# Patient Record
Sex: FEMALE | Race: WHITE | NOT HISPANIC OR LATINO | Employment: FULL TIME | URBAN - METROPOLITAN AREA
[De-identification: names, ages, dates, MRNs, and addresses within clinical notes are randomized per-mention and may not be internally consistent; named-entity substitution may affect disease eponyms.]

---

## 2017-02-25 ENCOUNTER — PATIENT MESSAGE (OUTPATIENT)
Dept: INTERNAL MEDICINE | Facility: CLINIC | Age: 37
End: 2017-02-25

## 2017-02-25 ENCOUNTER — PATIENT MESSAGE (OUTPATIENT)
Dept: OBSTETRICS AND GYNECOLOGY | Facility: CLINIC | Age: 37
End: 2017-02-25

## 2017-02-27 ENCOUNTER — PATIENT MESSAGE (OUTPATIENT)
Dept: OBSTETRICS AND GYNECOLOGY | Facility: CLINIC | Age: 37
End: 2017-02-27

## 2018-06-01 ENCOUNTER — OFFICE VISIT (OUTPATIENT)
Dept: FAMILY MEDICINE | Facility: CLINIC | Age: 38
End: 2018-06-01
Payer: COMMERCIAL

## 2018-06-01 VITALS
BODY MASS INDEX: 34.61 KG/M2 | OXYGEN SATURATION: 98 % | HEART RATE: 74 BPM | DIASTOLIC BLOOD PRESSURE: 72 MMHG | HEIGHT: 63 IN | SYSTOLIC BLOOD PRESSURE: 108 MMHG | WEIGHT: 195.31 LBS

## 2018-06-01 DIAGNOSIS — R07.9 CHEST PAIN, UNSPECIFIED TYPE: ICD-10-CM

## 2018-06-01 DIAGNOSIS — Z00.00 ROUTINE PHYSICAL EXAMINATION: Primary | ICD-10-CM

## 2018-06-01 DIAGNOSIS — F41.8 DEPRESSION WITH ANXIETY: ICD-10-CM

## 2018-06-01 PROCEDURE — 99999 PR PBB SHADOW E&M-EST. PATIENT-LVL III: CPT | Mod: PBBFAC,,, | Performed by: INTERNAL MEDICINE

## 2018-06-01 PROCEDURE — 99385 PREV VISIT NEW AGE 18-39: CPT | Mod: S$GLB,,, | Performed by: INTERNAL MEDICINE

## 2018-06-01 RX ORDER — CITALOPRAM 20 MG/1
20 TABLET, FILM COATED ORAL DAILY
Qty: 30 TABLET | Refills: 11 | Status: SHIPPED | OUTPATIENT
Start: 2018-06-01 | End: 2019-06-01

## 2018-06-01 RX ORDER — DIAZEPAM 2 MG/1
2 TABLET ORAL DAILY PRN
Qty: 30 TABLET | Refills: 1 | Status: SHIPPED | OUTPATIENT
Start: 2018-06-01 | End: 2019-10-10

## 2018-06-01 NOTE — PROGRESS NOTES
"Subjective:       Patient ID: Eugenie Melendrez is a 37 y.o. female.    Chief Complaint: Annual Exam    Here for routine health maintenance.  New patient to me.   Complains of 7/10 left center chest pain that radiates to her left arm intermittent for the past 1 week.  She was admitted to [facility].  Her cardiac enzymes and CT of her chest were normal.  She had a stress test but I can not see and she does not know the result.  She was DC and told to f/u w her PCP.  It occurs at rest and with exertion since 5 days ago (normall ok with this level of exertion).  It is associated with fatigue - she had to stop her [employer] training when it happened.  It is worse when she lays flat but goes away later in the night.  No palpitations but she felt like her "head was swimming."  She was DC after stress test. She has walked around with no issues since.  No cp today.   Mood good except with her menstrual period, but per pt her fiance states her moods have been "outrageous.:"  She does not feel stressed, but others have stated she is.  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           Review of Systems   Constitutional: Negative for appetite change and fever.   HENT: Negative for nosebleeds and trouble swallowing.    Eyes: Negative for discharge and visual disturbance.   Respiratory: Positive for chest tightness. Negative for choking and shortness of breath.    Cardiovascular: Negative for chest pain and palpitations.   Gastrointestinal: Negative for abdominal pain, nausea and vomiting.   Musculoskeletal: Negative for arthralgias and joint swelling.   Skin: Negative for rash and wound.   Neurological: Negative for dizziness and syncope.   Psychiatric/Behavioral: Negative for confusion and dysphoric mood.       Objective:      Vitals:    06/01/18 1226   BP: 108/72   Pulse: 74     Physical Exam   Constitutional: She " appears well-nourished.   Eyes: Conjunctivae and EOM are normal.   Neck: Trachea normal and normal range of motion. No thyromegaly present.   Cardiovascular: Normal heart sounds.    Edema negative   Pulmonary/Chest: Effort normal and breath sounds normal.   Abdominal: Soft. There is no hepatomegaly.   Musculoskeletal:   ROM normal bilateral  Strength normal bilateral   Neurological: She has normal reflexes. No cranial nerve deficit.   Skin: Skin is warm, dry and intact.   Psychiatric: She has a normal mood and affect.   Alert and Oriented    Vitals reviewed.        Assessment:       1. Routine physical examination    2. Chest pain, unspecified type    3. Depression with anxiety        Plan:       Routine physical examination    Chest pain, unspecified type  -     diazePAM (VALIUM) 2 MG tablet; Take 1 tablet (2 mg total) by mouth daily as needed for Anxiety.  Dispense: 30 tablet; Refill: 1  -     citalopram (CELEXA) 20 MG tablet; Take 1 tablet (20 mg total) by mouth once daily.  Dispense: 30 tablet; Refill: 11    Depression with anxiety  -     diazePAM (VALIUM) 2 MG tablet; Take 1 tablet (2 mg total) by mouth daily as needed for Anxiety.  Dispense: 30 tablet; Refill: 1  -     citalopram (CELEXA) 20 MG tablet; Take 1 tablet (20 mg total) by mouth once daily.  Dispense: 30 tablet; Refill: 11            Medication List with Changes/Refills   Discontinued Medications    CITALOPRAM (CELEXA) 20 MG TABLET    Take 1 tablet (20 mg total) by mouth once daily.    DICLOFENAC SODIUM 1 % GEL    Apply 2 g topically 4 (four) times daily.     Wellness reviewed  Likely stress induced.  Continue current management    Counseled on regular exercise, maintenance of a healthy weight, balanced diet rich in fruits/vegetables and lean protein, and avoidance of unhealthy habits like smoking and excessive alcohol intake.   Also, counseled on importance of being compliant with medication, health appointments, diet and exercise.     Follow-up in  "about 1 year (around 6/1/2019). call Monday if stress test wnl and no exertional cp/ valium improved = clear for     "This note will not be shared with the patient."  "

## 2018-06-04 ENCOUNTER — PATIENT MESSAGE (OUTPATIENT)
Dept: FAMILY MEDICINE | Facility: CLINIC | Age: 38
End: 2018-06-04

## 2018-07-24 ENCOUNTER — OFFICE VISIT (OUTPATIENT)
Dept: OBSTETRICS AND GYNECOLOGY | Facility: CLINIC | Age: 38
End: 2018-07-24
Payer: COMMERCIAL

## 2018-07-24 VITALS
RESPIRATION RATE: 15 BRPM | DIASTOLIC BLOOD PRESSURE: 78 MMHG | SYSTOLIC BLOOD PRESSURE: 126 MMHG | WEIGHT: 191.38 LBS | BODY MASS INDEX: 33.91 KG/M2 | HEIGHT: 63 IN

## 2018-07-24 DIAGNOSIS — D50.0 IRON DEFICIENCY ANEMIA DUE TO CHRONIC BLOOD LOSS: ICD-10-CM

## 2018-07-24 DIAGNOSIS — N94.6 DYSMENORRHEA: ICD-10-CM

## 2018-07-24 DIAGNOSIS — Z12.4 ENCOUNTER FOR PAP SMEAR OF CERVIX WITH HPV DNA COTESTING: Primary | ICD-10-CM

## 2018-07-24 DIAGNOSIS — Z86.718 PERSONAL HISTORY OF DVT (DEEP VEIN THROMBOSIS): ICD-10-CM

## 2018-07-24 DIAGNOSIS — N92.0 MENORRHAGIA WITH REGULAR CYCLE: ICD-10-CM

## 2018-07-24 PROCEDURE — 99999 PR PBB SHADOW E&M-EST. PATIENT-LVL III: CPT | Mod: PBBFAC,,, | Performed by: OBSTETRICS & GYNECOLOGY

## 2018-07-24 PROCEDURE — 99395 PREV VISIT EST AGE 18-39: CPT | Mod: S$GLB,,, | Performed by: OBSTETRICS & GYNECOLOGY

## 2018-07-24 PROCEDURE — 88175 CYTOPATH C/V AUTO FLUID REDO: CPT

## 2018-07-24 PROCEDURE — 87624 HPV HI-RISK TYP POOLED RSLT: CPT

## 2018-07-24 NOTE — PROGRESS NOTES
Chief Complaint   Patient presents with    Well Woman    Dysmenorrhea     with cycle       History of Present Illness: Eugenie Melendrez is a 37 y.o. female that presents today 2018 for well gyn visit. She reports painful heavy first day of the cycle. She reports changing tampon and pad together and changing every 1 hour.       Past Medical History:   Diagnosis Date    Clotting disorder     DVT (deep venous thrombosis) 3/07    was felt to be due to ? Factor V Leiden    Hyperthyroidism 2013    Iron deficiency anemia due to chronic blood loss 2015       Past Surgical History:   Procedure Laterality Date    2 R knee sx; 1 L knee      BRONCHOSCOPY       SECTION  7/30/15    KNEE ARTHROSCOPY      KNEE SURGERY      x3    TUBAL LIGATION  7/30/15       Current Outpatient Prescriptions   Medication Sig Dispense Refill    citalopram (CELEXA) 20 MG tablet Take 1 tablet (20 mg total) by mouth once daily. 30 tablet 11    diazePAM (VALIUM) 2 MG tablet Take 1 tablet (2 mg total) by mouth daily as needed for Anxiety. 30 tablet 1     No current facility-administered medications for this visit.        Review of patient's allergies indicates:   Allergen Reactions    Augmentin [amoxicillin-pot clavulanate] Swelling       Family History   Problem Relation Age of Onset    Breast cancer Maternal Grandmother     Anuerysm Father     Cancer Father         skin    Deep vein thrombosis Father     Hypertension Father     Fibromyalgia Mother     Depression Mother     Anuerysm Paternal Grandfather         AAA    Bipolar disorder Sister     Heart disease Brother     Anuerysm Paternal Aunt         AAA    Diabetes Paternal Grandmother     Ovarian cancer Neg Hx        Social History     Social History    Marital status:      Spouse name: N/A    Number of children: 2    Years of education: N/A     Social History Main Topics    Smoking status: Current Every Day Smoker     Packs/day: 3.00      "Years: 10.00    Smokeless tobacco: Never Used    Alcohol use 1.2 oz/week     2 Glasses of wine per week    Drug use: No    Sexual activity: Yes     Partners: Male     Birth control/ protection: None     Other Topics Concern    None     Social History Narrative    None       OB History    Para Term  AB Living   3 3 2 1   3   SAB TAB Ectopic Multiple Live Births           3      # Outcome Date GA Lbr [place]/2nd Weight Sex Delivery Anes PTL Lv   3 Term 07/30/15 39w0d  3.728 kg (8 lb 3.5 oz) F CS-LTranv EPI N JANNET   2 Term 12/15/12 37w0d  3.204 kg (7 lb 1 oz) F Vag-Spont EPI N JANNET      Birth Comments: System Generated. Please review and update pregnancy details.   1  10/06/08 25w0d  0.822 kg (1 lb 13 oz) F Vag-Spont None Y JANNET      Birth Comments: delivered at 25 IUP, PPROM on a friday with labor on monday.          Review of Symptoms:  GENERAL: Denies weight gain or weight loss. Feeling well overall.   SKIN: Denies rash or lesions.   HEAD: Denies head injury or headache.   NODES: Denies enlarged lymph nodes.   CHEST: Denies chest pain or shortness of breath.   CARDIOVASCULAR: Denies palpitations or left sided chest pain.   ABDOMEN: No abdominal pain, constipation, diarrhea, nausea, vomiting or rectal bleeding.   URINARY: No frequency, dysuria, hematuria, or burning on urination.  HEMATOLOGIC: No easy bruisability or excessive bleeding.   MUSCULOSKELETAL: Denies joint pain or swelling.     /78   Resp 15   Ht 5' 3" (1.6 m)   Wt 86.8 kg (191 lb 5.8 oz)   LMP 2018 (Approximate)   Physical Exam:  APPEARANCE: Well nourished, well developed, in no acute distress.  SKIN: Normal skin turgor, no lesions.  NECK: Neck symmetric without masses   RESPIRATORY: Normal respiratory effort with no retractions or use of accessory muscles  CARDIOVASCULAR: Peripheral vascular system with no swelling no varicosities and palpation of pulses normal  LYMPHATIC: No enlargements of the lymph nodes noted in " the neck, axillae, or groin  ABDOMEN: Soft. No tenderness or masses. No hepatosplenomegaly. No hernias.  BREASTS: Symmetrical, no skin changes or visible lesions. No palpable masses, nipple discharge or adenopathy bilaterally.  PELVIC: Normal external female genitalia without lesions. Normal hair distribution. Adequate perineal body, normal urethral meatus. Urethra with no masses.  Bladder nontender. Vagina moist and well rugated without lesions or discharge. Cervix pink and without lesions. No significant cystocele or rectocele. Bimanual exam showed uterus normal size, shape, position, mobile and nontender. Adnexa without masses or tenderness. Urethra and bladder normal.   EXTREMITIES: No clubbing cyanosis or edema.    ASSESSMENT/PLAN:  Encounter for Pap smear of cervix with HPV DNA cotesting  -     Liquid-based pap smear, screening  -     HPV High Risk Genotypes, PCR    Personal history of DVT (deep vein thrombosis)    Menorrhagia with regular cycle          Patient was counseled today on Pap guidelines, recommendation for pelvic exams, mammograms every other year after the age of 40 and annually after the age of 50, Colonoscopy after the age of 50, Dexa Bone Scan and calcium and vitamin D supplementation in menopause and to see her PCP for other health maintenance.   FOLLOW-UP:prn

## 2018-07-30 LAB
HPV HR 12 DNA CVX QL NAA+PROBE: NEGATIVE
HPV16 AG SPEC QL: NEGATIVE
HPV18 DNA SPEC QL NAA+PROBE: NEGATIVE

## 2018-07-31 ENCOUNTER — LAB VISIT (OUTPATIENT)
Dept: LAB | Facility: HOSPITAL | Age: 38
End: 2018-07-31
Attending: OBSTETRICS & GYNECOLOGY
Payer: COMMERCIAL

## 2018-07-31 ENCOUNTER — PATIENT MESSAGE (OUTPATIENT)
Dept: OBSTETRICS AND GYNECOLOGY | Facility: CLINIC | Age: 38
End: 2018-07-31

## 2018-07-31 DIAGNOSIS — N92.0 MENORRHAGIA WITH REGULAR CYCLE: ICD-10-CM

## 2018-07-31 LAB
BASOPHILS # BLD AUTO: 0.05 K/UL
BASOPHILS NFR BLD: 0.9 %
DIFFERENTIAL METHOD: ABNORMAL
EOSINOPHIL # BLD AUTO: 0.1 K/UL
EOSINOPHIL NFR BLD: 1.4 %
ERYTHROCYTE [DISTWIDTH] IN BLOOD BY AUTOMATED COUNT: 12.9 %
HCT VFR BLD AUTO: 43.6 %
HGB BLD-MCNC: 13.7 G/DL
IMM GRANULOCYTES # BLD AUTO: 0.01 K/UL
IMM GRANULOCYTES NFR BLD AUTO: 0.2 %
LYMPHOCYTES # BLD AUTO: 1.4 K/UL
LYMPHOCYTES NFR BLD: 25.5 %
MCH RBC QN AUTO: 29 PG
MCHC RBC AUTO-ENTMCNC: 31.4 G/DL
MCV RBC AUTO: 92 FL
MONOCYTES # BLD AUTO: 0.4 K/UL
MONOCYTES NFR BLD: 7.7 %
NEUTROPHILS # BLD AUTO: 3.6 K/UL
NEUTROPHILS NFR BLD: 64.3 %
NRBC BLD-RTO: 0 /100 WBC
PLATELET # BLD AUTO: 290 K/UL
PMV BLD AUTO: 10 FL
RBC # BLD AUTO: 4.73 M/UL
TSH SERPL DL<=0.005 MIU/L-ACNC: 0.71 UIU/ML
WBC # BLD AUTO: 5.56 K/UL

## 2018-07-31 PROCEDURE — 85025 COMPLETE CBC W/AUTO DIFF WBC: CPT

## 2018-07-31 PROCEDURE — 84443 ASSAY THYROID STIM HORMONE: CPT

## 2018-07-31 PROCEDURE — 36415 COLL VENOUS BLD VENIPUNCTURE: CPT | Mod: PO

## 2018-08-08 ENCOUNTER — TELEPHONE (OUTPATIENT)
Dept: OBSTETRICS AND GYNECOLOGY | Facility: CLINIC | Age: 38
End: 2018-08-08

## 2018-08-08 NOTE — TELEPHONE ENCOUNTER
Spoke with patient she states per Dr Pino that she will hold off on scheduling ablation until after her pelvic US on 8/13/18.

## 2018-08-13 ENCOUNTER — HOSPITAL ENCOUNTER (OUTPATIENT)
Dept: RADIOLOGY | Facility: HOSPITAL | Age: 38
Discharge: HOME | End: 2018-08-13
Attending: OBSTETRICS & GYNECOLOGY
Payer: COMMERCIAL

## 2018-08-13 DIAGNOSIS — N92.0 MENORRHAGIA WITH REGULAR CYCLE: ICD-10-CM

## 2018-08-13 PROCEDURE — 76830 TRANSVAGINAL US NON-OB: CPT | Mod: 26,,, | Performed by: RADIOLOGY

## 2018-08-13 PROCEDURE — 76856 US EXAM PELVIC COMPLETE: CPT | Mod: TC,PN

## 2018-08-13 PROCEDURE — 76856 US EXAM PELVIC COMPLETE: CPT | Mod: 26,,, | Performed by: RADIOLOGY

## 2018-08-14 ENCOUNTER — PATIENT MESSAGE (OUTPATIENT)
Dept: OBSTETRICS AND GYNECOLOGY | Facility: CLINIC | Age: 38
End: 2018-08-14

## 2018-09-08 ENCOUNTER — PATIENT MESSAGE (OUTPATIENT)
Dept: OBSTETRICS AND GYNECOLOGY | Facility: CLINIC | Age: 38
End: 2018-09-08

## 2018-11-07 ENCOUNTER — PATIENT MESSAGE (OUTPATIENT)
Dept: FAMILY MEDICINE | Facility: CLINIC | Age: 38
End: 2018-11-07

## 2018-11-09 ENCOUNTER — HOSPITAL ENCOUNTER (OUTPATIENT)
Dept: RADIOLOGY | Facility: HOSPITAL | Age: 38
Discharge: HOME | End: 2018-11-09
Attending: NURSE PRACTITIONER
Payer: COMMERCIAL

## 2018-11-09 ENCOUNTER — OFFICE VISIT (OUTPATIENT)
Dept: FAMILY MEDICINE | Facility: CLINIC | Age: 38
End: 2018-11-09
Payer: COMMERCIAL

## 2018-11-09 VITALS
SYSTOLIC BLOOD PRESSURE: 120 MMHG | HEART RATE: 79 BPM | HEIGHT: 63 IN | DIASTOLIC BLOOD PRESSURE: 72 MMHG | TEMPERATURE: 98 F | BODY MASS INDEX: 34.1 KG/M2 | OXYGEN SATURATION: 98 % | WEIGHT: 192.44 LBS

## 2018-11-09 DIAGNOSIS — M79.89 PAIN AND SWELLING OF LOWER EXTREMITY, UNSPECIFIED LATERALITY: ICD-10-CM

## 2018-11-09 DIAGNOSIS — Z86.718 HISTORY OF DVT (DEEP VEIN THROMBOSIS): ICD-10-CM

## 2018-11-09 DIAGNOSIS — M79.604 PAIN IN BOTH LOWER EXTREMITIES: Primary | ICD-10-CM

## 2018-11-09 DIAGNOSIS — M79.606 PAIN AND SWELLING OF LOWER EXTREMITY, UNSPECIFIED LATERALITY: ICD-10-CM

## 2018-11-09 DIAGNOSIS — M79.605 PAIN IN BOTH LOWER EXTREMITIES: Primary | ICD-10-CM

## 2018-11-09 PROCEDURE — 96372 THER/PROPH/DIAG INJ SC/IM: CPT | Mod: S$GLB,,, | Performed by: NURSE PRACTITIONER

## 2018-11-09 PROCEDURE — 99214 OFFICE O/P EST MOD 30 MIN: CPT | Mod: 25,S$GLB,, | Performed by: NURSE PRACTITIONER

## 2018-11-09 PROCEDURE — 3008F BODY MASS INDEX DOCD: CPT | Mod: CPTII,S$GLB,, | Performed by: NURSE PRACTITIONER

## 2018-11-09 PROCEDURE — 93970 EXTREMITY STUDY: CPT | Mod: TC,PO

## 2018-11-09 PROCEDURE — 99999 PR PBB SHADOW E&M-EST. PATIENT-LVL III: CPT | Mod: PBBFAC,,, | Performed by: NURSE PRACTITIONER

## 2018-11-09 PROCEDURE — 93970 EXTREMITY STUDY: CPT | Mod: 26,,, | Performed by: RADIOLOGY

## 2018-11-09 RX ORDER — KETOROLAC TROMETHAMINE 30 MG/ML
30 INJECTION, SOLUTION INTRAMUSCULAR; INTRAVENOUS
Status: COMPLETED | OUTPATIENT
Start: 2018-11-09 | End: 2018-11-09

## 2018-11-09 RX ORDER — PREDNISONE 10 MG/1
TABLET ORAL
Qty: 20 TABLET | Refills: 0 | Status: SHIPPED | OUTPATIENT
Start: 2018-11-09 | End: 2018-12-11

## 2018-11-09 RX ORDER — TIZANIDINE 4 MG/1
4 TABLET ORAL NIGHTLY PRN
Qty: 10 TABLET | Refills: 0 | Status: SHIPPED | OUTPATIENT
Start: 2018-11-09 | End: 2018-11-19

## 2018-11-09 RX ADMIN — KETOROLAC TROMETHAMINE 30 MG: 30 INJECTION, SOLUTION INTRAMUSCULAR; INTRAVENOUS at 01:11

## 2018-11-09 NOTE — PROGRESS NOTES
Subjective:       Patient ID: Eugenie Melendrez is a 38 y.o. female.    Chief Complaint: Leg Pain and Tetnus Shot Needed    Ms. Melendrez is a new patient to me. She presents today for leg pain     HPI   Gradually worsening over the last 1-2 months. Calves, bilaterally, occurs at night while laying in bed resting. Personal history of DVT--symptoms not similar to previous episode. Denies injury/trauma. Has tried otc NSAIDs without relief. Without pain on exertion. Drinks plenty of water. Denies otc supplements  Vitals:    11/09/18 1321   BP: 120/72   Pulse: 79   Temp: 98.2 °F (36.8 °C)     Review of Systems   Constitutional: Negative for diaphoresis and fever.   HENT: Negative for facial swelling and trouble swallowing.    Eyes: Negative for discharge and redness.   Respiratory: Negative for cough and shortness of breath.    Cardiovascular: Negative for chest pain and palpitations.   Gastrointestinal: Negative for abdominal pain and diarrhea.   Genitourinary: Negative for difficulty urinating.   Musculoskeletal: Positive for myalgias. Negative for gait problem.   Skin: Negative for rash and wound.   Neurological: Negative for facial asymmetry and speech difficulty.   Psychiatric/Behavioral: Negative for confusion. The patient is not nervous/anxious.        Past Medical History:   Diagnosis Date    Clotting disorder     DVT (deep venous thrombosis) 3/07    was felt to be due to ? Factor V Leiden    Hyperthyroidism 7/8/2013    Iron deficiency anemia due to chronic blood loss 12/1/2015     Objective:      Physical Exam   Constitutional: She is oriented to person, place, and time. She does not have a sickly appearance. No distress.   HENT:   Head: Normocephalic.   Right Ear: Hearing normal.   Left Ear: Hearing normal.   Nose: Nose normal.   Eyes: Conjunctivae and lids are normal.   Neck: No JVD present. No tracheal deviation present.   Cardiovascular: Normal rate.   dony nonpitting LE edema; without hyperpigmentation     Pulmonary/Chest: Effort normal.   Musculoskeletal: She exhibits no deformity.        Right lower leg: She exhibits tenderness.        Left lower leg: She exhibits tenderness.   Neurological: She is alert and oriented to person, place, and time.   Skin: She is not diaphoretic. No pallor.   Psychiatric: She has a normal mood and affect. Her speech is normal and behavior is normal. Judgment and thought content normal. Cognition and memory are normal.   Nursing note and vitals reviewed.      Assessment:       1. Pain in both lower extremities    2. History of DVT (deep vein thrombosis)    3. Pain and swelling of lower extremity, unspecified laterality        Plan:       Pain in both lower extremities  -     Comprehensive metabolic panel; Future; Expected date: 11/09/2018  -     Sedimentation rate; Future; Expected date: 11/09/2018  -     C-reactive protein; Future; Expected date: 11/09/2018  -     CK; Future; Expected date: 11/09/2018  -     ketorolac injection 30 mg  -     predniSONE (DELTASONE) 10 MG tablet; Take 4 tabs daily for 2 days then 3 tabs daily for 2 days then 2 tabs daily for 2 days then 1 tab daily for 2 days then stop  Dispense: 20 tablet; Refill: 0  -     tiZANidine (ZANAFLEX) 4 MG tablet; Take 1 tablet (4 mg total) by mouth nightly as needed (muscle spasms/pain).  Dispense: 10 tablet; Refill: 0    History of DVT (deep vein thrombosis)  -     US Lower Extremity Veins Bilateral; Future; Expected date: 11/09/2018    Pain and swelling of lower extremity, unspecified laterality  -      Lower Extremity Veins Bilateral; Future; Expected date: 11/09/2018          Follow-up for further evaluation if s/s worsen, fail to improve, or new symptoms arise.       Medication List           Accurate as of 11/9/18  1:54 PM. If you have any questions, ask your nurse or doctor.               START taking these medications    predniSONE 10 MG tablet  Commonly known as:  DELTASONE  Take 4 tabs daily for 2 days then 3 tabs  daily for 2 days then 2 tabs daily for 2 days then 1 tab daily for 2 days then stop  Started by:  Arlene Gomez NP     tiZANidine 4 MG tablet  Commonly known as:  ZANAFLEX  Take 1 tablet (4 mg total) by mouth nightly as needed (muscle spasms/pain).  Started by:  Arlene Gomez NP        CONTINUE taking these medications    citalopram 20 MG tablet  Commonly known as:  CELEXA  Take 1 tablet (20 mg total) by mouth once daily.     diazePAM 2 MG tablet  Commonly known as:  VALIUM  Take 1 tablet (2 mg total) by mouth daily as needed for Anxiety.           Where to Get Your Medications      These medications were sent to [facility]/pharmacy #[id number] - HILDA Millard  [address] [facility] [address] [address]    Phone:  [phone]   · predniSONE 10 MG tablet  · tiZANidine 4 MG tablet

## 2018-11-10 ENCOUNTER — PATIENT MESSAGE (OUTPATIENT)
Dept: FAMILY MEDICINE | Facility: CLINIC | Age: 38
End: 2018-11-10

## 2018-11-12 ENCOUNTER — PATIENT MESSAGE (OUTPATIENT)
Dept: FAMILY MEDICINE | Facility: CLINIC | Age: 38
End: 2018-11-12

## 2018-11-12 DIAGNOSIS — M79.89 PAIN AND SWELLING OF LOWER EXTREMITY, UNSPECIFIED LATERALITY: Primary | ICD-10-CM

## 2018-11-12 DIAGNOSIS — M79.606 PAIN AND SWELLING OF LOWER EXTREMITY, UNSPECIFIED LATERALITY: Primary | ICD-10-CM

## 2018-11-12 DIAGNOSIS — Z13.220 SCREENING, LIPID: ICD-10-CM

## 2018-11-20 ENCOUNTER — PATIENT MESSAGE (OUTPATIENT)
Dept: FAMILY MEDICINE | Facility: CLINIC | Age: 38
End: 2018-11-20

## 2018-11-21 ENCOUNTER — HOSPITAL ENCOUNTER (OUTPATIENT)
Dept: RADIOLOGY | Facility: HOSPITAL | Age: 38
Discharge: HOME | End: 2018-11-21
Attending: NURSE PRACTITIONER
Payer: COMMERCIAL

## 2018-11-21 ENCOUNTER — PATIENT MESSAGE (OUTPATIENT)
Dept: FAMILY MEDICINE | Facility: CLINIC | Age: 38
End: 2018-11-21

## 2018-11-21 DIAGNOSIS — M79.89 PAIN AND SWELLING OF LOWER EXTREMITY, UNSPECIFIED LATERALITY: ICD-10-CM

## 2018-11-21 DIAGNOSIS — M79.606 PAIN AND SWELLING OF LOWER EXTREMITY, UNSPECIFIED LATERALITY: ICD-10-CM

## 2018-11-21 PROCEDURE — 93922 UPR/L XTREMITY ART 2 LEVELS: CPT | Mod: 26,,, | Performed by: RADIOLOGY

## 2018-11-21 PROCEDURE — 93922 UPR/L XTREMITY ART 2 LEVELS: CPT | Mod: TC,PO

## 2018-11-21 PROCEDURE — 93925 LOWER EXTREMITY STUDY: CPT | Mod: 26,,, | Performed by: RADIOLOGY

## 2018-11-24 ENCOUNTER — LAB VISIT (OUTPATIENT)
Dept: LAB | Facility: HOSPITAL | Age: 38
End: 2018-11-24
Attending: INTERNAL MEDICINE
Payer: COMMERCIAL

## 2018-11-24 DIAGNOSIS — M79.606 PAIN AND SWELLING OF LOWER EXTREMITY, UNSPECIFIED LATERALITY: ICD-10-CM

## 2018-11-24 DIAGNOSIS — Z13.220 SCREENING, LIPID: ICD-10-CM

## 2018-11-24 DIAGNOSIS — M79.89 PAIN AND SWELLING OF LOWER EXTREMITY, UNSPECIFIED LATERALITY: ICD-10-CM

## 2018-11-24 LAB
CHOLEST SERPL-MCNC: 144 MG/DL
CHOLEST/HDLC SERPL: 3.5 {RATIO}
HDLC SERPL-MCNC: 41 MG/DL
HDLC SERPL: 28.5 %
LDLC SERPL CALC-MCNC: 87.2 MG/DL
NONHDLC SERPL-MCNC: 103 MG/DL
TRIGL SERPL-MCNC: 79 MG/DL

## 2018-11-24 PROCEDURE — 36415 COLL VENOUS BLD VENIPUNCTURE: CPT | Mod: PO

## 2018-11-24 PROCEDURE — 80061 LIPID PANEL: CPT

## 2018-11-26 ENCOUNTER — LAB VISIT (OUTPATIENT)
Dept: LAB | Facility: HOSPITAL | Age: 38
End: 2018-11-26
Attending: NURSE PRACTITIONER
Payer: COMMERCIAL

## 2018-11-26 DIAGNOSIS — M79.89 LEG SWELLING: ICD-10-CM

## 2018-11-26 DIAGNOSIS — M79.89 LEG SWELLING: Primary | ICD-10-CM

## 2018-11-26 LAB — BNP SERPL-MCNC: 21 PG/ML

## 2018-11-26 PROCEDURE — 36415 COLL VENOUS BLD VENIPUNCTURE: CPT | Mod: PO

## 2018-11-26 PROCEDURE — 83880 ASSAY OF NATRIURETIC PEPTIDE: CPT

## 2018-11-27 DIAGNOSIS — M79.89 LEG SWELLING: Primary | ICD-10-CM

## 2018-12-11 ENCOUNTER — OFFICE VISIT (OUTPATIENT)
Dept: PODIATRY | Facility: CLINIC | Age: 38
End: 2018-12-11
Payer: COMMERCIAL

## 2018-12-11 VITALS
HEART RATE: 83 BPM | DIASTOLIC BLOOD PRESSURE: 75 MMHG | TEMPERATURE: 98 F | WEIGHT: 192 LBS | OXYGEN SATURATION: 97 % | HEIGHT: 63 IN | SYSTOLIC BLOOD PRESSURE: 108 MMHG | BODY MASS INDEX: 34.02 KG/M2

## 2018-12-11 DIAGNOSIS — R60.0 LOWER EXTREMITY EDEMA: Primary | ICD-10-CM

## 2018-12-11 PROCEDURE — 99203 OFFICE O/P NEW LOW 30 MIN: CPT | Mod: S$GLB,,, | Performed by: PODIATRIST

## 2018-12-11 PROCEDURE — 3008F BODY MASS INDEX DOCD: CPT | Mod: CPTII,S$GLB,, | Performed by: PODIATRIST

## 2018-12-11 PROCEDURE — 99999 PR PBB SHADOW E&M-EST. PATIENT-LVL III: CPT | Mod: PBBFAC,,, | Performed by: PODIATRIST

## 2018-12-11 NOTE — LETTER
December 20, 2018      Arlene Gomez, MEGAN  [address] [facility] [address] [place] [address]           [facility]  [address] [facility] [address]  Phone: [phone]  Fax: [phone]          Patient: Eugenie Melendrez   MR Number: [id number]   YOB: 1980   Date of Visit: 12/11/2018       Dear Arlene Gomez:    Thank you for referring Eugenie Melendrez to me for evaluation. Attached you will find relevant portions of my assessment and plan of care.    If you have questions, please do not hesitate to call me. I look forward to following Eugenie Melendrez along with you.    Sincerely,    Swapna Hernandez DPM    Enclosure  CC:  No Recipients    If you would like to receive this communication electronically, please contact [email or web address] or [phone] to request more information on [facility] access.    For providers and/or their staff who would like to refer a patient to [facility], please contact us through our one-stop-shop provider referral line, [facility] , at [phone].    If you feel you have received this communication in error or would no longer like to receive these types of communications, please e-mail [email or web address]

## 2019-05-28 ENCOUNTER — PATIENT MESSAGE (OUTPATIENT)
Dept: FAMILY MEDICINE | Facility: CLINIC | Age: 39
End: 2019-05-28

## 2019-05-28 ENCOUNTER — OFFICE VISIT (OUTPATIENT)
Dept: FAMILY MEDICINE | Facility: CLINIC | Age: 39
End: 2019-05-28
Payer: COMMERCIAL

## 2019-05-28 VITALS
OXYGEN SATURATION: 97 % | SYSTOLIC BLOOD PRESSURE: 114 MMHG | BODY MASS INDEX: 34.53 KG/M2 | HEIGHT: 63 IN | DIASTOLIC BLOOD PRESSURE: 62 MMHG | RESPIRATION RATE: 14 BRPM | HEART RATE: 55 BPM | WEIGHT: 194.88 LBS | TEMPERATURE: 99 F

## 2019-05-28 DIAGNOSIS — N39.0 URINARY TRACT INFECTION WITH HEMATURIA, SITE UNSPECIFIED: Primary | ICD-10-CM

## 2019-05-28 DIAGNOSIS — J02.9 PHARYNGITIS, UNSPECIFIED ETIOLOGY: ICD-10-CM

## 2019-05-28 DIAGNOSIS — R31.9 URINARY TRACT INFECTION WITH HEMATURIA, SITE UNSPECIFIED: Primary | ICD-10-CM

## 2019-05-28 DIAGNOSIS — L29.9 PRURITUS: ICD-10-CM

## 2019-05-28 LAB
BILIRUB SERPL-MCNC: NORMAL MG/DL
BLOOD URINE, POC: NORMAL
COLOR, POC UA: NORMAL
CTP QC/QA: YES
GLUCOSE UR QL STRIP: NORMAL
KETONES UR QL STRIP: NEGATIVE
LEUKOCYTE ESTERASE URINE, POC: NORMAL
NITRITE, POC UA: POSITIVE
PH, POC UA: 5
PROTEIN, POC: NORMAL
S PYO RRNA THROAT QL PROBE: NEGATIVE
SPECIFIC GRAVITY, POC UA: 1.02
UROBILINOGEN, POC UA: NORMAL

## 2019-05-28 PROCEDURE — 87880 STREP A ASSAY W/OPTIC: CPT | Mod: QW,S$GLB,, | Performed by: NURSE PRACTITIONER

## 2019-05-28 PROCEDURE — 81002 POCT URINE DIPSTICK WITHOUT MICROSCOPE: ICD-10-PCS | Mod: S$GLB,,, | Performed by: NURSE PRACTITIONER

## 2019-05-28 PROCEDURE — 99214 OFFICE O/P EST MOD 30 MIN: CPT | Mod: S$GLB,,, | Performed by: NURSE PRACTITIONER

## 2019-05-28 PROCEDURE — 87186 SC STD MICRODIL/AGAR DIL: CPT

## 2019-05-28 PROCEDURE — 99214 PR OFFICE/OUTPT VISIT, EST, LEVL IV, 30-39 MIN: ICD-10-PCS | Mod: S$GLB,,, | Performed by: NURSE PRACTITIONER

## 2019-05-28 PROCEDURE — 3008F BODY MASS INDEX DOCD: CPT | Mod: CPTII,S$GLB,, | Performed by: NURSE PRACTITIONER

## 2019-05-28 PROCEDURE — 99999 PR PBB SHADOW E&M-EST. PATIENT-LVL IV: ICD-10-PCS | Mod: PBBFAC,,, | Performed by: NURSE PRACTITIONER

## 2019-05-28 PROCEDURE — 81002 URINALYSIS NONAUTO W/O SCOPE: CPT | Mod: S$GLB,,, | Performed by: NURSE PRACTITIONER

## 2019-05-28 PROCEDURE — 87880 POCT RAPID STREP A: ICD-10-PCS | Mod: QW,S$GLB,, | Performed by: NURSE PRACTITIONER

## 2019-05-28 PROCEDURE — 99999 PR PBB SHADOW E&M-EST. PATIENT-LVL IV: CPT | Mod: PBBFAC,,, | Performed by: NURSE PRACTITIONER

## 2019-05-28 PROCEDURE — 87088 URINE BACTERIA CULTURE: CPT

## 2019-05-28 PROCEDURE — 87070 CULTURE OTHR SPECIMN AEROBIC: CPT

## 2019-05-28 PROCEDURE — 87086 URINE CULTURE/COLONY COUNT: CPT

## 2019-05-28 PROCEDURE — 3008F PR BODY MASS INDEX (BMI) DOCUMENTED: ICD-10-PCS | Mod: CPTII,S$GLB,, | Performed by: NURSE PRACTITIONER

## 2019-05-28 PROCEDURE — 87077 CULTURE AEROBIC IDENTIFY: CPT

## 2019-05-28 RX ORDER — DOXYCYCLINE HYCLATE 100 MG
100 TABLET ORAL EVERY 12 HOURS
Qty: 14 TABLET | Refills: 0 | Status: SHIPPED | OUTPATIENT
Start: 2019-05-28 | End: 2019-05-30 | Stop reason: ALTCHOICE

## 2019-05-28 NOTE — PROGRESS NOTES
Subjective:       Patient ID: Eugenie Melendrez is a 38 y.o. female.    Chief Complaint: Sore Throat (hurts to swallow,); Itching (all over); and possible UTI    Ms. Melendrez is a known patient to me. She presents today for multiple concerns    Sore Throat    This is a new problem. The current episode started yesterday. The problem has been unchanged. There has been no fever. Pertinent negatives include no abdominal pain, coughing, diarrhea, shortness of breath, trouble swallowing or vomiting. She has tried nothing for the symptoms.   Itching   This is a new problem. The current episode started yesterday. The problem occurs constantly. The problem has been gradually worsening. Associated symptoms include a sore throat. Pertinent negatives include no abdominal pain, chest pain, coughing, diaphoresis, fever, rash or vomiting. She has tried nothing for the symptoms.   Urinary Tract Infection    This is a new problem. The current episode started in the past 7 days. The problem occurs every urination. The problem has been gradually worsening. Pertinent negatives include no vomiting or rash. Associated symptoms comments: Foul smelling urine. She has tried nothing for the symptoms.     Denies food allergies  Denies contact with plants or known allergens   Vitals:    05/28/19 0747   BP: 114/62   Pulse: (!) 55   Resp: 14   Temp: 98.7 °F (37.1 °C)     Review of Systems   Constitutional: Negative for diaphoresis and fever.   HENT: Positive for sore throat. Negative for facial swelling and trouble swallowing.    Eyes: Negative for discharge and redness.   Respiratory: Negative for cough, shortness of breath and wheezing.    Cardiovascular: Negative for chest pain and palpitations.   Gastrointestinal: Negative for abdominal pain, diarrhea and vomiting.   Genitourinary: Negative for difficulty urinating.        Foul smelling urine   Musculoskeletal: Negative for gait problem.   Skin: Positive for itching. Negative for pallor and  rash.   Neurological: Negative for facial asymmetry and speech difficulty.   Psychiatric/Behavioral: Negative for confusion. The patient is not nervous/anxious.        Past Medical History:   Diagnosis Date    Clotting disorder     DVT (deep venous thrombosis) 3/07    was felt to be due to ? Factor V Leiden    Hyperthyroidism 7/8/2013    Iron deficiency anemia due to chronic blood loss 12/1/2015     Objective:      Physical Exam   Constitutional: She is oriented to person, place, and time. She does not have a sickly appearance. No distress.   HENT:   Head: Normocephalic.   Right Ear: Hearing, tympanic membrane, external ear and ear canal normal.   Left Ear: Hearing, tympanic membrane, external ear and ear canal normal.   Nose: Nose normal.   Mouth/Throat: Uvula is midline, oropharynx is clear and moist and mucous membranes are normal.   Eyes: Conjunctivae and lids are normal.   Neck: No JVD present. No tracheal deviation present.   Cardiovascular: Normal rate.   Pulmonary/Chest: Effort normal. She has no wheezes.   Abdominal: Normal appearance. She exhibits no distension. There is no tenderness.   Musculoskeletal: She exhibits no deformity.   Neurological: She is alert and oriented to person, place, and time.   Skin: No rash noted. She is not diaphoretic. No pallor.   Psychiatric: She has a normal mood and affect. Her speech is normal and behavior is normal. Judgment and thought content normal. Cognition and memory are normal.   Nursing note and vitals reviewed.      Assessment:       1. Urinary tract infection with hematuria, site unspecified    2. Pharyngitis, unspecified etiology    3. Pruritus        Plan:       Urinary tract infection with hematuria, site unspecified  -     POCT URINE DIPSTICK WITHOUT MICROSCOPE  -     doxycycline (VIBRA-TABS) 100 MG tablet; Take 1 tablet (100 mg total) by mouth every 12 (twelve) hours. for 7 days  Dispense: 14 tablet; Refill: 0  -     Urine culture; Future; Expected date:  05/28/2019    Pharyngitis, unspecified etiology  -     POCT Rapid Strep A  -     Throat culture    Pruritus   Educated on otc medications, supportive care    Educated on medication safety, supportive care, return precautions    Follow up for further evaluation if s/s worsen, fail to improve, or new symptoms arise.    Medication List with Changes/Refills   New Medications    DOXYCYCLINE (VIBRA-TABS) 100 MG TABLET    Take 1 tablet (100 mg total) by mouth every 12 (twelve) hours. for 7 days   Current Medications    CITALOPRAM (CELEXA) 20 MG TABLET    Take 1 tablet (20 mg total) by mouth once daily.    DIAZEPAM (VALIUM) 2 MG TABLET    Take 1 tablet (2 mg total) by mouth daily as needed for Anxiety.

## 2019-05-28 NOTE — LETTER
May 28, 2019      [facility]  [address] [facility] [address] [place] [address]  Phone: [phone]  Fax: [phone]       Patient: Eugenie Melendrez   YOB: 1980  Date of Visit: 05/28/2019    To Whom It May Concern:    SHAJI Melendrez  was at [facility] on 05/28/2019. She may return to work on 5/31/19 with no restrictions. If you have any questions or concerns, or if I can be of further assistance, please do not hesitate to contact me.    Sincerely,        Arlene Gomez NP

## 2019-05-28 NOTE — PATIENT INSTRUCTIONS
"Take benadryl at night (can make you drowsy)  Take daytime antihistamine (Claritin, zyrtec, or allegra)  Take Pepcid as directed     OK to get generic medications         Bladder Infection, Female (Adult)    Urine is normally doesn't have any bacteria in it. But bacteria can get into the urinary tract from the skin around the rectum. Or they can travel in the blood from elsewhere in the body. Once they are in your urinary tract, they can cause infection in the urethra (urethritis), the bladder (cystitis), or the kidneys (pyelonephritis).  The most common place for an infection is in the bladder. This is called a bladder infection. This is one of the most common infections in women. Most bladder infections are easily treated. They are not serious unless the infection spreads to the kidney.  The phrases "bladder infection," "UTI," and "cystitis" are often used to describe the same thing. But they are not always the same. Cystitis is an inflammation of the bladder. The most common cause of cystitis is an infection.  Symptoms  The infection causes inflammation in the urethra and bladder. This causes many of the symptoms. The most common symptoms of a bladder infection are:  · Pain or burning when urinating  · Having to urinate more often than usual  · Urgent need to urinate  · Only a small amount of urine comes out  · Blood in urine  · Abdominal discomfort. This is usually in the lower abdomen above the pubic bone.  · Cloudy urine  · Strong- or bad-smelling urine  · Unable to urinate (urinary retention)  · Unable to hold urine in (urinary incontinence)  · Fever  · Loss of appetite  · Confusion (in older adults)  Causes  Bladder infections are not contagious. You can't get one from someone else, from a toilet seat, or from sharing a bath.  The most common cause of bladder infections is bacteria from the bowels. The bacteria get onto the skin around the opening of the urethra. From there, they can get into the urine and " travel up to the bladder, causing inflammation and infection. This usually happens because of:  · Wiping improperly after urinating. Always wipe from front to back.  · Bowel incontinence  · Pregnancy  · Procedures such as having a catheter inserted  · Older age  · Not emptying your bladder. This can allow bacteria a chance to grow in your urine.  · Dehydration  · Constipation  · Sex  · Use of a diaphragm for birth control   Treatment  Bladder infections are diagnosed by a urine test. They are treated with antibiotics and usually clear up quickly without complications. Treatment helps prevent a more serious kidney infection.  Medicines  Medicines can help in the treatment of a bladder infection:  · Take antibiotics until they are used up, even if you feel better. It is important to finish them to make sure the infection has cleared.  · You can use acetaminophen or ibuprofen for pain, fever, or discomfort, unless another medicine was prescribed. If you have chronic liver or kidney disease, talk with your healthcare provider before using these medicines. Also talk with your provider if you've ever had a stomach ulcer or gastrointestinal bleeding, or are taking blood-thinner medicines.  · If you are given phenazopydridine to reduce burning with urination, it will cause your urine to become a bright orange color. This can stain clothing.  Care and prevention  These self-care steps can help prevent future infections:  · Drink plenty of fluids to prevent dehydration and flush out your bladder. Do this unless you must restrict fluids for other health reasons, or your doctor told you not to.  · Proper cleaning after going to the bathroom is important. Wipe from front to back after using the toilet to prevent the spread of bacteria.  · Urinate more often. Don't try to hold urine in for a long time.  · Wear loose-fitting clothes and cotton underwear. Avoid tight-fitting pants.  · Improve your diet and prevent constipation. Eat  more fresh fruit and vegetables, and fiber, and less junk and fatty foods.  · Avoid sex until your symptoms are gone.  · Avoid caffeine, alcohol, and spicy foods. These can irritate your bladder.  · Urinate right after intercourse to flush out your bladder.  · If you use birth control pills and have frequent bladder infections, discuss it with your doctor.  Follow-up care  Call your healthcare provider if all symptoms are not gone after 3 days of treatment. This is especially important if you have repeat infections.  If a culture was done, you will be told if your treatment needs to be changed. If directed, you can call to find out the results.  If X-rays were done, you will be told if the results will affect your treatment.  Call 911  Call 911 if any of the following occur:  · Trouble breathing  · Hard to wake up or confusion  · Fainting or loss of consciousness  · Rapid heart rate  When to seek medical advice  Call your healthcare provider right away if any of these occur:  · Fever of 100.4ºF (38.0ºC) or higher, or as directed by your healthcare provider  · Symptoms are not better by the third day of treatment  · Back or belly (abdominal) pain that gets worse  · Repeated vomiting, or unable to keep medicine down  · Weakness or dizziness  · Vaginal discharge  · Pain, redness, or swelling in the outer vaginal area (labia)  Date Last Reviewed: 10/1/2016  © 7851-2208 [employer]. [address] [place] [address], [place] [address]. All rights reserved. This information is not intended as a substitute for professional medical care. Always follow your healthcare professional's instructions.

## 2019-05-30 ENCOUNTER — PATIENT MESSAGE (OUTPATIENT)
Dept: FAMILY MEDICINE | Facility: CLINIC | Age: 39
End: 2019-05-30

## 2019-05-30 DIAGNOSIS — R31.9 URINARY TRACT INFECTION WITH HEMATURIA, SITE UNSPECIFIED: Primary | ICD-10-CM

## 2019-05-30 DIAGNOSIS — N39.0 URINARY TRACT INFECTION WITH HEMATURIA, SITE UNSPECIFIED: Primary | ICD-10-CM

## 2019-05-30 LAB — BACTERIA THROAT CULT: NORMAL

## 2019-05-30 RX ORDER — SULFAMETHOXAZOLE AND TRIMETHOPRIM 800; 160 MG/1; MG/1
1 TABLET ORAL 2 TIMES DAILY
Qty: 14 TABLET | Refills: 0 | Status: SHIPPED | OUTPATIENT
Start: 2019-05-30 | End: 2019-05-31 | Stop reason: ALTCHOICE

## 2019-05-30 NOTE — LETTER
May 30, 2019      [facility]  [address] [facility] [address] [place] [address]  Phone: [phone]  Fax: [phone]       Patient: Eugenie Melendrez   YOB: 1980  Date of Visit: 05/30/2019    To Whom It May Concern:    SHAJI Melendrez  was at [facility] on 5/28/19. She may return to work on 6/1/19 with no restrictions. If you have any questions or concerns, or if I can be of further assistance, please do not hesitate to contact me.    Sincerely,    Arlene Gomez NP

## 2019-05-31 ENCOUNTER — PATIENT MESSAGE (OUTPATIENT)
Dept: FAMILY MEDICINE | Facility: CLINIC | Age: 39
End: 2019-05-31

## 2019-05-31 DIAGNOSIS — N39.0 URINARY TRACT INFECTION WITH HEMATURIA, SITE UNSPECIFIED: Primary | ICD-10-CM

## 2019-05-31 DIAGNOSIS — R31.9 URINARY TRACT INFECTION WITH HEMATURIA, SITE UNSPECIFIED: Primary | ICD-10-CM

## 2019-05-31 LAB — BACTERIA UR CULT: NORMAL

## 2019-05-31 RX ORDER — NITROFURANTOIN (MACROCRYSTALS) 100 MG/1
100 CAPSULE ORAL EVERY 6 HOURS
Qty: 20 CAPSULE | Refills: 0 | Status: SHIPPED | OUTPATIENT
Start: 2019-05-31 | End: 2019-06-05

## 2019-05-31 NOTE — TELEPHONE ENCOUNTER
Patient is having a reaction to the antibiotics. The antibiotics, itching, nausea. She wants a call from [facility] or advise on what to do.Please advise?

## 2019-05-31 NOTE — TELEPHONE ENCOUNTER
Nitrofurantoin sent to pharmacy    Unable to use cipro or levaquin due to potential interaction with celexa  PCN allergy

## 2019-07-01 ENCOUNTER — HOSPITAL ENCOUNTER (OUTPATIENT)
Dept: RADIOLOGY | Facility: HOSPITAL | Age: 39
Discharge: HOME | End: 2019-07-01
Attending: NURSE PRACTITIONER
Payer: COMMERCIAL

## 2019-07-01 ENCOUNTER — OFFICE VISIT (OUTPATIENT)
Dept: FAMILY MEDICINE | Facility: CLINIC | Age: 39
End: 2019-07-01
Payer: COMMERCIAL

## 2019-07-01 VITALS
WEIGHT: 190.25 LBS | DIASTOLIC BLOOD PRESSURE: 70 MMHG | BODY MASS INDEX: 33.71 KG/M2 | HEIGHT: 63 IN | SYSTOLIC BLOOD PRESSURE: 112 MMHG | HEART RATE: 88 BPM | OXYGEN SATURATION: 98 % | TEMPERATURE: 98 F

## 2019-07-01 DIAGNOSIS — R39.9 UTI SYMPTOMS: ICD-10-CM

## 2019-07-01 DIAGNOSIS — R10.9 FLANK PAIN: ICD-10-CM

## 2019-07-01 DIAGNOSIS — R31.9 HEMATURIA, UNSPECIFIED TYPE: ICD-10-CM

## 2019-07-01 DIAGNOSIS — R31.9 HEMATURIA, UNSPECIFIED TYPE: Primary | ICD-10-CM

## 2019-07-01 LAB
BILIRUB SERPL-MCNC: ABNORMAL MG/DL
BLOOD URINE, POC: ABNORMAL
COLOR, POC UA: ABNORMAL
GLUCOSE UR QL STRIP: NORMAL
KETONES UR QL STRIP: ABNORMAL
LEUKOCYTE ESTERASE URINE, POC: ABNORMAL
NITRITE, POC UA: ABNORMAL
PH, POC UA: 5
PROTEIN, POC: ABNORMAL
SPECIFIC GRAVITY, POC UA: 1.01
UROBILINOGEN, POC UA: NORMAL

## 2019-07-01 PROCEDURE — 81002 POCT URINE DIPSTICK WITHOUT MICROSCOPE: ICD-10-PCS | Mod: S$GLB,,, | Performed by: NURSE PRACTITIONER

## 2019-07-01 PROCEDURE — 74176 CT ABD & PELVIS W/O CONTRAST: CPT | Mod: TC,PO

## 2019-07-01 PROCEDURE — 99214 OFFICE O/P EST MOD 30 MIN: CPT | Mod: 25,S$GLB,, | Performed by: NURSE PRACTITIONER

## 2019-07-01 PROCEDURE — 99999 PR PBB SHADOW E&M-EST. PATIENT-LVL III: ICD-10-PCS | Mod: PBBFAC,,, | Performed by: NURSE PRACTITIONER

## 2019-07-01 PROCEDURE — 81002 URINALYSIS NONAUTO W/O SCOPE: CPT | Mod: S$GLB,,, | Performed by: NURSE PRACTITIONER

## 2019-07-01 PROCEDURE — 74176 CT RENAL STONE STUDY ABD PELVIS WO: ICD-10-PCS | Mod: 26,,, | Performed by: RADIOLOGY

## 2019-07-01 PROCEDURE — 74176 CT ABD & PELVIS W/O CONTRAST: CPT | Mod: 26,,, | Performed by: RADIOLOGY

## 2019-07-01 PROCEDURE — 99214 PR OFFICE/OUTPT VISIT, EST, LEVL IV, 30-39 MIN: ICD-10-PCS | Mod: 25,S$GLB,, | Performed by: NURSE PRACTITIONER

## 2019-07-01 PROCEDURE — 3008F BODY MASS INDEX DOCD: CPT | Mod: CPTII,S$GLB,, | Performed by: NURSE PRACTITIONER

## 2019-07-01 PROCEDURE — 3008F PR BODY MASS INDEX (BMI) DOCUMENTED: ICD-10-PCS | Mod: CPTII,S$GLB,, | Performed by: NURSE PRACTITIONER

## 2019-07-01 PROCEDURE — 99999 PR PBB SHADOW E&M-EST. PATIENT-LVL III: CPT | Mod: PBBFAC,,, | Performed by: NURSE PRACTITIONER

## 2019-07-01 RX ORDER — CITALOPRAM 20 MG/1
20 TABLET, FILM COATED ORAL
COMMUNITY
End: 2019-10-10

## 2019-07-01 NOTE — LETTER
July 1, 2019      [facility]  [address]  [place] [address]  Phone: [phone]  Fax: [phone]       Patient: Eugenie Melendrez   YOB: 1980  Date of Visit: 07/01/2019    To Whom It May Concern:    SHAJI Melendrez  was at [facility] on 07/01/2019. Please excuse her from work from Saturday 6/29/19 to 7/7/19. She may return to work 7/8/19 with no  restrictions. If you have any questions or concerns, or if I can be of further assistance, please do not hesitate to contact me.    Sincerely,        Arlene Gomez NP

## 2019-07-01 NOTE — PROGRESS NOTES
Subjective:       Patient ID: Eugenie Melendrez is a 38 y.o. female.    Chief Complaint: Urinary Tract Infection    Ms. Melendrez is a known patient to me. She presents today for possible UTI    HPI   Seen at [facility] ED on 6/29/19--diagnosed with UTI, DCd home with Rx Keflex, zofran and naprosyn. Was told to FU with PCP 2-3 days. She reports keflex was too expensive, started taking old Rx. Urine culture from ED visit with no growth. She reports left flank pain and abdominal pain.  LMP 6/10/19  Vitals:    07/01/19 1453   BP: 112/70   Pulse: 88   Temp: 98.3 °F (36.8 °C)     Review of Systems   Constitutional: Negative for diaphoresis and fever.   HENT: Negative for facial swelling and trouble swallowing.    Eyes: Negative for discharge and redness.   Respiratory: Negative for cough and shortness of breath.    Cardiovascular: Negative for chest pain and palpitations.   Gastrointestinal: Positive for abdominal pain. Negative for diarrhea, nausea and vomiting.   Genitourinary: Positive for flank pain. Negative for difficulty urinating.   Musculoskeletal: Negative for gait problem.   Skin: Negative for pallor and rash.   Neurological: Negative for facial asymmetry and speech difficulty.   Psychiatric/Behavioral: Negative for confusion. The patient is not nervous/anxious.        Past Medical History:   Diagnosis Date    Clotting disorder     DVT (deep venous thrombosis) 3/07    was felt to be due to ? Factor V Leiden    Hyperthyroidism 7/8/2013    Iron deficiency anemia due to chronic blood loss 12/1/2015     Objective:      Physical Exam   Constitutional: She is oriented to person, place, and time. She does not have a sickly appearance. No distress.   HENT:   Head: Normocephalic.   Right Ear: Hearing normal.   Left Ear: Hearing normal.   Nose: Nose normal.   Eyes: Conjunctivae and lids are normal.   Neck: No JVD present. No tracheal deviation present.   Cardiovascular: Normal rate and normal heart sounds.    Pulmonary/Chest: Effort normal and breath sounds normal.   Abdominal: Normal appearance. She exhibits no distension. There is tenderness. There is CVA tenderness.   Musculoskeletal: She exhibits no deformity.   Neurological: She is alert and oriented to person, place, and time.   Skin: She is not diaphoretic. No pallor.   Psychiatric: She has a normal mood and affect. Her speech is normal and behavior is normal. Judgment and thought content normal. Cognition and memory are normal.   Nursing note and vitals reviewed.      Assessment:       1. Hematuria, unspecified type    2. UTI symptoms    3. Flank pain        Plan:       Hematuria, unspecified type  -     CT Renal Stone Study ABD Pelvis WO; Future; Expected date: 07/01/2019  -     Cancel: PREGNANCY TEST, URINE RAPID; Future; Expected date: 07/01/2019  -     PREGNANCY TEST, URINE RAPID    UTI symptoms  -     POCT URINE DIPSTICK WITHOUT MICROSCOPE    Flank pain  -     CT Renal Stone Study ABD Pelvis WO; Future; Expected date: 07/01/2019  -     Cancel: PREGNANCY TEST, URINE RAPID; Future; Expected date: 07/01/2019  -     PREGNANCY TEST, URINE RAPID    dip +hematuria, negative UTI  educated on safety, return precautions  Follow up for further evaluation if s/s worsen, fail to improve, or new symptoms arise.    Medication List with Changes/Refills   Current Medications    CITALOPRAM (CELEXA) 20 MG TABLET    Take 1 tablet (20 mg total) by mouth once daily.    CITALOPRAM (CELEXA) 20 MG TABLET    Take 20 mg by mouth.    DIAZEPAM (VALIUM) 2 MG TABLET    Take 1 tablet (2 mg total) by mouth daily as needed for Anxiety.    NAPROXEN (NAPROSYN) 500 MG TABLET    Take 1 tablet (500 mg total) by mouth 2 (two) times daily with meals.    ONDANSETRON (ZOFRAN-ODT) 4 MG TBDL    Take 1 tablet (4 mg total) by mouth every 6 (six) hours as needed.   Discontinued Medications    CEPHALEXIN (KEFLEX) 500 MG CAPSULE    Take 1 capsule (500 mg total) by mouth every 12 (twelve) hours. for 7 days

## 2019-07-02 DIAGNOSIS — R93.41 ABNORMAL CT SCAN, BLADDER: ICD-10-CM

## 2019-07-02 DIAGNOSIS — R31.9 HEMATURIA, UNSPECIFIED TYPE: Primary | ICD-10-CM

## 2019-07-22 ENCOUNTER — OFFICE VISIT (OUTPATIENT)
Dept: UROLOGY | Facility: CLINIC | Age: 39
End: 2019-07-22
Payer: COMMERCIAL

## 2019-07-22 VITALS
SYSTOLIC BLOOD PRESSURE: 130 MMHG | WEIGHT: 192.25 LBS | DIASTOLIC BLOOD PRESSURE: 81 MMHG | HEART RATE: 109 BPM | BODY MASS INDEX: 34.06 KG/M2 | HEIGHT: 63 IN

## 2019-07-22 DIAGNOSIS — R31.9 HEMATURIA, UNSPECIFIED TYPE: Primary | ICD-10-CM

## 2019-07-22 DIAGNOSIS — R30.0 DYSURIA: ICD-10-CM

## 2019-07-22 DIAGNOSIS — R39.15 URINARY URGENCY: ICD-10-CM

## 2019-07-22 LAB
BILIRUB SERPL-MCNC: ABNORMAL MG/DL
BLOOD URINE, POC: ABNORMAL
COLOR, POC UA: ABNORMAL
GLUCOSE UR QL STRIP: ABNORMAL
KETONES UR QL STRIP: ABNORMAL
LEUKOCYTE ESTERASE URINE, POC: ABNORMAL
NITRITE, POC UA: ABNORMAL
PH, POC UA: 5
PROTEIN, POC: ABNORMAL
SPECIFIC GRAVITY, POC UA: 1020
UROBILINOGEN, POC UA: ABNORMAL

## 2019-07-22 PROCEDURE — 3008F BODY MASS INDEX DOCD: CPT | Mod: CPTII,S$GLB,, | Performed by: UROLOGY

## 2019-07-22 PROCEDURE — 81002 URINALYSIS NONAUTO W/O SCOPE: CPT | Mod: S$GLB,,, | Performed by: UROLOGY

## 2019-07-22 PROCEDURE — 3008F PR BODY MASS INDEX (BMI) DOCUMENTED: ICD-10-PCS | Mod: CPTII,S$GLB,, | Performed by: UROLOGY

## 2019-07-22 PROCEDURE — 99204 OFFICE O/P NEW MOD 45 MIN: CPT | Mod: 25,S$GLB,, | Performed by: UROLOGY

## 2019-07-22 PROCEDURE — 99999 PR PBB SHADOW E&M-EST. PATIENT-LVL III: ICD-10-PCS | Mod: PBBFAC,,, | Performed by: UROLOGY

## 2019-07-22 PROCEDURE — 99204 PR OFFICE/OUTPT VISIT, NEW, LEVL IV, 45-59 MIN: ICD-10-PCS | Mod: 25,S$GLB,, | Performed by: UROLOGY

## 2019-07-22 PROCEDURE — 81002 POCT URINE DIPSTICK WITHOUT MICROSCOPE: ICD-10-PCS | Mod: S$GLB,,, | Performed by: UROLOGY

## 2019-07-22 PROCEDURE — 99999 PR PBB SHADOW E&M-EST. PATIENT-LVL III: CPT | Mod: PBBFAC,,, | Performed by: UROLOGY

## 2019-07-22 NOTE — PROGRESS NOTES
Subjective:       Patient ID: Eugenie Melendrez is a 38 y.o. female.    Chief Complaint: Hematuria (consult)    HPI     38-year-old has had microscopic hematuria on several occasions.  She says she has pain in urethra after voiding but no pain.  While voiding  She has lower abdominal pain and lower back pain when wearing her duty belt.  She works in law enforcement.  She also complains of urinary urgency on occasion.  She denies gross hematuria but has had blood on the toilet tissue after voiding on occasion.  She has hope no history of kidney stone.  She is a current every day smoker but she has been trying to quit and has decreased her smoking to 5 cigarettes per day.  She had a CT scan obtained and I reviewed the images with her.  Her kidneys appear normal there are no stones or obstruction.  There is a small lesion noted on the bladder perhaps a diverticulum with a small stone inside.    Urine dipstick shows negative for nitrites, leukocytes, glucose, protein, positive for 1+ blood    Past Medical History:   Diagnosis Date    Clotting disorder     DVT (deep venous thrombosis) 3/07    was felt to be due to ? Factor V Leiden    Hyperthyroidism 2013    Iron deficiency anemia due to chronic blood loss 2015     Past Surgical History:   Procedure Laterality Date    2 R knee sx; 1 L knee      BRONCHOSCOPY       SECTION  7/30/15    ENCERCLAGE N/A 3/3/2015    Performed by Black Segovia III, MD at [facility] L[facility]D    KNEE ARTHROSCOPY      KNEE SURGERY      x3    TUBAL LIGATION  7/30/15       Current Outpatient Medications:     citalopram (CELEXA) 20 MG tablet, Take 20 mg by mouth., Disp: , Rfl:     diazePAM (VALIUM) 2 MG tablet, Take 1 tablet (2 mg total) by mouth daily as needed for Anxiety., Disp: 30 tablet, Rfl: 1    naproxen (NAPROSYN) 500 MG tablet, Take 1 tablet (500 mg total) by mouth 2 (two) times daily with meals., Disp: 20 tablet, Rfl: 0    ondansetron (ZOFRAN-ODT) 4 MG TbDL, Take 1  tablet (4 mg total) by mouth every 6 (six) hours as needed., Disp: 12 tablet, Rfl: 0    Review of Systems   Constitutional: Negative for fever.   Eyes: Negative for visual disturbance.   Respiratory: Negative for shortness of breath.    Cardiovascular: Negative for chest pain.   Gastrointestinal: Positive for abdominal pain. Negative for nausea.   Genitourinary: Positive for dysuria and urgency. Negative for flank pain and hematuria.   Musculoskeletal: Negative for gait problem.   Skin: Negative for rash.   Neurological: Negative for seizures.   Psychiatric/Behavioral: Negative for confusion.       Objective:      Physical Exam   Constitutional: She is oriented to person, place, and time. She appears well-developed and well-nourished.   HENT:   Head: Normocephalic.   Eyes: Conjunctivae and EOM are normal.   Neck: Normal range of motion.   Cardiovascular: Normal rate.   Pulmonary/Chest: Effort normal.   Abdominal: Soft. She exhibits no distension and no mass. There is no tenderness.   Genitourinary:   Genitourinary Comments: Bladder non-tender and nondistended  No CVA tenderness   Musculoskeletal: She exhibits no edema.   Neurological: She is alert and oriented to person, place, and time.   Skin: Skin is warm and dry. No rash noted. No erythema.   Psychiatric: She has a normal mood and affect. Her behavior is normal.   Vitals reviewed.      Assessment:       1. Microhematuria    2. Dysuria    3. Urinary urgency        Plan:       Microhematuria  -     POCT URINE DIPSTICK WITHOUT MICROSCOPE  -     Cystoscopy; Future    Dysuria    Urinary urgency      follow-up for cystoscopy

## 2019-07-22 NOTE — LETTER
July 22, 2019      Arlene Gomez, MEGAN  [address] [place] [address]           [facility]  [address] [place] [address]  Phone: [phone]  Fax: [phone]          Patient: Eugenie Melendrez   MR Number: [id number]   YOB: 1980   Date of Visit: 7/22/2019       Dear Arlene Gomez:    Thank you for referring Eugenie Melendrez to me for evaluation. Attached you will find relevant portions of my assessment and plan of care.    If you have questions, please do not hesitate to call me. I look forward to following Eugenie Melendrez along with you.    Sincerely,    ERMA Blood MD    Enclosure  CC:  No Recipients    If you would like to receive this communication electronically, please contact [email or web address] or [phone] to request more information on [employer] access.    For providers and/or their staff who would like to refer a patient to [facility], please contact us through our one-stop-shop provider referral line, [facility], at [phone].    If you feel you have received this communication in error or would no longer like to receive these types of communications, please e-mail [email or web address]

## 2019-08-01 ENCOUNTER — PROCEDURE VISIT (OUTPATIENT)
Dept: UROLOGY | Facility: CLINIC | Age: 39
End: 2019-08-01
Payer: COMMERCIAL

## 2019-08-01 VITALS
BODY MASS INDEX: 33.71 KG/M2 | SYSTOLIC BLOOD PRESSURE: 101 MMHG | HEART RATE: 59 BPM | WEIGHT: 190.25 LBS | DIASTOLIC BLOOD PRESSURE: 63 MMHG | HEIGHT: 63 IN

## 2019-08-01 DIAGNOSIS — R31.29 MICROSCOPIC HEMATURIA: Primary | ICD-10-CM

## 2019-08-01 DIAGNOSIS — R31.9 HEMATURIA, UNSPECIFIED TYPE: ICD-10-CM

## 2019-08-01 LAB
BILIRUB SERPL-MCNC: ABNORMAL MG/DL
BLOOD URINE, POC: ABNORMAL
COLOR, POC UA: ABNORMAL
GLUCOSE UR QL STRIP: ABNORMAL
KETONES UR QL STRIP: ABNORMAL
LEUKOCYTE ESTERASE URINE, POC: ABNORMAL
NITRITE, POC UA: ABNORMAL
PH, POC UA: 7
PROTEIN, POC: ABNORMAL
SPECIFIC GRAVITY, POC UA: 1020
UROBILINOGEN, POC UA: ABNORMAL

## 2019-08-01 PROCEDURE — 52000 CYSTOSCOPY: ICD-10-PCS | Mod: S$GLB,,, | Performed by: UROLOGY

## 2019-08-01 PROCEDURE — 81002 POCT URINE DIPSTICK WITHOUT MICROSCOPE: ICD-10-PCS | Mod: S$GLB,,, | Performed by: UROLOGY

## 2019-08-01 PROCEDURE — 88112 CYTOPATH CELL ENHANCE TECH: CPT | Performed by: PATHOLOGY

## 2019-08-01 PROCEDURE — 81002 URINALYSIS NONAUTO W/O SCOPE: CPT | Mod: S$GLB,,, | Performed by: UROLOGY

## 2019-08-01 PROCEDURE — 88112 CYTOPATH CELL ENHANCE TECH: CPT | Mod: 26,,, | Performed by: PATHOLOGY

## 2019-08-01 PROCEDURE — 88112 CYTOLOGY SPECIMEN-URINE: ICD-10-PCS | Mod: 26,,, | Performed by: PATHOLOGY

## 2019-08-01 PROCEDURE — 52000 CYSTOURETHROSCOPY: CPT | Mod: S$GLB,,, | Performed by: UROLOGY

## 2019-08-01 NOTE — PROCEDURES
"Cystoscopy  Date/Time: 8/1/2019 11:06 AM  Performed by: ERMA Blood MD  Authorized by: ERMA Blood MD     Consent Done?:  Yes (Written)  Time out: Immediately prior to procedure a "time out" was called to verify the correct patient, procedure, equipment, support staff and site/side marked as required.    Indications: hematuria    Position:  Other  Patient sedated?: No    Preparation: Patient was prepped and draped in usual sterile fashion      Scope type:  Flexible cystoscope    Patient tolerance:  Patient tolerated the procedure well with no immediate complications    Blood Loss:  None    The patients clinic history and physical were reviewed and there are no changes.    The patient was placed in the frog-leg position.  The genitals were prepped and draped in a sterile fashion.   Using a flexible scope, a careful cystoscopic exam was then performed.  The entire bladder mucosa was systematically visualized.  The mucosa appeared normal.  There were no lesions, masses foreign bodies or stones.   Each ureteral orifices were visualized and both had clear efflux of urine.  Barbotage was performed and washings were collected for cytological evaluation.  The cystoscope was then removed and I examined the entire length of the urethra.  The urethra appeared normal.  She tolerated the procedure well.  There were no complications.    Impression:  Normal cystoscopy.      "

## 2019-08-18 ENCOUNTER — PATIENT MESSAGE (OUTPATIENT)
Dept: OBSTETRICS AND GYNECOLOGY | Facility: CLINIC | Age: 39
End: 2019-08-18

## 2019-08-21 ENCOUNTER — OFFICE VISIT (OUTPATIENT)
Dept: OBSTETRICS AND GYNECOLOGY | Facility: CLINIC | Age: 39
End: 2019-08-21
Payer: COMMERCIAL

## 2019-08-21 VITALS
RESPIRATION RATE: 18 BRPM | DIASTOLIC BLOOD PRESSURE: 68 MMHG | HEIGHT: 62 IN | BODY MASS INDEX: 34.89 KG/M2 | WEIGHT: 189.63 LBS | SYSTOLIC BLOOD PRESSURE: 102 MMHG

## 2019-08-21 DIAGNOSIS — R10.2 PELVIC PAIN IN FEMALE: Primary | ICD-10-CM

## 2019-08-21 PROCEDURE — 87086 URINE CULTURE/COLONY COUNT: CPT

## 2019-08-21 PROCEDURE — 99213 OFFICE O/P EST LOW 20 MIN: CPT | Mod: S$GLB,,, | Performed by: OBSTETRICS & GYNECOLOGY

## 2019-08-21 PROCEDURE — 3008F PR BODY MASS INDEX (BMI) DOCUMENTED: ICD-10-PCS | Mod: CPTII,S$GLB,, | Performed by: OBSTETRICS & GYNECOLOGY

## 2019-08-21 PROCEDURE — 87491 CHLMYD TRACH DNA AMP PROBE: CPT

## 2019-08-21 PROCEDURE — 99999 PR PBB SHADOW E&M-EST. PATIENT-LVL III: CPT | Mod: PBBFAC,,, | Performed by: OBSTETRICS & GYNECOLOGY

## 2019-08-21 PROCEDURE — 3008F BODY MASS INDEX DOCD: CPT | Mod: CPTII,S$GLB,, | Performed by: OBSTETRICS & GYNECOLOGY

## 2019-08-21 PROCEDURE — 99213 PR OFFICE/OUTPT VISIT, EST, LEVL III, 20-29 MIN: ICD-10-PCS | Mod: S$GLB,,, | Performed by: OBSTETRICS & GYNECOLOGY

## 2019-08-21 PROCEDURE — 99999 PR PBB SHADOW E&M-EST. PATIENT-LVL III: ICD-10-PCS | Mod: PBBFAC,,, | Performed by: OBSTETRICS & GYNECOLOGY

## 2019-08-21 RX ORDER — PROMETHAZINE HYDROCHLORIDE 12.5 MG/1
12.5 TABLET ORAL
COMMUNITY
Start: 2019-08-10 | End: 2019-10-10

## 2019-08-21 RX ORDER — BUTALBITAL, ACETAMINOPHEN AND CAFFEINE 50; 325; 40 MG/1; MG/1; MG/1
1 TABLET ORAL
COMMUNITY
Start: 2019-08-10 | End: 2019-10-14 | Stop reason: ALTCHOICE

## 2019-08-21 NOTE — PROGRESS NOTES
Subjective:       Patient ID: Eugenie Melendrez is a 39 y.o. female.    Chief Complaint:  Pelvic Pain (LLQ)      History of Present Illness  HPI  39 y.o.  with complaint of LLQ pain for past 4 days. Patient reports cystoscopy on 19 and then normal menstrual flow on 19. Patient reports regular monthly menses and has had surgical sterilization.    GYN & OB History  Patient's last menstrual period was 2019.   Date of Last Pap: 2018    OB History    Para Term  AB Living   3 3 2 1   3   SAB TAB Ectopic Multiple Live Births           3      # Outcome Date GA Lbr [place]/2nd Weight Sex Delivery Anes PTL Lv   3 Term 07/30/15 39w0d  3.728 kg (8 lb 3.5 oz) F CS-LTranv EPI N JANNET   2 Term 12/15/12 37w0d  3.204 kg (7 lb 1 oz) F Vag-Spont EPI N JANNET      Birth Comments: System Generated. Please review and update pregnancy details.   1  10/06/08 25w0d  0.822 kg (1 lb 13 oz) F Vag-Spont None Y JANNET      Birth Comments: delivered at 25 IUP, PPROM on a friday with labor on monday.       Review of Systems  Review of Systems   Constitutional: Negative for activity change, appetite change, chills, diaphoresis, fatigue, fever and unexpected weight change.   HENT: Negative for nasal congestion, mouth sores and tinnitus.    Eyes: Negative for discharge and visual disturbance.   Respiratory: Negative for cough, shortness of breath and wheezing.    Cardiovascular: Negative for chest pain, palpitations and leg swelling.   Gastrointestinal: Negative for abdominal pain, bloating, blood in stool, constipation, diarrhea, nausea, vomiting, reflux and fecal incontinence.   Endocrine: Positive for hyperthyroidism. Negative for diabetes, hair loss, hot flashes and hypothyroidism.   Genitourinary: Positive for pelvic pain. Negative for bladder incontinence, decreased libido, dysmenorrhea, dyspareunia, dysuria, flank pain, frequency, genital sores, hematuria, hot flashes, menorrhagia, menstrual problem,  urgency, vaginal bleeding, vaginal discharge, vaginal pain, urinary incontinence, postcoital bleeding, postmenopausal bleeding, vaginal dryness and vaginal odor.   Musculoskeletal: Negative for arthralgias, back pain, joint swelling, leg pain and myalgias.   Integumentary:  Negative for rash, acne, hair changes, mole/lesion, breast mass, nipple discharge, breast skin changes and breast tenderness.   Neurological: Negative for vertigo, seizures, syncope, numbness and headaches.   Hematological: Negative for adenopathy. Does not bruise/bleed easily.   Psychiatric/Behavioral: Negative for depression and sleep disturbance. The patient is not nervous/anxious.    Breast: Negative for asymmetry, breast self exam, lump, mass, mastodynia, nipple discharge, skin changes and tenderness          Objective:    Physical Exam:   Constitutional: She is oriented to person, place, and time. She appears well-developed and well-nourished. No distress.    HENT:   Head: Normocephalic.   Nose: No epistaxis.    Eyes: Pupils are equal, round, and reactive to light.    Neck: Normal range of motion.    Cardiovascular: Normal rate.     Pulmonary/Chest: Effort normal.        Abdominal: Soft. She exhibits no distension and no mass. There is no tenderness. There is no rebound. No hernia.     Genitourinary: Vagina normal and uterus normal.   Genitourinary Comments: Cultures done of cervix. No cervical or uterine tenderness. No palpable adnexal masses. Patient more tender on left than right.           Musculoskeletal: Normal range of motion and moves all extremeties.       Neurological: She is alert and oriented to person, place, and time.    Skin: Skin is warm and dry. She is not diaphoretic.    Psychiatric: She has a normal mood and affect. Her behavior is normal. Judgment and thought content normal.          Assessment:        1. Pelvic pain in female    2.     Probable ovulatory pain            Plan:      Urine C&S

## 2019-08-22 LAB
C TRACH DNA SPEC QL NAA+PROBE: NOT DETECTED
N GONORRHOEA DNA SPEC QL NAA+PROBE: NOT DETECTED

## 2019-08-23 LAB — BACTERIA UR CULT: NORMAL

## 2019-10-07 NOTE — PROGRESS NOTES
"Subjective:       Patient ID: Eugenie Melendrez is a 39 y.o. female.    Chief Complaint: Hospital Follow Up    HPI   History of Present Illness:   Ms. Melendrez is a 40yo F with PMHx of DVT, iron deficiency, anxiety, tobacco abuse who presented to the [facility] on 9/30/19 with c/o facial numbness, slurred speech, and vision changes. She had experienced slurred speech, R side facial and tongue numbness, blurry vision and seeing spots, headache, dizziness, and lightheadedness while working. Stroke workup was negative (see below). Diagnosed with complicated migraine     Hospital Course and Treatment:     Complicated migraine  -Still complaining of a mild headache but is improved with Fioricet. Her headache was difficult to control and required IV Depacon, Zonegran.  -Neurology followed; no evidence of papilledema on exam.   -Also received a one time dose of Toradol and Dilaudid.   -Acute stroke ruled out with a negative MRI, MRA of the brain and CT of the brain.   -Carotid US showed no evidence of hemodynamically significant stenosis  -Echo w/bubble negative; EF 60-65%. Normal diastolic function.      Prescribed zonegran and depakote daily by Dr. Irby     Tobacco abuse  She has not smoked since admission and is determined to never smoke again    She has not seen neurology outpatient. She reports still getting headaches-Still feels "hazy". No reoccurrence of symptoms that brought her to ED  Vitals:    10/10/19 1020   BP: 116/70   Pulse: 98     Review of Systems   Constitutional: Negative for diaphoresis and fever.   HENT: Negative for facial swelling and trouble swallowing.    Eyes: Negative for discharge, redness and visual disturbance.   Respiratory: Negative for cough and shortness of breath.    Cardiovascular: Negative for chest pain and palpitations.   Gastrointestinal: Negative for abdominal pain and diarrhea.   Genitourinary: Negative for difficulty urinating.   Musculoskeletal: Negative for gait problem. "   Skin: Negative for pallor and rash.   Neurological: Positive for headaches. Negative for dizziness, tremors, syncope, facial asymmetry, speech difficulty, weakness and numbness.   Psychiatric/Behavioral: Negative for confusion. The patient is not nervous/anxious.        Past Medical History:   Diagnosis Date    Clotting disorder     DVT (deep venous thrombosis) 3/07    was felt to be due to ? Factor V Leiden    High risk pregnancy due to history of  labor 2015    Hyperthyroidism 2013    Iron deficiency anemia due to chronic blood loss 2015    Short cervix affecting pregnancy 3/3/2015     Objective:      Physical Exam   Constitutional: She is oriented to person, place, and time. She does not have a sickly appearance. No distress.   HENT:   Head: Normocephalic.   Right Ear: Hearing normal.   Left Ear: Hearing normal.   Nose: Nose normal.   Eyes: Pupils are equal, round, and reactive to light. Conjunctivae, EOM and lids are normal.   Neck: No JVD present. No tracheal deviation present.   Cardiovascular: Normal rate and normal heart sounds.   Pulmonary/Chest: Effort normal and breath sounds normal.   Abdominal: She exhibits no distension.   Musculoskeletal: She exhibits no deformity.   Neurological: She is alert and oriented to person, place, and time. She displays no atrophy and no tremor. She exhibits normal muscle tone. Coordination and gait normal.   Skin: She is not diaphoretic. No pallor.   Psychiatric: She has a normal mood and affect. Her speech is normal and behavior is normal. Judgment and thought content normal. Cognition and memory are normal.   Nursing note and vitals reviewed.      Assessment:       1. Hospital discharge follow-up    2. Complicated migraine    3. Smoker    4. Need for diphtheria-tetanus-pertussis (Tdap) vaccine        Plan:       Hospital discharge follow-up  -     Ambulatory referral to Neurology    Complicated migraine  -     Ambulatory referral to  Neurology    Smoking history     Need for diphtheria-tetanus-pertussis (Tdap) vaccine  -     (In Office Administered) Tdap Vaccine    Other orders  -     Influenza - Quadrivalent (PF)    Appreciate neuro recs regarding medicaiton      FU routinely with PCP, me as needed      Medication List with Changes/Refills   Current Medications    BUTALBITAL-ACETAMINOPHEN-CAFFEINE -40 MG (FIORICET, ESGIC) -40 MG PER TABLET    Take 1 tablet by mouth.    DIVALPROEX (DEPAKOTE) 500 MG TBEC    Take 500 mg by mouth.    ZONISAMIDE (ZONEGRAN) 100 MG CAP    Take 200 mg by mouth.   Discontinued Medications    CITALOPRAM (CELEXA) 20 MG TABLET    Take 20 mg by mouth.    DIAZEPAM (VALIUM) 2 MG TABLET    Take 1 tablet (2 mg total) by mouth daily as needed for Anxiety.    NAPROXEN (NAPROSYN) 500 MG TABLET    Take 1 tablet (500 mg total) by mouth 2 (two) times daily with meals.    ONDANSETRON (ZOFRAN-ODT) 4 MG TBDL    Take 1 tablet (4 mg total) by mouth every 6 (six) hours as needed.    PROMETHAZINE (PHENERGAN) 12.5 MG TAB    Take 12.5 mg by mouth as needed.

## 2019-10-10 ENCOUNTER — OFFICE VISIT (OUTPATIENT)
Dept: FAMILY MEDICINE | Facility: CLINIC | Age: 39
End: 2019-10-10
Payer: COMMERCIAL

## 2019-10-10 ENCOUNTER — TELEPHONE (OUTPATIENT)
Dept: NEUROLOGY | Facility: CLINIC | Age: 39
End: 2019-10-10

## 2019-10-10 ENCOUNTER — PATIENT OUTREACH (OUTPATIENT)
Dept: ADMINISTRATIVE | Facility: OTHER | Age: 39
End: 2019-10-10

## 2019-10-10 VITALS
OXYGEN SATURATION: 97 % | SYSTOLIC BLOOD PRESSURE: 116 MMHG | BODY MASS INDEX: 35.78 KG/M2 | WEIGHT: 194.44 LBS | DIASTOLIC BLOOD PRESSURE: 70 MMHG | HEIGHT: 62 IN | HEART RATE: 98 BPM

## 2019-10-10 DIAGNOSIS — Z87.891 SMOKING HX: ICD-10-CM

## 2019-10-10 DIAGNOSIS — Z23 NEED FOR DIPHTHERIA-TETANUS-PERTUSSIS (TDAP) VACCINE: ICD-10-CM

## 2019-10-10 DIAGNOSIS — G43.109 COMPLICATED MIGRAINE: ICD-10-CM

## 2019-10-10 DIAGNOSIS — Z09 HOSPITAL DISCHARGE FOLLOW-UP: Primary | ICD-10-CM

## 2019-10-10 PROCEDURE — 99999 PR PBB SHADOW E&M-EST. PATIENT-LVL III: ICD-10-PCS | Mod: PBBFAC,,, | Performed by: NURSE PRACTITIONER

## 2019-10-10 PROCEDURE — 3008F BODY MASS INDEX DOCD: CPT | Mod: CPTII,S$GLB,, | Performed by: NURSE PRACTITIONER

## 2019-10-10 PROCEDURE — 90472 TDAP VACCINE GREATER THAN OR EQUAL TO 7YO IM: ICD-10-PCS | Mod: S$GLB,,, | Performed by: NURSE PRACTITIONER

## 2019-10-10 PROCEDURE — 90471 FLU VACCINE (QUAD) GREATER THAN OR EQUAL TO 3YO PRESERVATIVE FREE IM: ICD-10-PCS | Mod: S$GLB,,, | Performed by: NURSE PRACTITIONER

## 2019-10-10 PROCEDURE — 99214 OFFICE O/P EST MOD 30 MIN: CPT | Mod: 25,S$GLB,, | Performed by: NURSE PRACTITIONER

## 2019-10-10 PROCEDURE — 99214 PR OFFICE/OUTPT VISIT, EST, LEVL IV, 30-39 MIN: ICD-10-PCS | Mod: 25,S$GLB,, | Performed by: NURSE PRACTITIONER

## 2019-10-10 PROCEDURE — 3008F PR BODY MASS INDEX (BMI) DOCUMENTED: ICD-10-PCS | Mod: CPTII,S$GLB,, | Performed by: NURSE PRACTITIONER

## 2019-10-10 PROCEDURE — 90686 FLU VACCINE (QUAD) GREATER THAN OR EQUAL TO 3YO PRESERVATIVE FREE IM: ICD-10-PCS | Mod: S$GLB,,, | Performed by: NURSE PRACTITIONER

## 2019-10-10 PROCEDURE — 90715 TDAP VACCINE 7 YRS/> IM: CPT | Mod: S$GLB,,, | Performed by: NURSE PRACTITIONER

## 2019-10-10 PROCEDURE — 99999 PR PBB SHADOW E&M-EST. PATIENT-LVL III: CPT | Mod: PBBFAC,,, | Performed by: NURSE PRACTITIONER

## 2019-10-10 PROCEDURE — 90686 IIV4 VACC NO PRSV 0.5 ML IM: CPT | Mod: S$GLB,,, | Performed by: NURSE PRACTITIONER

## 2019-10-10 PROCEDURE — 90715 TDAP VACCINE GREATER THAN OR EQUAL TO 7YO IM: ICD-10-PCS | Mod: S$GLB,,, | Performed by: NURSE PRACTITIONER

## 2019-10-10 PROCEDURE — 90471 IMMUNIZATION ADMIN: CPT | Mod: S$GLB,,, | Performed by: NURSE PRACTITIONER

## 2019-10-10 PROCEDURE — 90472 IMMUNIZATION ADMIN EACH ADD: CPT | Mod: S$GLB,,, | Performed by: NURSE PRACTITIONER

## 2019-10-10 RX ORDER — DIVALPROEX SODIUM 500 MG/1
500 TABLET, DELAYED RELEASE ORAL
COMMUNITY
Start: 2019-10-03 | End: 2019-10-14 | Stop reason: ALTCHOICE

## 2019-10-10 RX ORDER — ZONISAMIDE 100 MG/1
200 CAPSULE ORAL
COMMUNITY
Start: 2019-10-03 | End: 2019-10-31 | Stop reason: SDUPTHER

## 2019-10-10 NOTE — TELEPHONE ENCOUNTER
----- Message from Abelrado Roberto MA sent at 10/10/2019 11:05 AM CDT -----  Pt needs to be scheduled for a neurology appt. Pt is being referred for Z09 (ICD-10-CM) - Hospital discharge follow-up G43.109 (ICD-10-CM) - Complicated migraine. Please call the pt at [phone].

## 2019-10-10 NOTE — TELEPHONE ENCOUNTER
Called patient and offered my first available with Dr. Armendariz. Patient stated that she told the Doctor in the hospital that she would see any doctor even if she had to go to the [facility] to be seen sooner. Gave her [facility] number. Appointment not scheduled.

## 2019-10-14 ENCOUNTER — OFFICE VISIT (OUTPATIENT)
Dept: NEUROLOGY | Facility: CLINIC | Age: 39
End: 2019-10-14
Payer: COMMERCIAL

## 2019-10-14 VITALS
HEART RATE: 80 BPM | BODY MASS INDEX: 35.86 KG/M2 | DIASTOLIC BLOOD PRESSURE: 65 MMHG | HEIGHT: 62 IN | SYSTOLIC BLOOD PRESSURE: 97 MMHG | WEIGHT: 194.88 LBS

## 2019-10-14 DIAGNOSIS — E05.90 HYPERTHYROIDISM: ICD-10-CM

## 2019-10-14 DIAGNOSIS — G43.109 BASILAR MIGRAINE: Primary | ICD-10-CM

## 2019-10-14 DIAGNOSIS — D50.0 IRON DEFICIENCY ANEMIA DUE TO CHRONIC BLOOD LOSS: ICD-10-CM

## 2019-10-14 PROCEDURE — 99204 PR OFFICE/OUTPT VISIT, NEW, LEVL IV, 45-59 MIN: ICD-10-PCS | Mod: S$GLB,,, | Performed by: PHYSICIAN ASSISTANT

## 2019-10-14 PROCEDURE — 3008F PR BODY MASS INDEX (BMI) DOCUMENTED: ICD-10-PCS | Mod: CPTII,S$GLB,, | Performed by: PHYSICIAN ASSISTANT

## 2019-10-14 PROCEDURE — 99204 OFFICE O/P NEW MOD 45 MIN: CPT | Mod: S$GLB,,, | Performed by: PHYSICIAN ASSISTANT

## 2019-10-14 PROCEDURE — 99999 PR PBB SHADOW E&M-EST. PATIENT-LVL III: CPT | Mod: PBBFAC,,, | Performed by: PHYSICIAN ASSISTANT

## 2019-10-14 PROCEDURE — 99999 PR PBB SHADOW E&M-EST. PATIENT-LVL III: ICD-10-PCS | Mod: PBBFAC,,, | Performed by: PHYSICIAN ASSISTANT

## 2019-10-14 PROCEDURE — 3008F BODY MASS INDEX DOCD: CPT | Mod: CPTII,S$GLB,, | Performed by: PHYSICIAN ASSISTANT

## 2019-10-14 RX ORDER — DICLOFENAC SODIUM 50 MG/1
50 TABLET, DELAYED RELEASE ORAL 2 TIMES DAILY PRN
Qty: 20 TABLET | Refills: 3 | Status: SHIPPED | OUTPATIENT
Start: 2019-10-14 | End: 2019-10-22 | Stop reason: SDUPTHER

## 2019-10-14 NOTE — LETTER
October 14, 2019      Arlene Gomez, NP  [address] [facility] [address] [place] [address]           [facility]  [address]  [place] [address]  Phone: [phone]  Fax: [phone]          Patient: Eugenie Melendrez   MR Number: [id number]   YOB: 1980   Date of Visit: 10/14/2019       Dear Arlene Gomez:    Thank you for referring Eugenie Melendrez to me for evaluation. Attached you will find relevant portions of my assessment and plan of care.    If you have questions, please do not hesitate to call me. I look forward to following Eugenie Melendrez along with you.    Sincerely,    Mandi Huff PA-C    Enclosure  CC:  No Recipients    If you would like to receive this communication electronically, please contact [email or web address] or [phone] to request more information on [facility] [employer] access.    For providers and/or their staff who would like to refer a patient to [facility], please contact us through our one-stop-shop provider referral line, [facility], at [phone].    If you feel you have received this communication in error or would no longer like to receive these types of communications, please e-mail [email or web address]

## 2019-10-14 NOTE — PROGRESS NOTES
"New Patient     SUBJECTIVE:  Patient ID: Eugenie Melendrez   MRN: [id number]  Referred By: Arlene Gomez  Chief Complaint: Migraine      History of Present Illness:   39 y.o. female with a PMHx of DVT, anemia, hyperthyroidism, tubal ligation, migraine, who presents to clinic with  for evaluation of headaches.   PMHx negative for TBI, Meningitis, Aneurysms, Smoking, Kidney Stones, asthma, GI bleed, osteoporosis, CAD/MI, CVA/TIA, DM, infection, fever, cancer, pregnancy   Family Hx negative for brain tumors. Positive for mother and sisters with migraines, dad and paternal aunt with aneurysm.    Patient was admitted on 9/30/19 at [facility] for stroke like symptoms (slurred speech, R side facial and tongue numbness, blurry vision and seeing spots, headache, dizziness, and lightheadedness). MRI brain, MRA brain, CT brain, Carotid US, and Echo were all negative. Stroke w/u negative. Was diagnosed with migraines and started on fioricet, depakote, and zonegram. She feels she has been "zoned out" since starting these and her HA's have not improved.     Headache History:  Onset - teens  Previous Hx of HA - that were mild and intermittent  Location/Radiation - right retro-orbital, frontal, temporal, occipital, neck, shoulder, arm  Quality - throbbing, stabbing, numbing, tingling  Duration - 30 minutes to 3 days  Intensity (range) - 1-10/10  Time to Peak - 30 minutes  Frequency - 16/30 ha days per month, 11/30 are debilitating  Triggers - none identified  Aggravating Factors - light, noise  Alleviating Factors - laying down, dark room, quiet room  Recent Changes - got  last Saturday and has been very stressed about that  Prodrome/Aura - silver and black spots  HA today? - 5/10  Status Migrainosus history - no   Time of day of most headaches- anytime   Sleep - snores, gets 4-5 hours per night, has trouble staying asleep, doesn't wake feeling refreshed, resolution of headache with sleep    Associated " "symptoms with the headache: photophobia, phonophobia, paresthesia, numbness, nausea, worsened by exertion, lightheaded, visual blurriness, dysarthria, confusion    Treatments Tried   fioricet  depakote  zonegram  ibuprofen    Social History  Alcohol - denies  Smoke - 1/2 ppd since teens  Recreational Drug Use- denies    Current Medications:    butalbital-acetaminophen-caffeine -40 mg (FIORICET, ESGIC) -40 mg per tablet, Take 1 tablet by mouth., Disp: , Rfl:     divalproex (DEPAKOTE) 500 MG TbEC, Take 500 mg by mouth., Disp: , Rfl:     zonisamide (ZONEGRAN) 100 MG Cap, Take 200 mg by mouth., Disp: , Rfl:     Review of Systems - as per HPI, otherwise a balanced 10 systems review is negative.    OBJECTIVE:  Vitals:  BP 97/65   Pulse 80   Ht 5' 2" (1.575 m)   Wt 88.4 kg (194 lb 14.2 oz)   LMP 09/28/2019 (Approximate)   BMI 35.65 kg/m²     Physical Exam   Constitutional: she appears well-developed and well-nourished. she is well groomed. NAD  HENT:    Head: Normocephalic and atraumatic, oral and nasal mucosa intact.  Frontalis was NTTP, temporalis was NTTP   Eyes: Conjunctivae and EOM are normal. Pupils are equal, round, and reactive to light   Neck: Neck supple. Right Occiput and Right trapezius TTP. + right FATIMAH and ANNA Tinnel. Traps tight bilaterally  Musculoskeletal: Normal range of motion. No joint stiffness. No vertebral point tenderness.  Skin: Skin is warm and dry.  Psychiatric: Normal mood and affect.     Neuro exam:    Mental status:  The patient is alert and oriented to person, place and time.  Language is intact and fluent  Remote and recent memory are intact  Normal attention and concentration  Mood is stable    Cranial Nerves:  Pupils are equal and reactive to light.    Extraocular movements are intact and without nystagmus.    Visual fields are full to confrontation testing.   Facial movement is symmetric.  Facial sensation is intact.    Hearing is intact to finger rub   Uvula in " midline. Tongue in midline without fasciculation.   FROM of neck in all (6) directions without pain  Shoulder shrug symmetrical.    Coordination:     Finger to nose - normal and symmetric bilaterally   Heel to shin test - normal and symmetric bilaterally     Motor:  Normal muscle bulk and symmetry. No fasciculations were noted.   Tremor not apparent   Pronator drift not apparent.    strength was strong and symmetric  Finger extension strength was strong and symmetric  RUE:appropriate against gravity and medium force as tested 5/5  LUE: appropriate against gravity and medium force as tested 5/5  RLE:appropriate against gravity and medium force as tested 5/5              LLE: appropriate against gravity and medium force as tested 5/5    Reflexes:  Right Brachioradialis 2+  Left Brachioradialis 2+  Right Biceps 2+  Left Biceps 2+  Right Patellar2+  Left Patellar 2+                          Briones was negative    Sensory:  RUE  intact light touch  LUE intact light touch  RLE intact light touch  LLE intact light touch    Gait:   Romberg - negative  Normal gait  Tandem, Heel, and Toe Walk - able to perform without difficulty    Review of Data:   Notes from ED reviewed   Labs:  Office Visit on 08/21/2019   Component Date Value Ref Range Status    Chlamydia, Amplified DNA 08/21/2019 Not Detected  Not Detected Final    N gonorrhoeae, amplified DNA 08/21/2019 Not Detected  Not Detected Final    Urine Culture, Routine 08/21/2019 No significant growth   Final   Procedure visit on 08/01/2019   Component Date Value Ref Range Status    Color, UA 08/01/2019 y/c   Final    Spec Grav UA 08/01/2019 1,020   Final    pH, UA 08/01/2019 7   Final    WBC, UA 08/01/2019 n   Final    Nitrite, UA 08/01/2019 n   Final    Protein 08/01/2019 n   Final    Glucose, UA 08/01/2019 n   Final    Ketones, UA 08/01/2019 n   Final    Urobilinogen, UA 08/01/2019 n   Final    Bilirubin 08/01/2019 n   Final    Blood, UA 08/01/2019 tr    Final   Office Visit on 07/22/2019   Component Date Value Ref Range Status    Color, UA 07/22/2019 y/c   Final    Spec Grav UA 07/22/2019 1,020   Final    pH, UA 07/22/2019 5   Final    WBC, UA 07/22/2019 n   Final    Nitrite, UA 07/22/2019 n   Final    Protein 07/22/2019 n   Final    Glucose, UA 07/22/2019 n   Final    Ketones, UA 07/22/2019 tr   Final    Urobilinogen, UA 07/22/2019 n   Final    Bilirubin 07/22/2019 n   Final    Blood, UA 07/22/2019 small   Final     Imaging:  Results for orders placed or performed during the hospital encounter of 06/25/13   MRA Brain without contrast    Narrative    Routine  imaging through this 32-year-old females brain was obtained per the MRA protocol.  A survey flair weighted sequence and T2 weighted sequence were also available.    The anterior cerebral arteries, anterior communicating artery, middle cerebral arteries, and posterior cerebral arteries proximally demonstrate no evidence of aneurysm or stenosis.      The left vertebral artery appears dominant in comparison with the right.      The P1 segment on the left appears hypoplastic with the majority of the blood flow coming from the posterior communicating artery on the left.  The posterior communicating artery on the right appears hypoplastic with a majority of the blood flow coming   from the P1 segment.      The A1 segment on the right appears hypoplastic with the majority of the blood flow to the anterior cerebral arteries likely coming from the left A1 segment.    The ventricles and sulci appear age appropriate.  Mild mucous membrane thickening is noted within the maxillary sinuses bilaterally.    Impression     1.  No obvious evidence of aneurysm or stenosis is noted on this MRI of the brain.      2.  Mild mucous membrane thickening is noted within the maxillary sinuses bilaterally.      Electronically signed by: Philippe Hayden MD  Date:     06/25/13  Time:    16:50      Note: I have independently reviewed  any/all imaging/labs/tests and agree with the report (s) as documented.  Any discrepancies will be as noted/demarcated by free text.  YUDI NAVA 10/14/2019    ASSESSMENT:  1. Basilar migraine    2. Hyperthyroidism    3. Iron deficiency anemia due to chronic blood loss          PLAN:  - Discussed symptoms appear to be consistent with basilar migraine, discussed treatment options and patient agreed with the following plan:  - preventative - continue zonisamide, consider increase in dose next visit  - abortive - diclofenac  - nerve block at next visit  - d/c depakote  - d/c fioricet  - hypotension - avoid anti-htn medications, increase oral hydration and sleep  - hyperthyroid - TSH ordered (c/o fatigue)  - risks, benefits, and potential side effects of diclofenac and zonisamide discussed   - alternative treatment options offered   - importance of healthy diet, regular exercise and sleep hygiene in the treatment of headaches    - Start tracking headaches via Migraine Raghu markos on phone   - RTC in 2 weeks for right ANNA and FATIMAH block     Orders Placed This Encounter    Nerve block    TSH    diclofenac (VOLTAREN) 50 MG EC tablet       I have discussed realistic goals of care with patient at length as well as medication options, and need for lifestyle adjustment. I have explained that treatment will take time. We have agreed that the goal will be to reduce frequency/intensity/quantity of HA, not to be completely HA free. I have explained my non narcotic policy regarding headache treatment.    Patient agreeable to work on lifestyle adjustments.    Questions and concerns were sought and answered to the patient's stated verbal satisfaction.  The patient verbalizes understanding and agreement with the above stated treatment plan.     CC: MD Mandi Elkins PA-C  [facility]  [phone]    Dr. Silva was available during today's encounter.

## 2019-10-14 NOTE — PATIENT INSTRUCTIONS
Stop taking the depakote and fioricet.   Continue taking zonisamide daily. Message me with an update in your headaches in 2 weeks. Consider increase in dose at that time.   Nerve block at next visit in 2 weeks.   bp low today, please increase oral hydration and food intake. Please increase your sleeping and use good sleep hygiene (see below).  Hyperthyroid, go to lab to get your TSH drawn.

## 2019-10-17 ENCOUNTER — PATIENT MESSAGE (OUTPATIENT)
Dept: NEUROLOGY | Facility: CLINIC | Age: 39
End: 2019-10-17

## 2019-10-18 ENCOUNTER — LAB VISIT (OUTPATIENT)
Dept: LAB | Facility: HOSPITAL | Age: 39
End: 2019-10-18
Attending: PHYSICIAN ASSISTANT
Payer: COMMERCIAL

## 2019-10-18 DIAGNOSIS — E05.90 HYPERTHYROIDISM: ICD-10-CM

## 2019-10-18 LAB — TSH SERPL DL<=0.005 MIU/L-ACNC: 1.77 UIU/ML (ref 0.4–4)

## 2019-10-18 PROCEDURE — 84443 ASSAY THYROID STIM HORMONE: CPT

## 2019-10-18 PROCEDURE — 36415 COLL VENOUS BLD VENIPUNCTURE: CPT | Mod: PO

## 2019-10-22 ENCOUNTER — PATIENT MESSAGE (OUTPATIENT)
Dept: NEUROLOGY | Facility: CLINIC | Age: 39
End: 2019-10-22

## 2019-10-22 DIAGNOSIS — G43.109 BASILAR MIGRAINE: ICD-10-CM

## 2019-10-22 RX ORDER — DICLOFENAC SODIUM 75 MG/1
75 TABLET, DELAYED RELEASE ORAL 2 TIMES DAILY PRN
Qty: 20 TABLET | Refills: 3 | Status: SHIPPED | OUTPATIENT
Start: 2019-10-22 | End: 2021-01-04

## 2019-10-30 ENCOUNTER — PATIENT MESSAGE (OUTPATIENT)
Dept: NEUROLOGY | Facility: CLINIC | Age: 39
End: 2019-10-30

## 2019-10-31 ENCOUNTER — PATIENT MESSAGE (OUTPATIENT)
Dept: NEUROLOGY | Facility: CLINIC | Age: 39
End: 2019-10-31

## 2019-10-31 RX ORDER — ZONISAMIDE 100 MG/1
300 CAPSULE ORAL DAILY
Qty: 90 CAPSULE | Refills: 5 | Status: SHIPPED | OUTPATIENT
Start: 2019-10-31 | End: 2020-03-04

## 2019-11-07 ENCOUNTER — PATIENT MESSAGE (OUTPATIENT)
Dept: NEUROLOGY | Facility: CLINIC | Age: 39
End: 2019-11-07

## 2019-11-25 ENCOUNTER — PATIENT MESSAGE (OUTPATIENT)
Dept: FAMILY MEDICINE | Facility: CLINIC | Age: 39
End: 2019-11-25

## 2019-11-25 ENCOUNTER — TELEPHONE (OUTPATIENT)
Dept: FAMILY MEDICINE | Facility: CLINIC | Age: 39
End: 2019-11-25

## 2019-11-25 NOTE — TELEPHONE ENCOUNTER
Spoke with pt on phone. States that it is not workman's comp.  It is on the job injury.  Appt rescheduled.

## 2019-11-26 ENCOUNTER — OFFICE VISIT (OUTPATIENT)
Dept: FAMILY MEDICINE | Facility: CLINIC | Age: 39
End: 2019-11-26
Payer: COMMERCIAL

## 2019-11-26 VITALS
OXYGEN SATURATION: 98 % | DIASTOLIC BLOOD PRESSURE: 74 MMHG | WEIGHT: 192 LBS | HEIGHT: 62 IN | BODY MASS INDEX: 35.33 KG/M2 | TEMPERATURE: 98 F | HEART RATE: 75 BPM | SYSTOLIC BLOOD PRESSURE: 126 MMHG

## 2019-11-26 DIAGNOSIS — M54.41 ACUTE RIGHT-SIDED LOW BACK PAIN WITH RIGHT-SIDED SCIATICA: Primary | ICD-10-CM

## 2019-11-26 PROCEDURE — 99999 PR PBB SHADOW E&M-EST. PATIENT-LVL III: ICD-10-PCS | Mod: PBBFAC,,, | Performed by: NURSE PRACTITIONER

## 2019-11-26 PROCEDURE — 99999 PR PBB SHADOW E&M-EST. PATIENT-LVL III: CPT | Mod: PBBFAC,,, | Performed by: NURSE PRACTITIONER

## 2019-11-26 PROCEDURE — 99214 PR OFFICE/OUTPT VISIT, EST, LEVL IV, 30-39 MIN: ICD-10-PCS | Mod: 25,S$GLB,, | Performed by: NURSE PRACTITIONER

## 2019-11-26 PROCEDURE — 96372 THER/PROPH/DIAG INJ SC/IM: CPT | Mod: S$GLB,,, | Performed by: NURSE PRACTITIONER

## 2019-11-26 PROCEDURE — 3008F PR BODY MASS INDEX (BMI) DOCUMENTED: ICD-10-PCS | Mod: CPTII,S$GLB,, | Performed by: NURSE PRACTITIONER

## 2019-11-26 PROCEDURE — 3008F BODY MASS INDEX DOCD: CPT | Mod: CPTII,S$GLB,, | Performed by: NURSE PRACTITIONER

## 2019-11-26 PROCEDURE — 99214 OFFICE O/P EST MOD 30 MIN: CPT | Mod: 25,S$GLB,, | Performed by: NURSE PRACTITIONER

## 2019-11-26 PROCEDURE — 96372 PR INJECTION,THERAP/PROPH/DIAG2ST, IM OR SUBCUT: ICD-10-PCS | Mod: S$GLB,,, | Performed by: NURSE PRACTITIONER

## 2019-11-26 RX ORDER — KETOROLAC TROMETHAMINE 30 MG/ML
30 INJECTION, SOLUTION INTRAMUSCULAR; INTRAVENOUS
Status: COMPLETED | OUTPATIENT
Start: 2019-11-26 | End: 2019-11-26

## 2019-11-26 RX ORDER — PREDNISONE 10 MG/1
TABLET ORAL
Qty: 20 TABLET | Refills: 0 | Status: SHIPPED | OUTPATIENT
Start: 2019-11-26 | End: 2019-12-30 | Stop reason: ALTCHOICE

## 2019-11-26 RX ORDER — TIZANIDINE 4 MG/1
4 TABLET ORAL NIGHTLY PRN
Qty: 12 TABLET | Refills: 0 | Status: SHIPPED | OUTPATIENT
Start: 2019-11-26 | End: 2019-12-06

## 2019-11-26 RX ORDER — METHOCARBAMOL 750 MG/1
TABLET, FILM COATED ORAL
COMMUNITY
Start: 2019-11-21 | End: 2019-11-26 | Stop reason: ALTCHOICE

## 2019-11-26 RX ADMIN — KETOROLAC TROMETHAMINE 30 MG: 30 INJECTION, SOLUTION INTRAMUSCULAR; INTRAVENOUS at 09:11

## 2019-11-26 NOTE — LETTER
November 26, 2019      [facility]  [address] [place] [address]  Phone: [phone]  Fax: [phone]       Patient: Eugenie Melendrez   YOB: 1980  Date of Visit: 11/26/2019    To Whom It May Concern:    SHAJI Melendrez  was at [facility] on 11/26/2019. She may return to work on 12/4/19 with no restrictions. If you have any questions or concerns, or if I can be of further assistance, please do not hesitate to contact me.    Sincerely,        Arlene Gomez NP
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
## 2019-12-02 ENCOUNTER — LAB VISIT (OUTPATIENT)
Dept: LAB | Facility: HOSPITAL | Age: 39
End: 2019-12-02
Attending: NURSE PRACTITIONER
Payer: COMMERCIAL

## 2019-12-02 ENCOUNTER — OFFICE VISIT (OUTPATIENT)
Dept: FAMILY MEDICINE | Facility: CLINIC | Age: 39
End: 2019-12-02
Payer: COMMERCIAL

## 2019-12-02 VITALS
BODY MASS INDEX: 35.24 KG/M2 | WEIGHT: 192.69 LBS | OXYGEN SATURATION: 98 % | HEART RATE: 67 BPM | SYSTOLIC BLOOD PRESSURE: 132 MMHG | TEMPERATURE: 98 F | DIASTOLIC BLOOD PRESSURE: 90 MMHG

## 2019-12-02 DIAGNOSIS — R53.83 FATIGUE, UNSPECIFIED TYPE: ICD-10-CM

## 2019-12-02 DIAGNOSIS — R53.83 FATIGUE, UNSPECIFIED TYPE: Primary | ICD-10-CM

## 2019-12-02 LAB
ALBUMIN SERPL BCP-MCNC: 4.3 G/DL (ref 3.5–5.2)
ALP SERPL-CCNC: 60 U/L (ref 55–135)
ALT SERPL W/O P-5'-P-CCNC: 18 U/L (ref 10–44)
ANION GAP SERPL CALC-SCNC: 11 MMOL/L (ref 8–16)
AST SERPL-CCNC: 16 U/L (ref 10–40)
BASOPHILS # BLD AUTO: 0.08 K/UL (ref 0–0.2)
BASOPHILS NFR BLD: 0.9 % (ref 0–1.9)
BILIRUB SERPL-MCNC: 0.4 MG/DL (ref 0.1–1)
BUN SERPL-MCNC: 19 MG/DL (ref 6–20)
CALCIUM SERPL-MCNC: 9.5 MG/DL (ref 8.7–10.5)
CHLORIDE SERPL-SCNC: 106 MMOL/L (ref 95–110)
CO2 SERPL-SCNC: 24 MMOL/L (ref 23–29)
CREAT SERPL-MCNC: 0.9 MG/DL (ref 0.5–1.4)
DIFFERENTIAL METHOD: ABNORMAL
EOSINOPHIL # BLD AUTO: 0.1 K/UL (ref 0–0.5)
EOSINOPHIL NFR BLD: 0.5 % (ref 0–8)
ERYTHROCYTE [DISTWIDTH] IN BLOOD BY AUTOMATED COUNT: 12.7 % (ref 11.5–14.5)
EST. GFR  (AFRICAN AMERICAN): >60 ML/MIN/1.73 M^2
EST. GFR  (NON AFRICAN AMERICAN): >60 ML/MIN/1.73 M^2
GLUCOSE SERPL-MCNC: 86 MG/DL (ref 70–110)
HCT VFR BLD AUTO: 46.2 % (ref 37–48.5)
HGB BLD-MCNC: 14.7 G/DL (ref 12–16)
IMM GRANULOCYTES # BLD AUTO: 0.03 K/UL (ref 0–0.04)
IMM GRANULOCYTES NFR BLD AUTO: 0.3 % (ref 0–0.5)
LYMPHOCYTES # BLD AUTO: 2.8 K/UL (ref 1–4.8)
LYMPHOCYTES NFR BLD: 30.5 % (ref 18–48)
MCH RBC QN AUTO: 29.5 PG (ref 27–31)
MCHC RBC AUTO-ENTMCNC: 31.8 G/DL (ref 32–36)
MCV RBC AUTO: 93 FL (ref 82–98)
MONOCYTES # BLD AUTO: 0.5 K/UL (ref 0.3–1)
MONOCYTES NFR BLD: 5.9 % (ref 4–15)
NEUTROPHILS # BLD AUTO: 5.7 K/UL (ref 1.8–7.7)
NEUTROPHILS NFR BLD: 61.9 % (ref 38–73)
NRBC BLD-RTO: 0 /100 WBC
PLATELET # BLD AUTO: 289 K/UL (ref 150–350)
PMV BLD AUTO: 10 FL (ref 9.2–12.9)
POTASSIUM SERPL-SCNC: 4.5 MMOL/L (ref 3.5–5.1)
PROT SERPL-MCNC: 7.7 G/DL (ref 6–8.4)
RBC # BLD AUTO: 4.99 M/UL (ref 4–5.4)
SODIUM SERPL-SCNC: 141 MMOL/L (ref 136–145)
WBC # BLD AUTO: 9.14 K/UL (ref 3.9–12.7)

## 2019-12-02 PROCEDURE — 3008F BODY MASS INDEX DOCD: CPT | Mod: CPTII,S$GLB,, | Performed by: NURSE PRACTITIONER

## 2019-12-02 PROCEDURE — 99214 OFFICE O/P EST MOD 30 MIN: CPT | Mod: S$GLB,,, | Performed by: NURSE PRACTITIONER

## 2019-12-02 PROCEDURE — 3008F PR BODY MASS INDEX (BMI) DOCUMENTED: ICD-10-PCS | Mod: CPTII,S$GLB,, | Performed by: NURSE PRACTITIONER

## 2019-12-02 PROCEDURE — 99214 PR OFFICE/OUTPT VISIT, EST, LEVL IV, 30-39 MIN: ICD-10-PCS | Mod: S$GLB,,, | Performed by: NURSE PRACTITIONER

## 2019-12-02 PROCEDURE — 80053 COMPREHEN METABOLIC PANEL: CPT

## 2019-12-02 PROCEDURE — 85025 COMPLETE CBC W/AUTO DIFF WBC: CPT

## 2019-12-02 PROCEDURE — 99999 PR PBB SHADOW E&M-EST. PATIENT-LVL III: ICD-10-PCS | Mod: PBBFAC,,, | Performed by: NURSE PRACTITIONER

## 2019-12-02 PROCEDURE — 99999 PR PBB SHADOW E&M-EST. PATIENT-LVL III: CPT | Mod: PBBFAC,,, | Performed by: NURSE PRACTITIONER

## 2019-12-02 PROCEDURE — 36415 COLL VENOUS BLD VENIPUNCTURE: CPT | Mod: PO
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
## 2019-12-30 ENCOUNTER — OFFICE VISIT (OUTPATIENT)
Dept: FAMILY MEDICINE | Facility: CLINIC | Age: 39
End: 2019-12-30
Payer: COMMERCIAL

## 2019-12-30 VITALS
HEART RATE: 86 BPM | WEIGHT: 190.94 LBS | BODY MASS INDEX: 35.14 KG/M2 | DIASTOLIC BLOOD PRESSURE: 68 MMHG | HEIGHT: 62 IN | OXYGEN SATURATION: 98 % | SYSTOLIC BLOOD PRESSURE: 106 MMHG | TEMPERATURE: 98 F

## 2019-12-30 DIAGNOSIS — H66.92 LEFT OTITIS MEDIA, UNSPECIFIED OTITIS MEDIA TYPE: Primary | ICD-10-CM

## 2019-12-30 DIAGNOSIS — J02.9 PHARYNGITIS, UNSPECIFIED ETIOLOGY: ICD-10-CM

## 2019-12-30 LAB
CTP QC/QA: YES
INFLUENZA A, MOLECULAR: NEGATIVE
INFLUENZA B, MOLECULAR: NEGATIVE
S PYO RRNA THROAT QL PROBE: NEGATIVE
SPECIMEN SOURCE: NORMAL

## 2019-12-30 PROCEDURE — 87880 STREP A ASSAY W/OPTIC: CPT | Mod: QW,S$GLB,, | Performed by: NURSE PRACTITIONER

## 2019-12-30 PROCEDURE — 3008F PR BODY MASS INDEX (BMI) DOCUMENTED: ICD-10-PCS | Mod: CPTII,S$GLB,, | Performed by: NURSE PRACTITIONER

## 2019-12-30 PROCEDURE — 99999 PR PBB SHADOW E&M-EST. PATIENT-LVL III: CPT | Mod: PBBFAC,,, | Performed by: NURSE PRACTITIONER

## 2019-12-30 PROCEDURE — 3008F BODY MASS INDEX DOCD: CPT | Mod: CPTII,S$GLB,, | Performed by: NURSE PRACTITIONER

## 2019-12-30 PROCEDURE — 96372 THER/PROPH/DIAG INJ SC/IM: CPT | Mod: S$GLB,,, | Performed by: NURSE PRACTITIONER

## 2019-12-30 PROCEDURE — 96372 PR INJECTION,THERAP/PROPH/DIAG2ST, IM OR SUBCUT: ICD-10-PCS | Mod: S$GLB,,, | Performed by: NURSE PRACTITIONER

## 2019-12-30 PROCEDURE — 87502 INFLUENZA DNA AMP PROBE: CPT | Mod: PO

## 2019-12-30 PROCEDURE — 99214 PR OFFICE/OUTPT VISIT, EST, LEVL IV, 30-39 MIN: ICD-10-PCS | Mod: 25,S$GLB,, | Performed by: NURSE PRACTITIONER

## 2019-12-30 PROCEDURE — 87880 POCT RAPID STREP A: ICD-10-PCS | Mod: QW,S$GLB,, | Performed by: NURSE PRACTITIONER

## 2019-12-30 PROCEDURE — 99999 PR PBB SHADOW E&M-EST. PATIENT-LVL III: ICD-10-PCS | Mod: PBBFAC,,, | Performed by: NURSE PRACTITIONER

## 2019-12-30 PROCEDURE — 99214 OFFICE O/P EST MOD 30 MIN: CPT | Mod: 25,S$GLB,, | Performed by: NURSE PRACTITIONER

## 2019-12-30 RX ORDER — AZITHROMYCIN 250 MG/1
TABLET, FILM COATED ORAL
Qty: 6 TABLET | Refills: 0 | Status: SHIPPED | OUTPATIENT
Start: 2019-12-30 | End: 2020-01-04

## 2019-12-30 RX ORDER — BETAMETHASONE SODIUM PHOSPHATE AND BETAMETHASONE ACETATE 3; 3 MG/ML; MG/ML
6 INJECTION, SUSPENSION INTRA-ARTICULAR; INTRALESIONAL; INTRAMUSCULAR; SOFT TISSUE
Status: COMPLETED | OUTPATIENT
Start: 2019-12-30 | End: 2019-12-30

## 2019-12-30 RX ADMIN — BETAMETHASONE SODIUM PHOSPHATE AND BETAMETHASONE ACETATE 6 MG: 3; 3 INJECTION, SUSPENSION INTRA-ARTICULAR; INTRALESIONAL; INTRAMUSCULAR; SOFT TISSUE at 02:12

## 2019-12-30 NOTE — PROGRESS NOTES
Subjective:       Patient ID: Eugenie Laguerre is a 39 y.o. female.    Chief Complaint: Cough and Sore Throat    Sore Throat    This is a new problem. Episode onset: 3 days ago. The problem has been unchanged. There has been no fever. Associated symptoms include coughing and neck pain. Pertinent negatives include no diarrhea, shortness of breath or trouble swallowing. She has tried acetaminophen, NSAIDs and cool liquids for the symptoms. The treatment provided no relief.      Vitals:    19 1425   BP: 106/68   Pulse: 86   Temp: 98.3 °F (36.8 °C)     Review of Systems   Constitutional: Negative for chills, diaphoresis and fever.   HENT: Positive for sore throat. Negative for facial swelling and trouble swallowing.    Eyes: Negative for discharge and redness.   Respiratory: Positive for cough. Negative for shortness of breath.    Cardiovascular: Negative for chest pain and palpitations.   Gastrointestinal: Negative for diarrhea.   Genitourinary: Negative for difficulty urinating.   Musculoskeletal: Positive for neck pain. Negative for gait problem and myalgias.   Skin: Negative for pallor and rash.   Neurological: Negative for facial asymmetry and speech difficulty.   Psychiatric/Behavioral: Negative for confusion. The patient is not nervous/anxious.        Past Medical History:   Diagnosis Date    Clotting disorder     DVT (deep venous thrombosis) 3/07    was felt to be due to ? Factor V Leiden    High risk pregnancy due to history of  labor 2015    Hyperthyroidism 2013    Iron deficiency anemia due to chronic blood loss 2015    Short cervix affecting pregnancy 3/3/2015     Objective:      Physical Exam   Constitutional: She is oriented to person, place, and time. She does not have a sickly appearance. No distress.   HENT:   Head: Normocephalic.   Right Ear: Hearing, external ear and ear canal normal. A middle ear effusion is present.   Left Ear: Hearing normal. Tympanic membrane is  erythematous and bulging.   Nose: Nose normal.   Mouth/Throat: Uvula is midline. Posterior oropharyngeal edema and posterior oropharyngeal erythema present. No oropharyngeal exudate.   Eyes: Conjunctivae and lids are normal.   Neck: No JVD present. No tracheal deviation present.   Cardiovascular: Normal rate and normal heart sounds.   Pulmonary/Chest: Effort normal and breath sounds normal.   Abdominal: She exhibits no distension.   Musculoskeletal: She exhibits no deformity.   Neurological: She is alert and oriented to person, place, and time.   Skin: She is not diaphoretic. No pallor.   Psychiatric: She has a normal mood and affect. Her speech is normal and behavior is normal. Judgment and thought content normal. Cognition and memory are normal.   Nursing note and vitals reviewed.      Assessment:       1. Left otitis media, unspecified otitis media type    2. Pharyngitis, unspecified etiology        Plan:       Left otitis media, unspecified otitis media type  -     azithromycin (Z-VARINDER) 250 MG tablet; Take 2 tablets by mouth on day 1; Take 1 tablet by mouth on days 2-5  Dispense: 6 tablet; Refill: 0    Pharyngitis, unspecified etiology  -     POCT Rapid Strep A  -     Influenza A & B by Molecular  -     betamethasone acetate-betamethasone sodium phosphate injection 6 mg    educated on supportive care, return precautions         Follow up for further evaluation if s/s worsen, fail to improve, or new symptoms arise.    Medication List with Changes/Refills   New Medications    AZITHROMYCIN (Z-VARINDER) 250 MG TABLET    Take 2 tablets by mouth on day 1; Take 1 tablet by mouth on days 2-5   Current Medications    DICLOFENAC (VOLTAREN) 75 MG EC TABLET    Take 1 tablet (75 mg total) by mouth 2 (two) times daily as needed (for migraines).    ZONISAMIDE (ZONEGRAN) 100 MG CAP    Take 3 capsules (300 mg total) by mouth once daily.   Discontinued Medications    PREDNISONE (DELTASONE) 10 MG TABLET    Take 4 tabs daily for 2 days,  then 3 tabs daily for 2 days, then 2 tabs daily for 2 days, then 1 tab daily for 2 days, then stop

## 2020-01-06 ENCOUNTER — OFFICE VISIT (OUTPATIENT)
Dept: FAMILY MEDICINE | Facility: CLINIC | Age: 40
End: 2020-01-06
Payer: COMMERCIAL

## 2020-01-06 VITALS
OXYGEN SATURATION: 99 % | HEART RATE: 60 BPM | TEMPERATURE: 98 F | WEIGHT: 191.81 LBS | SYSTOLIC BLOOD PRESSURE: 108 MMHG | HEIGHT: 62 IN | DIASTOLIC BLOOD PRESSURE: 72 MMHG | BODY MASS INDEX: 35.3 KG/M2

## 2020-01-06 DIAGNOSIS — H66.002 ACUTE SUPPURATIVE OTITIS MEDIA OF LEFT EAR WITHOUT SPONTANEOUS RUPTURE OF TYMPANIC MEMBRANE, RECURRENCE NOT SPECIFIED: Primary | ICD-10-CM

## 2020-01-06 PROCEDURE — 99999 PR PBB SHADOW E&M-EST. PATIENT-LVL III: ICD-10-PCS | Mod: PBBFAC,,, | Performed by: NURSE PRACTITIONER

## 2020-01-06 PROCEDURE — 3008F PR BODY MASS INDEX (BMI) DOCUMENTED: ICD-10-PCS | Mod: CPTII,S$GLB,, | Performed by: NURSE PRACTITIONER

## 2020-01-06 PROCEDURE — 99214 PR OFFICE/OUTPT VISIT, EST, LEVL IV, 30-39 MIN: ICD-10-PCS | Mod: 25,S$GLB,, | Performed by: NURSE PRACTITIONER

## 2020-01-06 PROCEDURE — 99999 PR PBB SHADOW E&M-EST. PATIENT-LVL III: CPT | Mod: PBBFAC,,, | Performed by: NURSE PRACTITIONER

## 2020-01-06 PROCEDURE — 96372 PR INJECTION,THERAP/PROPH/DIAG2ST, IM OR SUBCUT: ICD-10-PCS | Mod: S$GLB,,, | Performed by: NURSE PRACTITIONER

## 2020-01-06 PROCEDURE — 3008F BODY MASS INDEX DOCD: CPT | Mod: CPTII,S$GLB,, | Performed by: NURSE PRACTITIONER

## 2020-01-06 PROCEDURE — 99214 OFFICE O/P EST MOD 30 MIN: CPT | Mod: 25,S$GLB,, | Performed by: NURSE PRACTITIONER

## 2020-01-06 PROCEDURE — 96372 THER/PROPH/DIAG INJ SC/IM: CPT | Mod: S$GLB,,, | Performed by: NURSE PRACTITIONER

## 2020-01-06 RX ORDER — DOXYCYCLINE HYCLATE 100 MG
100 TABLET ORAL EVERY 12 HOURS
Qty: 14 TABLET | Refills: 0 | Status: SHIPPED | OUTPATIENT
Start: 2020-01-06 | End: 2020-01-13

## 2020-01-06 RX ORDER — IBUPROFEN 800 MG/1
800 TABLET ORAL 3 TIMES DAILY PRN
Qty: 12 TABLET | Refills: 0 | Status: SHIPPED | OUTPATIENT
Start: 2020-01-06 | End: 2020-08-14

## 2020-01-06 RX ORDER — BETAMETHASONE SODIUM PHOSPHATE AND BETAMETHASONE ACETATE 3; 3 MG/ML; MG/ML
6 INJECTION, SUSPENSION INTRA-ARTICULAR; INTRALESIONAL; INTRAMUSCULAR; SOFT TISSUE
Status: COMPLETED | OUTPATIENT
Start: 2020-01-06 | End: 2020-01-06

## 2020-01-06 RX ADMIN — BETAMETHASONE SODIUM PHOSPHATE AND BETAMETHASONE ACETATE 6 MG: 3; 3 INJECTION, SUSPENSION INTRA-ARTICULAR; INTRALESIONAL; INTRAMUSCULAR; SOFT TISSUE at 08:01

## 2020-01-06 NOTE — PROGRESS NOTES
Subjective:       Patient ID: Eugenie Laguerre is a 39 y.o. female.    Chief Complaint: Otalgia    HPI   Mrs. Laguerre is a known patient to me. Recall I saw her last week diagnosed with AOM left ear--prescribed azithromycin (PCN allergy). She reports symptoms started to improve then significantly worsened, moderate to severe left otalgia. Denies drainage.   Vitals:    20 0757   BP: 108/72   Pulse: 60   Temp: 97.9 °F (36.6 °C)     Review of Systems   Constitutional: Negative for diaphoresis and fever.   HENT: Positive for ear pain. Negative for ear discharge, facial swelling and trouble swallowing.    Eyes: Negative for discharge and redness.   Respiratory: Negative for cough and shortness of breath.    Cardiovascular: Negative for chest pain and palpitations.   Gastrointestinal: Negative for diarrhea.   Genitourinary: Negative for difficulty urinating.   Musculoskeletal: Negative for gait problem.   Skin: Negative for pallor and rash.   Neurological: Negative for facial asymmetry and speech difficulty.   Psychiatric/Behavioral: Negative for confusion. The patient is not nervous/anxious.        Past Medical History:   Diagnosis Date    Clotting disorder     DVT (deep venous thrombosis) 3/07    was felt to be due to ? Factor V Leiden    High risk pregnancy due to history of  labor 2015    Hyperthyroidism 2013    Iron deficiency anemia due to chronic blood loss 2015    Short cervix affecting pregnancy 3/3/2015     Objective:      Physical Exam   Constitutional: She is oriented to person, place, and time. She does not have a sickly appearance. No distress.   HENT:   Head: Normocephalic.   Right Ear: Hearing, tympanic membrane, external ear and ear canal normal.   Left Ear: Hearing and external ear normal. There is swelling and tenderness. No drainage. A middle ear effusion is present.   Nose: Nose normal.   Swelling left ear canal    Eyes: Conjunctivae and lids are normal.   Neck: No JVD  present. No tracheal deviation present.   Cardiovascular: Normal rate.   Pulmonary/Chest: Effort normal.   Musculoskeletal: She exhibits no deformity.   Neurological: She is alert and oriented to person, place, and time.   Skin: She is not diaphoretic. No pallor.   Psychiatric: She has a normal mood and affect. Her speech is normal and behavior is normal. Judgment and thought content normal. Cognition and memory are normal.   Nursing note and vitals reviewed.      Assessment:       1. Acute suppurative otitis media of left ear without spontaneous rupture of tympanic membrane, recurrence not specified        Plan:       Acute suppurative otitis media of left ear without spontaneous rupture of tympanic membrane, recurrence not specified  -     betamethasone acetate-betamethasone sodium phosphate injection 6 mg  -     doxycycline (VIBRA-TABS) 100 MG tablet; Take 1 tablet (100 mg total) by mouth every 12 (twelve) hours. for 7 days  Dispense: 14 tablet; Refill: 0  -     Ambulatory referral to ENT    Other orders  -     ibuprofen (ADVIL,MOTRIN) 800 MG tablet; Take 1 tablet (800 mg total) by mouth 3 (three) times daily as needed for Pain.  Dispense: 12 tablet; Refill: 0      Follow up for further evaluation with ENT if s/s worsen, fail to improve, or new symptoms arise.    Medication List with Changes/Refills   New Medications    DOXYCYCLINE (VIBRA-TABS) 100 MG TABLET    Take 1 tablet (100 mg total) by mouth every 12 (twelve) hours. for 7 days    IBUPROFEN (ADVIL,MOTRIN) 800 MG TABLET    Take 1 tablet (800 mg total) by mouth 3 (three) times daily as needed for Pain.   Current Medications    DICLOFENAC (VOLTAREN) 75 MG EC TABLET    Take 1 tablet (75 mg total) by mouth 2 (two) times daily as needed (for migraines).    ZONISAMIDE (ZONEGRAN) 100 MG CAP    Take 3 capsules (300 mg total) by mouth once daily.

## 2020-01-06 NOTE — LETTER
January 6, 2020      [facility]  [address] [place] [address]  Phone: [phone]  Fax: [phone]       Patient: Eugenie Laguerre   YOB: 1980  Date of Visit: 01/06/2020    To Whom It May Concern:    SHAJI Laguerre  was at [facility] on 01/06/2020. She may return to work on 1/7/20 with no restrictions. If you have any questions or concerns, or if I can be of further assistance, please do not hesitate to contact me.    Sincerely,        Arlene Gomez NP

## 2020-01-06 NOTE — LETTER
January 6, 2020      [facility]  [address] [place] [address]  Phone: [phone]  Fax: [phone]       Patient: Eugenie Laguerre   YOB: 1980  Date of Visit: 01/06/2020    To Whom It May Concern:    SHAJI Laguerre  was at [facility] on 01/06/2020. She may return to work tomorrow 1/7/19 with no restrictions. If you have any questions or concerns, or if I can be of further assistance, please do not hesitate to contact me.    Sincerely,      Arlene Gomez NP

## 2020-01-23 ENCOUNTER — TELEPHONE (OUTPATIENT)
Dept: FAMILY MEDICINE | Facility: CLINIC | Age: 40
End: 2020-01-23

## 2020-01-23 DIAGNOSIS — F41.9 ANXIETY: Primary | ICD-10-CM

## 2020-01-23 RX ORDER — CITALOPRAM 20 MG/1
20 TABLET, FILM COATED ORAL DAILY
Qty: 30 TABLET | Refills: 5 | Status: SHIPPED | OUTPATIENT
Start: 2020-01-23 | End: 2020-05-21

## 2020-01-23 NOTE — TELEPHONE ENCOUNTER
Spoke with patient, requesting to restart celexa. Tolerated in past--stopped because she was feeling good

## 2020-02-17 ENCOUNTER — OFFICE VISIT (OUTPATIENT)
Dept: FAMILY MEDICINE | Facility: CLINIC | Age: 40
End: 2020-02-17
Payer: COMMERCIAL

## 2020-02-17 ENCOUNTER — PATIENT MESSAGE (OUTPATIENT)
Dept: FAMILY MEDICINE | Facility: CLINIC | Age: 40
End: 2020-02-17

## 2020-02-17 VITALS
WEIGHT: 194.69 LBS | BODY MASS INDEX: 35.6 KG/M2 | HEART RATE: 86 BPM | OXYGEN SATURATION: 98 % | DIASTOLIC BLOOD PRESSURE: 84 MMHG | SYSTOLIC BLOOD PRESSURE: 136 MMHG

## 2020-02-17 DIAGNOSIS — M75.82 ROTATOR CUFF TENDONITIS, LEFT: Primary | ICD-10-CM

## 2020-02-17 PROCEDURE — 3008F PR BODY MASS INDEX (BMI) DOCUMENTED: ICD-10-PCS | Mod: CPTII,S$GLB,, | Performed by: PHYSICIAN ASSISTANT

## 2020-02-17 PROCEDURE — 3008F BODY MASS INDEX DOCD: CPT | Mod: CPTII,S$GLB,, | Performed by: PHYSICIAN ASSISTANT

## 2020-02-17 PROCEDURE — 99999 PR PBB SHADOW E&M-EST. PATIENT-LVL III: CPT | Mod: PBBFAC,,, | Performed by: PHYSICIAN ASSISTANT

## 2020-02-17 PROCEDURE — 99214 OFFICE O/P EST MOD 30 MIN: CPT | Mod: S$GLB,,, | Performed by: PHYSICIAN ASSISTANT

## 2020-02-17 PROCEDURE — 99214 PR OFFICE/OUTPT VISIT, EST, LEVL IV, 30-39 MIN: ICD-10-PCS | Mod: S$GLB,,, | Performed by: PHYSICIAN ASSISTANT

## 2020-02-17 PROCEDURE — 99999 PR PBB SHADOW E&M-EST. PATIENT-LVL III: ICD-10-PCS | Mod: PBBFAC,,, | Performed by: PHYSICIAN ASSISTANT

## 2020-02-17 RX ORDER — MELOXICAM 15 MG/1
15 TABLET ORAL DAILY
COMMUNITY
End: 2020-02-24 | Stop reason: SDUPTHER

## 2020-02-17 NOTE — PROGRESS NOTES
Subjective:       Patient ID: Eugenie Laguerre is a 39 y.o. female    Chief Complaint: Pain (left shoulder)    HPI  The patient is new to me, PCP is Dr. Gerardo.  She presents today complaining of left shoulder pain.  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  She does not recall any specific injury but the training is very physically demanding.  Pain is in the left posterior and anterior aspects of the shoulder and radiates down the left arm.  She describes the pain as a burning pain. She denies any weakness, numbness or tingling.    Review of Systems   Constitutional: Negative for activity change, chills and fever.   HENT: Negative for congestion and sore throat.    Eyes: Negative for visual disturbance.   Respiratory: Negative for cough and shortness of breath.    Cardiovascular: Negative for chest pain and palpitations.   Gastrointestinal: Negative for abdominal pain, diarrhea, nausea and vomiting.   Endocrine: Negative for polydipsia and polyuria.   Musculoskeletal: Positive for arthralgias (Left shoulder pain). Negative for myalgias.   Skin: Negative for rash.   Neurological: Negative for headaches.   Psychiatric/Behavioral: The patient is not nervous/anxious.         Objective:   Physical Exam   Constitutional: She is oriented to person, place, and time. She appears well-developed and well-nourished. No distress.   HENT:   Head: Normocephalic and atraumatic.   Eyes: Pupils are equal, round, and reactive to light. EOM are normal.   Neck: Normal range of motion. Neck supple. No thyromegaly present.   No cervical facet tenderness, no cervical paraspinal muscle tenderness or midline tenderness   Cardiovascular: Normal rate, regular rhythm, normal heart sounds and intact distal pulses. Exam reveals no gallop and no friction rub.   No murmur heard.  Pulmonary/Chest: Effort normal and breath sounds normal. No respiratory distress. She has no wheezes. She has no rales. She exhibits no  tenderness.   Abdominal: Soft. Bowel sounds are normal. She exhibits no distension and no mass. There is no tenderness. There is no guarding.   Musculoskeletal: Normal range of motion. She exhibits tenderness (Mild tenderness to palpation over the left anterior and posterior aspect of the shoulder, negative Berman test). She exhibits no edema or deformity.   , biceps and triceps muscle strength 5/5 bilaterally, full left shoulder range of motion the patient reports some pain with full shoulder abduction and flexion   Neurological: She is alert and oriented to person, place, and time.   Skin: Skin is warm and dry. No rash noted. She is not diaphoretic.   Psychiatric: She has a normal mood and affect. Her behavior is normal. Judgment and thought content normal.        Assessment:       1. Rotator cuff tendonitis, left          Plan:       Rotator cuff tendonitis, left  - recommended light stretching  - continue Mobic once daily  - follow-up in 1 week  - patient given a letter with work restrictions for the next week (no repetitive lifting, no lifting over 10 lb, no pushups)

## 2020-02-17 NOTE — LETTER
February 17, 2020      [facility]  [address]  [place] [address]  Phone: [phone]  Fax: [phone]       Patient: Eugenie Laguerre   YOB: 1980  Date of Visit: 02/17/2020    To Whom It May Concern:    SHAJI Laguerre  was at [facility] on 02/17/2020. She may return to work/school on 2/18/2020 with restrictions until she is cleared.  She is scheduled for follow-up visit in 1 week on 02/24/2020.  The restrictions include no repetitive lifting, no lifting over 10 lb.  No pushups.  If you have any questions or concerns, or if I can be of further assistance, please do not hesitate to contact me.    Sincerely,    Danette Parks PA-C

## 2020-02-17 NOTE — PATIENT INSTRUCTIONS
Shoulder Exercises    To start, sit in a chair with your feet flat on the floor. Your weight should be slightly forward so that youre balanced evenly on your buttocks. Relax your shoulders and keep your head level. Avoid arching your back or rounding your shoulders. Using a chair with arms may help you keep your balance.  · Raise your arms, elbows bent, to shoulder height.  · Slowly move your forearms together. Hold for 5 seconds.  · Return to starting position. Repeat 5 times.  Date Last Reviewed: 10/1/2015  © 0704-0654 [employer] [address] [place] [address]. All rights reserved. This information is not intended as a substitute for professional medical care. Always follow your healthcare professional's instructions.        Understanding Rotator Cuff Tendonitis    Tendons are tough tissues that connect muscles to bone. A group of 4 muscles and their tendons form a cuff around the head of the upper arm bone. This is called the rotator cuff. It connects the upper arm to the shoulder blade. It gives the shoulder joint stability and strength.  If tendons are injured or strained, they may become irritated and swollen (inflamed). This is called tendonitis. Rotator cuff tendonitis may cause shoulder pain and loss of function.  What causes rotator cuff tendonitis?  Tendonitis results when the rotator cuff tendons are injured or overworked. The most common cause of injury is repetitive overhead activities. These can be work-related activities such as reaching, pushing, or lifting. Or they can be sports-related activities such as throwing, swimming, or lifting weights.  Symptoms of rotator cuff tendonitis  Pain on the side of the upper arm is the most common symptom. Pain may get worse with overhead movements or when you raise the arm above shoulder level. It may also hurt to lie on the shoulder at night.  Treatment for rotator cuff tendonitis  Treatment may include the following:  · Active  rest. This lets the rotator cuff heal. Active rest means using your arm and shoulder, but avoiding activities that cause pain, such as reaching overhead or sleeping on the shoulder.  · Cold packs. Putting ice packs on the shoulder helps reduce swelling and relieve pain.  · Pain medicines. Prescription or over-the-counter pain medicines can help relieve pain and swelling.  · Arm and shoulder exercises. These help keep the shoulder joint mobile as it heals. They also help improve the strength of muscles around the joint.  Possible complications  It might be tempting to stop using your shoulder completely to avoid pain. But doing so may lead to a condition called frozen shoulder. To help prevent this, following instructions you are given for active rest and for doing exercises to help your shoulder heal.  When to call your healthcare provider  Call your healthcare provider right away if you have any of these:  · Fever of 100.4°F (38°C) or higher, or as directed  · Symptoms that dont get better, or get worse  · New symptoms   Date Last Reviewed: 3/10/2016  © 4309-9112 [employer] [address] [place] [address], [place] [address]. All rights reserved. This information is not intended as a substitute for professional medical care. Always follow your healthcare professional's instructions.

## 2020-02-20 ENCOUNTER — TELEPHONE (OUTPATIENT)
Dept: FAMILY MEDICINE | Facility: CLINIC | Age: 40
End: 2020-02-20

## 2020-02-20 NOTE — TELEPHONE ENCOUNTER
----- Message from Bimal Mota sent at 2/20/2020 10:22 AM CST -----  Contact: Catherine (Stacia Pierson's Office)  Type: Needs Medical Advice    Who Called:  Catherine Escobar Call Back Number: [phone]; [phone]  Additional Information: Caller would like to verify restrictions for patient's worker's comp. Please call to advise. Thanks!

## 2020-02-24 ENCOUNTER — OFFICE VISIT (OUTPATIENT)
Dept: OTOLARYNGOLOGY | Facility: CLINIC | Age: 40
End: 2020-02-24
Payer: COMMERCIAL

## 2020-02-24 ENCOUNTER — OFFICE VISIT (OUTPATIENT)
Dept: FAMILY MEDICINE | Facility: CLINIC | Age: 40
End: 2020-02-24
Payer: COMMERCIAL

## 2020-02-24 VITALS
HEIGHT: 62 IN | WEIGHT: 192.88 LBS | TEMPERATURE: 98 F | OXYGEN SATURATION: 98 % | SYSTOLIC BLOOD PRESSURE: 112 MMHG | HEART RATE: 68 BPM | DIASTOLIC BLOOD PRESSURE: 72 MMHG | HEIGHT: 62 IN | BODY MASS INDEX: 35.49 KG/M2 | WEIGHT: 192.88 LBS | BODY MASS INDEX: 35.49 KG/M2

## 2020-02-24 DIAGNOSIS — M75.82 ROTATOR CUFF TENDONITIS, LEFT: Primary | ICD-10-CM

## 2020-02-24 DIAGNOSIS — H92.03 OTALGIA OF BOTH EARS: Primary | ICD-10-CM

## 2020-02-24 DIAGNOSIS — S03.40XA SPRAIN OF TEMPOROMANDIBULAR JOINT, INITIAL ENCOUNTER: ICD-10-CM

## 2020-02-24 PROCEDURE — 99999 PR PBB SHADOW E&M-EST. PATIENT-LVL III: ICD-10-PCS | Mod: PBBFAC,,, | Performed by: NURSE PRACTITIONER

## 2020-02-24 PROCEDURE — 99999 PR PBB SHADOW E&M-EST. PATIENT-LVL III: CPT | Mod: PBBFAC,,, | Performed by: OTOLARYNGOLOGY

## 2020-02-24 PROCEDURE — 99203 PR OFFICE/OUTPT VISIT, NEW, LEVL III, 30-44 MIN: ICD-10-PCS | Mod: S$GLB,,, | Performed by: OTOLARYNGOLOGY

## 2020-02-24 PROCEDURE — 3008F PR BODY MASS INDEX (BMI) DOCUMENTED: ICD-10-PCS | Mod: CPTII,S$GLB,, | Performed by: OTOLARYNGOLOGY

## 2020-02-24 PROCEDURE — 99999 PR PBB SHADOW E&M-EST. PATIENT-LVL III: CPT | Mod: PBBFAC,,, | Performed by: NURSE PRACTITIONER

## 2020-02-24 PROCEDURE — 99213 OFFICE O/P EST LOW 20 MIN: CPT | Mod: S$GLB,,, | Performed by: NURSE PRACTITIONER

## 2020-02-24 PROCEDURE — 3008F BODY MASS INDEX DOCD: CPT | Mod: CPTII,S$GLB,, | Performed by: NURSE PRACTITIONER

## 2020-02-24 PROCEDURE — 99203 OFFICE O/P NEW LOW 30 MIN: CPT | Mod: S$GLB,,, | Performed by: OTOLARYNGOLOGY

## 2020-02-24 PROCEDURE — 99213 PR OFFICE/OUTPT VISIT, EST, LEVL III, 20-29 MIN: ICD-10-PCS | Mod: S$GLB,,, | Performed by: NURSE PRACTITIONER

## 2020-02-24 PROCEDURE — 99999 PR PBB SHADOW E&M-EST. PATIENT-LVL III: ICD-10-PCS | Mod: PBBFAC,,, | Performed by: OTOLARYNGOLOGY

## 2020-02-24 PROCEDURE — 3008F PR BODY MASS INDEX (BMI) DOCUMENTED: ICD-10-PCS | Mod: CPTII,S$GLB,, | Performed by: NURSE PRACTITIONER

## 2020-02-24 PROCEDURE — 3008F BODY MASS INDEX DOCD: CPT | Mod: CPTII,S$GLB,, | Performed by: OTOLARYNGOLOGY

## 2020-02-24 RX ORDER — MELOXICAM 15 MG/1
15 TABLET ORAL DAILY
Qty: 30 TABLET | Refills: 0 | Status: SHIPPED | OUTPATIENT
Start: 2020-02-24 | End: 2020-08-14

## 2020-02-24 NOTE — LETTER
February 24, 2020      Danny Gerardo MD  [address] [facility] [address] [place] [address]           [facility]  [address] [place] [address]  Phone: [phone]  Fax: [phone]          Patient: Eugenie Laguerre   MR Number: [id number]   YOB: 1980   Date of Visit: 2/24/2020       Dear Dr. Danny Gerardo:    Thank you for referring Eugenie Laguerre to me for evaluation. Attached you will find relevant portions of my assessment and plan of care.    If you have questions, please do not hesitate to call me. I look forward to following Eugenie Laguerre along with you.    Sincerely,    Mohinder Fitzgerald MD    Enclosure  CC:  No Recipients    If you would like to receive this communication electronically, please contact [email or web address] or [phone] to request more information on [facility] Link access.    For providers and/or their staff who would like to refer a patient to [facility], please contact us through our one-stop-shop provider referral line, [facility], at [phone].    If you feel you have received this communication in error or would no longer like to receive these types of communications, please e-mail [email or web address]

## 2020-02-24 NOTE — PATIENT INSTRUCTIONS
TMJ Syndrome / Sprain    This is a condition with chronic or recurrent pain in the joint of the jaw (in front of the ear). Just like all other joints in the body (knees, hips, etc.) the jaw joint can be sprained or chronically injured over time. The jaw joint capsule can wear out just like other joints in the body.       The pain may cause limited motion of the jaw, a locking or catching sensation, clicking, popping or grinding sounds from the joint with movement. It may also lead to headache, earache or neck pain. It is sometimes caused by inflammation in the joint, injury or wear-and-tear of the cartilage in the joint, involuntary grinding of the teeth or poorly fitting dentures. Emotional stress and tension are often a factor. Most cases resolve completely within a few months with proper treatment.    Home Care:  1) Rest the jaw by avoiding crunchy or hard foods to chew. Do not eat hard or sticky candies. Soft foods and liquids are easier on the jaw. Protect your jaw while yawning.  2) Hot packs (small towel soaked in hot water) applied to the jaw may give relief by reducing muscle spasm. You may use a heating pad or a towel soaked in hot water. Some people get relief with cold packs, so try both and see which one works best for you.  3) You may use ibuprofen (Motrin, Advil) to control pain, unless another medicine was prescribed. [ NOTE : If you have chronic liver or kidney disease or ever had a stomach ulcer or GI bleeding, talk with your doctor before using these medicines.]  4) If you suspect emotional stress is related to your condition.  a) Try to identify the sources of stress in your life. It may not be obvious! These may include:  -- Daily hassles of life that pile up (traffic jams, missed appointments, car troubles)  -- Major life changes, both good (new baby, job promotion) and bad (loss of job, loss of loved one)  -- Overload: feeling that you have too many responsibilities and can't take care of  everything at once  -- Helplessness: feeling like your problems are more than you can solve  b) When possible, do something about the source of your stress: avoid hassles, limit the amount of change that is happening in your life at one time and take a break when you feel overloaded.  c) Unfortunately, many stressful situations cannot be avoided. Therefore, it is necessary to learn HOW TO MANAGE STRESS better. There are many proven methods that work and will reduce your anxiety. These include simple things like exercise, good nutrition and adequate rest. Also, there are certain techniques that are helpful: relaxation and breathing exercises, visualization, biofeedback, meditation or simply taking some time-out to clear your mind. For more information about this, consult your doctor or go to a local bookstore and review the many books and tapes available on this subject.

## 2020-02-24 NOTE — LETTER
February 24, 2020      [facility]  [address] [place] [address]  Phone: [phone]  Fax: [phone]       Patient: Eugenie Laguerre   YOB: 1980  Date of Visit: 02/24/2020    To Whom It May Concern:    SHAJI Laguerre  was at [facility] on 02/24/2020. She may return to work with no restrictions. If you have any questions or concerns, or if I can be of further assistance, please do not hesitate to contact me.    Sincerely,      Arlene Gomez NP

## 2020-02-24 NOTE — PROGRESS NOTES
"Subjective:       Patient ID: Eugenie Laguerre is a 39 y.o. female.    Chief Complaint: Otitis Media and Ear Fullness    Eugenie is here for ear issues.   She reports 2 recent ear infections which did not clear with antibiotics.  Or found incidentally when she was being evaluated for throat issues. She feels occ fullness in the ear and feeling that her ear cannot "drain."  Predominantly left sided issues but does have int R otalgia.   She feels her hearing is good overall.        Social History     Tobacco Use   Smoking Status Current Some Day Smoker    Packs/day: 3.00    Years: 10.00    Pack years: 30.00    Last attempt to quit: 2019    Years since quittin.4   Smokeless Tobacco Never Used   Tobacco Comment    Has not smoked at all in the past week 10/10/2019     Social History     Substance and Sexual Activity   Alcohol Use Yes    Alcohol/week: 2.0 standard drinks    Types: 2 Glasses of wine per week          Review of Systems   Constitutional: Negative for activity change and appetite change.   Eyes: Negative for discharge.   Respiratory: Negative for difficulty breathing and wheezing   Cardiovascular: Negative for chest pain.   Gastrointestinal: Negative for abdominal distention and abdominal pain.   Endocrine: Negative for cold intolerance and heat intolerance.   Genitourinary: Negative for dysuria.   Musculoskeletal: Negative for gait problem and joint swelling.   Skin: Negative for color change and pallor.   Neurological: Negative for syncope and weakness.   Psychiatric/Behavioral: Negative for agitation and confusion.     Objective:        Constitutional:   She is oriented to person, place, and time. She appears well-developed and well-nourished. She appears alert. She is active. Normal speech.      Head:  Normocephalic and atraumatic. Head is with TMJ tenderness (bilateral). No scars. Salivary glands normal.  Facial strength is normal.      Ears:    Right Ear: No drainage or swelling. No middle " ear effusion.   Left Ear: No drainage or swelling.  No middle ear effusion.     Nose:  No mucosal edema, rhinorrhea or sinus tenderness. No turbinate hypertrophy.      Mouth/Throat  Oropharynx clear and moist without lesions or asymmetry, normal uvula midline and mirror exam normal. Normal dentition. No uvula swelling, lacerations or trismus. No oropharyngeal exudate. Tonsillar erythema, tonsillar exudate.      Neck:  Full range of motion with neck supple and no adenopathy. Thyroid tenderness is present. No tracheal deviation, no edema, no erythema, normal range of motion, no stridor, no crepitus and no neck rigidity present. No thyroid mass present.     Cardiovascular:   Intact distal pulses and normal pulses.      Pulmonary/Chest:   Effort normal and breath sounds normal. No stridor.     Psychiatric:   Her speech is normal and behavior is normal. Her mood appears not anxious. Her affect is not labile.     Neurological:   She is alert and oriented to person, place, and time. No sensory deficit.     Skin:   No abrasions, lacerations, lesions, or rashes. No abrasion and no bruising noted.         Tests / Results:  none    Assessment:       1. Otalgia of both ears    2. Sprain of temporomandibular joint, initial encounter          Plan:         Reassured ears improved  Suspect TMJ contributing to ear discomfort currently, Discussed conservative measured  FU as needed if issues worsen or fail to improve.

## 2020-02-24 NOTE — PROGRESS NOTES
Subjective:       Patient ID: Eugenie Laguerre is a 39 y.o. female.    Chief Complaint: FOLLOW UP SHOULDER PAIN    HPI   Mrs. Laguerre presents for follow up of left rotator cuff tendonitis. She has been using mobic PRN and doing frequent shoulder stretches/exercises. She reports significant improvement in pain and ROM. Needs note to return to work   Vitals:    20 0843   BP: 112/72   Pulse: 68   Temp: 98.2 °F (36.8 °C)     Review of Systems   Constitutional: Negative for diaphoresis and fever.   HENT: Negative for facial swelling and trouble swallowing.    Eyes: Negative for discharge and redness.   Respiratory: Negative for cough and shortness of breath.    Cardiovascular: Negative for chest pain and palpitations.   Gastrointestinal: Negative for abdominal pain and diarrhea.   Genitourinary: Negative for difficulty urinating.   Musculoskeletal: Negative for gait problem.   Skin: Negative for pallor and rash.   Neurological: Negative for facial asymmetry, speech difficulty and numbness.   Psychiatric/Behavioral: Negative for confusion. The patient is not nervous/anxious.        Past Medical History:   Diagnosis Date    Clotting disorder     DVT (deep venous thrombosis) 3/07    was felt to be due to ? Factor V Leiden    High risk pregnancy due to history of  labor 2015    Hyperthyroidism 2013    Iron deficiency anemia due to chronic blood loss 2015    Short cervix affecting pregnancy 3/3/2015     Objective:      Physical Exam   Constitutional: She is oriented to person, place, and time. No distress.   HENT:   Head: Normocephalic.   Right Ear: Hearing normal.   Left Ear: Hearing normal.   Nose: Nose normal.   Eyes: Conjunctivae and lids are normal.   Neck: No JVD present. No tracheal deviation present.   Cardiovascular: Normal rate and normal heart sounds.   Pulmonary/Chest: Effort normal and breath sounds normal.   Abdominal: She exhibits no distension.   Musculoskeletal: She exhibits no  deformity.        Left shoulder: Normal.   Neurological: She is alert and oriented to person, place, and time.   Skin: She is not diaphoretic. No pallor.   Psychiatric: She has a normal mood and affect. Her speech is normal and behavior is normal. Judgment and thought content normal. Cognition and memory are normal.   Nursing note and vitals reviewed.      Assessment:       1. Rotator cuff tendonitis, left        Plan:       Rotator cuff tendonitis, left  -     meloxicam (MOBIC) 15 MG tablet; Take 1 tablet (15 mg total) by mouth once daily.  Dispense: 30 tablet; Refill: 0    refill mobic--prn. Educated on avoidance of concurrent use of Rx/otcNSAIDs  Continue shoulder exercises   Follow up for further evaluation if s/s worsen, fail to improve, or new symptoms arise.    Medication List with Changes/Refills   Current Medications    CITALOPRAM (CELEXA) 20 MG TABLET    Take 1 tablet (20 mg total) by mouth once daily.    DICLOFENAC (VOLTAREN) 75 MG EC TABLET    Take 1 tablet (75 mg total) by mouth 2 (two) times daily as needed (for migraines).    IBUPROFEN (ADVIL,MOTRIN) 800 MG TABLET    Take 1 tablet (800 mg total) by mouth 3 (three) times daily as needed for Pain.    ZONISAMIDE (ZONEGRAN) 100 MG CAP    Take 3 capsules (300 mg total) by mouth once daily.   Changed and/or Refilled Medications    Modified Medication Previous Medication    MELOXICAM (MOBIC) 15 MG TABLET meloxicam (MOBIC) 15 MG tablet       Take 1 tablet (15 mg total) by mouth once daily.    Take 15 mg by mouth once daily.

## 2020-03-04 RX ORDER — ZONISAMIDE 100 MG/1
CAPSULE ORAL
Qty: 90 CAPSULE | Refills: 5 | Status: SHIPPED | OUTPATIENT
Start: 2020-03-04 | End: 2020-08-14

## 2020-05-21 DIAGNOSIS — F41.9 ANXIETY: ICD-10-CM

## 2020-05-21 RX ORDER — CITALOPRAM 20 MG/1
TABLET, FILM COATED ORAL
Qty: 90 TABLET | Refills: 1 | Status: SHIPPED | OUTPATIENT
Start: 2020-05-21 | End: 2020-08-14

## 2020-08-11 ENCOUNTER — OFFICE VISIT (OUTPATIENT)
Dept: NEUROLOGY | Facility: CLINIC | Age: 40
End: 2020-08-11
Payer: COMMERCIAL

## 2020-08-11 VITALS
WEIGHT: 200.75 LBS | DIASTOLIC BLOOD PRESSURE: 93 MMHG | HEART RATE: 73 BPM | SYSTOLIC BLOOD PRESSURE: 135 MMHG | BODY MASS INDEX: 36.71 KG/M2

## 2020-08-11 DIAGNOSIS — G43.109 MIGRAINE WITH AURA AND WITHOUT STATUS MIGRAINOSUS, NOT INTRACTABLE: ICD-10-CM

## 2020-08-11 DIAGNOSIS — G43.109 BASILAR MIGRAINE: Primary | ICD-10-CM

## 2020-08-11 PROCEDURE — 3008F BODY MASS INDEX DOCD: CPT | Mod: CPTII,S$GLB,, | Performed by: PHYSICIAN ASSISTANT

## 2020-08-11 PROCEDURE — 3008F PR BODY MASS INDEX (BMI) DOCUMENTED: ICD-10-PCS | Mod: CPTII,S$GLB,, | Performed by: PHYSICIAN ASSISTANT

## 2020-08-11 PROCEDURE — 99215 OFFICE O/P EST HI 40 MIN: CPT | Mod: S$GLB,,, | Performed by: PHYSICIAN ASSISTANT

## 2020-08-11 PROCEDURE — 99999 PR PBB SHADOW E&M-EST. PATIENT-LVL III: CPT | Mod: PBBFAC,,, | Performed by: PHYSICIAN ASSISTANT

## 2020-08-11 PROCEDURE — 99215 PR OFFICE/OUTPT VISIT, EST, LEVL V, 40-54 MIN: ICD-10-PCS | Mod: S$GLB,,, | Performed by: PHYSICIAN ASSISTANT

## 2020-08-11 PROCEDURE — 99999 PR PBB SHADOW E&M-EST. PATIENT-LVL III: ICD-10-PCS | Mod: PBBFAC,,, | Performed by: PHYSICIAN ASSISTANT

## 2020-08-11 RX ORDER — TOPIRAMATE 25 MG/1
25 TABLET ORAL NIGHTLY
Qty: 30 TABLET | Refills: 3 | Status: SHIPPED | OUTPATIENT
Start: 2020-08-11 | End: 2020-11-04

## 2020-08-11 NOTE — PROGRESS NOTES
Established Patient   SUBJECTIVE:  Patient ID: Eugenie Laguerre   Chief Complaint: Headache    History of Present Illness:  Eugenie Laguerre is a 40 y.o. female with a PMHx of DVT, anemia, hyperthyroidism, tubal ligation, migraine who presents to clinic alone for follow-up of headaches.       Recommendations made at last Office Visit on 10/14/19:  - Discussed symptoms appear to be consistent with migraines w/ brainstem aura, discussed treatment options and patient agreed with the following plan:  - preventative - continue zonisamide, consider increase in dose next visit  - abortive - diclofenac  - nerve block at next visit  - d/c depakote  - d/c fioricet  - hypotension - avoid anti-htn medications, increase oral hydration and sleep  - hyperthyroid - TSH ordered (c/o fatigue)  - avoid triptans as they are c/i in basilar migraines  - risks, benefits, and potential side effects of diclofenac and zonisamide discussed   - alternative treatment options offered   - importance of healthy diet, regular exercise and sleep hygiene in the treatment of headaches    - Start tracking headaches via Migraine Raghu markos on phone   - RTC in 2 weeks for right ANNA and FATIMAH block     08/11/2020 - Interval History:  Last visit pt presented w/ migraines w/ brainstem aura, we increased zonisamide to 300mg/d and increased diclofenac to 75mg prn.   Pt stopped taking zonegram in March as she felt it was causing drowsiness and was in [employer] which she could not be drowsy during. However, while she was taking zonegram, her HA's were well controlled and were approx 3-4/30 days per month. Since that time, her HA's have worsened culminating in a recent ED visit to [place].   Pt reported she started having pain in her neck and down right arm on the evening of 8/6. She then developed n/t down right face, fingers, and tongue. Sometime later, she developed a HA as well. She went to [facility] where blood work, CXR, EKG, CTH, CT angio, MRI  brain, and Echo was unremarkable. All symptoms resolved.   She has been taking either ibuprofen or diclofenac prn for her HA's which she finds to be effective in aborting her HA's. She reports HA's are occurring 10/30 days per month. Severity 3-8/10. Duration 1-5 hours.   Plan: switch to topamax, continue diclofenac prn, f/u 6 wks      Treatments Tried:  depakote  zonegram  fioricet  diclofenac  ibuprofen    Current Medications:    Current Outpatient Medications:     citalopram (CELEXA) 20 MG tablet, TAKE 1 TABLET BY MOUTH EVERY DAY (Patient not taking: Reported on 8/11/2020), Disp: 90 tablet, Rfl: 1    diclofenac (VOLTAREN) 75 MG EC tablet, Take 1 tablet (75 mg total) by mouth 2 (two) times daily as needed (for migraines)., Disp: 20 tablet, Rfl: 3    ibuprofen (ADVIL,MOTRIN) 800 MG tablet, Take 1 tablet (800 mg total) by mouth 3 (three) times daily as needed for Pain. (Patient not taking: Reported on 8/11/2020), Disp: 12 tablet, Rfl: 0    meloxicam (MOBIC) 15 MG tablet, Take 1 tablet (15 mg total) by mouth once daily. (Patient not taking: Reported on 8/11/2020), Disp: 30 tablet, Rfl: 0    topiramate (TOPAMAX) 25 MG tablet, Take 1 tablet (25 mg total) by mouth every evening., Disp: 30 tablet, Rfl: 3    zonisamide (ZONEGRAN) 100 MG Cap, TAKE 3 CAPSULES EVERY DAY (Patient not taking: Reported on 8/11/2020), Disp: 90 capsule, Rfl: 5  No current facility-administered medications for this visit.     Review of Systems - as per HPI, otherwise a balanced 10 systems review is negative.    OBJECTIVE:  Vitals:  BP (!) 135/93   Pulse 73   Wt 91 kg (200 lb 11.7 oz)   BMI 36.71 kg/m²      Physical Exam:  Constitutional: she appears well-developed and well-nourished. she is well groomed. NAD   HENT:    Head: Normocephalic and atraumatic  Eyes: Conjunctivae and EOM are normal  Musculoskeletal: Normal range of motion. No joint stiffness.   Skin: Skin is warm and dry.  Psychiatric: Mood and affect are normal    Neuro:  Patient is alert and oriented to person, place, and time. Language is intact and fluent. Speech is clear and fluent. Recent and remote memory are intact.  Normal attention and concentration.  Facial movement is symmetric. Moves all 4 extremities against gravity. Gait and station normal.  Cranial Nerves II through XII without focal deficit.     Review of Data:   Notes from ED, neuro reviewed   Labs:  No visits with results within 3 Month(s) from this visit.   Latest known visit with results is:   Office Visit on 12/30/2019   Component Date Value Ref Range Status    Rapid Strep A Screen 12/30/2019 Negative  Negative Final     Acceptable 12/30/2019 Yes   Final    Influenza A, Molecular 12/30/2019 Negative  Negative Final    Influenza B, Molecular 12/30/2019 Negative  Negative Final    Flu A & B Source 12/30/2019 NP   Final     Imaging:  Results for orders placed or performed during the hospital encounter of 06/25/13   MRA Brain without contrast    Narrative    Routine  imaging through this 32-year-old females brain was obtained per the MRA protocol.  A survey flair weighted sequence and T2 weighted sequence were also available.    The anterior cerebral arteries, anterior communicating artery, middle cerebral arteries, and posterior cerebral arteries proximally demonstrate no evidence of aneurysm or stenosis.      The left vertebral artery appears dominant in comparison with the right.      The P1 segment on the left appears hypoplastic with the majority of the blood flow coming from the posterior communicating artery on the left.  The posterior communicating artery on the right appears hypoplastic with a majority of the blood flow coming   from the P1 segment.      The A1 segment on the right appears hypoplastic with the majority of the blood flow to the anterior cerebral arteries likely coming from the left A1 segment.    The ventricles and sulci appear age appropriate.  Mild mucous membrane  thickening is noted within the maxillary sinuses bilaterally.    Impression     1.  No obvious evidence of aneurysm or stenosis is noted on this MRI of the brain.      2.  Mild mucous membrane thickening is noted within the maxillary sinuses bilaterally.      Electronically signed by: Philippe Hayden MD  Date:     06/25/13  Time:    16:50      Note: I have independently reviewed any/all imaging/labs/tests and agree with the report (s) as documented.  Any discrepancies will be as noted/demarcated by free text.  YUDI NAVA 8/11/2020    ASSESSMENT:  1. Basilar migraine    2. Migraine with aura and without status migrainosus, not intractable        PLAN:  - Discussed symptoms appear to be consistent with migraines w/ brainstem aura, discussed treatment options and patient agreed with the following plan:  - preventative - start topamax 25mg/d  - abortive - continue diclofenac  - avoid triptans as they are c/i in basilar migraines  - hypotension - avoid anti-htn medications, increase oral hydration and sleep  - hyperthyroid - TSH wnl, mgmt per pcp  - Continue tracking headaches   - Discussed goals of therapy are to decrease the frequency, intensity, and duration of headaches  - RTC in 6 wks     Orders Placed This Encounter    topiramate (TOPAMAX) 25 MG tablet       Counseling:  I spent 39 minutes face-to-face with the patient with > 50% of the time spent counseling and educating regarding the results of the data, diagnosis, and recommendations stated above, the risks, benefits, and potential side effects of the medications, and the future plan of care.  Questions and concerns were sought and answered to the patient's stated verbal satisfaction.  The patient verbalizes understanding and agreement with the above stated treatment plan.       CC: MD Mandi Elkins PA-C  [facility]   [phone]    Dr. Silva was available during today's encounter.

## 2020-08-14 ENCOUNTER — OFFICE VISIT (OUTPATIENT)
Dept: FAMILY MEDICINE | Facility: CLINIC | Age: 40
End: 2020-08-14
Payer: COMMERCIAL

## 2020-08-14 ENCOUNTER — LAB VISIT (OUTPATIENT)
Dept: LAB | Facility: HOSPITAL | Age: 40
End: 2020-08-14
Attending: NURSE PRACTITIONER
Payer: COMMERCIAL

## 2020-08-14 VITALS
HEIGHT: 62 IN | HEART RATE: 74 BPM | BODY MASS INDEX: 36.71 KG/M2 | OXYGEN SATURATION: 99 % | DIASTOLIC BLOOD PRESSURE: 72 MMHG | TEMPERATURE: 99 F | SYSTOLIC BLOOD PRESSURE: 108 MMHG | WEIGHT: 199.5 LBS

## 2020-08-14 DIAGNOSIS — R73.9 HYPERGLYCEMIA: Primary | ICD-10-CM

## 2020-08-14 DIAGNOSIS — E66.9 OBESITY, UNSPECIFIED CLASSIFICATION, UNSPECIFIED OBESITY TYPE, UNSPECIFIED WHETHER SERIOUS COMORBIDITY PRESENT: ICD-10-CM

## 2020-08-14 DIAGNOSIS — R73.9 HYPERGLYCEMIA: ICD-10-CM

## 2020-08-14 DIAGNOSIS — R53.83 FATIGUE, UNSPECIFIED TYPE: ICD-10-CM

## 2020-08-14 LAB
ESTIMATED AVG GLUCOSE: 103 MG/DL (ref 68–131)
HBA1C MFR BLD HPLC: 5.2 % (ref 4–5.6)

## 2020-08-14 PROCEDURE — 99214 PR OFFICE/OUTPT VISIT, EST, LEVL IV, 30-39 MIN: ICD-10-PCS | Mod: S$GLB,,, | Performed by: NURSE PRACTITIONER

## 2020-08-14 PROCEDURE — 86038 ANTINUCLEAR ANTIBODIES: CPT

## 2020-08-14 PROCEDURE — 99214 OFFICE O/P EST MOD 30 MIN: CPT | Mod: S$GLB,,, | Performed by: NURSE PRACTITIONER

## 2020-08-14 PROCEDURE — 3008F BODY MASS INDEX DOCD: CPT | Mod: CPTII,S$GLB,, | Performed by: NURSE PRACTITIONER

## 2020-08-14 PROCEDURE — 83036 HEMOGLOBIN GLYCOSYLATED A1C: CPT

## 2020-08-14 PROCEDURE — 36415 COLL VENOUS BLD VENIPUNCTURE: CPT | Mod: PO

## 2020-08-14 PROCEDURE — 3008F PR BODY MASS INDEX (BMI) DOCUMENTED: ICD-10-PCS | Mod: CPTII,S$GLB,, | Performed by: NURSE PRACTITIONER

## 2020-08-14 PROCEDURE — 99999 PR PBB SHADOW E&M-EST. PATIENT-LVL III: ICD-10-PCS | Mod: PBBFAC,,, | Performed by: NURSE PRACTITIONER

## 2020-08-14 PROCEDURE — 99999 PR PBB SHADOW E&M-EST. PATIENT-LVL III: CPT | Mod: PBBFAC,,, | Performed by: NURSE PRACTITIONER

## 2020-08-14 NOTE — LETTER
August 14, 2020      [facility]  [address]  [place] [address]  Phone: [phone]  Fax: [phone]       Patient: Eugenie Laguerre   YOB: 1980  Date of Visit: 08/14/2020    To Whom It May Concern:    SHAJI Laguerre  was at [facility] on 08/14/2020. Please excuse her from work 8/13/20 and 8/14/20. She may return on Monday 8/17/20 with no restrictions.  If you have any questions or concerns, or if I can be of further assistance, please do not hesitate to contact me.    Sincerely,        Arlene Gomez NP

## 2020-08-14 NOTE — PROGRESS NOTES
Subjective:       Patient ID: Eugenie Laguerre is a 40 y.o. female.    Chief Complaint: Hospital Follow Up (headaches)    HPI   Patient presents for hospital follow up. Per Epic documentation:     Eugenie Laguerre presented to [facility] on 8/6/20 with complaints of an acute onset of throat pain and bilateral shoulder and arm pain with paresthesias which happened while driving home from dinner about 7pm. She took a shower and symptoms resolved. Symptoms resumed while lying down in bed, and progressed to right-sided facial numbness, right-sided arm numbness. Her  did a FAST exam, which she passed about 10pm.     About 1030pm, she arrived via personal vehicle with her , she denied visual changes, headache, dizziness, chest pain, shortness of breath, abdominal pain. Patient presented to the emergency department approximately 3 hours and 40 minutes from initial onset of symptoms. NIH stroke score 3. Stroke alert continued. Patient not a TPA candidate at this time due to risk outweighs benefit.    In the emergency department pressure 155/94, heart rate 86, respirations 20, sats 98% on room air, temperature 98.3. CBC, BMP unremarkable    Workup including CT brain, echo, MRI, CXR, CTA head and neck were unremarkable. Diagnosed with atypical migraine.    Saw neurology on 8/11 for follow up  Symptoms consistent with migraines w brainstem aura  Started on topamax 25mg  Diclofenac for abortive  Has follow up scheduled 9/21    She reports 30mins-1 hr after eating she gets lightheaded and fatigued  She has cut down to 4 small meals per day, cut down on caffeine and sugar intake--implemented diet adjustments about 1 week ago, has not noticed improvement    Reports extreme fatigue for months. Denies depression or anxiety. Denies fever, night sweats or rashes. Denies inflammatory arthritis. Denies known family history of autoimmune conditions   Vitals:    08/14/20 0846   BP: 108/72   Pulse: 74   Temp: 98.5 °F (36.9 °C)      Review of Systems   Constitutional: Positive for fatigue. Negative for diaphoresis and fever.   HENT: Negative for facial swelling and trouble swallowing.    Eyes: Negative for discharge and redness.   Respiratory: Negative for cough and shortness of breath.    Cardiovascular: Negative for chest pain and palpitations.   Gastrointestinal: Negative for abdominal pain and diarrhea.   Genitourinary: Negative for difficulty urinating.   Musculoskeletal: Negative for gait problem.   Skin: Negative for pallor and rash.   Neurological: Positive for light-headedness and headaches. Negative for facial asymmetry and speech difficulty.   Psychiatric/Behavioral: Negative for confusion. The patient is not nervous/anxious.        Past Medical History:   Diagnosis Date    Clotting disorder     DVT (deep venous thrombosis) 3/07    was felt to be due to ? Factor V Leiden    High risk pregnancy due to history of  labor 2015    Hyperthyroidism 2013    Iron deficiency anemia due to chronic blood loss 2015    Short cervix affecting pregnancy 3/3/2015     Objective:      Physical Exam  Vitals signs and nursing note reviewed.   Constitutional:       General: She is not in acute distress.     Appearance: She is not diaphoretic.   HENT:      Head: Normocephalic.      Right Ear: Hearing normal.      Left Ear: Hearing normal.      Nose: Nose normal.   Eyes:      General: Lids are normal.      Conjunctiva/sclera: Conjunctivae normal.   Neck:      Vascular: No JVD.      Trachea: No tracheal deviation.   Cardiovascular:      Rate and Rhythm: Normal rate.      Heart sounds: Normal heart sounds.      Comments: Nonpitting 1+ LE edema   Pulmonary:      Effort: Pulmonary effort is normal.      Breath sounds: Normal breath sounds.   Abdominal:      Palpations: Abdomen is soft.      Tenderness: There is no abdominal tenderness.   Musculoskeletal:         General: No deformity.   Skin:     Coloration: Skin is not pale.       Findings: No erythema or rash.   Neurological:      Mental Status: She is alert and oriented to person, place, and time.   Psychiatric:         Speech: Speech normal.         Behavior: Behavior normal.         Thought Content: Thought content normal.         Judgment: Judgment normal.         Assessment:       1. Hyperglycemia    2. Fatigue, unspecified type    3. Obesity, unspecified classification, unspecified obesity type, unspecified whether serious comorbidity present        Plan:       Hyperglycemia  -     Hemoglobin A1C; Future; Expected date: 08/14/2020    Fatigue, unspecified type  -     DAMIAN Screen w/Reflex; Future; Expected date: 08/14/2020    Obesity, unspecified classification, unspecified obesity type, unspecified whether serious comorbidity present    FU with neuro next month as scheduled  3 months with PCP  Will notify patient of lab results and further plan of care  Follow up for further evaluation if s/s worsen, fail to improve, or new symptoms arise.    Medication List with Changes/Refills   Current Medications    DICLOFENAC (VOLTAREN) 75 MG EC TABLET    Take 1 tablet (75 mg total) by mouth 2 (two) times daily as needed (for migraines).    TOPIRAMATE (TOPAMAX) 25 MG TABLET    Take 1 tablet (25 mg total) by mouth every evening.   Discontinued Medications    CITALOPRAM (CELEXA) 20 MG TABLET    TAKE 1 TABLET BY MOUTH EVERY DAY    IBUPROFEN (ADVIL,MOTRIN) 800 MG TABLET    Take 1 tablet (800 mg total) by mouth 3 (three) times daily as needed for Pain.    MELOXICAM (MOBIC) 15 MG TABLET    Take 1 tablet (15 mg total) by mouth once daily.    ZONISAMIDE (ZONEGRAN) 100 MG CAP    TAKE 3 CAPSULES EVERY DAY

## 2020-08-18 LAB — ANA SER QL IF: NORMAL

## 2020-09-20 ENCOUNTER — PATIENT OUTREACH (OUTPATIENT)
Dept: ADMINISTRATIVE | Facility: OTHER | Age: 40
End: 2020-09-20

## 2020-09-21 ENCOUNTER — PATIENT MESSAGE (OUTPATIENT)
Dept: NEUROLOGY | Facility: CLINIC | Age: 40
End: 2020-09-21

## 2021-03-15 ENCOUNTER — PATIENT MESSAGE (OUTPATIENT)
Dept: FAMILY MEDICINE | Facility: CLINIC | Age: 41
End: 2021-03-15

## 2021-03-16 ENCOUNTER — HOSPITAL ENCOUNTER (OUTPATIENT)
Dept: RADIOLOGY | Facility: HOSPITAL | Age: 41
Discharge: HOME | End: 2021-03-16
Attending: PHYSICIAN ASSISTANT
Payer: COMMERCIAL

## 2021-03-16 ENCOUNTER — OFFICE VISIT (OUTPATIENT)
Dept: FAMILY MEDICINE | Facility: CLINIC | Age: 41
End: 2021-03-16
Payer: COMMERCIAL

## 2021-03-16 VITALS
BODY MASS INDEX: 39.23 KG/M2 | WEIGHT: 213.19 LBS | DIASTOLIC BLOOD PRESSURE: 80 MMHG | SYSTOLIC BLOOD PRESSURE: 118 MMHG | HEIGHT: 62 IN | TEMPERATURE: 98 F | RESPIRATION RATE: 18 BRPM | HEART RATE: 81 BPM | OXYGEN SATURATION: 98 %

## 2021-03-16 DIAGNOSIS — M25.512 ACUTE PAIN OF LEFT SHOULDER: ICD-10-CM

## 2021-03-16 DIAGNOSIS — M19.012 ARTHROSIS OF LEFT ACROMIOCLAVICULAR JOINT: Primary | ICD-10-CM

## 2021-03-16 DIAGNOSIS — M25.512 STERNOCLAVICULAR JOINT PAIN, LEFT: Primary | ICD-10-CM

## 2021-03-16 DIAGNOSIS — M54.2 NECK PAIN ON LEFT SIDE: ICD-10-CM

## 2021-03-16 DIAGNOSIS — M89.8X1 PAIN OF LEFT CLAVICLE: ICD-10-CM

## 2021-03-16 PROCEDURE — 99214 PR OFFICE/OUTPT VISIT, EST, LEVL IV, 30-39 MIN: ICD-10-PCS | Mod: S$GLB,,, | Performed by: PHYSICIAN ASSISTANT

## 2021-03-16 PROCEDURE — 93005 EKG 12-LEAD: ICD-10-PCS | Mod: S$GLB,,, | Performed by: PHYSICIAN ASSISTANT

## 2021-03-16 PROCEDURE — 1125F AMNT PAIN NOTED PAIN PRSNT: CPT | Mod: S$GLB,,, | Performed by: PHYSICIAN ASSISTANT

## 2021-03-16 PROCEDURE — 73000 XR CLAVICLE LEFT: ICD-10-PCS | Mod: 26,LT,, | Performed by: RADIOLOGY

## 2021-03-16 PROCEDURE — 93010 ELECTROCARDIOGRAM REPORT: CPT | Mod: S$GLB,,, | Performed by: INTERNAL MEDICINE

## 2021-03-16 PROCEDURE — 3008F BODY MASS INDEX DOCD: CPT | Mod: CPTII,S$GLB,, | Performed by: PHYSICIAN ASSISTANT

## 2021-03-16 PROCEDURE — 99214 OFFICE O/P EST MOD 30 MIN: CPT | Mod: S$GLB,,, | Performed by: PHYSICIAN ASSISTANT

## 2021-03-16 PROCEDURE — 3008F PR BODY MASS INDEX (BMI) DOCUMENTED: ICD-10-PCS | Mod: CPTII,S$GLB,, | Performed by: PHYSICIAN ASSISTANT

## 2021-03-16 PROCEDURE — 72040 X-RAY EXAM NECK SPINE 2-3 VW: CPT | Mod: 26,,, | Performed by: RADIOLOGY

## 2021-03-16 PROCEDURE — 99999 PR PBB SHADOW E&M-EST. PATIENT-LVL IV: CPT | Mod: PBBFAC,,, | Performed by: PHYSICIAN ASSISTANT

## 2021-03-16 PROCEDURE — 73000 X-RAY EXAM OF COLLAR BONE: CPT | Mod: TC,FY,PO,LT

## 2021-03-16 PROCEDURE — 73000 X-RAY EXAM OF COLLAR BONE: CPT | Mod: 26,LT,, | Performed by: RADIOLOGY

## 2021-03-16 PROCEDURE — 72040 X-RAY EXAM NECK SPINE 2-3 VW: CPT | Mod: TC,FY,PO

## 2021-03-16 PROCEDURE — 72040 XR CERVICAL SPINE AP LATERAL: ICD-10-PCS | Mod: 26,,, | Performed by: RADIOLOGY

## 2021-03-16 PROCEDURE — 93005 ELECTROCARDIOGRAM TRACING: CPT | Mod: S$GLB,,, | Performed by: PHYSICIAN ASSISTANT

## 2021-03-16 PROCEDURE — 93010 EKG 12-LEAD: ICD-10-PCS | Mod: S$GLB,,, | Performed by: INTERNAL MEDICINE

## 2021-03-16 PROCEDURE — 1125F PR PAIN SEVERITY QUANTIFIED, PAIN PRESENT: ICD-10-PCS | Mod: S$GLB,,, | Performed by: PHYSICIAN ASSISTANT

## 2021-03-16 PROCEDURE — 99999 PR PBB SHADOW E&M-EST. PATIENT-LVL IV: ICD-10-PCS | Mod: PBBFAC,,, | Performed by: PHYSICIAN ASSISTANT

## 2021-03-16 RX ORDER — MELOXICAM 7.5 MG/1
7.5 TABLET ORAL DAILY
Qty: 15 TABLET | Refills: 0 | Status: SHIPPED | OUTPATIENT
Start: 2021-03-16 | End: 2021-03-31

## 2021-03-25 ENCOUNTER — OFFICE VISIT (OUTPATIENT)
Dept: ORTHOPEDICS | Facility: CLINIC | Age: 41
End: 2021-03-25
Payer: COMMERCIAL

## 2021-03-25 VITALS — HEIGHT: 62 IN | BODY MASS INDEX: 39.23 KG/M2 | WEIGHT: 213.19 LBS

## 2021-03-25 DIAGNOSIS — M19.012 ARTHROSIS OF LEFT ACROMIOCLAVICULAR JOINT: ICD-10-CM

## 2021-03-25 PROCEDURE — 99202 OFFICE O/P NEW SF 15 MIN: CPT | Mod: S$GLB,,, | Performed by: ORTHOPAEDIC SURGERY

## 2021-03-25 PROCEDURE — 1125F PR PAIN SEVERITY QUANTIFIED, PAIN PRESENT: ICD-10-PCS | Mod: S$GLB,,, | Performed by: ORTHOPAEDIC SURGERY

## 2021-03-25 PROCEDURE — 99202 PR OFFICE/OUTPT VISIT, NEW, LEVL II, 15-29 MIN: ICD-10-PCS | Mod: S$GLB,,, | Performed by: ORTHOPAEDIC SURGERY

## 2021-03-25 PROCEDURE — 99999 PR PBB SHADOW E&M-EST. PATIENT-LVL III: CPT | Mod: PBBFAC,,, | Performed by: ORTHOPAEDIC SURGERY

## 2021-03-25 PROCEDURE — 3008F BODY MASS INDEX DOCD: CPT | Mod: CPTII,S$GLB,, | Performed by: ORTHOPAEDIC SURGERY

## 2021-03-25 PROCEDURE — 3008F PR BODY MASS INDEX (BMI) DOCUMENTED: ICD-10-PCS | Mod: CPTII,S$GLB,, | Performed by: ORTHOPAEDIC SURGERY

## 2021-03-25 PROCEDURE — 99999 PR PBB SHADOW E&M-EST. PATIENT-LVL III: ICD-10-PCS | Mod: PBBFAC,,, | Performed by: ORTHOPAEDIC SURGERY

## 2021-03-25 PROCEDURE — 1125F AMNT PAIN NOTED PAIN PRSNT: CPT | Mod: S$GLB,,, | Performed by: ORTHOPAEDIC SURGERY

## 2021-03-30 ENCOUNTER — PATIENT MESSAGE (OUTPATIENT)
Dept: FAMILY MEDICINE | Facility: CLINIC | Age: 41
End: 2021-03-30

## 2021-05-06 ENCOUNTER — PATIENT MESSAGE (OUTPATIENT)
Dept: RESEARCH | Facility: HOSPITAL | Age: 41
End: 2021-05-06

## 2021-06-09 ENCOUNTER — PATIENT MESSAGE (OUTPATIENT)
Dept: FAMILY MEDICINE | Facility: CLINIC | Age: 41
End: 2021-06-09

## 2021-06-10 ENCOUNTER — OFFICE VISIT (OUTPATIENT)
Dept: FAMILY MEDICINE | Facility: CLINIC | Age: 41
End: 2021-06-10
Payer: COMMERCIAL

## 2021-06-10 DIAGNOSIS — K29.70 GASTRITIS, PRESENCE OF BLEEDING UNSPECIFIED, UNSPECIFIED CHRONICITY, UNSPECIFIED GASTRITIS TYPE: Primary | ICD-10-CM

## 2021-06-10 PROCEDURE — 99214 OFFICE O/P EST MOD 30 MIN: CPT | Mod: 95,,, | Performed by: PHYSICIAN ASSISTANT

## 2021-06-10 PROCEDURE — 99214 PR OFFICE/OUTPT VISIT, EST, LEVL IV, 30-39 MIN: ICD-10-PCS | Mod: 95,,, | Performed by: PHYSICIAN ASSISTANT

## 2021-06-10 RX ORDER — PANTOPRAZOLE SODIUM 40 MG/1
40 TABLET, DELAYED RELEASE ORAL DAILY
Qty: 30 TABLET | Refills: 1 | Status: SHIPPED | OUTPATIENT
Start: 2021-06-10 | End: 2021-07-02

## 2021-06-18 ENCOUNTER — OFFICE VISIT (OUTPATIENT)
Dept: FAMILY MEDICINE | Facility: CLINIC | Age: 41
End: 2021-06-18
Payer: COMMERCIAL

## 2021-06-18 VITALS
HEART RATE: 78 BPM | HEIGHT: 63 IN | WEIGHT: 216.94 LBS | SYSTOLIC BLOOD PRESSURE: 118 MMHG | BODY MASS INDEX: 38.44 KG/M2 | DIASTOLIC BLOOD PRESSURE: 78 MMHG

## 2021-06-18 DIAGNOSIS — F32.A DEPRESSION, UNSPECIFIED DEPRESSION TYPE: ICD-10-CM

## 2021-06-18 DIAGNOSIS — R14.0 ABDOMINAL BLOATING: Primary | ICD-10-CM

## 2021-06-18 DIAGNOSIS — R10.13 DYSPEPSIA: ICD-10-CM

## 2021-06-18 DIAGNOSIS — F41.9 ANXIETY: ICD-10-CM

## 2021-06-18 PROCEDURE — 99214 PR OFFICE/OUTPT VISIT, EST, LEVL IV, 30-39 MIN: ICD-10-PCS | Mod: S$GLB,,, | Performed by: FAMILY MEDICINE

## 2021-06-18 PROCEDURE — 99999 PR PBB SHADOW E&M-EST. PATIENT-LVL III: CPT | Mod: PBBFAC,,, | Performed by: FAMILY MEDICINE

## 2021-06-18 PROCEDURE — 1126F AMNT PAIN NOTED NONE PRSNT: CPT | Mod: S$GLB,,, | Performed by: FAMILY MEDICINE

## 2021-06-18 PROCEDURE — 1126F PR PAIN SEVERITY QUANTIFIED, NO PAIN PRESENT: ICD-10-PCS | Mod: S$GLB,,, | Performed by: FAMILY MEDICINE

## 2021-06-18 PROCEDURE — 3008F BODY MASS INDEX DOCD: CPT | Mod: CPTII,S$GLB,, | Performed by: FAMILY MEDICINE

## 2021-06-18 PROCEDURE — 3008F PR BODY MASS INDEX (BMI) DOCUMENTED: ICD-10-PCS | Mod: CPTII,S$GLB,, | Performed by: FAMILY MEDICINE

## 2021-06-18 PROCEDURE — 99214 OFFICE O/P EST MOD 30 MIN: CPT | Mod: S$GLB,,, | Performed by: FAMILY MEDICINE

## 2021-06-18 PROCEDURE — 99999 PR PBB SHADOW E&M-EST. PATIENT-LVL III: ICD-10-PCS | Mod: PBBFAC,,, | Performed by: FAMILY MEDICINE

## 2021-06-18 RX ORDER — CITALOPRAM 20 MG/1
20 TABLET, FILM COATED ORAL DAILY
Qty: 30 TABLET | Refills: 11 | Status: SHIPPED | OUTPATIENT
Start: 2021-06-18 | End: 2022-08-01

## 2021-06-29 ENCOUNTER — PATIENT OUTREACH (OUTPATIENT)
Dept: ADMINISTRATIVE | Facility: OTHER | Age: 41
End: 2021-06-29

## 2021-06-30 ENCOUNTER — HOSPITAL ENCOUNTER (OUTPATIENT)
Dept: RADIOLOGY | Facility: HOSPITAL | Age: 41
Discharge: HOME | End: 2021-06-30
Attending: FAMILY MEDICINE
Payer: COMMERCIAL

## 2021-06-30 ENCOUNTER — OFFICE VISIT (OUTPATIENT)
Dept: OBSTETRICS AND GYNECOLOGY | Facility: CLINIC | Age: 41
End: 2021-06-30
Payer: COMMERCIAL

## 2021-06-30 VITALS
SYSTOLIC BLOOD PRESSURE: 108 MMHG | HEIGHT: 63 IN | BODY MASS INDEX: 38.21 KG/M2 | WEIGHT: 215.63 LBS | DIASTOLIC BLOOD PRESSURE: 70 MMHG

## 2021-06-30 DIAGNOSIS — Z01.419 GYNECOLOGIC EXAM NORMAL: Primary | ICD-10-CM

## 2021-06-30 DIAGNOSIS — R14.0 ABDOMINAL BLOATING: ICD-10-CM

## 2021-06-30 DIAGNOSIS — R10.13 DYSPEPSIA: ICD-10-CM

## 2021-06-30 DIAGNOSIS — Z86.718 PERSONAL HISTORY OF DVT (DEEP VEIN THROMBOSIS): ICD-10-CM

## 2021-06-30 PROCEDURE — 76700 US ABDOMEN COMPLETE: ICD-10-PCS | Mod: 26,,, | Performed by: RADIOLOGY

## 2021-06-30 PROCEDURE — 3008F BODY MASS INDEX DOCD: CPT | Mod: CPTII,S$GLB,, | Performed by: OBSTETRICS & GYNECOLOGY

## 2021-06-30 PROCEDURE — 76700 US EXAM ABDOM COMPLETE: CPT | Mod: 26,,, | Performed by: RADIOLOGY

## 2021-06-30 PROCEDURE — 1126F PR PAIN SEVERITY QUANTIFIED, NO PAIN PRESENT: ICD-10-PCS | Mod: S$GLB,,, | Performed by: OBSTETRICS & GYNECOLOGY

## 2021-06-30 PROCEDURE — 1126F AMNT PAIN NOTED NONE PRSNT: CPT | Mod: S$GLB,,, | Performed by: OBSTETRICS & GYNECOLOGY

## 2021-06-30 PROCEDURE — 88175 CYTOPATH C/V AUTO FLUID REDO: CPT | Performed by: OBSTETRICS & GYNECOLOGY

## 2021-06-30 PROCEDURE — 76700 US EXAM ABDOM COMPLETE: CPT | Mod: TC,PO

## 2021-06-30 PROCEDURE — 99395 PR PREVENTIVE VISIT,EST,18-39: ICD-10-PCS | Mod: S$GLB,,, | Performed by: OBSTETRICS & GYNECOLOGY

## 2021-06-30 PROCEDURE — 99999 PR PBB SHADOW E&M-EST. PATIENT-LVL III: ICD-10-PCS | Mod: PBBFAC,,, | Performed by: OBSTETRICS & GYNECOLOGY

## 2021-06-30 PROCEDURE — 3008F PR BODY MASS INDEX (BMI) DOCUMENTED: ICD-10-PCS | Mod: CPTII,S$GLB,, | Performed by: OBSTETRICS & GYNECOLOGY

## 2021-06-30 PROCEDURE — 99395 PREV VISIT EST AGE 18-39: CPT | Mod: S$GLB,,, | Performed by: OBSTETRICS & GYNECOLOGY

## 2021-06-30 PROCEDURE — 99999 PR PBB SHADOW E&M-EST. PATIENT-LVL III: CPT | Mod: PBBFAC,,, | Performed by: OBSTETRICS & GYNECOLOGY

## 2021-06-30 PROCEDURE — 87624 HPV HI-RISK TYP POOLED RSLT: CPT | Performed by: OBSTETRICS & GYNECOLOGY

## 2021-07-02 ENCOUNTER — PATIENT MESSAGE (OUTPATIENT)
Dept: FAMILY MEDICINE | Facility: CLINIC | Age: 41
End: 2021-07-02

## 2021-07-02 RX ORDER — PANTOPRAZOLE SODIUM 40 MG/1
TABLET, DELAYED RELEASE ORAL
Qty: 30 TABLET | Refills: 1 | Status: SHIPPED | OUTPATIENT
Start: 2021-07-02 | End: 2021-08-03

## 2021-07-08 LAB
FINAL PATHOLOGIC DIAGNOSIS: NORMAL
HPV HR 12 DNA SPEC QL NAA+PROBE: NEGATIVE
HPV16 AG SPEC QL: NEGATIVE
HPV18 DNA SPEC QL NAA+PROBE: NEGATIVE
Lab: NORMAL

## 2021-07-09 ENCOUNTER — IMMUNIZATION (OUTPATIENT)
Dept: FAMILY MEDICINE | Facility: CLINIC | Age: 41
End: 2021-07-09
Payer: COMMERCIAL

## 2021-07-09 DIAGNOSIS — Z23 NEED FOR VACCINATION: Primary | ICD-10-CM

## 2021-07-09 PROCEDURE — 0031A COVID-19,VECTOR-NR,RS-AD26,PF,0.5 ML DOSE VACCINE (JANSSEN): CPT | Mod: PBBFAC | Performed by: FAMILY MEDICINE

## 2021-07-18 ENCOUNTER — TELEPHONE (OUTPATIENT)
Dept: FAMILY MEDICINE | Facility: CLINIC | Age: 41
End: 2021-07-18

## 2021-07-18 ENCOUNTER — PATIENT MESSAGE (OUTPATIENT)
Dept: FAMILY MEDICINE | Facility: CLINIC | Age: 41
End: 2021-07-18

## 2021-07-18 DIAGNOSIS — R14.0 ABDOMINAL BLOATING: Primary | ICD-10-CM

## 2021-07-18 DIAGNOSIS — K80.20 GALLSTONES: ICD-10-CM

## 2021-07-19 ENCOUNTER — PATIENT MESSAGE (OUTPATIENT)
Dept: FAMILY MEDICINE | Facility: CLINIC | Age: 41
End: 2021-07-19

## 2021-07-21 ENCOUNTER — PATIENT MESSAGE (OUTPATIENT)
Dept: FAMILY MEDICINE | Facility: CLINIC | Age: 41
End: 2021-07-21

## 2021-07-22 ENCOUNTER — OFFICE VISIT (OUTPATIENT)
Dept: FAMILY MEDICINE | Facility: CLINIC | Age: 41
End: 2021-07-22
Payer: COMMERCIAL

## 2021-07-22 DIAGNOSIS — N30.90 CYSTITIS: Primary | ICD-10-CM

## 2021-07-22 DIAGNOSIS — K80.20 SYMPTOMATIC CHOLELITHIASIS: ICD-10-CM

## 2021-07-22 PROCEDURE — 99212 PR OFFICE/OUTPT VISIT, EST, LEVL II, 10-19 MIN: ICD-10-PCS | Mod: 95,,, | Performed by: PHYSICIAN ASSISTANT

## 2021-07-22 PROCEDURE — 99212 OFFICE O/P EST SF 10 MIN: CPT | Mod: 95,,, | Performed by: PHYSICIAN ASSISTANT

## 2021-07-23 ENCOUNTER — PATIENT MESSAGE (OUTPATIENT)
Dept: FAMILY MEDICINE | Facility: CLINIC | Age: 41
End: 2021-07-23

## 2021-07-29 ENCOUNTER — TELEPHONE (OUTPATIENT)
Dept: SURGERY | Facility: CLINIC | Age: 41
End: 2021-07-29

## 2021-07-29 ENCOUNTER — OFFICE VISIT (OUTPATIENT)
Dept: SURGERY | Facility: CLINIC | Age: 41
End: 2021-07-29
Payer: COMMERCIAL

## 2021-07-29 VITALS
TEMPERATURE: 99 F | HEIGHT: 63 IN | SYSTOLIC BLOOD PRESSURE: 128 MMHG | HEART RATE: 58 BPM | BODY MASS INDEX: 38.32 KG/M2 | DIASTOLIC BLOOD PRESSURE: 76 MMHG | WEIGHT: 216.25 LBS

## 2021-07-29 DIAGNOSIS — K80.20 GALLSTONES: ICD-10-CM

## 2021-07-29 DIAGNOSIS — R14.0 ABDOMINAL BLOATING: Primary | ICD-10-CM

## 2021-07-29 DIAGNOSIS — Z01.818 PRE-OP TESTING: ICD-10-CM

## 2021-07-29 PROCEDURE — 1159F PR MEDICATION LIST DOCUMENTED IN MEDICAL RECORD: ICD-10-PCS | Mod: CPTII,S$GLB,, | Performed by: SURGERY

## 2021-07-29 PROCEDURE — 1126F PR PAIN SEVERITY QUANTIFIED, NO PAIN PRESENT: ICD-10-PCS | Mod: CPTII,S$GLB,, | Performed by: SURGERY

## 2021-07-29 PROCEDURE — 3008F BODY MASS INDEX DOCD: CPT | Mod: CPTII,S$GLB,, | Performed by: SURGERY

## 2021-07-29 PROCEDURE — 3078F PR MOST RECENT DIASTOLIC BLOOD PRESSURE < 80 MM HG: ICD-10-PCS | Mod: CPTII,S$GLB,, | Performed by: SURGERY

## 2021-07-29 PROCEDURE — 99204 PR OFFICE/OUTPT VISIT, NEW, LEVL IV, 45-59 MIN: ICD-10-PCS | Mod: S$GLB,,, | Performed by: SURGERY

## 2021-07-29 PROCEDURE — 3074F SYST BP LT 130 MM HG: CPT | Mod: CPTII,S$GLB,, | Performed by: SURGERY

## 2021-07-29 PROCEDURE — 1126F AMNT PAIN NOTED NONE PRSNT: CPT | Mod: CPTII,S$GLB,, | Performed by: SURGERY

## 2021-07-29 PROCEDURE — 3078F DIAST BP <80 MM HG: CPT | Mod: CPTII,S$GLB,, | Performed by: SURGERY

## 2021-07-29 PROCEDURE — 1159F MED LIST DOCD IN RCRD: CPT | Mod: CPTII,S$GLB,, | Performed by: SURGERY

## 2021-07-29 PROCEDURE — 99204 OFFICE O/P NEW MOD 45 MIN: CPT | Mod: S$GLB,,, | Performed by: SURGERY

## 2021-07-29 PROCEDURE — 3074F PR MOST RECENT SYSTOLIC BLOOD PRESSURE < 130 MM HG: ICD-10-PCS | Mod: CPTII,S$GLB,, | Performed by: SURGERY

## 2021-07-29 PROCEDURE — 99999 PR PBB SHADOW E&M-EST. PATIENT-LVL III: ICD-10-PCS | Mod: PBBFAC,,, | Performed by: SURGERY

## 2021-07-29 PROCEDURE — 3008F PR BODY MASS INDEX (BMI) DOCUMENTED: ICD-10-PCS | Mod: CPTII,S$GLB,, | Performed by: SURGERY

## 2021-07-29 PROCEDURE — 99999 PR PBB SHADOW E&M-EST. PATIENT-LVL III: CPT | Mod: PBBFAC,,, | Performed by: SURGERY

## 2021-07-30 ENCOUNTER — TELEPHONE (OUTPATIENT)
Dept: SURGERY | Facility: CLINIC | Age: 41
End: 2021-07-30

## 2021-07-30 RX ORDER — SODIUM CHLORIDE 9 MG/ML
INJECTION, SOLUTION INTRAVENOUS CONTINUOUS
Status: CANCELLED | OUTPATIENT
Start: 2021-07-30

## 2021-08-01 ENCOUNTER — LAB VISIT (OUTPATIENT)
Dept: FAMILY MEDICINE | Facility: CLINIC | Age: 41
End: 2021-08-01
Payer: COMMERCIAL

## 2021-08-01 DIAGNOSIS — Z01.818 PRE-OP TESTING: ICD-10-CM

## 2021-08-01 PROCEDURE — U0003 INFECTIOUS AGENT DETECTION BY NUCLEIC ACID (DNA OR RNA); SEVERE ACUTE RESPIRATORY SYNDROME CORONAVIRUS 2 (SARS-COV-2) (CORONAVIRUS DISEASE [COVID-19]), AMPLIFIED PROBE TECHNIQUE, MAKING USE OF HIGH THROUGHPUT TECHNOLOGIES AS DESCRIBED BY CMS-2020-01-R: HCPCS | Performed by: SURGERY

## 2021-08-01 PROCEDURE — U0005 INFEC AGEN DETEC AMPLI PROBE: HCPCS | Performed by: SURGERY

## 2021-08-02 LAB
SARS-COV-2 RNA RESP QL NAA+PROBE: NOT DETECTED
SARS-COV-2- CYCLE NUMBER: -1

## 2021-08-03 ENCOUNTER — ANESTHESIA EVENT (OUTPATIENT)
Dept: SURGERY | Facility: HOSPITAL | Age: 41
End: 2021-08-03
Payer: COMMERCIAL

## 2021-08-04 ENCOUNTER — HOSPITAL ENCOUNTER (OUTPATIENT)
Facility: HOSPITAL | Age: 41
Discharge: HOME | End: 2021-08-04
Attending: SURGERY | Admitting: SURGERY
Payer: COMMERCIAL

## 2021-08-04 ENCOUNTER — ANESTHESIA (OUTPATIENT)
Dept: SURGERY | Facility: HOSPITAL | Age: 41
End: 2021-08-04
Payer: COMMERCIAL

## 2021-08-04 DIAGNOSIS — K80.20 GALLSTONES: ICD-10-CM

## 2021-08-04 DIAGNOSIS — R14.0 ABDOMINAL BLOATING: ICD-10-CM

## 2021-08-04 LAB
B-HCG UR QL: NEGATIVE
CTP QC/QA: YES

## 2021-08-04 PROCEDURE — D9220A PRA ANESTHESIA: ICD-10-PCS | Mod: ,,, | Performed by: NURSE ANESTHETIST, CERTIFIED REGISTERED

## 2021-08-04 PROCEDURE — 81025 URINE PREGNANCY TEST: CPT | Mod: PO | Performed by: SURGERY

## 2021-08-04 PROCEDURE — 37000009 HC ANESTHESIA EA ADD 15 MINS: Mod: PO | Performed by: SURGERY

## 2021-08-04 PROCEDURE — 36000708 HC OR TIME LEV III 1ST 15 MIN: Mod: PO | Performed by: SURGERY

## 2021-08-04 PROCEDURE — D9220A PRA ANESTHESIA: Mod: ,,, | Performed by: NURSE ANESTHETIST, CERTIFIED REGISTERED

## 2021-08-04 PROCEDURE — 47562 PR LAP,CHOLECYSTECTOMY: ICD-10-PCS | Mod: ,,, | Performed by: SURGERY

## 2021-08-04 PROCEDURE — 25000003 PHARM REV CODE 250: Mod: PO | Performed by: ANESTHESIOLOGY

## 2021-08-04 PROCEDURE — D9220A PRA ANESTHESIA: ICD-10-PCS | Mod: ,,, | Performed by: ANESTHESIOLOGY

## 2021-08-04 PROCEDURE — 71000033 HC RECOVERY, INTIAL HOUR: Mod: PO | Performed by: SURGERY

## 2021-08-04 PROCEDURE — 88304 TISSUE EXAM BY PATHOLOGIST: CPT | Performed by: STUDENT IN AN ORGANIZED HEALTH CARE EDUCATION/TRAINING PROGRAM

## 2021-08-04 PROCEDURE — 37000008 HC ANESTHESIA 1ST 15 MINUTES: Mod: PO | Performed by: SURGERY

## 2021-08-04 PROCEDURE — 88304 TISSUE EXAM BY PATHOLOGIST: CPT | Mod: 26,,, | Performed by: STUDENT IN AN ORGANIZED HEALTH CARE EDUCATION/TRAINING PROGRAM

## 2021-08-04 PROCEDURE — D9220A PRA ANESTHESIA: Mod: ,,, | Performed by: ANESTHESIOLOGY

## 2021-08-04 PROCEDURE — 47562 LAPAROSCOPIC CHOLECYSTECTOMY: CPT | Mod: ,,, | Performed by: SURGERY

## 2021-08-04 PROCEDURE — 27201423 OPTIME MED/SURG SUP & DEVICES STERILE SUPPLY: Mod: PO | Performed by: SURGERY

## 2021-08-04 PROCEDURE — 25000003 PHARM REV CODE 250: Mod: PO | Performed by: SURGERY

## 2021-08-04 PROCEDURE — 25000003 PHARM REV CODE 250: Mod: PO | Performed by: NURSE ANESTHETIST, CERTIFIED REGISTERED

## 2021-08-04 PROCEDURE — 63600175 PHARM REV CODE 636 W HCPCS: Mod: PO | Performed by: NURSE ANESTHETIST, CERTIFIED REGISTERED

## 2021-08-04 PROCEDURE — 71000015 HC POSTOP RECOV 1ST HR: Mod: PO | Performed by: SURGERY

## 2021-08-04 PROCEDURE — 36000709 HC OR TIME LEV III EA ADD 15 MIN: Mod: PO | Performed by: SURGERY

## 2021-08-04 PROCEDURE — 63600175 PHARM REV CODE 636 W HCPCS: Mod: PO | Performed by: ANESTHESIOLOGY

## 2021-08-04 PROCEDURE — 71000016 HC POSTOP RECOV ADDL HR: Mod: PO | Performed by: SURGERY

## 2021-08-04 PROCEDURE — 88304 PR  SURG PATH,LEVEL III: ICD-10-PCS | Mod: 26,,, | Performed by: STUDENT IN AN ORGANIZED HEALTH CARE EDUCATION/TRAINING PROGRAM

## 2021-08-04 RX ORDER — SODIUM CHLORIDE 9 MG/ML
INJECTION, SOLUTION INTRAVENOUS CONTINUOUS
Status: DISCONTINUED | OUTPATIENT
Start: 2021-08-04 | End: 2021-08-04 | Stop reason: HOSPADM

## 2021-08-04 RX ORDER — ONDANSETRON 2 MG/ML
4 INJECTION INTRAMUSCULAR; INTRAVENOUS EVERY 12 HOURS PRN
Status: DISCONTINUED | OUTPATIENT
Start: 2021-08-04 | End: 2021-08-04 | Stop reason: HOSPADM

## 2021-08-04 RX ORDER — FENTANYL CITRATE 50 UG/ML
25 INJECTION, SOLUTION INTRAMUSCULAR; INTRAVENOUS EVERY 5 MIN PRN
Status: COMPLETED | OUTPATIENT
Start: 2021-08-04 | End: 2021-08-04

## 2021-08-04 RX ORDER — CLINDAMYCIN PHOSPHATE 900 MG/50ML
INJECTION, SOLUTION INTRAVENOUS
Status: DISCONTINUED | OUTPATIENT
Start: 2021-08-04 | End: 2021-08-04

## 2021-08-04 RX ORDER — KETOROLAC TROMETHAMINE 30 MG/ML
INJECTION, SOLUTION INTRAMUSCULAR; INTRAVENOUS
Status: DISCONTINUED | OUTPATIENT
Start: 2021-08-04 | End: 2021-08-04

## 2021-08-04 RX ORDER — DEXAMETHASONE SODIUM PHOSPHATE 4 MG/ML
8 INJECTION, SOLUTION INTRA-ARTICULAR; INTRALESIONAL; INTRAMUSCULAR; INTRAVENOUS; SOFT TISSUE
Status: COMPLETED | OUTPATIENT
Start: 2021-08-04 | End: 2021-08-04

## 2021-08-04 RX ORDER — EPHEDRINE SULFATE 50 MG/ML
INJECTION, SOLUTION INTRAVENOUS
Status: DISCONTINUED | OUTPATIENT
Start: 2021-08-04 | End: 2021-08-04

## 2021-08-04 RX ORDER — HYDROMORPHONE HYDROCHLORIDE 2 MG/ML
0.2 INJECTION, SOLUTION INTRAMUSCULAR; INTRAVENOUS; SUBCUTANEOUS EVERY 5 MIN PRN
Status: DISCONTINUED | OUTPATIENT
Start: 2021-08-04 | End: 2021-08-04 | Stop reason: HOSPADM

## 2021-08-04 RX ORDER — LIDOCAINE HCL/PF 100 MG/5ML
SYRINGE (ML) INTRAVENOUS
Status: DISCONTINUED | OUTPATIENT
Start: 2021-08-04 | End: 2021-08-04

## 2021-08-04 RX ORDER — ACETAMINOPHEN 10 MG/ML
INJECTION, SOLUTION INTRAVENOUS
Status: DISCONTINUED | OUTPATIENT
Start: 2021-08-04 | End: 2021-08-04

## 2021-08-04 RX ORDER — HYDROCODONE BITARTRATE AND ACETAMINOPHEN 5; 325 MG/1; MG/1
1 TABLET ORAL EVERY 6 HOURS PRN
Qty: 20 TABLET | Refills: 0 | Status: SHIPPED | OUTPATIENT
Start: 2021-08-04 | End: 2022-08-01

## 2021-08-04 RX ORDER — HYDROCODONE BITARTRATE AND ACETAMINOPHEN 5; 325 MG/1; MG/1
1 TABLET ORAL EVERY 4 HOURS PRN
Status: DISCONTINUED | OUTPATIENT
Start: 2021-08-04 | End: 2021-08-04 | Stop reason: HOSPADM

## 2021-08-04 RX ORDER — OXYCODONE HYDROCHLORIDE 5 MG/1
5 TABLET ORAL
Status: DISCONTINUED | OUTPATIENT
Start: 2021-08-04 | End: 2021-08-04 | Stop reason: HOSPADM

## 2021-08-04 RX ORDER — SODIUM CHLORIDE, SODIUM LACTATE, POTASSIUM CHLORIDE, CALCIUM CHLORIDE 600; 310; 30; 20 MG/100ML; MG/100ML; MG/100ML; MG/100ML
INJECTION, SOLUTION INTRAVENOUS CONTINUOUS
Status: DISCONTINUED | OUTPATIENT
Start: 2021-08-04 | End: 2021-08-04 | Stop reason: HOSPADM

## 2021-08-04 RX ORDER — MIDAZOLAM HYDROCHLORIDE 1 MG/ML
INJECTION INTRAMUSCULAR; INTRAVENOUS
Status: DISCONTINUED | OUTPATIENT
Start: 2021-08-04 | End: 2021-08-04

## 2021-08-04 RX ORDER — ATROPINE SULFATE 0.4 MG/ML
INJECTION, SOLUTION ENDOTRACHEAL; INTRAMEDULLARY; INTRAMUSCULAR; INTRAVENOUS; SUBCUTANEOUS
Status: DISCONTINUED | OUTPATIENT
Start: 2021-08-04 | End: 2021-08-04

## 2021-08-04 RX ORDER — FENTANYL CITRATE 50 UG/ML
INJECTION, SOLUTION INTRAMUSCULAR; INTRAVENOUS
Status: DISCONTINUED | OUTPATIENT
Start: 2021-08-04 | End: 2021-08-04

## 2021-08-04 RX ORDER — KETAMINE HCL IN 0.9 % NACL 50 MG/5 ML
SYRINGE (ML) INTRAVENOUS
Status: DISCONTINUED | OUTPATIENT
Start: 2021-08-04 | End: 2021-08-04

## 2021-08-04 RX ORDER — ROCURONIUM BROMIDE 10 MG/ML
INJECTION, SOLUTION INTRAVENOUS
Status: DISCONTINUED | OUTPATIENT
Start: 2021-08-04 | End: 2021-08-04

## 2021-08-04 RX ORDER — LIDOCAINE HYDROCHLORIDE 10 MG/ML
1 INJECTION, SOLUTION EPIDURAL; INFILTRATION; INTRACAUDAL; PERINEURAL ONCE
Status: DISCONTINUED | OUTPATIENT
Start: 2021-08-04 | End: 2021-08-04 | Stop reason: HOSPADM

## 2021-08-04 RX ORDER — PROPOFOL 10 MG/ML
VIAL (ML) INTRAVENOUS
Status: DISCONTINUED | OUTPATIENT
Start: 2021-08-04 | End: 2021-08-04

## 2021-08-04 RX ORDER — BUPIVACAINE HYDROCHLORIDE AND EPINEPHRINE 2.5; 5 MG/ML; UG/ML
INJECTION, SOLUTION EPIDURAL; INFILTRATION; INTRACAUDAL; PERINEURAL
Status: DISCONTINUED | OUTPATIENT
Start: 2021-08-04 | End: 2021-08-04 | Stop reason: HOSPADM

## 2021-08-04 RX ORDER — ONDANSETRON 2 MG/ML
INJECTION INTRAMUSCULAR; INTRAVENOUS
Status: DISCONTINUED | OUTPATIENT
Start: 2021-08-04 | End: 2021-08-04

## 2021-08-04 RX ADMIN — CLINDAMYCIN PHOSPHATE 900 MG: 18 INJECTION, SOLUTION INTRAVENOUS at 08:08

## 2021-08-04 RX ADMIN — SODIUM CHLORIDE, SODIUM LACTATE, POTASSIUM CHLORIDE, AND CALCIUM CHLORIDE: .6; .31; .03; .02 INJECTION, SOLUTION INTRAVENOUS at 08:08

## 2021-08-04 RX ADMIN — DEXAMETHASONE SODIUM PHOSPHATE 8 MG: 4 INJECTION, SOLUTION INTRAMUSCULAR; INTRAVENOUS at 08:08

## 2021-08-04 RX ADMIN — ONDANSETRON 4 MG: 2 INJECTION INTRAMUSCULAR; INTRAVENOUS at 09:08

## 2021-08-04 RX ADMIN — LIDOCAINE HYDROCHLORIDE 100 MG: 20 INJECTION, SOLUTION INTRAVENOUS at 08:08

## 2021-08-04 RX ADMIN — OXYCODONE 5 MG: 5 TABLET ORAL at 10:08

## 2021-08-04 RX ADMIN — FENTANYL CITRATE 25 MCG: 50 INJECTION INTRAMUSCULAR; INTRAVENOUS at 09:08

## 2021-08-04 RX ADMIN — Medication 25 MG: at 08:08

## 2021-08-04 RX ADMIN — MIDAZOLAM HYDROCHLORIDE 1 MG: 1 INJECTION, SOLUTION INTRAMUSCULAR; INTRAVENOUS at 08:08

## 2021-08-04 RX ADMIN — ROCURONIUM BROMIDE 35 MG: 10 INJECTION, SOLUTION INTRAVENOUS at 08:08

## 2021-08-04 RX ADMIN — KETOROLAC TROMETHAMINE 30 MG: 30 INJECTION, SOLUTION INTRAMUSCULAR at 09:08

## 2021-08-04 RX ADMIN — ROCURONIUM BROMIDE 5 MG: 10 INJECTION, SOLUTION INTRAVENOUS at 08:08

## 2021-08-04 RX ADMIN — ATROPINE SULFATE 0.4 MG: 0.4 INJECTION, SOLUTION INTRAMUSCULAR; INTRAVENOUS; SUBCUTANEOUS at 08:08

## 2021-08-04 RX ADMIN — FENTANYL CITRATE 100 MCG: 50 INJECTION, SOLUTION INTRAMUSCULAR; INTRAVENOUS at 08:08

## 2021-08-04 RX ADMIN — ACETAMINOPHEN 1000 MG: 10 INJECTION, SOLUTION INTRAVENOUS at 08:08

## 2021-08-04 RX ADMIN — SUGAMMADEX 200 MG: 100 INJECTION, SOLUTION INTRAVENOUS at 09:08

## 2021-08-04 RX ADMIN — FENTANYL CITRATE 25 MCG: 50 INJECTION INTRAMUSCULAR; INTRAVENOUS at 10:08

## 2021-08-04 RX ADMIN — EPHEDRINE SULFATE 10 MG: 50 INJECTION INTRAVENOUS at 08:08

## 2021-08-04 RX ADMIN — PROPOFOL 200 MG: 10 INJECTION, EMULSION INTRAVENOUS at 08:08

## 2021-08-05 VITALS
HEART RATE: 78 BPM | RESPIRATION RATE: 18 BRPM | WEIGHT: 215 LBS | SYSTOLIC BLOOD PRESSURE: 140 MMHG | DIASTOLIC BLOOD PRESSURE: 72 MMHG | TEMPERATURE: 98 F | HEIGHT: 63 IN | BODY MASS INDEX: 38.09 KG/M2 | OXYGEN SATURATION: 98 %

## 2021-08-09 LAB
FINAL PATHOLOGIC DIAGNOSIS: NORMAL
GROSS: NORMAL
Lab: NORMAL
MICROSCOPIC EXAM: NORMAL

## 2021-08-18 ENCOUNTER — TELEPHONE (OUTPATIENT)
Dept: SURGERY | Facility: CLINIC | Age: 41
End: 2021-08-18

## 2021-08-19 ENCOUNTER — OFFICE VISIT (OUTPATIENT)
Dept: SURGERY | Facility: CLINIC | Age: 41
End: 2021-08-19
Payer: COMMERCIAL

## 2021-08-19 VITALS — HEIGHT: 63 IN | BODY MASS INDEX: 37.21 KG/M2 | TEMPERATURE: 99 F | WEIGHT: 210 LBS

## 2021-08-19 DIAGNOSIS — Z09 POSTOP CHECK: Primary | ICD-10-CM

## 2021-08-19 PROCEDURE — 99024 PR POST-OP FOLLOW-UP VISIT: ICD-10-PCS | Mod: S$GLB,,, | Performed by: SURGERY

## 2021-08-19 PROCEDURE — 1126F AMNT PAIN NOTED NONE PRSNT: CPT | Mod: CPTII,S$GLB,, | Performed by: SURGERY

## 2021-08-19 PROCEDURE — 3008F PR BODY MASS INDEX (BMI) DOCUMENTED: ICD-10-PCS | Mod: CPTII,S$GLB,, | Performed by: SURGERY

## 2021-08-19 PROCEDURE — 1159F MED LIST DOCD IN RCRD: CPT | Mod: CPTII,S$GLB,, | Performed by: SURGERY

## 2021-08-19 PROCEDURE — 99999 PR PBB SHADOW E&M-EST. PATIENT-LVL III: ICD-10-PCS | Mod: PBBFAC,,, | Performed by: SURGERY

## 2021-08-19 PROCEDURE — 99999 PR PBB SHADOW E&M-EST. PATIENT-LVL III: CPT | Mod: PBBFAC,,, | Performed by: SURGERY

## 2021-08-19 PROCEDURE — 3008F BODY MASS INDEX DOCD: CPT | Mod: CPTII,S$GLB,, | Performed by: SURGERY

## 2021-08-19 PROCEDURE — 99024 POSTOP FOLLOW-UP VISIT: CPT | Mod: S$GLB,,, | Performed by: SURGERY

## 2021-08-19 PROCEDURE — 1159F PR MEDICATION LIST DOCUMENTED IN MEDICAL RECORD: ICD-10-PCS | Mod: CPTII,S$GLB,, | Performed by: SURGERY

## 2021-08-19 PROCEDURE — 1126F PR PAIN SEVERITY QUANTIFIED, NO PAIN PRESENT: ICD-10-PCS | Mod: CPTII,S$GLB,, | Performed by: SURGERY

## 2021-10-18 ENCOUNTER — PATIENT MESSAGE (OUTPATIENT)
Dept: FAMILY MEDICINE | Facility: CLINIC | Age: 41
End: 2021-10-18

## 2021-10-19 ENCOUNTER — PATIENT MESSAGE (OUTPATIENT)
Dept: FAMILY MEDICINE | Facility: CLINIC | Age: 41
End: 2021-10-19
Payer: COMMERCIAL

## 2021-11-09 ENCOUNTER — PATIENT MESSAGE (OUTPATIENT)
Dept: FAMILY MEDICINE | Facility: CLINIC | Age: 41
End: 2021-11-09
Payer: COMMERCIAL

## 2021-11-10 ENCOUNTER — OFFICE VISIT (OUTPATIENT)
Dept: FAMILY MEDICINE | Facility: CLINIC | Age: 41
End: 2021-11-10
Payer: COMMERCIAL

## 2021-11-10 ENCOUNTER — PATIENT MESSAGE (OUTPATIENT)
Dept: FAMILY MEDICINE | Facility: CLINIC | Age: 41
End: 2021-11-10

## 2021-11-10 VITALS
HEIGHT: 62 IN | WEIGHT: 210 LBS | SYSTOLIC BLOOD PRESSURE: 128 MMHG | HEART RATE: 75 BPM | DIASTOLIC BLOOD PRESSURE: 80 MMHG | BODY MASS INDEX: 38.64 KG/M2 | OXYGEN SATURATION: 97 %

## 2021-11-10 DIAGNOSIS — R10.13 EPIGASTRIC ABDOMINAL PAIN: Primary | ICD-10-CM

## 2021-11-10 DIAGNOSIS — M54.41 ACUTE RIGHT-SIDED LOW BACK PAIN WITH RIGHT-SIDED SCIATICA: ICD-10-CM

## 2021-11-10 DIAGNOSIS — R82.90 ABNORMAL URINALYSIS: ICD-10-CM

## 2021-11-10 LAB
BILIRUB SERPL-MCNC: ABNORMAL MG/DL
BLOOD URINE, POC: ABNORMAL
CLARITY, POC UA: CLEAR
COLOR, POC UA: ABNORMAL
GLUCOSE UR QL STRIP: NORMAL
KETONES UR QL STRIP: ABNORMAL
LEUKOCYTE ESTERASE URINE, POC: ABNORMAL
NITRITE, POC UA: ABNORMAL
PH, POC UA: 5
PROTEIN, POC: ABNORMAL
SPECIFIC GRAVITY, POC UA: 1.01
UROBILINOGEN, POC UA: ABNORMAL

## 2021-11-10 PROCEDURE — 3079F PR MOST RECENT DIASTOLIC BLOOD PRESSURE 80-89 MM HG: ICD-10-PCS | Mod: CPTII,S$GLB,, | Performed by: NURSE PRACTITIONER

## 2021-11-10 PROCEDURE — 1159F MED LIST DOCD IN RCRD: CPT | Mod: CPTII,S$GLB,, | Performed by: NURSE PRACTITIONER

## 2021-11-10 PROCEDURE — 3079F DIAST BP 80-89 MM HG: CPT | Mod: CPTII,S$GLB,, | Performed by: NURSE PRACTITIONER

## 2021-11-10 PROCEDURE — 3008F PR BODY MASS INDEX (BMI) DOCUMENTED: ICD-10-PCS | Mod: CPTII,S$GLB,, | Performed by: NURSE PRACTITIONER

## 2021-11-10 PROCEDURE — 87186 SC STD MICRODIL/AGAR DIL: CPT | Performed by: NURSE PRACTITIONER

## 2021-11-10 PROCEDURE — 1160F PR REVIEW ALL MEDS BY PRESCRIBER/CLIN PHARMACIST DOCUMENTED: ICD-10-PCS | Mod: CPTII,S$GLB,, | Performed by: NURSE PRACTITIONER

## 2021-11-10 PROCEDURE — 81002 URINALYSIS NONAUTO W/O SCOPE: CPT | Mod: S$GLB,,, | Performed by: NURSE PRACTITIONER

## 2021-11-10 PROCEDURE — 1159F PR MEDICATION LIST DOCUMENTED IN MEDICAL RECORD: ICD-10-PCS | Mod: CPTII,S$GLB,, | Performed by: NURSE PRACTITIONER

## 2021-11-10 PROCEDURE — 3074F PR MOST RECENT SYSTOLIC BLOOD PRESSURE < 130 MM HG: ICD-10-PCS | Mod: CPTII,S$GLB,, | Performed by: NURSE PRACTITIONER

## 2021-11-10 PROCEDURE — 87077 CULTURE AEROBIC IDENTIFY: CPT | Performed by: NURSE PRACTITIONER

## 2021-11-10 PROCEDURE — 99999 PR PBB SHADOW E&M-EST. PATIENT-LVL IV: CPT | Mod: PBBFAC,,, | Performed by: NURSE PRACTITIONER

## 2021-11-10 PROCEDURE — 81002 POCT URINE DIPSTICK WITHOUT MICROSCOPE: ICD-10-PCS | Mod: S$GLB,,, | Performed by: NURSE PRACTITIONER

## 2021-11-10 PROCEDURE — 99214 OFFICE O/P EST MOD 30 MIN: CPT | Mod: S$GLB,,, | Performed by: NURSE PRACTITIONER

## 2021-11-10 PROCEDURE — 99214 PR OFFICE/OUTPT VISIT, EST, LEVL IV, 30-39 MIN: ICD-10-PCS | Mod: S$GLB,,, | Performed by: NURSE PRACTITIONER

## 2021-11-10 PROCEDURE — 3074F SYST BP LT 130 MM HG: CPT | Mod: CPTII,S$GLB,, | Performed by: NURSE PRACTITIONER

## 2021-11-10 PROCEDURE — 1160F RVW MEDS BY RX/DR IN RCRD: CPT | Mod: CPTII,S$GLB,, | Performed by: NURSE PRACTITIONER

## 2021-11-10 PROCEDURE — 3008F BODY MASS INDEX DOCD: CPT | Mod: CPTII,S$GLB,, | Performed by: NURSE PRACTITIONER

## 2021-11-10 PROCEDURE — 87086 URINE CULTURE/COLONY COUNT: CPT | Performed by: NURSE PRACTITIONER

## 2021-11-10 PROCEDURE — 99999 PR PBB SHADOW E&M-EST. PATIENT-LVL IV: ICD-10-PCS | Mod: PBBFAC,,, | Performed by: NURSE PRACTITIONER

## 2021-11-10 PROCEDURE — 87088 URINE BACTERIA CULTURE: CPT | Performed by: NURSE PRACTITIONER

## 2021-11-10 RX ORDER — NITROFURANTOIN 25; 75 MG/1; MG/1
100 CAPSULE ORAL 2 TIMES DAILY
Qty: 14 CAPSULE | Refills: 0 | Status: CANCELLED | OUTPATIENT
Start: 2021-11-10 | End: 2021-11-17

## 2021-11-12 ENCOUNTER — PATIENT MESSAGE (OUTPATIENT)
Dept: FAMILY MEDICINE | Facility: CLINIC | Age: 41
End: 2021-11-12
Payer: COMMERCIAL

## 2021-11-12 RX ORDER — NITROFURANTOIN 25; 75 MG/1; MG/1
100 CAPSULE ORAL 2 TIMES DAILY
Qty: 14 CAPSULE | Refills: 0 | Status: SHIPPED | OUTPATIENT
Start: 2021-11-12 | End: 2021-11-15 | Stop reason: ALTCHOICE

## 2021-11-15 LAB — BACTERIA UR CULT: ABNORMAL

## 2021-11-15 RX ORDER — CEPHALEXIN 500 MG/1
500 CAPSULE ORAL EVERY 12 HOURS
Qty: 14 CAPSULE | Refills: 0 | Status: SHIPPED | OUTPATIENT
Start: 2021-11-15 | End: 2021-11-22

## 2021-11-29 ENCOUNTER — PATIENT MESSAGE (OUTPATIENT)
Dept: FAMILY MEDICINE | Facility: CLINIC | Age: 41
End: 2021-11-29
Payer: COMMERCIAL

## 2021-11-29 ENCOUNTER — HOSPITAL ENCOUNTER (OUTPATIENT)
Dept: RADIOLOGY | Facility: HOSPITAL | Age: 41
Discharge: HOME | End: 2021-11-29
Attending: PHYSICIAN ASSISTANT
Payer: COMMERCIAL

## 2021-11-29 ENCOUNTER — OFFICE VISIT (OUTPATIENT)
Dept: FAMILY MEDICINE | Facility: CLINIC | Age: 41
End: 2021-11-29
Payer: COMMERCIAL

## 2021-11-29 VITALS
WEIGHT: 212.94 LBS | BODY MASS INDEX: 39.19 KG/M2 | OXYGEN SATURATION: 97 % | HEART RATE: 94 BPM | DIASTOLIC BLOOD PRESSURE: 72 MMHG | HEIGHT: 62 IN | SYSTOLIC BLOOD PRESSURE: 122 MMHG

## 2021-11-29 DIAGNOSIS — R10.9 FLANK PAIN: ICD-10-CM

## 2021-11-29 DIAGNOSIS — R10.30 LOWER ABDOMINAL PAIN: ICD-10-CM

## 2021-11-29 DIAGNOSIS — N30.01 ACUTE CYSTITIS WITH HEMATURIA: Primary | ICD-10-CM

## 2021-11-29 LAB
BACTERIA #/AREA URNS HPF: ABNORMAL /HPF
BILIRUB UR QL STRIP: NEGATIVE
CLARITY UR: CLEAR
COLOR UR: YELLOW
GLUCOSE UR QL STRIP: NEGATIVE
HGB UR QL STRIP: ABNORMAL
KETONES UR QL STRIP: NEGATIVE
LEUKOCYTE ESTERASE UR QL STRIP: ABNORMAL
MICROSCOPIC COMMENT: ABNORMAL
NITRITE UR QL STRIP: NEGATIVE
PH UR STRIP: 6 [PH] (ref 5–8)
PROT UR QL STRIP: NEGATIVE
RBC #/AREA URNS HPF: 1 /HPF (ref 0–4)
SP GR UR STRIP: 1.02 (ref 1–1.03)
SQUAMOUS #/AREA URNS HPF: 8 /HPF
URN SPEC COLLECT METH UR: ABNORMAL
WBC #/AREA URNS HPF: 5 /HPF (ref 0–5)

## 2021-11-29 PROCEDURE — 99214 OFFICE O/P EST MOD 30 MIN: CPT | Mod: S$GLB,,, | Performed by: PHYSICIAN ASSISTANT

## 2021-11-29 PROCEDURE — 99999 PR PBB SHADOW E&M-EST. PATIENT-LVL III: CPT | Mod: PBBFAC,,, | Performed by: PHYSICIAN ASSISTANT

## 2021-11-29 PROCEDURE — 74176 CT ABD & PELVIS W/O CONTRAST: CPT | Mod: 26,,, | Performed by: RADIOLOGY

## 2021-11-29 PROCEDURE — 99999 PR PBB SHADOW E&M-EST. PATIENT-LVL III: ICD-10-PCS | Mod: PBBFAC,,, | Performed by: PHYSICIAN ASSISTANT

## 2021-11-29 PROCEDURE — 74176 CT RENAL STONE STUDY ABD PELVIS WO: ICD-10-PCS | Mod: 26,,, | Performed by: RADIOLOGY

## 2021-11-29 PROCEDURE — 87086 URINE CULTURE/COLONY COUNT: CPT | Performed by: PHYSICIAN ASSISTANT

## 2021-11-29 PROCEDURE — 81000 URINALYSIS NONAUTO W/SCOPE: CPT | Mod: PO | Performed by: PHYSICIAN ASSISTANT

## 2021-11-29 PROCEDURE — 74176 CT ABD & PELVIS W/O CONTRAST: CPT | Mod: TC,PO

## 2021-11-29 PROCEDURE — 99214 PR OFFICE/OUTPT VISIT, EST, LEVL IV, 30-39 MIN: ICD-10-PCS | Mod: S$GLB,,, | Performed by: PHYSICIAN ASSISTANT

## 2021-11-29 RX ORDER — DOXYCYCLINE 100 MG/1
100 CAPSULE ORAL 2 TIMES DAILY
Qty: 20 CAPSULE | Refills: 0 | Status: SHIPPED | OUTPATIENT
Start: 2021-11-29 | End: 2021-12-09

## 2021-11-30 ENCOUNTER — OFFICE VISIT (OUTPATIENT)
Dept: GASTROENTEROLOGY | Facility: CLINIC | Age: 41
End: 2021-11-30
Payer: COMMERCIAL

## 2021-11-30 VITALS — BODY MASS INDEX: 39.15 KG/M2 | WEIGHT: 212.75 LBS | HEIGHT: 62 IN

## 2021-11-30 DIAGNOSIS — K76.0 FATTY LIVER: ICD-10-CM

## 2021-11-30 DIAGNOSIS — R10.11 RUQ ABDOMINAL PAIN: ICD-10-CM

## 2021-11-30 DIAGNOSIS — Z90.49 S/P CHOLECYSTECTOMY: ICD-10-CM

## 2021-11-30 DIAGNOSIS — R10.13 EPIGASTRIC ABDOMINAL PAIN: Primary | ICD-10-CM

## 2021-11-30 DIAGNOSIS — R11.0 NAUSEA: ICD-10-CM

## 2021-11-30 PROCEDURE — 99999 PR PBB SHADOW E&M-EST. PATIENT-LVL IV: ICD-10-PCS | Mod: PBBFAC,,, | Performed by: NURSE PRACTITIONER

## 2021-11-30 PROCEDURE — 99999 PR PBB SHADOW E&M-EST. PATIENT-LVL IV: CPT | Mod: PBBFAC,,, | Performed by: NURSE PRACTITIONER

## 2021-11-30 PROCEDURE — 99204 PR OFFICE/OUTPT VISIT, NEW, LEVL IV, 45-59 MIN: ICD-10-PCS | Mod: S$GLB,,, | Performed by: NURSE PRACTITIONER

## 2021-11-30 PROCEDURE — 99204 OFFICE O/P NEW MOD 45 MIN: CPT | Mod: S$GLB,,, | Performed by: NURSE PRACTITIONER

## 2021-11-30 RX ORDER — PANTOPRAZOLE SODIUM 40 MG/1
40 TABLET, DELAYED RELEASE ORAL DAILY
Qty: 30 TABLET | Refills: 2 | Status: SHIPPED | OUTPATIENT
Start: 2021-11-30 | End: 2022-08-01

## 2021-12-02 LAB — BACTERIA UR CULT: NO GROWTH

## 2022-01-13 ENCOUNTER — PATIENT MESSAGE (OUTPATIENT)
Dept: ENDOSCOPY | Facility: HOSPITAL | Age: 42
End: 2022-01-13
Payer: COMMERCIAL

## 2022-03-10 ENCOUNTER — TELEPHONE (OUTPATIENT)
Dept: GASTROENTEROLOGY | Facility: CLINIC | Age: 42
End: 2022-03-10
Payer: COMMERCIAL

## 2022-03-10 NOTE — TELEPHONE ENCOUNTER
Spoke with pt to schedule covid test. Pt states she is feeling much better since starting optivio diet & wants to cancel procedure. Pt states if she begins to feel bad again, she will call clinic to schedule a f/u appointment or r/s procedure.     Procedure canceled.

## 2022-05-13 ENCOUNTER — PATIENT MESSAGE (OUTPATIENT)
Dept: OBSTETRICS AND GYNECOLOGY | Facility: CLINIC | Age: 42
End: 2022-05-13
Payer: COMMERCIAL

## 2022-05-13 ENCOUNTER — PATIENT MESSAGE (OUTPATIENT)
Dept: FAMILY MEDICINE | Facility: CLINIC | Age: 42
End: 2022-05-13
Payer: COMMERCIAL

## 2022-05-16 ENCOUNTER — OFFICE VISIT (OUTPATIENT)
Dept: OBSTETRICS AND GYNECOLOGY | Facility: CLINIC | Age: 42
End: 2022-05-16
Payer: COMMERCIAL

## 2022-05-16 ENCOUNTER — PATIENT OUTREACH (OUTPATIENT)
Dept: ADMINISTRATIVE | Facility: OTHER | Age: 42
End: 2022-05-16
Payer: COMMERCIAL

## 2022-05-16 ENCOUNTER — PATIENT MESSAGE (OUTPATIENT)
Dept: ADMINISTRATIVE | Facility: OTHER | Age: 42
End: 2022-05-16
Payer: COMMERCIAL

## 2022-05-16 ENCOUNTER — HOSPITAL ENCOUNTER (OUTPATIENT)
Dept: RADIOLOGY | Facility: HOSPITAL | Age: 42
Discharge: HOME | End: 2022-05-16
Attending: OBSTETRICS & GYNECOLOGY
Payer: COMMERCIAL

## 2022-05-16 VITALS
HEIGHT: 62 IN | SYSTOLIC BLOOD PRESSURE: 132 MMHG | WEIGHT: 206.56 LBS | DIASTOLIC BLOOD PRESSURE: 86 MMHG | BODY MASS INDEX: 38.01 KG/M2

## 2022-05-16 DIAGNOSIS — Z12.31 VISIT FOR SCREENING MAMMOGRAM: ICD-10-CM

## 2022-05-16 DIAGNOSIS — Z12.31 ENCOUNTER FOR SCREENING MAMMOGRAM FOR MALIGNANT NEOPLASM OF BREAST: Primary | ICD-10-CM

## 2022-05-16 DIAGNOSIS — R10.2 PELVIC PAIN: ICD-10-CM

## 2022-05-16 DIAGNOSIS — R10.2 PELVIC PAIN: Primary | ICD-10-CM

## 2022-05-16 DIAGNOSIS — Z86.718 PERSONAL HISTORY OF DVT (DEEP VEIN THROMBOSIS): ICD-10-CM

## 2022-05-16 LAB
BILIRUB SERPL-MCNC: NORMAL MG/DL
BLOOD URINE, POC: 50
CLARITY, POC UA: NORMAL
COLOR, POC UA: NORMAL
GLUCOSE UR QL STRIP: NORMAL
KETONES UR QL STRIP: NORMAL
LEUKOCYTE ESTERASE URINE, POC: NORMAL
NITRITE, POC UA: NORMAL
PH, POC UA: 5
PROTEIN, POC: NORMAL
SPECIFIC GRAVITY, POC UA: NORMAL
UROBILINOGEN, POC UA: NORMAL

## 2022-05-16 PROCEDURE — 1159F MED LIST DOCD IN RCRD: CPT | Mod: CPTII,S$GLB,, | Performed by: OBSTETRICS & GYNECOLOGY

## 2022-05-16 PROCEDURE — 77063 BREAST TOMOSYNTHESIS BI: CPT | Mod: TC,PN

## 2022-05-16 PROCEDURE — 99999 PR PBB SHADOW E&M-EST. PATIENT-LVL III: CPT | Mod: PBBFAC,,, | Performed by: OBSTETRICS & GYNECOLOGY

## 2022-05-16 PROCEDURE — 76830 TRANSVAGINAL US NON-OB: CPT | Mod: TC,PO

## 2022-05-16 PROCEDURE — 77063 BREAST TOMOSYNTHESIS BI: CPT | Mod: 26,,, | Performed by: RADIOLOGY

## 2022-05-16 PROCEDURE — 3079F PR MOST RECENT DIASTOLIC BLOOD PRESSURE 80-89 MM HG: ICD-10-PCS | Mod: CPTII,S$GLB,, | Performed by: OBSTETRICS & GYNECOLOGY

## 2022-05-16 PROCEDURE — 1159F PR MEDICATION LIST DOCUMENTED IN MEDICAL RECORD: ICD-10-PCS | Mod: CPTII,S$GLB,, | Performed by: OBSTETRICS & GYNECOLOGY

## 2022-05-16 PROCEDURE — 3075F SYST BP GE 130 - 139MM HG: CPT | Mod: CPTII,S$GLB,, | Performed by: OBSTETRICS & GYNECOLOGY

## 2022-05-16 PROCEDURE — 77067 MAMMO DIGITAL SCREENING BILAT WITH TOMO: ICD-10-PCS | Mod: 26,,, | Performed by: RADIOLOGY

## 2022-05-16 PROCEDURE — 76830 US PELVIS COMP WITH TRANSVAG NON-OB (XPD): ICD-10-PCS | Mod: 26,,, | Performed by: RADIOLOGY

## 2022-05-16 PROCEDURE — 3008F BODY MASS INDEX DOCD: CPT | Mod: CPTII,S$GLB,, | Performed by: OBSTETRICS & GYNECOLOGY

## 2022-05-16 PROCEDURE — 3079F DIAST BP 80-89 MM HG: CPT | Mod: CPTII,S$GLB,, | Performed by: OBSTETRICS & GYNECOLOGY

## 2022-05-16 PROCEDURE — 81002 POCT URINE DIPSTICK WITHOUT MICROSCOPE: ICD-10-PCS | Mod: S$GLB,,, | Performed by: OBSTETRICS & GYNECOLOGY

## 2022-05-16 PROCEDURE — 81002 URINALYSIS NONAUTO W/O SCOPE: CPT | Mod: S$GLB,,, | Performed by: OBSTETRICS & GYNECOLOGY

## 2022-05-16 PROCEDURE — 99214 PR OFFICE/OUTPT VISIT, EST, LEVL IV, 30-39 MIN: ICD-10-PCS | Mod: S$GLB,,, | Performed by: OBSTETRICS & GYNECOLOGY

## 2022-05-16 PROCEDURE — 77063 MAMMO DIGITAL SCREENING BILAT WITH TOMO: ICD-10-PCS | Mod: 26,,, | Performed by: RADIOLOGY

## 2022-05-16 PROCEDURE — 77067 SCR MAMMO BI INCL CAD: CPT | Mod: TC,PN

## 2022-05-16 PROCEDURE — 76830 TRANSVAGINAL US NON-OB: CPT | Mod: 26,,, | Performed by: RADIOLOGY

## 2022-05-16 PROCEDURE — 77067 SCR MAMMO BI INCL CAD: CPT | Mod: 26,,, | Performed by: RADIOLOGY

## 2022-05-16 PROCEDURE — 87086 URINE CULTURE/COLONY COUNT: CPT | Performed by: OBSTETRICS & GYNECOLOGY

## 2022-05-16 PROCEDURE — 76856 US EXAM PELVIC COMPLETE: CPT | Mod: 26,,, | Performed by: RADIOLOGY

## 2022-05-16 PROCEDURE — 3075F PR MOST RECENT SYSTOLIC BLOOD PRESS GE 130-139MM HG: ICD-10-PCS | Mod: CPTII,S$GLB,, | Performed by: OBSTETRICS & GYNECOLOGY

## 2022-05-16 PROCEDURE — 76856 US PELVIS COMP WITH TRANSVAG NON-OB (XPD): ICD-10-PCS | Mod: 26,,, | Performed by: RADIOLOGY

## 2022-05-16 PROCEDURE — 3008F PR BODY MASS INDEX (BMI) DOCUMENTED: ICD-10-PCS | Mod: CPTII,S$GLB,, | Performed by: OBSTETRICS & GYNECOLOGY

## 2022-05-16 PROCEDURE — 99999 PR PBB SHADOW E&M-EST. PATIENT-LVL III: ICD-10-PCS | Mod: PBBFAC,,, | Performed by: OBSTETRICS & GYNECOLOGY

## 2022-05-16 PROCEDURE — 99214 OFFICE O/P EST MOD 30 MIN: CPT | Mod: S$GLB,,, | Performed by: OBSTETRICS & GYNECOLOGY

## 2022-05-16 RX ORDER — CIPROFLOXACIN 500 MG/1
500 TABLET ORAL EVERY 12 HOURS
Qty: 14 TABLET | Refills: 0 | Status: SHIPPED | OUTPATIENT
Start: 2022-05-16 | End: 2022-05-23

## 2022-05-16 NOTE — PROGRESS NOTES
Chief Complaint   Patient presents with    Pelvic Pain       History of Present Illness: Eugenie Laguerre is a 41 y.o. female that presents today 2022 for   Chief Complaint   Patient presents with    Pelvic Pain     She reports left abdominal pain for 2-3 days.  She reports rest relieves.  She reports no dysuria or hematuria.  She reports standing makes it worse.  She reports no change in bowel movements last one this morning.     Past Medical History:   Diagnosis Date    Clotting disorder     DVT (deep venous thrombosis) 3/07    was felt to be due to ? Factor V Leiden    High risk pregnancy due to history of  labor 2015    Hyperthyroidism 2013    Iron deficiency anemia due to chronic blood loss 2015    Short cervix affecting pregnancy 3/3/2015       Past Surgical History:   Procedure Laterality Date    2 R knee sx; 1 L knee      BRONCHOSCOPY       SECTION  7/30/15    CHOLECYSTECTOMY      KNEE ARTHROSCOPY      KNEE SURGERY      x3    LAPAROSCOPIC CHOLECYSTECTOMY N/A 2021    Procedure: CHOLECYSTECTOMY, LAPAROSCOPIC;  Surgeon: Pascual Lu MD;  [facility];  Service: General;  Laterality: N/A;    TUBAL LIGATION  7/30/15       Current Outpatient Medications   Medication Sig Dispense Refill    ciprofloxacin HCl (CIPRO) 500 MG tablet Take 1 tablet (500 mg total) by mouth every 12 (twelve) hours. for 7 days 14 tablet 0    citalopram (CELEXA) 20 MG tablet Take 1 tablet (20 mg total) by mouth once daily. (Patient not taking: Reported on 2022) 30 tablet 11    HYDROcodone-acetaminophen (NORCO) 5-325 mg per tablet Take 1 tablet by mouth every 6 (six) hours as needed for Pain. (Patient not taking: Reported on 2022) 20 tablet 0    pantoprazole (PROTONIX) 40 MG tablet Take 1 tablet (40 mg total) by mouth once daily. 30 tablet 2    topiramate (TOPAMAX) 25 MG tablet TAKE 1 TABLET BY MOUTH EVERY EVENING (Patient not taking: Reported on 2022) 90 tablet 1  "    No current facility-administered medications for this visit.       Review of patient's allergies indicates:   Allergen Reactions    Amoxicillin     Augmentin [amoxicillin-pot clavulanate] Swelling    Bactrim [sulfamethoxazole-trimethoprim] Itching and Nausea Only       Family History   Problem Relation Age of Onset    Breast cancer Maternal Grandmother     Anuerysm Father     Cancer Father         skin    Deep vein thrombosis Father     Hypertension Father     Fibromyalgia Mother     Depression Mother     Anuerysm Paternal Grandfather         AAA    Bipolar disorder Sister     Heart disease Brother     Anuerysm Paternal Aunt         AAA    Diabetes Paternal Grandmother     Ovarian cancer Neg Hx     Colon cancer Neg Hx     Stomach cancer Neg Hx     Esophageal cancer Neg Hx        Social History     Tobacco Use    Smoking status: Current Every Day Smoker     Packs/day: 0.50     Years: 0.00     Pack years: 0.00     Types: Cigarettes    Smokeless tobacco: Former User     Quit date: 2021   Substance Use Topics    Alcohol use: Yes     Alcohol/week: 0.0 standard drinks     Comment: rare    Drug use: No       OB History    Para Term  AB Living   3 3 2 1   3   SAB IAB Ectopic Multiple Live Births           3      # Outcome Date GA Lbr [place]/2nd Weight Sex Delivery Anes PTL Lv   3 Term 07/30/15 39w0d  3.728 kg (8 lb 3.5 oz) F CS-LTranv EPI N JANNET   2 Term 12/15/12 37w0d  3.204 kg (7 lb 1 oz) F Vag-Spont EPI N JANNET      Birth Comments: System Generated. Please review and update pregnancy details.   1  10/06/08 25w0d  0.822 kg (1 lb 13 oz) F Vag-Spont None Y JANNET      Birth Comments: delivered at 25 IUP, PPROM on a friday with labor on monday.         /86   Ht 5' 2" (1.575 m)   Wt 93.7 kg (206 lb 9.1 oz)   LMP 2022   Physical Exam:  APPEARANCE: Well nourished, well developed, in no acute distress.  SKIN: Normal skin turgor, no lesions.  NECK: Neck symmetric without " masses   RESPIRATORY: Normal respiratory effort with no retractions or use of accessory muscles  CARDIOVASCULAR: Peripheral vascular system with no swelling no varicosities and palpation of pulses normal  LYMPHATIC: No enlargements of the lymph nodes noted in the neck, axillae, or groin  ABDOMEN: Soft. No tenderness or masses. No hepatosplenomegaly. No hernias.  PELVIC: Normal external female genitalia without lesions. Normal hair distribution. Adequate perineal body, normal urethral meatus. Urethra with no masses.  Bladder +++tender. Vagina moist and well rugated without lesions or discharge. Cervix pink and without lesions. No significant cystocele or rectocele. Bimanual exam showed uterus normal size, shape, position, mobile and nontender. Adnexa without masses or tenderness. Urethra and bladder normal.  EXTREMITIES: No clubbing cyanosis or edema.    ASSESSMENT/PLAN:  Pelvic pain  -     POCT URINE DIPSTICK WITHOUT MICROSCOPE  -     Urine culture  -     US Pelvis Comp with Transvag NON-OB (xpd; Future; Expected date: 05/16/2022  -     ciprofloxacin HCl (CIPRO) 500 MG tablet; Take 1 tablet (500 mg total) by mouth every 12 (twelve) hours. for 7 days  Dispense: 14 tablet; Refill: 0    Visit for screening mammogram  -     Mammo Digital Screening Bilat w/ Justin; Future; Expected date: 05/16/2022    Personal history of DVT (deep vein thrombosis)          30 minutes spent today spent preparing reviewing previous external notes, reviewing previous results, performing medical examination, ordering tests or medications, counseling and documenting.

## 2022-05-16 NOTE — PROGRESS NOTES
Health Maintenance Due   Topic Date Due    Hepatitis C Screening  Never done    Pneumococcal Vaccines (Age 0-64) (1 - PCV) Never done    Mammogram  Never done    High Dose Statin  Never done    COVID-19 Vaccine (2 - Booster for Zaira series) 09/03/2021     Updates were requested from care everywhere.  Chart was reviewed for overdue Proactive [facility] Encounters ([facility]) topics (CRS, Breast Cancer Screening, Eye exam)  Health Maintenance has been updated.  LINKS immunization registry triggered.  Immunizations were reconciled.

## 2022-05-17 LAB — BACTERIA UR CULT: NO GROWTH

## 2022-08-01 ENCOUNTER — LAB VISIT (OUTPATIENT)
Dept: LAB | Facility: HOSPITAL | Age: 42
End: 2022-08-01
Attending: NURSE PRACTITIONER
Payer: COMMERCIAL

## 2022-08-01 ENCOUNTER — OFFICE VISIT (OUTPATIENT)
Dept: FAMILY MEDICINE | Facility: CLINIC | Age: 42
End: 2022-08-01
Payer: COMMERCIAL

## 2022-08-01 VITALS
OXYGEN SATURATION: 98 % | HEIGHT: 62 IN | SYSTOLIC BLOOD PRESSURE: 124 MMHG | DIASTOLIC BLOOD PRESSURE: 78 MMHG | WEIGHT: 213.88 LBS | HEART RATE: 98 BPM | BODY MASS INDEX: 39.36 KG/M2

## 2022-08-01 DIAGNOSIS — G25.81 RESTLESS LEGS: ICD-10-CM

## 2022-08-01 DIAGNOSIS — R25.2 CRAMP OF MUSCLE OF RIGHT UPPER EXTREMITY: Primary | ICD-10-CM

## 2022-08-01 DIAGNOSIS — R25.2 CRAMP OF MUSCLE OF RIGHT UPPER EXTREMITY: ICD-10-CM

## 2022-08-01 LAB
BACTERIA #/AREA URNS HPF: ABNORMAL /HPF
BILIRUB UR QL STRIP: NEGATIVE
CLARITY UR: CLEAR
COLOR UR: YELLOW
GLUCOSE UR QL STRIP: NEGATIVE
HGB UR QL STRIP: ABNORMAL
KETONES UR QL STRIP: NEGATIVE
LEUKOCYTE ESTERASE UR QL STRIP: NEGATIVE
MICROSCOPIC COMMENT: ABNORMAL
NITRITE UR QL STRIP: NEGATIVE
PH UR STRIP: 6 [PH] (ref 5–8)
PROT UR QL STRIP: NEGATIVE
RBC #/AREA URNS HPF: 2 /HPF (ref 0–4)
SP GR UR STRIP: 1.01 (ref 1–1.03)
SQUAMOUS #/AREA URNS HPF: 1 /HPF
URN SPEC COLLECT METH UR: ABNORMAL

## 2022-08-01 PROCEDURE — 81000 URINALYSIS NONAUTO W/SCOPE: CPT | Mod: PO | Performed by: NURSE PRACTITIONER

## 2022-08-01 PROCEDURE — 1159F MED LIST DOCD IN RCRD: CPT | Mod: CPTII,S$GLB,, | Performed by: NURSE PRACTITIONER

## 2022-08-01 PROCEDURE — 3074F SYST BP LT 130 MM HG: CPT | Mod: CPTII,S$GLB,, | Performed by: NURSE PRACTITIONER

## 2022-08-01 PROCEDURE — 3078F PR MOST RECENT DIASTOLIC BLOOD PRESSURE < 80 MM HG: ICD-10-PCS | Mod: CPTII,S$GLB,, | Performed by: NURSE PRACTITIONER

## 2022-08-01 PROCEDURE — 3078F DIAST BP <80 MM HG: CPT | Mod: CPTII,S$GLB,, | Performed by: NURSE PRACTITIONER

## 2022-08-01 PROCEDURE — 3074F PR MOST RECENT SYSTOLIC BLOOD PRESSURE < 130 MM HG: ICD-10-PCS | Mod: CPTII,S$GLB,, | Performed by: NURSE PRACTITIONER

## 2022-08-01 PROCEDURE — 99214 PR OFFICE/OUTPT VISIT, EST, LEVL IV, 30-39 MIN: ICD-10-PCS | Mod: S$GLB,,, | Performed by: NURSE PRACTITIONER

## 2022-08-01 PROCEDURE — 99214 OFFICE O/P EST MOD 30 MIN: CPT | Mod: S$GLB,,, | Performed by: NURSE PRACTITIONER

## 2022-08-01 PROCEDURE — 3008F PR BODY MASS INDEX (BMI) DOCUMENTED: ICD-10-PCS | Mod: CPTII,S$GLB,, | Performed by: NURSE PRACTITIONER

## 2022-08-01 PROCEDURE — 3008F BODY MASS INDEX DOCD: CPT | Mod: CPTII,S$GLB,, | Performed by: NURSE PRACTITIONER

## 2022-08-01 PROCEDURE — 99999 PR PBB SHADOW E&M-EST. PATIENT-LVL III: CPT | Mod: PBBFAC,,, | Performed by: NURSE PRACTITIONER

## 2022-08-01 PROCEDURE — 1159F PR MEDICATION LIST DOCUMENTED IN MEDICAL RECORD: ICD-10-PCS | Mod: CPTII,S$GLB,, | Performed by: NURSE PRACTITIONER

## 2022-08-01 PROCEDURE — 1160F RVW MEDS BY RX/DR IN RCRD: CPT | Mod: CPTII,S$GLB,, | Performed by: NURSE PRACTITIONER

## 2022-08-01 PROCEDURE — 1160F PR REVIEW ALL MEDS BY PRESCRIBER/CLIN PHARMACIST DOCUMENTED: ICD-10-PCS | Mod: CPTII,S$GLB,, | Performed by: NURSE PRACTITIONER

## 2022-08-01 PROCEDURE — 99999 PR PBB SHADOW E&M-EST. PATIENT-LVL III: ICD-10-PCS | Mod: PBBFAC,,, | Performed by: NURSE PRACTITIONER

## 2022-08-01 RX ORDER — MELOXICAM 15 MG/1
15 TABLET ORAL DAILY
Qty: 10 TABLET | Refills: 0 | Status: SHIPPED | OUTPATIENT
Start: 2022-08-01 | End: 2022-08-11

## 2022-08-01 RX ORDER — TIZANIDINE 4 MG/1
4 TABLET ORAL NIGHTLY PRN
Qty: 10 TABLET | Refills: 0 | Status: SHIPPED | OUTPATIENT
Start: 2022-08-01 | End: 2022-08-11

## 2022-08-01 NOTE — PROGRESS NOTES
Subjective:       Patient ID: Eugenie Laguerre is a 42 y.o. female.    Chief Complaint: Arm Pain    HPI   Patient is a deputy for the 's office   Right upper arm cramping with activities such as push ups and defensive training ongoing for 3 years without specific injury, gradually worsening   Spasms for 2-3 mins   Denies swelling or discoloration  Stays hydrated   No neck pain numbness or tingling   Has tried ibuprofen 600-800mg otc which helps but has not taken consistently    Restless legs at night  Need to move them  No pain   Not restless when she takes ibuprofen that day     Vitals:    22 1403   BP: 124/78   Pulse: 98     Review of Systems   Constitutional: Negative for fever.   Genitourinary: Negative for difficulty urinating and dysuria.   Musculoskeletal: Positive for myalgias.   Skin: Negative for rash.   Neurological: Negative for weakness and numbness.       Past Medical History:   Diagnosis Date    Clotting disorder     DVT (deep venous thrombosis) 3/07    was felt to be due to ? Factor V Leiden    High risk pregnancy due to history of  labor 2015    Hyperthyroidism 2013    Iron deficiency anemia due to chronic blood loss 2015    Short cervix affecting pregnancy 3/3/2015     Objective:      Physical Exam  Constitutional:       General: She is not in acute distress.     Appearance: She is well-developed. She is not ill-appearing, toxic-appearing or diaphoretic.   HENT:      Right Ear: Hearing normal.      Left Ear: Hearing normal.   Pulmonary:      Effort: No tachypnea or respiratory distress.   Musculoskeletal:      Right upper arm: No swelling, edema, deformity or tenderness.      Right lower leg: No edema.      Left lower leg: No edema.   Skin:     Coloration: Skin is not pale.      Findings: No ecchymosis, erythema or rash.   Neurological:      Mental Status: She is alert and oriented to person, place, and time.   Psychiatric:         Speech: Speech normal.          Behavior: Behavior normal.         Thought Content: Thought content normal.         Judgment: Judgment normal.         Assessment:       1. Cramp of muscle of right upper extremity    2. Restless legs        Plan:       Cramp of muscle of right upper extremity  -     CBC Auto Differential; Future; Expected date: 08/01/2022  -     Comprehensive Metabolic Panel; Future; Expected date: 08/01/2022  -     TSH; Future; Expected date: 08/01/2022  -     C-Reactive Protein; Future; Expected date: 08/01/2022  -     Sedimentation rate; Future; Expected date: 08/01/2022  -     CK; Future; Expected date: 08/01/2022  -     Magnesium; Future; Expected date: 08/01/2022  -     Urinalysis; Future; Expected date: 08/01/2022  -     tiZANidine (ZANAFLEX) 4 MG tablet; Take 1 tablet (4 mg total) by mouth nightly as needed.  Dispense: 10 tablet; Refill: 0  -     meloxicam (MOBIC) 15 MG tablet; Take 1 tablet (15 mg total) by mouth once daily. for 10 days  Dispense: 10 tablet; Refill: 0  -     Ambulatory referral/consult to Orthopedics; Future; Expected date: 08/08/2022    Restless legs   Improved with ibuprofen?    Trial meloxicam and zanaflex   Message me with response             Medication List with Changes/Refills   New Medications    MELOXICAM (MOBIC) 15 MG TABLET    Take 1 tablet (15 mg total) by mouth once daily. for 10 days    TIZANIDINE (ZANAFLEX) 4 MG TABLET    Take 1 tablet (4 mg total) by mouth nightly as needed.   Discontinued Medications    CITALOPRAM (CELEXA) 20 MG TABLET    Take 1 tablet (20 mg total) by mouth once daily.    HYDROCODONE-ACETAMINOPHEN (NORCO) 5-325 MG PER TABLET    Take 1 tablet by mouth every 6 (six) hours as needed for Pain.    PANTOPRAZOLE (PROTONIX) 40 MG TABLET    Take 1 tablet (40 mg total) by mouth once daily.    TOPIRAMATE (TOPAMAX) 25 MG TABLET    TAKE 1 TABLET BY MOUTH EVERY EVENING

## 2022-08-11 ENCOUNTER — PATIENT MESSAGE (OUTPATIENT)
Dept: FAMILY MEDICINE | Facility: CLINIC | Age: 42
End: 2022-08-11
Payer: COMMERCIAL

## 2022-08-16 DIAGNOSIS — M79.605 PAIN IN BOTH LOWER EXTREMITIES: Primary | ICD-10-CM

## 2022-08-16 DIAGNOSIS — M79.604 PAIN IN BOTH LOWER EXTREMITIES: Primary | ICD-10-CM

## 2022-08-17 ENCOUNTER — HOSPITAL ENCOUNTER (OUTPATIENT)
Dept: RADIOLOGY | Facility: HOSPITAL | Age: 42
Discharge: HOME | End: 2022-08-17
Attending: NURSE PRACTITIONER
Payer: COMMERCIAL

## 2022-08-17 DIAGNOSIS — M79.604 PAIN IN BOTH LOWER EXTREMITIES: ICD-10-CM

## 2022-08-17 DIAGNOSIS — M79.605 PAIN IN BOTH LOWER EXTREMITIES: ICD-10-CM

## 2022-08-17 PROCEDURE — 72110 X-RAY EXAM L-2 SPINE 4/>VWS: CPT | Mod: TC,FY,PO

## 2022-08-17 PROCEDURE — 72110 XR LUMBAR SPINE COMPLETE 5 VIEW: ICD-10-PCS | Mod: 26,,, | Performed by: RADIOLOGY

## 2022-08-17 PROCEDURE — 72110 X-RAY EXAM L-2 SPINE 4/>VWS: CPT | Mod: 26,,, | Performed by: RADIOLOGY

## 2022-08-18 ENCOUNTER — TELEPHONE (OUTPATIENT)
Dept: FAMILY MEDICINE | Facility: CLINIC | Age: 42
End: 2022-08-18
Payer: COMMERCIAL

## 2022-08-18 NOTE — TELEPHONE ENCOUNTER
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

## 2022-08-18 NOTE — TELEPHONE ENCOUNTER
Patient has not seen me in over 3 years and is now a new patient to me making it difficult to assist her.  Please schedule an annual with me - any new patient or 7 day change slot.

## 2022-08-19 ENCOUNTER — OFFICE VISIT (OUTPATIENT)
Dept: ORTHOPEDICS | Facility: CLINIC | Age: 42
End: 2022-08-19
Payer: COMMERCIAL

## 2022-08-19 ENCOUNTER — HOSPITAL ENCOUNTER (OUTPATIENT)
Dept: RADIOLOGY | Facility: HOSPITAL | Age: 42
Discharge: HOME | End: 2022-08-19
Attending: NURSE PRACTITIONER
Payer: COMMERCIAL

## 2022-08-19 VITALS — WEIGHT: 213 LBS | BODY MASS INDEX: 39.2 KG/M2 | HEIGHT: 62 IN

## 2022-08-19 DIAGNOSIS — M79.601 RIGHT ARM PAIN: ICD-10-CM

## 2022-08-19 DIAGNOSIS — M62.838 MUSCLE SPASM: ICD-10-CM

## 2022-08-19 DIAGNOSIS — M79.601 RIGHT ARM PAIN: Primary | ICD-10-CM

## 2022-08-19 DIAGNOSIS — S46.291A OTHER INJURY OF MUSCLE, FASCIA AND TENDON OF OTHER PARTS OF BICEPS, RIGHT ARM, INITIAL ENCOUNTER: Primary | ICD-10-CM

## 2022-08-19 PROCEDURE — 99999 PR PBB SHADOW E&M-EST. PATIENT-LVL III: ICD-10-PCS | Mod: PBBFAC,,, | Performed by: NURSE PRACTITIONER

## 2022-08-19 PROCEDURE — 99213 PR OFFICE/OUTPT VISIT, EST, LEVL III, 20-29 MIN: ICD-10-PCS | Mod: S$GLB,,, | Performed by: NURSE PRACTITIONER

## 2022-08-19 PROCEDURE — 1160F RVW MEDS BY RX/DR IN RCRD: CPT | Mod: CPTII,S$GLB,, | Performed by: NURSE PRACTITIONER

## 2022-08-19 PROCEDURE — 73060 XR HUMERUS 2 VIEW RIGHT: ICD-10-PCS | Mod: 26,RT,, | Performed by: RADIOLOGY

## 2022-08-19 PROCEDURE — 3008F PR BODY MASS INDEX (BMI) DOCUMENTED: ICD-10-PCS | Mod: CPTII,S$GLB,, | Performed by: NURSE PRACTITIONER

## 2022-08-19 PROCEDURE — 99213 OFFICE O/P EST LOW 20 MIN: CPT | Mod: S$GLB,,, | Performed by: NURSE PRACTITIONER

## 2022-08-19 PROCEDURE — 1159F PR MEDICATION LIST DOCUMENTED IN MEDICAL RECORD: ICD-10-PCS | Mod: CPTII,S$GLB,, | Performed by: NURSE PRACTITIONER

## 2022-08-19 PROCEDURE — 1160F PR REVIEW ALL MEDS BY PRESCRIBER/CLIN PHARMACIST DOCUMENTED: ICD-10-PCS | Mod: CPTII,S$GLB,, | Performed by: NURSE PRACTITIONER

## 2022-08-19 PROCEDURE — 3008F BODY MASS INDEX DOCD: CPT | Mod: CPTII,S$GLB,, | Performed by: NURSE PRACTITIONER

## 2022-08-19 PROCEDURE — 73060 X-RAY EXAM OF HUMERUS: CPT | Mod: 26,RT,, | Performed by: RADIOLOGY

## 2022-08-19 PROCEDURE — 73060 X-RAY EXAM OF HUMERUS: CPT | Mod: TC,PO,RT

## 2022-08-19 PROCEDURE — 1159F MED LIST DOCD IN RCRD: CPT | Mod: CPTII,S$GLB,, | Performed by: NURSE PRACTITIONER

## 2022-08-19 PROCEDURE — 99999 PR PBB SHADOW E&M-EST. PATIENT-LVL III: CPT | Mod: PBBFAC,,, | Performed by: NURSE PRACTITIONER

## 2022-08-19 RX ORDER — KETOROLAC TROMETHAMINE 10 MG/1
10 TABLET, FILM COATED ORAL EVERY 6 HOURS PRN
Qty: 20 TABLET | Refills: 0 | Status: SHIPPED | OUTPATIENT
Start: 2022-08-19 | End: 2022-08-24

## 2022-08-19 RX ORDER — METHOCARBAMOL 750 MG/1
750 TABLET, FILM COATED ORAL 4 TIMES DAILY PRN
Qty: 40 TABLET | Refills: 2 | Status: SHIPPED | OUTPATIENT
Start: 2022-08-19 | End: 2022-09-18

## 2022-08-19 NOTE — PROGRESS NOTES
Chief Complaint   Patient presents with    Right Upper Arm - Pain       HPI:    This is a 42 y.o. who presents today complaining of right arm pain with cramping for 1 years after unknown cause. 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 States during her PT twice a year, and defense tactics three times yearly, she has to do push ups etc and the right arm is locking up on her then. She's taken mobic, and muscle relaxer with not much relief. She has used biofreeze and it will relieve it enough to sleep but doesn't help with the right upper arm mechanics. Pain is aching and cramping.  When arm muscle spasms, she has to wiggle her fingers and move arm around r/'t numbness and tingling that the spasm is causing.         Past Medical History:   Diagnosis Date    Clotting disorder     DVT (deep venous thrombosis) 3/07    was felt to be due to ? Factor V Leiden    High risk pregnancy due to history of  labor 2015    Hyperthyroidism 2013    Iron deficiency anemia due to chronic blood loss 2015    Short cervix affecting pregnancy 3/3/2015      Past Surgical History:   Procedure Laterality Date    2 R knee sx; 1 L knee      BRONCHOSCOPY       SECTION  7/30/15    CHOLECYSTECTOMY      KNEE ARTHROSCOPY      KNEE SURGERY      x3    LAPAROSCOPIC CHOLECYSTECTOMY N/A 2021    Procedure: CHOLECYSTECTOMY, LAPAROSCOPIC;  Surgeon: Pascual Lu MD;  Location: [facility];  Service: General;  Laterality: N/A;    TUBAL LIGATION  7/30/15      No current outpatient medications on file prior to visit.     No current facility-administered medications on file prior to visit.      Review of patient's allergies indicates:   Allergen Reactions    Amoxicillin     Augmentin [amoxicillin-pot clavulanate] Swelling    Bactrim [sulfamethoxazole-trimethoprim] Itching and Nausea Only      Family  History not pertinent   Social History     Socioeconomic History    Marital status:     Number of children: 2   Tobacco Use    Smoking status: Current Every Day Smoker     Packs/day: 0.50     Years: 0.00     Pack years: 0.00     Types: Cigarettes    Smokeless tobacco: Former User     Quit date: 8/1/2021   Substance and Sexual Activity    Alcohol use: Yes     Alcohol/week: 0.0 standard drinks     Comment: rare    Drug use: No    Sexual activity: Yes     Partners: Male     Birth control/protection: None         Review of Systems:   Constitutional:  Denies fever or chills    Eyes:  Denies change in visual acuity    HENT:  Denies nasal congestion or sore throat    Respiratory:  Denies cough or shortness of breath    Cardiovascular:  Denies chest pain or edema    GI:  Denies abdominal pain, nausea, vomiting, bloody stools or diarrhea    :  Denies dysuria    Integument:  Denies rash    Neurologic:  Denies headache, focal weakness or sensory changes    Endocrine:  Denies polyuria or polydipsia    Lymphatic:  Denies swollen glands    Psychiatric:  Denies depression or anxiety       Physical Exam:    Constitutional:  Well developed, well nourished, no acute distress, non-toxic appearance    Integument:  Well hydrated, warm and dry.    Neurologic:  Alert & oriented x 3  Psychiatric:  Speech and behavior appropriate.     R Humerus  She was able to demonstrate and with extension and bearing weight to her RUE, she showed me what it does. tricep area starting spasms, and then moved to the front with spasms and started having to wiggle her arm/fingers to try and get it to stop.   She has bulging abnormality to the anterior bicep area of her right humerus that is not present on her left upper ext.     Ultimately she needs MRI of her right humerus          X-rays were performed today, personally reviewed by me and findings discussed with the patient.   2 views of the right humerus show mild ac arthritis but otherwise  no acute abnormalities.             Other injury of muscle, fascia and tendon of other parts of biceps, right arm, initial encounter  -     MRI Humerus Without Contrast Right; Future; Expected date: 08/19/2022    Right arm pain  -     MRI Humerus Without Contrast Right; Future; Expected date: 08/19/2022    Muscle spasm  -     MRI Humerus Without Contrast Right; Future; Expected date: 08/19/2022    Other orders  -     ketorolac (TORADOL) 10 mg tablet; Take 1 tablet (10 mg total) by mouth every 6 (six) hours as needed for Pain.  Dispense: 20 tablet; Refill: 0  -     methocarbamoL (ROBAXIN) 750 MG Tab; Take 1 tablet (750 mg total) by mouth 4 (four) times daily as needed.  Dispense: 40 tablet; Refill: 2        MRI of right humerus; questionable bicep rupture; need MRI to further determine plan of care.    Once completed, will call and review results with the patient.     Return to clinic as needed.

## 2022-08-30 ENCOUNTER — HOSPITAL ENCOUNTER (OUTPATIENT)
Dept: RADIOLOGY | Facility: HOSPITAL | Age: 42
Discharge: HOME | End: 2022-08-30
Attending: NURSE PRACTITIONER
Payer: COMMERCIAL

## 2022-08-30 ENCOUNTER — OFFICE VISIT (OUTPATIENT)
Dept: FAMILY MEDICINE | Facility: CLINIC | Age: 42
End: 2022-08-30
Payer: COMMERCIAL

## 2022-08-30 VITALS
TEMPERATURE: 98 F | WEIGHT: 212.94 LBS | HEIGHT: 62 IN | OXYGEN SATURATION: 98 % | DIASTOLIC BLOOD PRESSURE: 76 MMHG | HEART RATE: 75 BPM | BODY MASS INDEX: 39.19 KG/M2 | SYSTOLIC BLOOD PRESSURE: 128 MMHG

## 2022-08-30 DIAGNOSIS — E66.01 SEVERE OBESITY (BMI 35.0-39.9) WITH COMORBIDITY: ICD-10-CM

## 2022-08-30 DIAGNOSIS — G25.81 RLS (RESTLESS LEGS SYNDROME): ICD-10-CM

## 2022-08-30 DIAGNOSIS — S46.291A OTHER INJURY OF MUSCLE, FASCIA AND TENDON OF OTHER PARTS OF BICEPS, RIGHT ARM, INITIAL ENCOUNTER: ICD-10-CM

## 2022-08-30 DIAGNOSIS — M62.838 MUSCLE SPASM: ICD-10-CM

## 2022-08-30 DIAGNOSIS — M79.601 RIGHT ARM PAIN: ICD-10-CM

## 2022-08-30 DIAGNOSIS — Z00.00 ROUTINE PHYSICAL EXAMINATION: Primary | ICD-10-CM

## 2022-08-30 DIAGNOSIS — Z11.59 NEED FOR HEPATITIS C SCREENING TEST: ICD-10-CM

## 2022-08-30 PROCEDURE — 3008F PR BODY MASS INDEX (BMI) DOCUMENTED: ICD-10-PCS | Mod: CPTII,S$GLB,, | Performed by: INTERNAL MEDICINE

## 2022-08-30 PROCEDURE — 3074F SYST BP LT 130 MM HG: CPT | Mod: CPTII,S$GLB,, | Performed by: INTERNAL MEDICINE

## 2022-08-30 PROCEDURE — 73218 MRI UPPER EXTREMITY W/O DYE: CPT | Mod: TC,PO,RT

## 2022-08-30 PROCEDURE — 3078F DIAST BP <80 MM HG: CPT | Mod: CPTII,S$GLB,, | Performed by: INTERNAL MEDICINE

## 2022-08-30 PROCEDURE — 3008F BODY MASS INDEX DOCD: CPT | Mod: CPTII,S$GLB,, | Performed by: INTERNAL MEDICINE

## 2022-08-30 PROCEDURE — 73218 MRI UPPER EXTREMITY W/O DYE: CPT | Mod: 26,RT,, | Performed by: RADIOLOGY

## 2022-08-30 PROCEDURE — 99396 PREV VISIT EST AGE 40-64: CPT | Mod: S$GLB,,, | Performed by: INTERNAL MEDICINE

## 2022-08-30 PROCEDURE — 1159F PR MEDICATION LIST DOCUMENTED IN MEDICAL RECORD: ICD-10-PCS | Mod: CPTII,S$GLB,, | Performed by: INTERNAL MEDICINE

## 2022-08-30 PROCEDURE — 3074F PR MOST RECENT SYSTOLIC BLOOD PRESSURE < 130 MM HG: ICD-10-PCS | Mod: CPTII,S$GLB,, | Performed by: INTERNAL MEDICINE

## 2022-08-30 PROCEDURE — 99999 PR PBB SHADOW E&M-EST. PATIENT-LVL III: ICD-10-PCS | Mod: PBBFAC,,, | Performed by: INTERNAL MEDICINE

## 2022-08-30 PROCEDURE — 3078F PR MOST RECENT DIASTOLIC BLOOD PRESSURE < 80 MM HG: ICD-10-PCS | Mod: CPTII,S$GLB,, | Performed by: INTERNAL MEDICINE

## 2022-08-30 PROCEDURE — 73218 MRI HUMERUS WITHOUT CONTRAST RIGHT: ICD-10-PCS | Mod: 26,RT,, | Performed by: RADIOLOGY

## 2022-08-30 PROCEDURE — 99396 PR PREVENTIVE VISIT,EST,40-64: ICD-10-PCS | Mod: S$GLB,,, | Performed by: INTERNAL MEDICINE

## 2022-08-30 PROCEDURE — 1159F MED LIST DOCD IN RCRD: CPT | Mod: CPTII,S$GLB,, | Performed by: INTERNAL MEDICINE

## 2022-08-30 PROCEDURE — 99999 PR PBB SHADOW E&M-EST. PATIENT-LVL III: CPT | Mod: PBBFAC,,, | Performed by: INTERNAL MEDICINE

## 2022-08-30 RX ORDER — ROPINIROLE 0.5 MG/1
0.5 TABLET, FILM COATED ORAL NIGHTLY
Qty: 30 TABLET | Refills: 11 | Status: SHIPPED | OUTPATIENT
Start: 2022-08-30 | End: 2022-09-08

## 2022-08-30 NOTE — PROGRESS NOTES
Subjective:       Patient ID: Eugenie Laguerre is a 42 y.o. female.    Chief Complaint: Annual Exam    Here for routine health maintenance.  New patient to me.     Complains of need to move her legs constantly before bed.  Not sure if intense urge present, but can not keep them still.  History of iron deficiency in past.     Shoulder pain - improving with Robaxin and Toradol.  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   Review of Systems   Constitutional:  Negative for appetite change and fever.   HENT:  Negative for nosebleeds and trouble swallowing.    Eyes:  Negative for discharge and visual disturbance.   Respiratory:  Negative for choking and shortness of breath.    Cardiovascular:  Negative for chest pain and palpitations.   Gastrointestinal:  Negative for abdominal pain, nausea and vomiting.   Musculoskeletal:  Positive for arthralgias. Negative for joint swelling.   Skin:  Negative for rash and wound.   Neurological:  Negative for dizziness and syncope.   Psychiatric/Behavioral:  Negative for confusion and dysphoric mood.      Objective:      Vitals:    08/30/22 0800   BP: 128/76   Pulse: 75   Temp: 98.1 °F (36.7 °C)     Physical Exam  Vitals reviewed.   Eyes:      Conjunctiva/sclera: Conjunctivae normal.   Neck:      Thyroid: No thyromegaly.      Trachea: Trachea normal.   Cardiovascular:      Heart sounds: Normal heart sounds.      Comments: Edema negative  Pulmonary:      Effort: Pulmonary effort is normal.      Breath sounds: Normal breath sounds.   Abdominal:      Palpations: Abdomen is soft. There is no hepatomegaly.   Musculoskeletal:      Cervical back: Normal range of motion.      Comments: ROM normal bilateral  Strength normal bilateral   Skin:     General: Skin is warm and dry.   Neurological:      Cranial Nerves: No cranial nerve deficit.      Deep Tendon Reflexes: Reflexes are normal and symmetric.   Psychiatric:      Comments: Alert and Oriented          Assessment:       1.  Routine physical examination    2. Severe obesity (BMI 35.0-39.9) with comorbidity    3. RLS (restless legs syndrome)    4. Need for hepatitis C screening test          Plan:       Routine physical examination    Severe obesity (BMI 35.0-39.9) with comorbidity    RLS (restless legs syndrome)  -     Ferritin; Future; Expected date: 08/30/2022  -     Iron and TIBC; Future; Expected date: 08/30/2022  -     rOPINIRole (REQUIP) 0.5 MG tablet; Take 1 tablet (0.5 mg total) by mouth every evening.  Dispense: 30 tablet; Refill: 11    Need for hepatitis C screening test  -     Hepatitis C Antibody; Future; Expected date: 08/30/2022            Medication List with Changes/Refills   New Medications    ROPINIROLE (REQUIP) 0.5 MG TABLET    Take 1 tablet (0.5 mg total) by mouth every evening.   Current Medications    METHOCARBAMOL (ROBAXIN) 750 MG TAB    Take 1 tablet (750 mg total) by mouth 4 (four) times daily as needed.     Wellness reviewed   Continue current management and monitor.    Counseled on regular exercise, maintenance of a healthy weight, balanced diet rich in fruits/vegetables and lean protein, and avoidance of unhealthy habits like smoking and excessive alcohol intake.   Also, counseled on importance of being compliant with medication, health appointments, diet and exercise.     Follow up in about 1 year (around 8/30/2023), or if symptoms worsen or fail to improve. Ok to increase if emails me - Requip.  iron

## 2022-09-02 ENCOUNTER — PATIENT MESSAGE (OUTPATIENT)
Dept: ORTHOPEDICS | Facility: CLINIC | Age: 42
End: 2022-09-02
Payer: COMMERCIAL

## 2022-09-02 ENCOUNTER — TELEPHONE (OUTPATIENT)
Dept: ORTHOPEDICS | Facility: CLINIC | Age: 42
End: 2022-09-02
Payer: COMMERCIAL

## 2022-09-02 DIAGNOSIS — S46.811A TEAR OF RIGHT INFRASPINATUS TENDON, INITIAL ENCOUNTER: ICD-10-CM

## 2022-09-02 DIAGNOSIS — M75.101 TEAR OF RIGHT SUPRASPINATUS TENDON: Primary | ICD-10-CM

## 2022-09-02 NOTE — TELEPHONE ENCOUNTER
----- Message from GÓMEZ Segal sent at 9/2/2022  1:34 PM CDT -----  Regarding: MRI humerus- please call pt with results  MRI  showed 8 mm diameter at least high-grade partial-thickness and possibly full-thickness tear of the posterior fibers of the supraspinatus tendon/anterior fibers of the infraspinatus tendon.     Reviewed findings with Dr. Correia and md recommends she see Dr. Hammond.   Will place referral for her.   Thanks   Molly   ----- Message -----  From: Interface, Rad Results In  Sent: 8/31/2022   7:59 AM CDT  To: GÓMEZ Segal

## 2022-09-02 NOTE — TELEPHONE ENCOUNTER
Called and s/w pt. Advised pt of MRI results per Corazon Barnes NP previous message. Informed pt she was referred to  and the office should be contacting to schedule. Pt verbalized understanding.

## 2022-09-06 ENCOUNTER — PATIENT MESSAGE (OUTPATIENT)
Dept: FAMILY MEDICINE | Facility: CLINIC | Age: 42
End: 2022-09-06
Payer: COMMERCIAL

## 2022-09-06 NOTE — TELEPHONE ENCOUNTER
Patient thinks the medication may be causing, an allergic reaction. She will go to the ER for evaluation as our next available was in 2 days and she didn't want to wait.

## 2022-09-07 ENCOUNTER — PATIENT MESSAGE (OUTPATIENT)
Dept: FAMILY MEDICINE | Facility: CLINIC | Age: 42
End: 2022-09-07
Payer: COMMERCIAL

## 2022-09-08 ENCOUNTER — OFFICE VISIT (OUTPATIENT)
Dept: FAMILY MEDICINE | Facility: CLINIC | Age: 42
End: 2022-09-08
Payer: COMMERCIAL

## 2022-09-08 ENCOUNTER — PATIENT MESSAGE (OUTPATIENT)
Dept: ORTHOPEDICS | Facility: CLINIC | Age: 42
End: 2022-09-08
Payer: COMMERCIAL

## 2022-09-08 VITALS
HEIGHT: 62 IN | OXYGEN SATURATION: 97 % | DIASTOLIC BLOOD PRESSURE: 86 MMHG | BODY MASS INDEX: 39.6 KG/M2 | HEART RATE: 101 BPM | WEIGHT: 215.19 LBS | SYSTOLIC BLOOD PRESSURE: 120 MMHG

## 2022-09-08 DIAGNOSIS — G47.00 INSOMNIA, UNSPECIFIED TYPE: ICD-10-CM

## 2022-09-08 DIAGNOSIS — E66.01 SEVERE OBESITY (BMI 35.0-39.9) WITH COMORBIDITY: ICD-10-CM

## 2022-09-08 DIAGNOSIS — G25.81 RLS (RESTLESS LEGS SYNDROME): Primary | ICD-10-CM

## 2022-09-08 PROCEDURE — 1159F MED LIST DOCD IN RCRD: CPT | Mod: CPTII,S$GLB,, | Performed by: STUDENT IN AN ORGANIZED HEALTH CARE EDUCATION/TRAINING PROGRAM

## 2022-09-08 PROCEDURE — 99999 PR PBB SHADOW E&M-EST. PATIENT-LVL III: ICD-10-PCS | Mod: PBBFAC,,, | Performed by: STUDENT IN AN ORGANIZED HEALTH CARE EDUCATION/TRAINING PROGRAM

## 2022-09-08 PROCEDURE — 99999 PR PBB SHADOW E&M-EST. PATIENT-LVL III: CPT | Mod: PBBFAC,,, | Performed by: STUDENT IN AN ORGANIZED HEALTH CARE EDUCATION/TRAINING PROGRAM

## 2022-09-08 PROCEDURE — 3008F PR BODY MASS INDEX (BMI) DOCUMENTED: ICD-10-PCS | Mod: CPTII,S$GLB,, | Performed by: STUDENT IN AN ORGANIZED HEALTH CARE EDUCATION/TRAINING PROGRAM

## 2022-09-08 PROCEDURE — 99213 PR OFFICE/OUTPT VISIT, EST, LEVL III, 20-29 MIN: ICD-10-PCS | Mod: 25,S$GLB,, | Performed by: STUDENT IN AN ORGANIZED HEALTH CARE EDUCATION/TRAINING PROGRAM

## 2022-09-08 PROCEDURE — 3074F SYST BP LT 130 MM HG: CPT | Mod: CPTII,S$GLB,, | Performed by: STUDENT IN AN ORGANIZED HEALTH CARE EDUCATION/TRAINING PROGRAM

## 2022-09-08 PROCEDURE — 3079F PR MOST RECENT DIASTOLIC BLOOD PRESSURE 80-89 MM HG: ICD-10-PCS | Mod: CPTII,S$GLB,, | Performed by: STUDENT IN AN ORGANIZED HEALTH CARE EDUCATION/TRAINING PROGRAM

## 2022-09-08 PROCEDURE — 1160F PR REVIEW ALL MEDS BY PRESCRIBER/CLIN PHARMACIST DOCUMENTED: ICD-10-PCS | Mod: CPTII,S$GLB,, | Performed by: STUDENT IN AN ORGANIZED HEALTH CARE EDUCATION/TRAINING PROGRAM

## 2022-09-08 PROCEDURE — 90471 IMMUNIZATION ADMIN: CPT | Mod: S$GLB,,, | Performed by: STUDENT IN AN ORGANIZED HEALTH CARE EDUCATION/TRAINING PROGRAM

## 2022-09-08 PROCEDURE — 90686 FLU VACCINE (QUAD) GREATER THAN OR EQUAL TO 3YO PRESERVATIVE FREE IM: ICD-10-PCS | Mod: S$GLB,,, | Performed by: STUDENT IN AN ORGANIZED HEALTH CARE EDUCATION/TRAINING PROGRAM

## 2022-09-08 PROCEDURE — 3074F PR MOST RECENT SYSTOLIC BLOOD PRESSURE < 130 MM HG: ICD-10-PCS | Mod: CPTII,S$GLB,, | Performed by: STUDENT IN AN ORGANIZED HEALTH CARE EDUCATION/TRAINING PROGRAM

## 2022-09-08 PROCEDURE — 1159F PR MEDICATION LIST DOCUMENTED IN MEDICAL RECORD: ICD-10-PCS | Mod: CPTII,S$GLB,, | Performed by: STUDENT IN AN ORGANIZED HEALTH CARE EDUCATION/TRAINING PROGRAM

## 2022-09-08 PROCEDURE — 99213 OFFICE O/P EST LOW 20 MIN: CPT | Mod: 25,S$GLB,, | Performed by: STUDENT IN AN ORGANIZED HEALTH CARE EDUCATION/TRAINING PROGRAM

## 2022-09-08 PROCEDURE — 90471 FLU VACCINE (QUAD) GREATER THAN OR EQUAL TO 3YO PRESERVATIVE FREE IM: ICD-10-PCS | Mod: S$GLB,,, | Performed by: STUDENT IN AN ORGANIZED HEALTH CARE EDUCATION/TRAINING PROGRAM

## 2022-09-08 PROCEDURE — 90686 IIV4 VACC NO PRSV 0.5 ML IM: CPT | Mod: S$GLB,,, | Performed by: STUDENT IN AN ORGANIZED HEALTH CARE EDUCATION/TRAINING PROGRAM

## 2022-09-08 PROCEDURE — 1160F RVW MEDS BY RX/DR IN RCRD: CPT | Mod: CPTII,S$GLB,, | Performed by: STUDENT IN AN ORGANIZED HEALTH CARE EDUCATION/TRAINING PROGRAM

## 2022-09-08 PROCEDURE — 3008F BODY MASS INDEX DOCD: CPT | Mod: CPTII,S$GLB,, | Performed by: STUDENT IN AN ORGANIZED HEALTH CARE EDUCATION/TRAINING PROGRAM

## 2022-09-08 PROCEDURE — 3079F DIAST BP 80-89 MM HG: CPT | Mod: CPTII,S$GLB,, | Performed by: STUDENT IN AN ORGANIZED HEALTH CARE EDUCATION/TRAINING PROGRAM

## 2022-09-08 RX ORDER — TOPIRAMATE 50 MG/1
TABLET, FILM COATED ORAL
Qty: 30 TABLET | Refills: 1 | Status: SHIPPED | OUTPATIENT
Start: 2022-09-08 | End: 2022-09-22 | Stop reason: SDUPTHER

## 2022-09-08 RX ORDER — KETOROLAC TROMETHAMINE 10 MG/1
10 TABLET, FILM COATED ORAL EVERY 6 HOURS PRN
COMMUNITY
End: 2022-09-22

## 2022-09-08 NOTE — PROGRESS NOTES
"Subjective:       Patient ID: Eugenie Laguerre is a 42 y.o. female.    Chief Complaint: Leg Pain    Reports involuntary leg movements and difficulty falling asleep, gradual onset, was prescribed ropinirole, developed dry mouth, sore throat, rash, seen in ED, was told to discontinue medication  Now reports allergic symptoms have resolved, though restless movements and difficulty falling asleep persist, incompletely relieved by robaxin and PO toradol, otherwise denies adverse effects from medications    Leg Pain   Pertinent negatives include no tingling.     Review of Systems   Constitutional:  Negative for diaphoresis, fever, malaise/fatigue and weight loss.   HENT:  Negative for congestion and sore throat.    Eyes:  Negative for blurred vision.   Respiratory:  Negative for cough, shortness of breath and wheezing.    Cardiovascular:  Negative for chest pain and palpitations.        Denies dyspnea on exertion   Gastrointestinal:  Negative for abdominal pain, constipation, diarrhea, heartburn, nausea and vomiting.   Genitourinary:  Negative for frequency and urgency.   Musculoskeletal:  Negative for back pain and neck pain.   Neurological:  Negative for dizziness, tingling, tremors, speech change, weakness and headaches.        Denies neuropathic pain, Denies focal neurological changes   Endo/Heme/Allergies:  Negative for polydipsia.        Denies hot or cold intolerance, Denies other hyper- or hypo-glycemia symptoms   Psychiatric/Behavioral:  Negative for depression and suicidal ideas. The patient is not nervous/anxious.       Objective:      Vitals:    09/08/22 1401   BP: 120/86   Pulse: 101   SpO2: 97%   Weight: 97.6 kg (215 lb 2.7 oz)   Height: 5' 2" (1.575 m)      Physical Exam  Constitutional:       General: She is not in acute distress.  HENT:      Right Ear: Tympanic membrane and ear canal normal.      Left Ear: Tympanic membrane and ear canal normal.      Ears:      Comments: with good landmarks and no fluid     " Nose: No congestion or rhinorrhea.      Comments: No sinus tenderness     Mouth/Throat:      Mouth: Mucous membranes are moist.      Pharynx: No oropharyngeal exudate or posterior oropharyngeal erythema.   Eyes:      General: No scleral icterus.     Extraocular Movements: Extraocular movements intact.      Conjunctiva/sclera: Conjunctivae normal.      Pupils: Pupils are equal, round, and reactive to light.   Cardiovascular:      Rate and Rhythm: Normal rate and regular rhythm.      Pulses: Normal pulses.   Pulmonary:      Comments: Respirations symmetric and not labored, Lungs are clear to auscultation.  Abdominal:      General: Bowel sounds are normal. There is no distension.      Palpations: Abdomen is soft.      Tenderness: There is no abdominal tenderness. There is no right CVA tenderness or left CVA tenderness.   Musculoskeletal:         General: Normal range of motion.      Cervical back: Neck supple. No tenderness.      Right lower leg: No edema.      Left lower leg: No edema.   Lymphadenopathy:      Cervical: No cervical adenopathy.   Skin:     General: Skin is warm and dry.      Capillary Refill: Capillary refill takes less than 2 seconds.      Findings: No lesion or rash.   Neurological:      General: No focal deficit present.      Mental Status: She is alert and oriented to person, place, and time.      Comments: DTR absent right patella, 2+ on left   Psychiatric:         Mood and Affect: Mood normal.         Behavior: Behavior normal.        Assessment:       1. RLS (restless legs syndrome)    2. Insomnia, unspecified type    3. Severe obesity (BMI 35.0-39.9) with comorbidity          Plan:       RLS (restless legs syndrome)  Allergic reaction to ropinirole  Previously tolerated topamax well in setting of migraines, now resolved  Given insomnia, RLS, desired weight loss, BMI 39, will start topamax and monitor for improvement in sleep, appetite, will f/u in 2 weeks by video visit and increase as  tolerated    Insomnia, unspecified type  As#1  Severe obesity (BMI 35.0-39.9) with comorbidity  As#1, will start carbohydrate controlled diet    Other orders  -     topiramate (TOPAMAX) 50 MG tablet; Take 1/2 tablet every evening x 1 week, then increase to 1 tablet every evening if well tolerated  Dispense: 30 tablet; Refill: 1

## 2022-09-08 NOTE — LETTER
September 8, 2022    Eugenie Laguerre  [address] [place] [address]             [facility]  [address]  [facility] [address]  Phone: [phone]  Fax: [phone]   September 8, 2022     Patient: Eugenie Laguerre   YOB: 1980   Date of Visit: 9/8/2022       To Whom it May Concern:    Eugenie Laguerre was seen in my clinic on 9/8/2022. She may return with no restrictions on 09/12/22.    Please excuse her from any classes or work missed.    If you have any questions or concerns, please don't hesitate to call.    Sincerely,         Juan Linn MD

## 2022-09-22 ENCOUNTER — OFFICE VISIT (OUTPATIENT)
Dept: FAMILY MEDICINE | Facility: CLINIC | Age: 42
End: 2022-09-22
Payer: COMMERCIAL

## 2022-09-22 DIAGNOSIS — G47.00 INSOMNIA, UNSPECIFIED TYPE: ICD-10-CM

## 2022-09-22 DIAGNOSIS — G25.81 RLS (RESTLESS LEGS SYNDROME): ICD-10-CM

## 2022-09-22 DIAGNOSIS — E66.01 SEVERE OBESITY (BMI 35.0-39.9) WITH COMORBIDITY: ICD-10-CM

## 2022-09-22 PROBLEM — Z72.0 TOBACCO ABUSE: Status: ACTIVE | Noted: 2019-09-30

## 2022-09-22 PROBLEM — G43.009 MIGRAINE WITHOUT AURA: Status: ACTIVE | Noted: 2020-08-11

## 2022-09-22 PROBLEM — G45.9 TIA (TRANSIENT ISCHEMIC ATTACK): Status: ACTIVE | Noted: 2019-09-30

## 2022-09-22 PROCEDURE — 99213 OFFICE O/P EST LOW 20 MIN: CPT | Mod: 95,,, | Performed by: STUDENT IN AN ORGANIZED HEALTH CARE EDUCATION/TRAINING PROGRAM

## 2022-09-22 PROCEDURE — 1160F PR REVIEW ALL MEDS BY PRESCRIBER/CLIN PHARMACIST DOCUMENTED: ICD-10-PCS | Mod: CPTII,95,, | Performed by: STUDENT IN AN ORGANIZED HEALTH CARE EDUCATION/TRAINING PROGRAM

## 2022-09-22 PROCEDURE — 99213 PR OFFICE/OUTPT VISIT, EST, LEVL III, 20-29 MIN: ICD-10-PCS | Mod: 95,,, | Performed by: STUDENT IN AN ORGANIZED HEALTH CARE EDUCATION/TRAINING PROGRAM

## 2022-09-22 PROCEDURE — 1159F MED LIST DOCD IN RCRD: CPT | Mod: CPTII,95,, | Performed by: STUDENT IN AN ORGANIZED HEALTH CARE EDUCATION/TRAINING PROGRAM

## 2022-09-22 PROCEDURE — 1160F RVW MEDS BY RX/DR IN RCRD: CPT | Mod: CPTII,95,, | Performed by: STUDENT IN AN ORGANIZED HEALTH CARE EDUCATION/TRAINING PROGRAM

## 2022-09-22 PROCEDURE — 1159F PR MEDICATION LIST DOCUMENTED IN MEDICAL RECORD: ICD-10-PCS | Mod: CPTII,95,, | Performed by: STUDENT IN AN ORGANIZED HEALTH CARE EDUCATION/TRAINING PROGRAM

## 2022-09-22 RX ORDER — TOPIRAMATE 50 MG/1
75 TABLET, FILM COATED ORAL NIGHTLY
Qty: 30 TABLET | Refills: 11 | Status: SHIPPED | OUTPATIENT
Start: 2022-09-22 | End: 2022-09-22

## 2022-09-22 RX ORDER — TOPIRAMATE 50 MG/1
75 TABLET, FILM COATED ORAL NIGHTLY
Qty: 40 TABLET | Refills: 11 | Status: ON HOLD | OUTPATIENT
Start: 2022-09-22 | End: 2023-07-14 | Stop reason: HOSPADM

## 2022-09-22 NOTE — PROGRESS NOTES
Subjective:       Patient ID: Eugenie Laguerre is a 42 y.o. female.    Chief Complaint: No chief complaint on file.    The patient location is: [place]   The chief complaint leading to consultation is: sleep, RLS, appetite    Visit type: audiovisual    Face to Face time with patient: 5 minutes  10 minutes of total time spent on the encounter, which includes face to face time and non-face to face time preparing to see the patient (eg, review of tests), Obtaining and/or reviewing separately obtained history, Documenting clinical information in the electronic or other health record, Independently interpreting results (not separately reported) and communicating results to the patient/family/caregiver, or Care coordination (not separately reported).         Each patient to whom he or she provides medical services by telemedicine is:  (1) informed of the relationship between the physician and patient and the respective role of any other health care provider with respect to management of the patient; and (2) notified that he or she may decline to receive medical services by telemedicine and may withdraw from such care at any time.    Notes:     Reports involuntary leg movements and difficulty falling asleep, improved on current regimen, denies adverse effects from medication, has not noticed change in appetite    Review of Systems   Neurological:  Negative for dizziness, tingling and headaches.   Psychiatric/Behavioral:  Negative for depression. The patient is not nervous/anxious.         Daytime somnolence      Objective:      There were no vitals filed for this visit.   Physical Exam  Constitutional:       General: She is not in acute distress.  Eyes:      General: No scleral icterus.     Conjunctiva/sclera: Conjunctivae normal.   Neck:      Comments: thyroid not enlarged, No cervical lymphadenopathy  Cardiovascular:      Comments: Good skin turgor, No edema  Pulmonary:      Comments: Respirations symmetric and not  labored  Musculoskeletal:         General: Normal range of motion.      Cervical back: Neck supple.      Comments: Normal strength   Skin:     General: Skin is warm and dry.      Findings: No lesion or rash.   Neurological:      General: No focal deficit present.      Mental Status: She is alert and oriented to person, place, and time.   Psychiatric:         Mood and Affect: Mood normal.      Comments: Cooperative, Appropriate affect        Assessment:       1. RLS (restless legs syndrome)    2. Insomnia, unspecified type    3. Severe obesity (BMI 35.0-39.9) with comorbidity          Plan:       RLS (restless legs syndrome)  -     topiramate (TOPAMAX) 50 MG tablet; Take 1.5 tablets (75 mg total) by mouth every evening.   Improved, will continue to increase topamax as tolerated, monitor for improvement in RLS, sleep, and appetite    Insomnia, unspecified type  -     topiramate (TOPAMAX) 50 MG tablet; Take 1.5 tablets (75 mg total) by mouth every evening.   As#1    Severe obesity (BMI 35.0-39.9) with comorbidity  -     topiramate (TOPAMAX) 50 MG tablet; Take 1.5 tablets (75 mg total) by mouth every evening.   As#1    Risks, benefits, alternatives discussed with patient, asked if there were any questions, patient said no  Precautions, counseling and education provided, patient verbalized understanding

## 2022-09-27 ENCOUNTER — PATIENT MESSAGE (OUTPATIENT)
Dept: OBSTETRICS AND GYNECOLOGY | Facility: CLINIC | Age: 42
End: 2022-09-27
Payer: COMMERCIAL

## 2022-10-18 PROBLEM — M75.101 ROTATOR CUFF SYNDROME, RIGHT: Status: ACTIVE | Noted: 2022-10-18

## 2022-12-26 PROBLEM — G45.9 TIA (TRANSIENT ISCHEMIC ATTACK): Status: RESOLVED | Noted: 2019-09-30 | Resolved: 2022-12-26

## 2023-01-03 ENCOUNTER — HOSPITAL ENCOUNTER (OUTPATIENT)
Dept: RADIOLOGY | Facility: HOSPITAL | Age: 43
Discharge: HOME | End: 2023-01-03
Attending: INTERNAL MEDICINE
Payer: COMMERCIAL

## 2023-01-03 ENCOUNTER — PATIENT MESSAGE (OUTPATIENT)
Dept: FAMILY MEDICINE | Facility: CLINIC | Age: 43
End: 2023-01-03
Payer: COMMERCIAL

## 2023-01-03 ENCOUNTER — OFFICE VISIT (OUTPATIENT)
Dept: FAMILY MEDICINE | Facility: CLINIC | Age: 43
End: 2023-01-03
Payer: COMMERCIAL

## 2023-01-03 VITALS
OXYGEN SATURATION: 97 % | HEIGHT: 62 IN | SYSTOLIC BLOOD PRESSURE: 110 MMHG | DIASTOLIC BLOOD PRESSURE: 78 MMHG | WEIGHT: 214.75 LBS | TEMPERATURE: 98 F | BODY MASS INDEX: 39.52 KG/M2 | HEART RATE: 79 BPM

## 2023-01-03 DIAGNOSIS — R10.32 LEFT LOWER QUADRANT ABDOMINAL PAIN: ICD-10-CM

## 2023-01-03 DIAGNOSIS — N12 PYELONEPHRITIS: ICD-10-CM

## 2023-01-03 DIAGNOSIS — N39.0 URINARY TRACT INFECTION WITHOUT HEMATURIA, SITE UNSPECIFIED: Primary | ICD-10-CM

## 2023-01-03 DIAGNOSIS — R10.9 LEFT FLANK PAIN: ICD-10-CM

## 2023-01-03 LAB
BACTERIA #/AREA URNS HPF: ABNORMAL /HPF
BILIRUB SERPL-MCNC: NORMAL MG/DL
BILIRUB UR QL STRIP: NEGATIVE
BLOOD URINE, POC: NORMAL
CLARITY UR: ABNORMAL
COLOR UR: YELLOW
COLOR, POC UA: YELLOW
GLUCOSE UR QL STRIP: NEGATIVE
GLUCOSE UR QL STRIP: NORMAL
HGB UR QL STRIP: ABNORMAL
KETONES UR QL STRIP: NEGATIVE
KETONES UR QL STRIP: NORMAL
LEUKOCYTE ESTERASE UR QL STRIP: ABNORMAL
LEUKOCYTE ESTERASE URINE, POC: NORMAL
MICROSCOPIC COMMENT: ABNORMAL
NITRITE UR QL STRIP: NEGATIVE
NITRITE, POC UA: NORMAL
PH UR STRIP: 6 [PH] (ref 5–8)
PH, POC UA: 5.5
PROT UR QL STRIP: NEGATIVE
PROTEIN, POC: NORMAL
RBC #/AREA URNS HPF: 2 /HPF (ref 0–4)
SP GR UR STRIP: 1.02 (ref 1–1.03)
SPECIFIC GRAVITY, POC UA: 1.02
SQUAMOUS #/AREA URNS HPF: 2 /HPF
URN SPEC COLLECT METH UR: ABNORMAL
UROBILINOGEN, POC UA: 0.2
WBC #/AREA URNS HPF: 5 /HPF (ref 0–5)

## 2023-01-03 PROCEDURE — 81000 URINALYSIS NONAUTO W/SCOPE: CPT | Mod: PO | Performed by: INTERNAL MEDICINE

## 2023-01-03 PROCEDURE — 3074F PR MOST RECENT SYSTOLIC BLOOD PRESSURE < 130 MM HG: ICD-10-PCS | Mod: CPTII,S$GLB,, | Performed by: INTERNAL MEDICINE

## 2023-01-03 PROCEDURE — 3008F PR BODY MASS INDEX (BMI) DOCUMENTED: ICD-10-PCS | Mod: CPTII,S$GLB,, | Performed by: INTERNAL MEDICINE

## 2023-01-03 PROCEDURE — 74176 CT RENAL STONE STUDY ABD PELVIS WO: ICD-10-PCS | Mod: 26,,, | Performed by: RADIOLOGY

## 2023-01-03 PROCEDURE — 3078F DIAST BP <80 MM HG: CPT | Mod: CPTII,S$GLB,, | Performed by: INTERNAL MEDICINE

## 2023-01-03 PROCEDURE — 1159F PR MEDICATION LIST DOCUMENTED IN MEDICAL RECORD: ICD-10-PCS | Mod: CPTII,S$GLB,, | Performed by: INTERNAL MEDICINE

## 2023-01-03 PROCEDURE — 74176 CT ABD & PELVIS W/O CONTRAST: CPT | Mod: 26,,, | Performed by: RADIOLOGY

## 2023-01-03 PROCEDURE — 74176 CT ABD & PELVIS W/O CONTRAST: CPT | Mod: TC,PO

## 2023-01-03 PROCEDURE — 1160F RVW MEDS BY RX/DR IN RCRD: CPT | Mod: CPTII,S$GLB,, | Performed by: INTERNAL MEDICINE

## 2023-01-03 PROCEDURE — 99999 PR PBB SHADOW E&M-EST. PATIENT-LVL III: CPT | Mod: PBBFAC,,, | Performed by: INTERNAL MEDICINE

## 2023-01-03 PROCEDURE — 99215 OFFICE O/P EST HI 40 MIN: CPT | Mod: S$GLB,,, | Performed by: INTERNAL MEDICINE

## 2023-01-03 PROCEDURE — 81001 POCT URINALYSIS, DIPSTICK OR TABLET REAGENT, AUTOMATED, WITH MICROSCOP: ICD-10-PCS | Mod: S$GLB,,, | Performed by: INTERNAL MEDICINE

## 2023-01-03 PROCEDURE — 3078F PR MOST RECENT DIASTOLIC BLOOD PRESSURE < 80 MM HG: ICD-10-PCS | Mod: CPTII,S$GLB,, | Performed by: INTERNAL MEDICINE

## 2023-01-03 PROCEDURE — 3074F SYST BP LT 130 MM HG: CPT | Mod: CPTII,S$GLB,, | Performed by: INTERNAL MEDICINE

## 2023-01-03 PROCEDURE — 1159F MED LIST DOCD IN RCRD: CPT | Mod: CPTII,S$GLB,, | Performed by: INTERNAL MEDICINE

## 2023-01-03 PROCEDURE — 81001 URINALYSIS AUTO W/SCOPE: CPT | Mod: S$GLB,,, | Performed by: INTERNAL MEDICINE

## 2023-01-03 PROCEDURE — 1160F PR REVIEW ALL MEDS BY PRESCRIBER/CLIN PHARMACIST DOCUMENTED: ICD-10-PCS | Mod: CPTII,S$GLB,, | Performed by: INTERNAL MEDICINE

## 2023-01-03 PROCEDURE — 3008F BODY MASS INDEX DOCD: CPT | Mod: CPTII,S$GLB,, | Performed by: INTERNAL MEDICINE

## 2023-01-03 PROCEDURE — 99215 PR OFFICE/OUTPT VISIT, EST, LEVL V, 40-54 MIN: ICD-10-PCS | Mod: S$GLB,,, | Performed by: INTERNAL MEDICINE

## 2023-01-03 PROCEDURE — 99999 PR PBB SHADOW E&M-EST. PATIENT-LVL III: ICD-10-PCS | Mod: PBBFAC,,, | Performed by: INTERNAL MEDICINE

## 2023-01-03 RX ORDER — HYDROCODONE BITARTRATE AND ACETAMINOPHEN 7.5; 325 MG/1; MG/1
1 TABLET ORAL EVERY 4 HOURS PRN
Qty: 20 TABLET | Refills: 0 | Status: SHIPPED | OUTPATIENT
Start: 2023-01-03 | End: 2023-01-23

## 2023-01-03 RX ORDER — ONDANSETRON 4 MG/1
4 TABLET, FILM COATED ORAL EVERY 6 HOURS PRN
Qty: 20 TABLET | Refills: 0 | Status: SHIPPED | OUTPATIENT
Start: 2023-01-03 | End: 2023-01-23 | Stop reason: SDUPTHER

## 2023-01-03 RX ORDER — CIPROFLOXACIN 500 MG/1
500 TABLET ORAL 2 TIMES DAILY
Qty: 14 TABLET | Refills: 0 | Status: SHIPPED | OUTPATIENT
Start: 2023-01-03 | End: 2023-01-10

## 2023-01-03 NOTE — PROGRESS NOTES
Subjective:       Patient ID: Eugenie Laguerre is a 42 y.o. female.    Chief Complaint: Urinary Tract Infection    Complains of 2 days of severe left flank/ low back pain for 2 days.  Intermittent and worse after urinates.  No dysuria.  + foul smelling urine.  + urinary tenesmus.   + chills, + nausea.  No fever.    Review of Systems   Constitutional:  Positive for chills. Negative for appetite change and fever.   HENT:  Negative for nosebleeds and trouble swallowing.    Eyes:  Negative for discharge and visual disturbance.   Respiratory:  Negative for choking and shortness of breath.    Cardiovascular:  Negative for chest pain and palpitations.   Gastrointestinal:  Positive for abdominal pain. Negative for nausea and vomiting.   Musculoskeletal:  Positive for back pain. Negative for arthralgias and joint swelling.   Skin:  Negative for rash and wound.   Neurological:  Negative for dizziness and syncope.   Psychiatric/Behavioral:  Negative for confusion and dysphoric mood.      Objective:      Vitals:    01/03/23 0926   BP: 110/78   Pulse: 79   Temp: 98.2 °F (36.8 °C)     Physical Exam  Abdominal:      General: Bowel sounds are normal.      Palpations: Abdomen is soft.      Tenderness: There is abdominal tenderness. There is left CVA tenderness.               POCT UA:  + small blood, + trace leukocytes   Assessment:       1. Urinary tract infection without hematuria, site unspecified    2. Left flank pain    3. Left lower quadrant abdominal pain    4. Pyelonephritis          Plan:       Urinary tract infection without hematuria, site unspecified  -     POCT urinalysis, dipstick or tablet reag  -     Urinalysis, Reflex to Urine Culture Urine, Clean Catch  -     ciprofloxacin HCl (CIPRO) 500 MG tablet; Take 1 tablet (500 mg total) by mouth 2 (two) times daily. for 7 days  Dispense: 14 tablet; Refill: 0  -     ondansetron (ZOFRAN) 4 MG tablet; Take 1 tablet (4 mg total) by mouth every 6 (six) hours as needed for Nausea.   Dispense: 20 tablet; Refill: 0    Left flank pain  -     ciprofloxacin HCl (CIPRO) 500 MG tablet; Take 1 tablet (500 mg total) by mouth 2 (two) times daily. for 7 days  Dispense: 14 tablet; Refill: 0  -     CT Renal Stone Study ABD Pelvis WO; Future; Expected date: 01/03/2023  -     HYDROcodone-acetaminophen (NORCO) 7.5-325 mg per tablet; Take 1 tablet by mouth every 4 (four) hours as needed for Pain.  Dispense: 20 tablet; Refill: 0  -     ondansetron (ZOFRAN) 4 MG tablet; Take 1 tablet (4 mg total) by mouth every 6 (six) hours as needed for Nausea.  Dispense: 20 tablet; Refill: 0    Left lower quadrant abdominal pain  -     ciprofloxacin HCl (CIPRO) 500 MG tablet; Take 1 tablet (500 mg total) by mouth 2 (two) times daily. for 7 days  Dispense: 14 tablet; Refill: 0  -     CT Renal Stone Study ABD Pelvis WO; Future; Expected date: 01/03/2023  -     ondansetron (ZOFRAN) 4 MG tablet; Take 1 tablet (4 mg total) by mouth every 6 (six) hours as needed for Nausea.  Dispense: 20 tablet; Refill: 0    Pyelonephritis  -     ondansetron (ZOFRAN) 4 MG tablet; Take 1 tablet (4 mg total) by mouth every 6 (six) hours as needed for Nausea.  Dispense: 20 tablet; Refill: 0            Medication List with Changes/Refills   New Medications    CIPROFLOXACIN HCL (CIPRO) 500 MG TABLET    Take 1 tablet (500 mg total) by mouth 2 (two) times daily. for 7 days    HYDROCODONE-ACETAMINOPHEN (NORCO) 7.5-325 MG PER TABLET    Take 1 tablet by mouth every 4 (four) hours as needed for Pain.    ONDANSETRON (ZOFRAN) 4 MG TABLET    Take 1 tablet (4 mg total) by mouth every 6 (six) hours as needed for Nausea.   Current Medications    TOPIRAMATE (TOPAMAX) 50 MG TABLET    Take 1.5 tablets (75 mg total) by mouth every evening.     DDX: pylenophritis vs kidney stone   Contact radiology for CT today  If symptoms worsen, please go to Emergency Room.        Follow up if symptoms worsen or fail to improve.    Total time spent on patient's needs today in  preparing for the visit, the actual visit including counseling, managing the patient's needs and electronic record documentation was more than 40 minutes

## 2023-01-16 ENCOUNTER — PATIENT MESSAGE (OUTPATIENT)
Dept: FAMILY MEDICINE | Facility: CLINIC | Age: 43
End: 2023-01-16
Payer: COMMERCIAL

## 2023-01-20 ENCOUNTER — PATIENT MESSAGE (OUTPATIENT)
Dept: FAMILY MEDICINE | Facility: CLINIC | Age: 43
End: 2023-01-20
Payer: COMMERCIAL

## 2023-01-20 ENCOUNTER — PATIENT MESSAGE (OUTPATIENT)
Dept: OBSTETRICS AND GYNECOLOGY | Facility: CLINIC | Age: 43
End: 2023-01-20
Payer: COMMERCIAL

## 2023-01-23 ENCOUNTER — LAB VISIT (OUTPATIENT)
Dept: LAB | Facility: HOSPITAL | Age: 43
End: 2023-01-23
Payer: COMMERCIAL

## 2023-01-23 ENCOUNTER — OFFICE VISIT (OUTPATIENT)
Dept: FAMILY MEDICINE | Facility: CLINIC | Age: 43
End: 2023-01-23
Payer: COMMERCIAL

## 2023-01-23 VITALS
HEIGHT: 62 IN | OXYGEN SATURATION: 98 % | DIASTOLIC BLOOD PRESSURE: 60 MMHG | SYSTOLIC BLOOD PRESSURE: 100 MMHG | BODY MASS INDEX: 39.38 KG/M2 | HEART RATE: 76 BPM | WEIGHT: 214 LBS | TEMPERATURE: 98 F

## 2023-01-23 DIAGNOSIS — F41.9 ANXIETY: Primary | ICD-10-CM

## 2023-01-23 DIAGNOSIS — R11.0 NAUSEA: ICD-10-CM

## 2023-01-23 LAB
B-HCG UR QL: NEGATIVE
BACTERIA #/AREA URNS HPF: ABNORMAL /HPF
BILIRUB UR QL STRIP: NEGATIVE
CLARITY UR: CLEAR
COLOR UR: YELLOW
GLUCOSE UR QL STRIP: NEGATIVE
HGB UR QL STRIP: ABNORMAL
KETONES UR QL STRIP: NEGATIVE
LEUKOCYTE ESTERASE UR QL STRIP: NEGATIVE
MICROSCOPIC COMMENT: ABNORMAL
NITRITE UR QL STRIP: NEGATIVE
PH UR STRIP: 6 [PH] (ref 5–8)
PROT UR QL STRIP: NEGATIVE
RBC #/AREA URNS HPF: 3 /HPF (ref 0–4)
SP GR UR STRIP: >=1.03 (ref 1–1.03)
SQUAMOUS #/AREA URNS HPF: 2 /HPF
URN SPEC COLLECT METH UR: ABNORMAL
WBC #/AREA URNS HPF: 1 /HPF (ref 0–5)

## 2023-01-23 PROCEDURE — 3008F PR BODY MASS INDEX (BMI) DOCUMENTED: ICD-10-PCS | Mod: CPTII,S$GLB,, | Performed by: NURSE PRACTITIONER

## 2023-01-23 PROCEDURE — 81025 URINE PREGNANCY TEST: CPT | Mod: PO | Performed by: NURSE PRACTITIONER

## 2023-01-23 PROCEDURE — 99214 PR OFFICE/OUTPT VISIT, EST, LEVL IV, 30-39 MIN: ICD-10-PCS | Mod: S$GLB,,, | Performed by: NURSE PRACTITIONER

## 2023-01-23 PROCEDURE — 3078F DIAST BP <80 MM HG: CPT | Mod: CPTII,S$GLB,, | Performed by: NURSE PRACTITIONER

## 2023-01-23 PROCEDURE — 99999 PR PBB SHADOW E&M-EST. PATIENT-LVL III: CPT | Mod: PBBFAC,,, | Performed by: NURSE PRACTITIONER

## 2023-01-23 PROCEDURE — 3074F PR MOST RECENT SYSTOLIC BLOOD PRESSURE < 130 MM HG: ICD-10-PCS | Mod: CPTII,S$GLB,, | Performed by: NURSE PRACTITIONER

## 2023-01-23 PROCEDURE — 3074F SYST BP LT 130 MM HG: CPT | Mod: CPTII,S$GLB,, | Performed by: NURSE PRACTITIONER

## 2023-01-23 PROCEDURE — 99214 OFFICE O/P EST MOD 30 MIN: CPT | Mod: S$GLB,,, | Performed by: NURSE PRACTITIONER

## 2023-01-23 PROCEDURE — 3008F BODY MASS INDEX DOCD: CPT | Mod: CPTII,S$GLB,, | Performed by: NURSE PRACTITIONER

## 2023-01-23 PROCEDURE — 1159F MED LIST DOCD IN RCRD: CPT | Mod: CPTII,S$GLB,, | Performed by: NURSE PRACTITIONER

## 2023-01-23 PROCEDURE — 81000 URINALYSIS NONAUTO W/SCOPE: CPT | Mod: PO | Performed by: NURSE PRACTITIONER

## 2023-01-23 PROCEDURE — 3078F PR MOST RECENT DIASTOLIC BLOOD PRESSURE < 80 MM HG: ICD-10-PCS | Mod: CPTII,S$GLB,, | Performed by: NURSE PRACTITIONER

## 2023-01-23 PROCEDURE — 1159F PR MEDICATION LIST DOCUMENTED IN MEDICAL RECORD: ICD-10-PCS | Mod: CPTII,S$GLB,, | Performed by: NURSE PRACTITIONER

## 2023-01-23 PROCEDURE — 99999 PR PBB SHADOW E&M-EST. PATIENT-LVL III: ICD-10-PCS | Mod: PBBFAC,,, | Performed by: NURSE PRACTITIONER

## 2023-01-23 RX ORDER — BUSPIRONE HYDROCHLORIDE 5 MG/1
5 TABLET ORAL 2 TIMES DAILY
Qty: 60 TABLET | Refills: 5 | Status: SHIPPED | OUTPATIENT
Start: 2023-01-23 | End: 2023-02-13

## 2023-01-23 RX ORDER — ONDANSETRON 4 MG/1
4 TABLET, FILM COATED ORAL EVERY 6 HOURS PRN
Qty: 20 TABLET | Refills: 0 | Status: SHIPPED | OUTPATIENT
Start: 2023-01-23 | End: 2023-07-07 | Stop reason: ALTCHOICE

## 2023-01-23 NOTE — LETTER
January 23, 2023      [facility]  [address] [place] [address]  Phone: [phone]  Fax: [phone]       Patient: Eugenie Laguerre   YOB: 1980  Date of Visit: 01/23/2023    To Whom It May Concern:    SHAJI Laguerre  was at [facility] on 01/23/2023. Please excuse her from work 1/24/23.  If you have any questions or concerns, or if I can be of further assistance, please do not hesitate to contact me.    Sincerely,        Arlene Gomez NP

## 2023-01-24 ENCOUNTER — OFFICE VISIT (OUTPATIENT)
Dept: OBSTETRICS AND GYNECOLOGY | Facility: CLINIC | Age: 43
End: 2023-01-24
Payer: COMMERCIAL

## 2023-01-24 ENCOUNTER — PATIENT MESSAGE (OUTPATIENT)
Dept: FAMILY MEDICINE | Facility: CLINIC | Age: 43
End: 2023-01-24
Payer: COMMERCIAL

## 2023-01-24 ENCOUNTER — LAB VISIT (OUTPATIENT)
Dept: LAB | Facility: HOSPITAL | Age: 43
End: 2023-01-24
Attending: OBSTETRICS & GYNECOLOGY
Payer: COMMERCIAL

## 2023-01-24 VITALS
SYSTOLIC BLOOD PRESSURE: 102 MMHG | WEIGHT: 209.69 LBS | BODY MASS INDEX: 38.59 KG/M2 | HEIGHT: 62 IN | DIASTOLIC BLOOD PRESSURE: 60 MMHG

## 2023-01-24 DIAGNOSIS — F41.9 ANXIETY: ICD-10-CM

## 2023-01-24 DIAGNOSIS — N91.2 ABSENT MENSES: ICD-10-CM

## 2023-01-24 DIAGNOSIS — G25.81 RESTLESS LEG SYNDROME: ICD-10-CM

## 2023-01-24 DIAGNOSIS — Z86.718 PERSONAL HISTORY OF DVT (DEEP VEIN THROMBOSIS): ICD-10-CM

## 2023-01-24 DIAGNOSIS — R11.2 NAUSEA AND VOMITING, UNSPECIFIED VOMITING TYPE: Primary | ICD-10-CM

## 2023-01-24 DIAGNOSIS — N91.2 ABSENT MENSES: Primary | ICD-10-CM

## 2023-01-24 LAB
ESTRADIOL SERPL-MCNC: 304 PG/ML
FSH SERPL-ACNC: 4.24 MIU/ML

## 2023-01-24 PROCEDURE — 36415 COLL VENOUS BLD VENIPUNCTURE: CPT | Mod: PN | Performed by: OBSTETRICS & GYNECOLOGY

## 2023-01-24 PROCEDURE — 3074F SYST BP LT 130 MM HG: CPT | Mod: CPTII,S$GLB,, | Performed by: OBSTETRICS & GYNECOLOGY

## 2023-01-24 PROCEDURE — 99214 OFFICE O/P EST MOD 30 MIN: CPT | Mod: S$GLB,,, | Performed by: OBSTETRICS & GYNECOLOGY

## 2023-01-24 PROCEDURE — 99999 PR PBB SHADOW E&M-EST. PATIENT-LVL III: CPT | Mod: PBBFAC,,, | Performed by: OBSTETRICS & GYNECOLOGY

## 2023-01-24 PROCEDURE — 3078F DIAST BP <80 MM HG: CPT | Mod: CPTII,S$GLB,, | Performed by: OBSTETRICS & GYNECOLOGY

## 2023-01-24 PROCEDURE — 1159F MED LIST DOCD IN RCRD: CPT | Mod: CPTII,S$GLB,, | Performed by: OBSTETRICS & GYNECOLOGY

## 2023-01-24 PROCEDURE — 83001 ASSAY OF GONADOTROPIN (FSH): CPT | Performed by: OBSTETRICS & GYNECOLOGY

## 2023-01-24 PROCEDURE — 99214 PR OFFICE/OUTPT VISIT, EST, LEVL IV, 30-39 MIN: ICD-10-PCS | Mod: S$GLB,,, | Performed by: OBSTETRICS & GYNECOLOGY

## 2023-01-24 PROCEDURE — 3078F PR MOST RECENT DIASTOLIC BLOOD PRESSURE < 80 MM HG: ICD-10-PCS | Mod: CPTII,S$GLB,, | Performed by: OBSTETRICS & GYNECOLOGY

## 2023-01-24 PROCEDURE — 3008F PR BODY MASS INDEX (BMI) DOCUMENTED: ICD-10-PCS | Mod: CPTII,S$GLB,, | Performed by: OBSTETRICS & GYNECOLOGY

## 2023-01-24 PROCEDURE — 99999 PR PBB SHADOW E&M-EST. PATIENT-LVL III: ICD-10-PCS | Mod: PBBFAC,,, | Performed by: OBSTETRICS & GYNECOLOGY

## 2023-01-24 PROCEDURE — 3008F BODY MASS INDEX DOCD: CPT | Mod: CPTII,S$GLB,, | Performed by: OBSTETRICS & GYNECOLOGY

## 2023-01-24 PROCEDURE — 3074F PR MOST RECENT SYSTOLIC BLOOD PRESSURE < 130 MM HG: ICD-10-PCS | Mod: CPTII,S$GLB,, | Performed by: OBSTETRICS & GYNECOLOGY

## 2023-01-24 PROCEDURE — 1159F PR MEDICATION LIST DOCUMENTED IN MEDICAL RECORD: ICD-10-PCS | Mod: CPTII,S$GLB,, | Performed by: OBSTETRICS & GYNECOLOGY

## 2023-01-24 PROCEDURE — 82670 ASSAY OF TOTAL ESTRADIOL: CPT | Performed by: OBSTETRICS & GYNECOLOGY

## 2023-01-24 RX ORDER — MEDROXYPROGESTERONE ACETATE 10 MG/1
10 TABLET ORAL DAILY
Qty: 10 TABLET | Refills: 0 | Status: SHIPPED | OUTPATIENT
Start: 2023-01-24 | End: 2023-07-08

## 2023-01-24 RX ORDER — PROMETHAZINE HYDROCHLORIDE 25 MG/1
25 TABLET ORAL EVERY 6 HOURS PRN
Qty: 20 TABLET | Refills: 0 | Status: SHIPPED | OUTPATIENT
Start: 2023-01-24 | End: 2023-07-07 | Stop reason: ALTCHOICE

## 2023-01-24 NOTE — PROGRESS NOTES
Chief Complaint   Patient presents with    Absent Menses     Negative pregnancy test, no cycle for 4 months, clear discharge.        History of Present Illness: Eugenie Laguerre is a 42 y.o. female that presents today 2023 for   Chief Complaint   Patient presents with    Absent Menses     Negative pregnancy test, no cycle for 4 months, clear discharge.      She reports spotting periods since summer 2022.  She reports September spotting and no periods since then.     Past Medical History:   Diagnosis Date    Anxiety 2023    Clotting disorder     DVT (deep venous thrombosis) 2007    was felt to be due to ? Factor V Leiden    High risk pregnancy due to history of  labor 2015    Hyperthyroidism 2013    reports repeat testing was WNL and no treatment needed.    Iron deficiency anemia due to chronic blood loss 2015    Migraines     Restless legs syndrome (RLS)     Short cervix affecting pregnancy 2015       Past Surgical History:   Procedure Laterality Date    2 R knee sx; 1 L knee      arthroscopies    BRONCHOSCOPY       SECTION  2015    LAPAROSCOPIC CHOLECYSTECTOMY N/A 2021    Procedure: CHOLECYSTECTOMY, LAPAROSCOPIC;  Surgeon: Pascual Lu MD;  Location: [facility];  Service: General;  Laterality: N/A;    TUBAL LIGATION  2015       Current Outpatient Medications   Medication Sig Dispense Refill    busPIRone (BUSPAR) 5 MG Tab Take 1 tablet (5 mg total) by mouth 2 (two) times daily. 60 tablet 5    ondansetron (ZOFRAN) 4 MG tablet Take 1 tablet (4 mg total) by mouth every 6 (six) hours as needed for Nausea. 20 tablet 0    topiramate (TOPAMAX) 50 MG tablet Take 1.5 tablets (75 mg total) by mouth every evening. 40 tablet 11    medroxyPROGESTERone (PROVERA) 10 MG tablet Take 1 tablet (10 mg total) by mouth once daily. 10 tablet 0     No current facility-administered medications for this visit.       Review of patient's allergies indicates:   Allergen  "Reactions    Amoxicillin     Augmentin [amoxicillin-pot clavulanate] Swelling    Bactrim [sulfamethoxazole-trimethoprim] Itching and Nausea Only    Ropinirole Rash       Family History   Problem Relation Age of Onset    Fibromyalgia Mother     Depression Mother     Anuerysm Father     Cancer Father         skin    Deep vein thrombosis Father     Hypertension Father     Bipolar disorder Sister     Ovarian cancer Maternal Aunt     Bladder Cancer Maternal Aunt     Anuerysm Paternal Aunt         AAA    Breast cancer Maternal Grandmother     Diabetes Paternal Grandmother     Anuerysm Paternal Grandfather         AAA    Heart disease Brother     Colon cancer Neg Hx     Stomach cancer Neg Hx     Esophageal cancer Neg Hx        Social History     Tobacco Use    Smoking status: Former     Packs/day: 0.50     Years: 0.00     Pack years: 0.00     Types: Cigarettes    Smokeless tobacco: Former     Quit date: 2021   Substance Use Topics    Alcohol use: Yes     Alcohol/week: 0.0 standard drinks     Comment: rare    Drug use: No       OB History    Para Term  AB Living   4 4 3 1   3   SAB IAB Ectopic Multiple Live Births           3      # Outcome Date GA Lbr [place]/2nd Weight Sex Delivery Anes PTL Lv   4 Term 07/30/15 39w0d  3.728 kg (8 lb 3.5 oz) F CS-LTranv EPI N JANNET   3 Term 12/15/12 37w0d  3.204 kg (7 lb 1 oz) F Vag-Spont EPI N JANNET      Birth Comments: System Generated. Please review and update pregnancy details.   2  10/06/08 25w0d  0.822 kg (1 lb 13 oz) F Vag-Spont None Y JANNET      Birth Comments: delivered at 25 IUP, PPROM on a friday with labor on monday.   1 Term                  /60   Ht 5' 2" (1.575 m)   Wt 95.1 kg (209 lb 10.5 oz)   LMP 2022 (Approximate)       ASSESSMENT/PLAN:  Absent menses  Comments:  POP, mirena, cyclic provera options  notify us if no bleeding after cyclic provera challenge.   Orders:  -     FOLLICLE STIMULATING HORMONE; Future; Expected date: 2023  -     " ESTRADIOL; Future; Expected date: 01/24/2023  -     medroxyPROGESTERone (PROVERA) 10 MG tablet; Take 1 tablet (10 mg total) by mouth once daily.  Dispense: 10 tablet; Refill: 0    Restless leg syndrome    Personal history of DVT (deep vein thrombosis)  -     FOLLICLE STIMULATING HORMONE; Future; Expected date: 01/24/2023  -     ESTRADIOL; Future; Expected date: 01/24/2023    Anxiety        30 minutes spent today spent preparing reviewing previous external notes, reviewing previous results, performing medical examination, ordering tests or medications, counseling and documenting.

## 2023-02-13 ENCOUNTER — OFFICE VISIT (OUTPATIENT)
Dept: FAMILY MEDICINE | Facility: CLINIC | Age: 43
End: 2023-02-13
Payer: COMMERCIAL

## 2023-02-13 VITALS
SYSTOLIC BLOOD PRESSURE: 128 MMHG | DIASTOLIC BLOOD PRESSURE: 82 MMHG | TEMPERATURE: 98 F | WEIGHT: 219 LBS | HEART RATE: 110 BPM | BODY MASS INDEX: 40.3 KG/M2 | OXYGEN SATURATION: 98 % | HEIGHT: 62 IN

## 2023-02-13 DIAGNOSIS — F41.9 ANXIETY: ICD-10-CM

## 2023-02-13 PROCEDURE — 3074F SYST BP LT 130 MM HG: CPT | Mod: CPTII,S$GLB,, | Performed by: NURSE PRACTITIONER

## 2023-02-13 PROCEDURE — 3008F BODY MASS INDEX DOCD: CPT | Mod: CPTII,S$GLB,, | Performed by: NURSE PRACTITIONER

## 2023-02-13 PROCEDURE — 99999 PR PBB SHADOW E&M-EST. PATIENT-LVL III: ICD-10-PCS | Mod: PBBFAC,,, | Performed by: NURSE PRACTITIONER

## 2023-02-13 PROCEDURE — 99999 PR PBB SHADOW E&M-EST. PATIENT-LVL III: CPT | Mod: PBBFAC,,, | Performed by: NURSE PRACTITIONER

## 2023-02-13 PROCEDURE — 1159F PR MEDICATION LIST DOCUMENTED IN MEDICAL RECORD: ICD-10-PCS | Mod: CPTII,S$GLB,, | Performed by: NURSE PRACTITIONER

## 2023-02-13 PROCEDURE — 3074F PR MOST RECENT SYSTOLIC BLOOD PRESSURE < 130 MM HG: ICD-10-PCS | Mod: CPTII,S$GLB,, | Performed by: NURSE PRACTITIONER

## 2023-02-13 PROCEDURE — 1159F MED LIST DOCD IN RCRD: CPT | Mod: CPTII,S$GLB,, | Performed by: NURSE PRACTITIONER

## 2023-02-13 PROCEDURE — 99213 OFFICE O/P EST LOW 20 MIN: CPT | Mod: S$GLB,,, | Performed by: NURSE PRACTITIONER

## 2023-02-13 PROCEDURE — 99213 PR OFFICE/OUTPT VISIT, EST, LEVL III, 20-29 MIN: ICD-10-PCS | Mod: S$GLB,,, | Performed by: NURSE PRACTITIONER

## 2023-02-13 PROCEDURE — 3008F PR BODY MASS INDEX (BMI) DOCUMENTED: ICD-10-PCS | Mod: CPTII,S$GLB,, | Performed by: NURSE PRACTITIONER

## 2023-02-13 PROCEDURE — 3079F DIAST BP 80-89 MM HG: CPT | Mod: CPTII,S$GLB,, | Performed by: NURSE PRACTITIONER

## 2023-02-13 PROCEDURE — 3079F PR MOST RECENT DIASTOLIC BLOOD PRESSURE 80-89 MM HG: ICD-10-PCS | Mod: CPTII,S$GLB,, | Performed by: NURSE PRACTITIONER

## 2023-02-13 RX ORDER — IBUPROFEN 800 MG/1
800 TABLET ORAL EVERY 8 HOURS PRN
COMMUNITY
Start: 2023-01-20 | End: 2023-07-08

## 2023-02-13 RX ORDER — BUSPIRONE HYDROCHLORIDE 10 MG/1
10 TABLET ORAL 2 TIMES DAILY
Qty: 60 TABLET | Refills: 5 | Status: SHIPPED | OUTPATIENT
Start: 2023-02-13 | End: 2023-02-20

## 2023-02-13 NOTE — PROGRESS NOTES
Subjective:       Patient ID: Eugenie Laguerre is a 42 y.o. female.    Chief Complaint: Med Change Follow Up    HPI  Patient presents for FU of anxiety  Started Buspar 5mg BID at last visit 23--she reports significant improvement in anxiety and generalized physical symptoms (myalgias, nausea). Sleeping improved. Denies adverse effect    Vitals:    23 1550   BP: 128/82   Pulse: 110   Temp: 98.1 °F (36.7 °C)     Review of Systems   Constitutional:  Negative for fever.   Gastrointestinal:  Negative for abdominal pain, nausea and vomiting.   Musculoskeletal:  Negative for myalgias.   Psychiatric/Behavioral:  Negative for dysphoric mood and sleep disturbance.      Past Medical History:   Diagnosis Date    Anxiety 2023    Clotting disorder     DVT (deep venous thrombosis) 2007    was felt to be due to ? Factor V Leiden    High risk pregnancy due to history of  labor 2015    Hyperthyroidism 2013    reports repeat testing was WNL and no treatment needed.    Iron deficiency anemia due to chronic blood loss 2015    Migraines     Restless legs syndrome (RLS)     Short cervix affecting pregnancy 2015     Objective:      Physical Exam  Constitutional:       General: She is not in acute distress.     Appearance: She is well-developed. She is not ill-appearing, toxic-appearing or diaphoretic.   HENT:      Right Ear: Hearing normal.      Left Ear: Hearing normal.   Pulmonary:      Effort: No tachypnea or respiratory distress.   Skin:     Coloration: Skin is not pale.   Neurological:      Mental Status: She is alert and oriented to person, place, and time.   Psychiatric:         Speech: Speech normal.         Behavior: Behavior normal.         Thought Content: Thought content normal.         Judgment: Judgment normal.       Assessment:       1. Anxiety        Plan:       Anxiety  -     busPIRone (BUSPAR) 10 MG tablet; Take 1 tablet (10 mg total) by mouth 2 (two) times daily.  Dispense:  60 tablet; Refill: 5        Increase dose to 10mg BID--educated on symptom monitoring, side effects, return precautions   FU with PCP 4 months for annual, with me as needed      Medication List with Changes/Refills   Current Medications    IBUPROFEN (ADVIL,MOTRIN) 800 MG TABLET    Take 800 mg by mouth every 8 (eight) hours as needed.    MEDROXYPROGESTERONE (PROVERA) 10 MG TABLET    Take 1 tablet (10 mg total) by mouth once daily.    ONDANSETRON (ZOFRAN) 4 MG TABLET    Take 1 tablet (4 mg total) by mouth every 6 (six) hours as needed for Nausea.    PROMETHAZINE (PHENERGAN) 25 MG TABLET    Take 1 tablet (25 mg total) by mouth every 6 (six) hours as needed for Nausea.    TOPIRAMATE (TOPAMAX) 50 MG TABLET    Take 1.5 tablets (75 mg total) by mouth every evening.   Changed and/or Refilled Medications    Modified Medication Previous Medication    BUSPIRONE (BUSPAR) 10 MG TABLET busPIRone (BUSPAR) 5 MG Tab       Take 1 tablet (10 mg total) by mouth 2 (two) times daily.    Take 1 tablet (5 mg total) by mouth 2 (two) times daily.

## 2023-02-16 ENCOUNTER — PATIENT MESSAGE (OUTPATIENT)
Dept: FAMILY MEDICINE | Facility: CLINIC | Age: 43
End: 2023-02-16

## 2023-02-16 ENCOUNTER — PATIENT MESSAGE (OUTPATIENT)
Dept: FAMILY MEDICINE | Facility: CLINIC | Age: 43
End: 2023-02-16
Payer: COMMERCIAL

## 2023-02-16 ENCOUNTER — OFFICE VISIT (OUTPATIENT)
Dept: FAMILY MEDICINE | Facility: CLINIC | Age: 43
End: 2023-02-16
Payer: COMMERCIAL

## 2023-02-16 VITALS
TEMPERATURE: 98 F | DIASTOLIC BLOOD PRESSURE: 84 MMHG | WEIGHT: 216.5 LBS | HEART RATE: 109 BPM | HEIGHT: 62 IN | SYSTOLIC BLOOD PRESSURE: 120 MMHG | BODY MASS INDEX: 39.84 KG/M2 | OXYGEN SATURATION: 95 %

## 2023-02-16 DIAGNOSIS — J06.9 UPPER RESPIRATORY TRACT INFECTION, UNSPECIFIED TYPE: Primary | ICD-10-CM

## 2023-02-16 DIAGNOSIS — R52 BODY ACHES: ICD-10-CM

## 2023-02-16 PROCEDURE — 3008F BODY MASS INDEX DOCD: CPT | Mod: CPTII,S$GLB,, | Performed by: FAMILY MEDICINE

## 2023-02-16 PROCEDURE — 99999 PR PBB SHADOW E&M-EST. PATIENT-LVL III: CPT | Mod: PBBFAC,,, | Performed by: FAMILY MEDICINE

## 2023-02-16 PROCEDURE — 3008F PR BODY MASS INDEX (BMI) DOCUMENTED: ICD-10-PCS | Mod: CPTII,S$GLB,, | Performed by: FAMILY MEDICINE

## 2023-02-16 PROCEDURE — 99213 OFFICE O/P EST LOW 20 MIN: CPT | Mod: S$GLB,,, | Performed by: FAMILY MEDICINE

## 2023-02-16 PROCEDURE — 3074F PR MOST RECENT SYSTOLIC BLOOD PRESSURE < 130 MM HG: ICD-10-PCS | Mod: CPTII,S$GLB,, | Performed by: FAMILY MEDICINE

## 2023-02-16 PROCEDURE — 3074F SYST BP LT 130 MM HG: CPT | Mod: CPTII,S$GLB,, | Performed by: FAMILY MEDICINE

## 2023-02-16 PROCEDURE — 99213 PR OFFICE/OUTPT VISIT, EST, LEVL III, 20-29 MIN: ICD-10-PCS | Mod: S$GLB,,, | Performed by: FAMILY MEDICINE

## 2023-02-16 PROCEDURE — 99999 PR PBB SHADOW E&M-EST. PATIENT-LVL III: ICD-10-PCS | Mod: PBBFAC,,, | Performed by: FAMILY MEDICINE

## 2023-02-16 PROCEDURE — 3079F DIAST BP 80-89 MM HG: CPT | Mod: CPTII,S$GLB,, | Performed by: FAMILY MEDICINE

## 2023-02-16 PROCEDURE — 3079F PR MOST RECENT DIASTOLIC BLOOD PRESSURE 80-89 MM HG: ICD-10-PCS | Mod: CPTII,S$GLB,, | Performed by: FAMILY MEDICINE

## 2023-02-16 NOTE — PROGRESS NOTES
Subjective:       Patient ID: Eugenie Laguerre is a 42 y.o. female.    Chief Complaint: Cough (With congestion), Generalized Body Aches, and Fatigue    Here for acute visit as new pt to me.  Symptoms x 2 days.  Started with joint pain this morning.      Cough  Pertinent negatives include no chest pain, chills, fever or shortness of breath.   Fatigue  Associated symptoms include coughing and fatigue. Pertinent negatives include no chest pain, chills or fever.   Review of Systems   Constitutional:  Positive for fatigue. Negative for chills and fever.   Respiratory:  Positive for cough. Negative for chest tightness and shortness of breath.    Cardiovascular:  Negative for chest pain, palpitations and leg swelling.   Endocrine: Negative for cold intolerance and heat intolerance.   Psychiatric/Behavioral:  Negative for decreased concentration. The patient is not nervous/anxious.      Objective:      Physical Exam  Vitals and nursing note reviewed.   Constitutional:       Appearance: She is well-developed.   HENT:      Head: Normocephalic and atraumatic.   Cardiovascular:      Rate and Rhythm: Normal rate and regular rhythm.      Heart sounds: Normal heart sounds.   Pulmonary:      Effort: Pulmonary effort is normal.      Breath sounds: Normal breath sounds.   Psychiatric:         Mood and Affect: Mood normal.         Behavior: Behavior normal.       Assessment:       1. Upper respiratory tract infection, unspecified type    2. Body aches        Plan:       Upper respiratory tract infection, unspecified type    Body aches  -     POCT Influenza A/B Molecular  -     POCT COVID-19 Rapid Screening      Supportive care  Call tomorrow with update as could be early flu  Tamiflu as needed  Will monitor chronic medical issues and continue current plan of care.      Follow up if symptoms worsen or fail to improve.

## 2023-02-20 ENCOUNTER — PATIENT MESSAGE (OUTPATIENT)
Dept: FAMILY MEDICINE | Facility: CLINIC | Age: 43
End: 2023-02-20
Payer: COMMERCIAL

## 2023-02-20 DIAGNOSIS — F41.9 ANXIETY: ICD-10-CM

## 2023-02-20 RX ORDER — BUSPIRONE HYDROCHLORIDE 15 MG/1
15 TABLET ORAL 2 TIMES DAILY
Qty: 60 TABLET | Refills: 5 | Status: SHIPPED | OUTPATIENT
Start: 2023-02-20 | End: 2023-09-07

## 2023-03-07 ENCOUNTER — PATIENT MESSAGE (OUTPATIENT)
Dept: FAMILY MEDICINE | Facility: CLINIC | Age: 43
End: 2023-03-07
Payer: COMMERCIAL

## 2023-03-13 ENCOUNTER — OFFICE VISIT (OUTPATIENT)
Dept: FAMILY MEDICINE | Facility: CLINIC | Age: 43
End: 2023-03-13
Payer: COMMERCIAL

## 2023-03-13 VITALS
WEIGHT: 214.75 LBS | DIASTOLIC BLOOD PRESSURE: 82 MMHG | OXYGEN SATURATION: 99 % | BODY MASS INDEX: 39.27 KG/M2 | SYSTOLIC BLOOD PRESSURE: 126 MMHG | HEART RATE: 71 BPM

## 2023-03-13 DIAGNOSIS — F41.9 ANXIETY: ICD-10-CM

## 2023-03-13 DIAGNOSIS — F32.A DEPRESSION, UNSPECIFIED DEPRESSION TYPE: Primary | ICD-10-CM

## 2023-03-13 DIAGNOSIS — G43.909 MIGRAINE WITHOUT STATUS MIGRAINOSUS, NOT INTRACTABLE, UNSPECIFIED MIGRAINE TYPE: ICD-10-CM

## 2023-03-13 DIAGNOSIS — E66.01 SEVERE OBESITY: ICD-10-CM

## 2023-03-13 PROCEDURE — 3008F BODY MASS INDEX DOCD: CPT | Mod: CPTII,S$GLB,, | Performed by: NURSE PRACTITIONER

## 2023-03-13 PROCEDURE — 99214 OFFICE O/P EST MOD 30 MIN: CPT | Mod: S$GLB,,, | Performed by: NURSE PRACTITIONER

## 2023-03-13 PROCEDURE — 99214 PR OFFICE/OUTPT VISIT, EST, LEVL IV, 30-39 MIN: ICD-10-PCS | Mod: S$GLB,,, | Performed by: NURSE PRACTITIONER

## 2023-03-13 PROCEDURE — 1159F PR MEDICATION LIST DOCUMENTED IN MEDICAL RECORD: ICD-10-PCS | Mod: CPTII,S$GLB,, | Performed by: NURSE PRACTITIONER

## 2023-03-13 PROCEDURE — 3079F PR MOST RECENT DIASTOLIC BLOOD PRESSURE 80-89 MM HG: ICD-10-PCS | Mod: CPTII,S$GLB,, | Performed by: NURSE PRACTITIONER

## 2023-03-13 PROCEDURE — 99999 PR PBB SHADOW E&M-EST. PATIENT-LVL III: ICD-10-PCS | Mod: PBBFAC,,, | Performed by: NURSE PRACTITIONER

## 2023-03-13 PROCEDURE — 3074F SYST BP LT 130 MM HG: CPT | Mod: CPTII,S$GLB,, | Performed by: NURSE PRACTITIONER

## 2023-03-13 PROCEDURE — 3079F DIAST BP 80-89 MM HG: CPT | Mod: CPTII,S$GLB,, | Performed by: NURSE PRACTITIONER

## 2023-03-13 PROCEDURE — 3008F PR BODY MASS INDEX (BMI) DOCUMENTED: ICD-10-PCS | Mod: CPTII,S$GLB,, | Performed by: NURSE PRACTITIONER

## 2023-03-13 PROCEDURE — 3074F PR MOST RECENT SYSTOLIC BLOOD PRESSURE < 130 MM HG: ICD-10-PCS | Mod: CPTII,S$GLB,, | Performed by: NURSE PRACTITIONER

## 2023-03-13 PROCEDURE — 1159F MED LIST DOCD IN RCRD: CPT | Mod: CPTII,S$GLB,, | Performed by: NURSE PRACTITIONER

## 2023-03-13 PROCEDURE — 99999 PR PBB SHADOW E&M-EST. PATIENT-LVL III: CPT | Mod: PBBFAC,,, | Performed by: NURSE PRACTITIONER

## 2023-03-13 RX ORDER — SERTRALINE HYDROCHLORIDE 25 MG/1
25 TABLET, FILM COATED ORAL DAILY
Qty: 30 TABLET | Refills: 5 | Status: SHIPPED | OUTPATIENT
Start: 2023-03-13 | End: 2023-09-07

## 2023-03-13 NOTE — LETTER
March 13, 2023      [facility]  [address] [place] [address]  Phone: [phone]  Fax: [phone]       Patient: Eugenie Laguerre   YOB: 1980  Date of Visit: 03/13/2023    To Whom It May Concern:    SHAJI Laguerre  was at [facility] on 03/13/2023. The patient may return to work on 3/20/23. If you have any questions or concerns, or if I can be of further assistance, please do not hesitate to contact me.    Sincerely,        Arlene Gomez NP

## 2023-03-14 ENCOUNTER — TELEPHONE (OUTPATIENT)
Dept: NEUROLOGY | Facility: CLINIC | Age: 43
End: 2023-03-14
Payer: COMMERCIAL

## 2023-03-14 NOTE — TELEPHONE ENCOUNTER
Spoke to the pt, aptp scheduled for 3/15/23 at 1300 with Tricia Gerardo.  Date, time and location discussed.   pt is 84 y.o. male c/o fever s/p dialysis. pt has hx of CKD on dialysis (Mon/Fri), lives at [facility]. pt was receiving dialysis when the center pt is 84 y.o. male c/o fever s/p dialysis. pt has hx of CKD on dialysis (Mon/Fri), lives at [facility]. pt was receiving dialysis when the center realized he developed a temperature, gave him a dose of Tylenol and referred him to the ED. On arrival, his rectal temp was 102.4. pt has hx of Left upper chest port and Right AKA, CAD, Afib, anemia and atelectasis. His Left foot is in a boot which gets checked daily for his venous stasis ulcers. vital signs as charted, will continue to monitor.

## 2023-03-15 ENCOUNTER — PATIENT MESSAGE (OUTPATIENT)
Dept: FAMILY MEDICINE | Facility: CLINIC | Age: 43
End: 2023-03-15
Payer: COMMERCIAL

## 2023-03-15 ENCOUNTER — OFFICE VISIT (OUTPATIENT)
Dept: NEUROLOGY | Facility: CLINIC | Age: 43
End: 2023-03-15
Payer: COMMERCIAL

## 2023-03-15 VITALS
TEMPERATURE: 98 F | DIASTOLIC BLOOD PRESSURE: 81 MMHG | BODY MASS INDEX: 39.84 KG/M2 | WEIGHT: 216.5 LBS | SYSTOLIC BLOOD PRESSURE: 131 MMHG | RESPIRATION RATE: 18 BRPM | HEART RATE: 90 BPM | HEIGHT: 62 IN

## 2023-03-15 DIAGNOSIS — Z82.49 FAMILY HISTORY OF BRAIN ANEURYSM: ICD-10-CM

## 2023-03-15 DIAGNOSIS — R11.0 NAUSEA: ICD-10-CM

## 2023-03-15 DIAGNOSIS — G43.909 MIGRAINE WITHOUT STATUS MIGRAINOSUS, NOT INTRACTABLE, UNSPECIFIED MIGRAINE TYPE: ICD-10-CM

## 2023-03-15 DIAGNOSIS — G43.719 INTRACTABLE CHRONIC MIGRAINE WITHOUT AURA AND WITHOUT STATUS MIGRAINOSUS: Primary | ICD-10-CM

## 2023-03-15 DIAGNOSIS — M79.18 CERVICAL MYOFASCIAL PAIN SYNDROME: ICD-10-CM

## 2023-03-15 DIAGNOSIS — G43.119 INTRACTABLE MIGRAINE WITH AURA WITHOUT STATUS MIGRAINOSUS: ICD-10-CM

## 2023-03-15 PROCEDURE — 96372 PR INJECTION,THERAP/PROPH/DIAG2ST, IM OR SUBCUT: ICD-10-PCS | Mod: S$GLB,,, | Performed by: NURSE PRACTITIONER

## 2023-03-15 PROCEDURE — 3008F PR BODY MASS INDEX (BMI) DOCUMENTED: ICD-10-PCS | Mod: CPTII,S$GLB,, | Performed by: NURSE PRACTITIONER

## 2023-03-15 PROCEDURE — 1159F PR MEDICATION LIST DOCUMENTED IN MEDICAL RECORD: ICD-10-PCS | Mod: CPTII,S$GLB,, | Performed by: NURSE PRACTITIONER

## 2023-03-15 PROCEDURE — 96372 THER/PROPH/DIAG INJ SC/IM: CPT | Mod: S$GLB,,, | Performed by: NURSE PRACTITIONER

## 2023-03-15 PROCEDURE — 3008F BODY MASS INDEX DOCD: CPT | Mod: CPTII,S$GLB,, | Performed by: NURSE PRACTITIONER

## 2023-03-15 PROCEDURE — 99999 PR PBB SHADOW E&M-EST. PATIENT-LVL V: ICD-10-PCS | Mod: PBBFAC,,, | Performed by: NURSE PRACTITIONER

## 2023-03-15 PROCEDURE — 99215 PR OFFICE/OUTPT VISIT, EST, LEVL V, 40-54 MIN: ICD-10-PCS | Mod: 25,S$GLB,, | Performed by: NURSE PRACTITIONER

## 2023-03-15 PROCEDURE — 3079F DIAST BP 80-89 MM HG: CPT | Mod: CPTII,S$GLB,, | Performed by: NURSE PRACTITIONER

## 2023-03-15 PROCEDURE — 99215 OFFICE O/P EST HI 40 MIN: CPT | Mod: 25,S$GLB,, | Performed by: NURSE PRACTITIONER

## 2023-03-15 PROCEDURE — 1159F MED LIST DOCD IN RCRD: CPT | Mod: CPTII,S$GLB,, | Performed by: NURSE PRACTITIONER

## 2023-03-15 PROCEDURE — 1160F PR REVIEW ALL MEDS BY PRESCRIBER/CLIN PHARMACIST DOCUMENTED: ICD-10-PCS | Mod: CPTII,S$GLB,, | Performed by: NURSE PRACTITIONER

## 2023-03-15 PROCEDURE — 3075F SYST BP GE 130 - 139MM HG: CPT | Mod: CPTII,S$GLB,, | Performed by: NURSE PRACTITIONER

## 2023-03-15 PROCEDURE — 99999 PR PBB SHADOW E&M-EST. PATIENT-LVL V: CPT | Mod: PBBFAC,,, | Performed by: NURSE PRACTITIONER

## 2023-03-15 PROCEDURE — 1160F RVW MEDS BY RX/DR IN RCRD: CPT | Mod: CPTII,S$GLB,, | Performed by: NURSE PRACTITIONER

## 2023-03-15 PROCEDURE — 3075F PR MOST RECENT SYSTOLIC BLOOD PRESS GE 130-139MM HG: ICD-10-PCS | Mod: CPTII,S$GLB,, | Performed by: NURSE PRACTITIONER

## 2023-03-15 PROCEDURE — 3079F PR MOST RECENT DIASTOLIC BLOOD PRESSURE 80-89 MM HG: ICD-10-PCS | Mod: CPTII,S$GLB,, | Performed by: NURSE PRACTITIONER

## 2023-03-15 RX ORDER — RIZATRIPTAN BENZOATE 10 MG/1
TABLET ORAL
Qty: 9 TABLET | Refills: 11 | Status: SHIPPED | OUTPATIENT
Start: 2023-03-15 | End: 2023-08-02 | Stop reason: ALTCHOICE

## 2023-03-15 RX ORDER — GALCANEZUMAB 120 MG/ML
120 INJECTION, SOLUTION SUBCUTANEOUS
Qty: 1 ML | Refills: 11 | Status: SHIPPED | OUTPATIENT
Start: 2023-03-15 | End: 2023-07-08

## 2023-03-15 RX ORDER — TIZANIDINE 4 MG/1
TABLET ORAL
Qty: 30 TABLET | Refills: 11 | Status: SHIPPED | OUTPATIENT
Start: 2023-03-15 | End: 2023-07-07 | Stop reason: ALTCHOICE

## 2023-03-15 RX ORDER — PROCHLORPERAZINE MALEATE 10 MG
10 TABLET ORAL EVERY 6 HOURS PRN
Qty: 60 TABLET | Refills: 11 | Status: SHIPPED | OUTPATIENT
Start: 2023-03-15 | End: 2023-07-07 | Stop reason: ALTCHOICE

## 2023-03-15 RX ORDER — KETOROLAC TROMETHAMINE 30 MG/ML
30 INJECTION, SOLUTION INTRAMUSCULAR; INTRAVENOUS
Status: COMPLETED | OUTPATIENT
Start: 2023-03-15 | End: 2023-03-15

## 2023-03-15 RX ADMIN — KETOROLAC TROMETHAMINE 30 MG: 30 INJECTION, SOLUTION INTRAMUSCULAR; INTRAVENOUS at 01:03

## 2023-03-15 NOTE — LETTER
March 16, 2023      [facility]  [address]  [place] [address]  Phone: [phone]  Fax: [phone]       Patient: Eugenie Laguerre   YOB: 1980  Date of Visit: 03/16/2023    To Whom It May Concern:    SHAJI Laguerre is currently under my care, last office visit 3/13/23, follow up visit scheduled 3/27/23. Please excuse her until further notice/release.  If you have any questions or concerns, or if I can be of further assistance, please do not hesitate to contact me.    Sincerely,      Arlene Gomez NP

## 2023-03-15 NOTE — PROGRESS NOTES
Date of service: 3/15/2023  Referring provider: Arlene Gomez    Subjective:      Chief complaint: Headache       Patient ID: Eugenie Laguerre is a 42 y.o. female with anxiety, hyperthyroidism, VERENICE, migraine, RLS, clotting disorder who presents for new patient evaluation of headache     She previously saw John Crowe for headaches, last in 2020    History of Present Illness    ORIGINAL HEADACHE HISTORY - 3/15/23  Age at onset and course over time: in her 20's had an episode similar to a stroke. She went to the ER and diagnosed with migraines. She was hypotensive due to medication.     She takes Topamax but causes her to be overly sedated. She takes more as needed due to this.     She works as a .      Aura - ring of light in vision, lasts an hour if not treated, always associated with severe headache     Family history of migraine - mom, maternal grandmother  Family history of aneurysm - dad, paternal sister  Last eye exam - august 2022    Location: mainly behind her eyes   Quality:  [] pressure [] tight [x] throbbing [] sharp [] stabbing   Severity: current 6 with range 3-8  Duration: days  Frequency: 20 days per month   Headaches awaken at night?:  yes  Worst time of day: upon waking   Associated with: [x] photophobia [x]  phonophobia [] osmophobia [x] blurred vision  [] double vision [x] loss of appetite [x] nausea [x] vomiting [x] dizziness [] vertigo  [] tinnitus [x] irritability [] sinus pressure [] problems with concentration   [] neck tightness   Alleviated by:  [x] sleep [x] darkness [] massage [] heat [] ice [] medication  Exacerbated by:  [x] fatigue [x] light [x] noise [] smells [] coughing [] sneezing  [] bending over [] ovulation [] menses [] alcohol [] change in weather [x]  stress  Ipsilateral autonomic: [] nasal congestion [] lacrimation [] ptosis [] injection [] edema [] foreign body sensation [] ear fullness   ICP:  [] transient visual obscurations  [] tinnitus   [x] positional  headache  [] non-positional   Sleep habits: trouble falling asleep  Caffeine intake: 44 oz of tea  Gyn status (if female): having periods  HIT 6 - 65    Current acute treatment:  Zofran  Phenergan  Ibuprofen - daily     Current prevention:  Buspar  Sertraline  Topamax - 100 mg QHS    Previously tried/failed acute treatment:  Mobic  Robaxin  Fioricet  Naproxen  Sumatriptan  Diclofenac    Previously tried/failed preventative treatment:  Nifedipine  Amitriptyline - for headaches, somewhat helpful but sedating   Celexa  Zonisamide  Depakote - severe hypotension   Requip    Review of patient's allergies indicates:   Allergen Reactions    Amoxicillin     Augmentin [amoxicillin-pot clavulanate] Swelling    Bactrim [sulfamethoxazole-trimethoprim] Itching and Nausea Only    Ropinirole Rash     Current Outpatient Medications   Medication Sig Dispense Refill    busPIRone (BUSPAR) 15 MG tablet Take 1 tablet (15 mg total) by mouth 2 (two) times daily. 60 tablet 5    ibuprofen (ADVIL,MOTRIN) 800 MG tablet Take 800 mg by mouth every 8 (eight) hours as needed.      sertraline (ZOLOFT) 25 MG tablet Take 1 tablet (25 mg total) by mouth once daily. 30 tablet 5    topiramate (TOPAMAX) 50 MG tablet Take 1.5 tablets (75 mg total) by mouth every evening. 40 tablet 11    galcanezumab-gnlm (EMGALITY PEN) 120 mg/mL PnIj Inject 120 mg into the skin every 28 days. 1 mL 11    galcanezumab-gnlm 120 mg/mL PnIj Inject 240 mg (2 injections) subcutaneous at separate sites, once (loading dose). Start maintenance dose 28 days later 2 mL 0    medroxyPROGESTERone (PROVERA) 10 MG tablet Take 1 tablet (10 mg total) by mouth once daily. (Patient not taking: Reported on 3/15/2023) 10 tablet 0    ondansetron (ZOFRAN) 4 MG tablet Take 1 tablet (4 mg total) by mouth every 6 (six) hours as needed for Nausea. (Patient not taking: Reported on 3/15/2023) 20 tablet 0    prochlorperazine (COMPAZINE) 10 MG tablet Take 1 tablet (10 mg total) by mouth every 6 (six)  hours as needed (migraine or nausea). 60 tablet 11    promethazine (PHENERGAN) 25 MG tablet Take 1 tablet (25 mg total) by mouth every 6 (six) hours as needed for Nausea. (Patient not taking: Reported on 3/15/2023) 20 tablet 0    rizatriptan (MAXALT) 10 MG tablet 1 tab PO PRN migraine. May repeat every 2 hours for max 3 tabs in 24 hours. Use no more than 10 days per month. 9 tablet 11    tiZANidine (ZANAFLEX) 4 MG tablet Half or full tablet by mouth at night as needed for muscle spasm 30 tablet 11     No current facility-administered medications for this visit.       Past Medical History  Past Medical History:   Diagnosis Date    Anxiety 2023    Clotting disorder     DVT (deep venous thrombosis) 2007    was felt to be due to ? Factor V Leiden    High risk pregnancy due to history of  labor 2015    Hyperthyroidism 2013    reports repeat testing was WNL and no treatment needed.    Iron deficiency anemia due to chronic blood loss 2015    Migraines     Restless legs syndrome (RLS)     Short cervix affecting pregnancy 2015       Past Surgical History  Past Surgical History:   Procedure Laterality Date    2 R knee sx; 1 L knee      arthroscopies    BRONCHOSCOPY       SECTION  2015    LAPAROSCOPIC CHOLECYSTECTOMY N/A 2021    Procedure: CHOLECYSTECTOMY, LAPAROSCOPIC;  Surgeon: Pascual Lu MD;  Location: [facility];  Service: General;  Laterality: N/A;    TUBAL LIGATION  2015       Family History  Family History   Problem Relation Age of Onset    Fibromyalgia Mother     Depression Mother     Anuerysm Father     Cancer Father         skin    Deep vein thrombosis Father     Hypertension Father     Bipolar disorder Sister     Ovarian cancer Maternal Aunt     Bladder Cancer Maternal Aunt     Anuerysm Paternal Aunt         AAA    Breast cancer Maternal Grandmother     Diabetes Paternal Grandmother     Anuerysm Paternal Grandfather         AAA    Heart disease  Brother     Colon cancer Neg Hx     Stomach cancer Neg Hx     Esophageal cancer Neg Hx        Social History  Social History     Socioeconomic History    Marital status:     Number of children: 2   Tobacco Use    Smoking status: Former     Packs/day: 0.50     Years: 0.00     Pack years: 0.00     Types: Cigarettes    Smokeless tobacco: Former     Quit date: 8/1/2021   Substance and Sexual Activity    Alcohol use: Yes     Alcohol/week: 0.0 standard drinks     Comment: rare    Drug use: No    Sexual activity: Yes     Partners: Male     Birth control/protection: None        Review of Systems  14-point review of systems as follows:   No check cat indicates NEGATIVE response   Constitutional: [] weight loss, [x] change to appetite   Eyes: [] change in vision, [] double vision   Ears, nose, mouth, throat: [] frequent nose bleeds, [] ringing in the ears   Respiratory: [] cough, [] wheezing   Cardiovascular: [] chest pain, [x] palpitations   Gastrointestinal: [] jaundice, [] nausea/vomiting   Genitourinary: [] incontinence, [] burning with urination   Hematologic/lymphatic: [] easy bruising/bleeding, [] night sweats   Neurological: [] numbness, [x] weakness   Endocrine: [x] fatigue, [] heat/cold intolerance   Allergy/Immunologic: [] fevers, [] chills   Musculoskeletal: [x] muscle pain, [x] joint pain   Psychiatric: [] thoughts of harming self/others, [x] depression   Integumentary: [] rashes, [] sores that do not heal     Objective:        Vitals:    03/15/23 1305   BP: 131/81   Pulse: 90   Resp: 18   Temp: 97.8 °F (36.6 °C)     Body mass index is 39.6 kg/m².    3/15/23  Constitutional: appears in no acute distress, well-developed, well-nourished     Eyes: normal conjunctiva, PERRLA    Ears, nose, mouth, throat: external appearance of ears and nose normal, hearing intact     Cardiovascular: regular rate and rhythm, no murmurs appreciated    Respiratory: unlabored respirations, breath sounds normal  bilaterally    Gastrointestinal: no visible abdominal masses, no guarding, no visible hernia    Musculoskeletal: normal tone in all four extremities. No abnormal movements. No pronator drift. No orbit. Symmetric finger tapping. Normal station. Normal regular gait. Normal tandem gait.      Spine:   CERVICAL SPINE:  ROM: normal   MUSCLE SPASM: bilateral    FACET LOADING: no   SPURLING: no  ANNA / FATIMAH tender: no     Psychiatric: normal judgment and insight. Oriented to person, place, and time.     Neurologic:   Cortical functions: recent and remote memory intact, normal attention span and concentration, speech fluent, adequate fund of knowledge   Cranial nerves: visual fields full, PERRLA, EOMI, symmetric facial strength, hearing intact, palate elevates symmetrically, shoulder shrug 5/5, tongue protrudes midline   Reflexes: 2+ in the upper and lower extremities, no Briones  Sensation: intact to temperature throughout   Coordination: normal finger to nose, heel to shin    Data Review:     I have personally reviewed the referring provider's notes, labs, & imaging made available to me today.      RADIOLOGY STUDIES:  I have personally reviewed the pertinent images performed.       Results for orders placed or performed during the hospital encounter of 06/25/13   MRA Brain without contrast    Narrative    Routine  imaging through this 32-year-old females brain was obtained per the MRA protocol.  A survey flair weighted sequence and T2 weighted sequence were also available.    The anterior cerebral arteries, anterior communicating artery, middle cerebral arteries, and posterior cerebral arteries proximally demonstrate no evidence of aneurysm or stenosis.      The left vertebral artery appears dominant in comparison with the right.      The P1 segment on the left appears hypoplastic with the majority of the blood flow coming from the posterior communicating artery on the left.  The posterior communicating artery on the right  appears hypoplastic with a majority of the blood flow coming   from the P1 segment.      The A1 segment on the right appears hypoplastic with the majority of the blood flow to the anterior cerebral arteries likely coming from the left A1 segment.    The ventricles and sulci appear age appropriate.  Mild mucous membrane thickening is noted within the maxillary sinuses bilaterally.    Impression     1.  No obvious evidence of aneurysm or stenosis is noted on this MRI of the brain.      2.  Mild mucous membrane thickening is noted within the maxillary sinuses bilaterally.      Electronically signed by: Philippe Hayden MD  Date:     06/25/13  Time:    16:50        Lab Results   Component Value Date    BNP 21 11/26/2018     01/23/2023    K 4.5 01/23/2023    MG 2.3 09/07/2022     01/23/2023    CO2 22 (L) 01/23/2023    BUN 9 01/23/2023    CREATININE 1.0 01/23/2023    GLU 92 01/23/2023    HGBA1C 5.2 08/14/2020    AST 20 01/23/2023    ALT 17 01/23/2023    ALBUMIN 4.3 01/23/2023    PROT 7.9 01/23/2023    BILITOT 0.8 01/23/2023    CHOL 172 08/07/2020    CHOL 144 11/24/2018    HDL 46 08/07/2020    HDL 41 11/24/2018    LDLCALC 119 (H) 08/07/2020    LDLCALC 87.2 11/24/2018    TRIG 36 08/07/2020    TRIG 79 11/24/2018       Lab Results   Component Value Date    WBC 6.77 01/23/2023    HGB 15.4 01/23/2023    HCT 46.4 01/23/2023    MCV 89 01/23/2023     01/23/2023       Lab Results   Component Value Date    TSH 1.154 08/01/2022           Assessment & Plan:       Problem List Items Addressed This Visit          Neuro    Intractable migraine with aura without status migrainosus    Overview     Add magnesium.          Intractable chronic migraine without aura and without status migrainosus - Primary    Overview     Migraine headaches since her 20's. Headaches are typically unilateral, moderate to severe in intensity, worsen with activity, pounding in quality and associated with sensitivity to light and sound. Gradual  progression pattern, lack of red flag features on history, and normal neurological exam are reassuring for primary as opposed to secondary etiology of headaches thus imaging will not be pursued for this history and this exam at this time.    Galcanezumab (Emgality) treatment was approved for the prevention of acute and chronic migraine on 9/27/2018. The patient will be an ideal candidate for Emgality. We are planning for 3 treatments 28 days apart. If we see no improvement after 3 treatments, we will discontinue the injections.     Next step would be to add Botox.    Add rizatriptan for acute attacks. Next step would be gepant.          Relevant Medications    galcanezumab-gnlm (EMGALITY PEN) 120 mg/mL PnIj    galcanezumab-gnlm 120 mg/mL PnIj    rizatriptan (MAXALT) 10 MG tablet     Other Visit Diagnoses       Migraine without status migrainosus, not intractable, unspecified migraine type        Relevant Medications    ketorolac injection 30 mg (Completed)    Nausea        Relevant Medications    prochlorperazine (COMPAZINE) 10 MG tablet    Family history of brain aneurysm        Father, paternal aunt    Relevant Orders    MRA Brain without contrast    Cervical myofascial pain syndrome        Relevant Medications    tiZANidine (ZANAFLEX) 4 MG tablet                Please call our clinic at [phone] or send a message on the [employer] [facility] portal if there are any changes to the plan described below, for example,if you are not contacted for the requested tests, referral(s) within one week, if you are unable to receive the medications prescribed, or if you feel you need to change the treatment course for any reason.     TESTING:  -- MRA    REFERRALS:  -- none    PREVENTION (use daily regardless of headache):  -- Start Emgality injection once every 28 days (2 prescriptions, first month give yourself TWO shots, all other months give yourself ONE shot) (will come from [facility], they will call you)  -- start  magnesium in ONE of the following preparations -               1. Magnesium oxide 800mg daily (the most common over the counter kind, may causes loose stools)              2. Magnesium citrate 400-500mg daily (harder to find, but more neutral on the bowels)              3. Magnesium glycinate 400mg daily (hardest to find, look online, but most bowel-neutral, best absorbed)     AS-NEEDED TREATMENT (use total no more than 10 days per month unless otherwise stated):  -- START rizatriptan with next migraine. You can repeat two hours later if needed  START tizanidine at night. This is a muscle relaxer and it will also make you potentially sleepy. Start with half a tablet to see how you respond but can take up to a whole tablet if needed  - start compazine. This is a nausea medication that also has anti-migraine properties. Can take daily and will not contribute to rebound headaches      Follow up in about 3 months (around 6/15/2023) for follow up with MARQUES.    Tricia Gerardo NP

## 2023-03-15 NOTE — PATIENT INSTRUCTIONS
Please call our clinic at [phone] or send a message on the [employer] portal if there are any changes to the plan described below, for example,if you are not contacted for the requested tests, referral(s) within one week, if you are unable to receive the medications prescribed, or if you feel you need to change the treatment course for any reason.     TESTING:  -- MRA    REFERRALS:  -- none    PREVENTION (use daily regardless of headache):  -- Start Emgality injection once every 28 days (2 prescriptions, first month give yourself TWO shots, all other months give yourself ONE shot) (will come from [facility], they will call you)  -- start magnesium in ONE of the following preparations -               1. Magnesium oxide 800mg daily (the most common over the counter kind, may causes loose stools)              2. Magnesium citrate 400-500mg daily (harder to find, but more neutral on the bowels)              3. Magnesium glycinate 400mg daily (hardest to find, look online, but most bowel-neutral, best absorbed)     AS-NEEDED TREATMENT (use total no more than 10 days per month unless otherwise stated):  -- START rizatriptan with next migraine. You can repeat two hours later if needed  START tizanidine at night. This is a muscle relaxer and it will also make you potentially sleepy. Start with half a tablet to see how you respond but can take up to a whole tablet if needed  - start compazine. This is a nausea medication that also has anti-migraine properties. Can take daily and will not contribute to rebound headaches

## 2023-03-17 ENCOUNTER — TELEPHONE (OUTPATIENT)
Dept: PHARMACY | Facility: CLINIC | Age: 43
End: 2023-03-17
Payer: COMMERCIAL

## 2023-03-21 ENCOUNTER — HOSPITAL ENCOUNTER (OUTPATIENT)
Dept: RADIOLOGY | Facility: HOSPITAL | Age: 43
Discharge: HOME | End: 2023-03-21
Attending: NURSE PRACTITIONER
Payer: COMMERCIAL

## 2023-03-21 DIAGNOSIS — Z82.49 FAMILY HISTORY OF BRAIN ANEURYSM: ICD-10-CM

## 2023-03-21 PROCEDURE — 70544 MR ANGIOGRAPHY HEAD W/O DYE: CPT | Mod: 26,,, | Performed by: RADIOLOGY

## 2023-03-21 PROCEDURE — 70544 MR ANGIOGRAPHY HEAD W/O DYE: CPT | Mod: TC,PO

## 2023-03-21 PROCEDURE — 70544 MRA BRAIN WITHOUT CONTRAST: ICD-10-PCS | Mod: 26,,, | Performed by: RADIOLOGY

## 2023-03-22 DIAGNOSIS — I72.9 ANEURYSM: Primary | ICD-10-CM

## 2023-03-27 ENCOUNTER — OFFICE VISIT (OUTPATIENT)
Dept: FAMILY MEDICINE | Facility: CLINIC | Age: 43
End: 2023-03-27
Payer: COMMERCIAL

## 2023-03-27 VITALS
HEART RATE: 60 BPM | BODY MASS INDEX: 39.79 KG/M2 | SYSTOLIC BLOOD PRESSURE: 130 MMHG | WEIGHT: 216.25 LBS | OXYGEN SATURATION: 98 % | DIASTOLIC BLOOD PRESSURE: 82 MMHG | HEIGHT: 62 IN | TEMPERATURE: 99 F

## 2023-03-27 DIAGNOSIS — F41.8 DEPRESSION WITH ANXIETY: Primary | ICD-10-CM

## 2023-03-27 DIAGNOSIS — G43.909 MIGRAINE WITHOUT STATUS MIGRAINOSUS, NOT INTRACTABLE, UNSPECIFIED MIGRAINE TYPE: ICD-10-CM

## 2023-03-27 DIAGNOSIS — I72.9 ANEURYSM: ICD-10-CM

## 2023-03-27 PROCEDURE — 99214 PR OFFICE/OUTPT VISIT, EST, LEVL IV, 30-39 MIN: ICD-10-PCS | Mod: S$GLB,,, | Performed by: NURSE PRACTITIONER

## 2023-03-27 PROCEDURE — 3075F SYST BP GE 130 - 139MM HG: CPT | Mod: CPTII,S$GLB,, | Performed by: NURSE PRACTITIONER

## 2023-03-27 PROCEDURE — 3008F PR BODY MASS INDEX (BMI) DOCUMENTED: ICD-10-PCS | Mod: CPTII,S$GLB,, | Performed by: NURSE PRACTITIONER

## 2023-03-27 PROCEDURE — 1159F MED LIST DOCD IN RCRD: CPT | Mod: CPTII,S$GLB,, | Performed by: NURSE PRACTITIONER

## 2023-03-27 PROCEDURE — 3079F DIAST BP 80-89 MM HG: CPT | Mod: CPTII,S$GLB,, | Performed by: NURSE PRACTITIONER

## 2023-03-27 PROCEDURE — 3008F BODY MASS INDEX DOCD: CPT | Mod: CPTII,S$GLB,, | Performed by: NURSE PRACTITIONER

## 2023-03-27 PROCEDURE — 3079F PR MOST RECENT DIASTOLIC BLOOD PRESSURE 80-89 MM HG: ICD-10-PCS | Mod: CPTII,S$GLB,, | Performed by: NURSE PRACTITIONER

## 2023-03-27 PROCEDURE — 99999 PR PBB SHADOW E&M-EST. PATIENT-LVL III: CPT | Mod: PBBFAC,,, | Performed by: NURSE PRACTITIONER

## 2023-03-27 PROCEDURE — 99999 PR PBB SHADOW E&M-EST. PATIENT-LVL III: ICD-10-PCS | Mod: PBBFAC,,, | Performed by: NURSE PRACTITIONER

## 2023-03-27 PROCEDURE — 99214 OFFICE O/P EST MOD 30 MIN: CPT | Mod: S$GLB,,, | Performed by: NURSE PRACTITIONER

## 2023-03-27 PROCEDURE — 1159F PR MEDICATION LIST DOCUMENTED IN MEDICAL RECORD: ICD-10-PCS | Mod: CPTII,S$GLB,, | Performed by: NURSE PRACTITIONER

## 2023-03-27 PROCEDURE — 3075F PR MOST RECENT SYSTOLIC BLOOD PRESS GE 130-139MM HG: ICD-10-PCS | Mod: CPTII,S$GLB,, | Performed by: NURSE PRACTITIONER

## 2023-03-27 NOTE — PROGRESS NOTES
Subjective:       Patient ID: Eugenie Laguerre is a 42 y.o. female.    Chief Complaint: 2wk f/u depression / anxiety    HPI  Zoloft 25mg was added to buspar at last visit for depression  She has been off work during this time  Feeling better--less anxious, less depressed. Calmer.   Tolerated meds.  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 Planning to change careers    Vitals:    23 0842   BP: 130/82   Pulse: 60   Temp: 98.8 °F (37.1 °C)     Review of Systems   Constitutional:  Negative for fever.   Gastrointestinal:  Negative for nausea and vomiting.   Psychiatric/Behavioral:  Negative for confusion and suicidal ideas.      Past Medical History:   Diagnosis Date    Anxiety 2023    Clotting disorder     DVT (deep venous thrombosis) 2007    was felt to be due to ? Factor V Leiden    High risk pregnancy due to history of  labor 2015    Hyperthyroidism 2013    reports repeat testing was WNL and no treatment needed.    Iron deficiency anemia due to chronic blood loss 2015    Migraines     Restless legs syndrome (RLS)     Short cervix affecting pregnancy 2015     Objective:      Physical Exam  Constitutional:       General: She is not in acute distress.     Appearance: She is well-developed. She is not ill-appearing, toxic-appearing or diaphoretic.   HENT:      Right Ear: Hearing normal.      Left Ear: Hearing normal.   Pulmonary:      Effort: No tachypnea or respiratory distress.   Skin:     Coloration: Skin is not pale.   Neurological:      Mental Status: She is alert and oriented to person, place, and time.   Psychiatric:         Speech: Speech normal.         Behavior: Behavior normal.         Thought Content: Thought content normal.         Judgment: Judgment normal.       Assessment:       1. Depression with anxiety    2. Aneurysm    3. Migraine without status migrainosus, not intractable, unspecified migraine type        Plan:       Depression with  anxiety   Improving; continue Zoloft and buspar   Out of work for another 2 weeks--will require psychiatry eval prior to return   Recommend reaching out to work for counseling resources    Aneurysm    Vascular referral placed by neuro     Migraine without status migrainosus, not intractable, unspecified migraine type   Not exacerbated by new zoloft; stable, per neuro           Follow up in about 2 weeks (around 4/10/2023).    Medication List with Changes/Refills   Current Medications    BUSPIRONE (BUSPAR) 15 MG TABLET    Take 1 tablet (15 mg total) by mouth 2 (two) times daily.    GALCANEZUMAB-GNLM (EMGALITY PEN) 120 MG/ML PNIJ    Inject 120 mg into the skin every 28 days.    GALCANEZUMAB-GNLM 120 MG/ML PNIJ    Inject 240 mg (2 injections) subcutaneous at separate sites, once (loading dose). Start maintenance dose 28 days later    IBUPROFEN (ADVIL,MOTRIN) 800 MG TABLET    Take 800 mg by mouth every 8 (eight) hours as needed.    MEDROXYPROGESTERONE (PROVERA) 10 MG TABLET    Take 1 tablet (10 mg total) by mouth once daily.    ONDANSETRON (ZOFRAN) 4 MG TABLET    Take 1 tablet (4 mg total) by mouth every 6 (six) hours as needed for Nausea.    PROCHLORPERAZINE (COMPAZINE) 10 MG TABLET    Take 1 tablet (10 mg total) by mouth every 6 (six) hours as needed (migraine or nausea).    PROMETHAZINE (PHENERGAN) 25 MG TABLET    Take 1 tablet (25 mg total) by mouth every 6 (six) hours as needed for Nausea.    RIZATRIPTAN (MAXALT) 10 MG TABLET    1 tab PO PRN migraine. May repeat every 2 hours for max 3 tabs in 24 hours. Use no more than 10 days per month.    SERTRALINE (ZOLOFT) 25 MG TABLET    Take 1 tablet (25 mg total) by mouth once daily.    TIZANIDINE (ZANAFLEX) 4 MG TABLET    Half or full tablet by mouth at night as needed for muscle spasm    TOPIRAMATE (TOPAMAX) 50 MG TABLET    Take 1.5 tablets (75 mg total) by mouth every evening.

## 2023-03-27 NOTE — LETTER
March 27, 2023      [facility]  [address] [place] [address]  Phone: [phone]  Fax: [phone]       Patient: Eugenie Laguerre   YOB: 1980  Date of Visit: 03/27/2023    To Whom It May Concern:    SHAJI Laguerre  was at [facility] on 03/27/2023. The patient remains under our care, please excuse her until follow up evaluation on Monday April 10, 2023. If you have any questions or concerns, or if I can be of further assistance, please do not hesitate to contact me.    Sincerely,        Arlene Gomez NP

## 2023-03-29 ENCOUNTER — PATIENT MESSAGE (OUTPATIENT)
Dept: NEUROLOGY | Facility: CLINIC | Age: 43
End: 2023-03-29
Payer: COMMERCIAL

## 2023-03-30 ENCOUNTER — TELEPHONE (OUTPATIENT)
Dept: NEUROLOGY | Facility: CLINIC | Age: 43
End: 2023-03-30
Payer: COMMERCIAL

## 2023-03-30 NOTE — TELEPHONE ENCOUNTER
Called patient and notified that the Call Center had sent us here in the Headache Department the message instead of sending to [facility]. Informed patient that I would send message to the staff and offered her the number to [facility]. Patient requested message be sent with the phone number since she is not able to write it down.

## 2023-03-30 NOTE — TELEPHONE ENCOUNTER
----- Message from Danita Pino sent at 3/30/2023 10:15 AM CDT -----  Contact: patient  Type:  Apoointment Request    Name of Caller: patient     When is the first available appointment?n/a     Symptoms:f/u     Would the patient rather a call back or a response via [facility]? Call     Best Call Back Number: [phone] (home)      Additional Information:

## 2023-03-31 NOTE — TELEPHONE ENCOUNTER
Called pt regarding referral for an aneurysm. I explained to pt that she will need to be scheduled with Gabriela Naik NP since she and Dr. Burnett handle IR cases such as these. Pt verbalized understanding and agreed to be scheduled. Pt scheduled with Gabriela Naik on 4/11 at 11am. Pt confirmed date/time of appt.

## 2023-04-10 ENCOUNTER — PATIENT MESSAGE (OUTPATIENT)
Dept: FAMILY MEDICINE | Facility: CLINIC | Age: 43
End: 2023-04-10

## 2023-04-10 ENCOUNTER — OFFICE VISIT (OUTPATIENT)
Dept: FAMILY MEDICINE | Facility: CLINIC | Age: 43
End: 2023-04-10
Payer: COMMERCIAL

## 2023-04-10 DIAGNOSIS — F41.8 DEPRESSION WITH ANXIETY: Primary | ICD-10-CM

## 2023-04-10 DIAGNOSIS — G43.909 MIGRAINE WITHOUT STATUS MIGRAINOSUS, NOT INTRACTABLE, UNSPECIFIED MIGRAINE TYPE: ICD-10-CM

## 2023-04-10 DIAGNOSIS — I72.9 ANEURYSM: ICD-10-CM

## 2023-04-10 PROCEDURE — 99214 OFFICE O/P EST MOD 30 MIN: CPT | Mod: 95,,, | Performed by: NURSE PRACTITIONER

## 2023-04-10 PROCEDURE — 99214 PR OFFICE/OUTPT VISIT, EST, LEVL IV, 30-39 MIN: ICD-10-PCS | Mod: 95,,, | Performed by: NURSE PRACTITIONER

## 2023-04-10 NOTE — PROGRESS NOTES
Subjective:       Patient ID: Eugenie Laguerre is a 42 y.o. female.    The patient location is: [place]  The chief complaint leading to consultation is: FU  Visit type: audiovisual    Face to Face time with patient: 5 min  15 minutes of total time spent on the encounter, which includes face to face time and non-face to face time preparing to see the patient (eg, review of tests), Obtaining and/or reviewing separately obtained history, Documenting clinical information in the electronic or other health record, Independently interpreting results (not separately reported) and communicating results to the patient/family/caregiver, or Care coordination (not separately reported).     Each patient to whom he or she provides medical services by telemedicine is:  (1) informed of the relationship between the physician and patient and the respective role of any other health care provider with respect to management of the patient; and (2) notified that he or she may decline to receive medical services by telemedicine and may withdraw from such care at any time.    HPI  Anxiety and depression: currently on Zoloft 25mg and buspar 15mg BID  Mood has significantly improved. Planning on changing jobs which is main contributor to mood    Seeing vascular neurology tomorrow for aneurysm   Migraines followed by our neuro--stable, mild increase in frequency since recent med adjustment but resolves with PRN meds    Review of Systems   Constitutional:  Positive for activity change. Negative for unexpected weight change.   HENT:  Negative for hearing loss, rhinorrhea and trouble swallowing.    Eyes:  Negative for discharge and visual disturbance.   Respiratory:  Negative for chest tightness and wheezing.    Cardiovascular:  Negative for chest pain and palpitations.   Gastrointestinal:  Negative for blood in stool, constipation, diarrhea and vomiting.   Endocrine: Negative for polydipsia and polyuria.   Genitourinary:  Negative for difficulty  urinating, dysuria, hematuria and menstrual problem.   Musculoskeletal:  Positive for arthralgias and joint swelling. Negative for neck pain.   Neurological:  Positive for headaches. Negative for weakness.   Psychiatric/Behavioral:  Negative for confusion and dysphoric mood.        Objective:      Physical Exam  Constitutional:       General: She is not in acute distress.     Appearance: She is well-developed. She is not ill-appearing, toxic-appearing or diaphoretic.   HENT:      Right Ear: Hearing normal.      Left Ear: Hearing normal.   Pulmonary:      Effort: No tachypnea or respiratory distress.   Skin:     Coloration: Skin is not pale.   Neurological:      Mental Status: She is alert and oriented to person, place, and time.   Psychiatric:         Speech: Speech normal.         Behavior: Behavior normal.         Thought Content: Thought content normal.         Judgment: Judgment normal.       Assessment:       1. Depression with anxiety    2. Aneurysm    3. Migraine without status migrainosus, not intractable, unspecified migraine type        Plan:       Depression with anxiety   Continue current medications    Aneurysm   FU with vascular neuro tomorrow as scheduled    Migraine without status migrainosus, not intractable, unspecified migraine type   Monitor for increased frequency/severity--FU with neuro routinely and PRN         FU 3-6 months with PCP, me sooner as needed      Medication List with Changes/Refills   Current Medications    BUSPIRONE (BUSPAR) 15 MG TABLET    Take 1 tablet (15 mg total) by mouth 2 (two) times daily.    GALCANEZUMAB-GNLM (EMGALITY PEN) 120 MG/ML PNIJ    Inject 120 mg into the skin every 28 days.    GALCANEZUMAB-GNLM 120 MG/ML PNIJ    Inject 240 mg (2 injections) subcutaneous at separate sites, once (loading dose). Start maintenance dose 28 days later    IBUPROFEN (ADVIL,MOTRIN) 800 MG TABLET    Take 800 mg by mouth every 8 (eight) hours as needed.    MEDROXYPROGESTERONE (PROVERA) 10  MG TABLET    Take 1 tablet (10 mg total) by mouth once daily.    ONDANSETRON (ZOFRAN) 4 MG TABLET    Take 1 tablet (4 mg total) by mouth every 6 (six) hours as needed for Nausea.    PROCHLORPERAZINE (COMPAZINE) 10 MG TABLET    Take 1 tablet (10 mg total) by mouth every 6 (six) hours as needed (migraine or nausea).    PROMETHAZINE (PHENERGAN) 25 MG TABLET    Take 1 tablet (25 mg total) by mouth every 6 (six) hours as needed for Nausea.    RIZATRIPTAN (MAXALT) 10 MG TABLET    1 tab PO PRN migraine. May repeat every 2 hours for max 3 tabs in 24 hours. Use no more than 10 days per month.    SERTRALINE (ZOLOFT) 25 MG TABLET    Take 1 tablet (25 mg total) by mouth once daily.    TIZANIDINE (ZANAFLEX) 4 MG TABLET    Half or full tablet by mouth at night as needed for muscle spasm    TOPIRAMATE (TOPAMAX) 50 MG TABLET    Take 1.5 tablets (75 mg total) by mouth every evening.

## 2023-04-11 ENCOUNTER — OFFICE VISIT (OUTPATIENT)
Dept: NEUROLOGY | Facility: CLINIC | Age: 43
End: 2023-04-11
Payer: COMMERCIAL

## 2023-04-11 VITALS
HEART RATE: 69 BPM | BODY MASS INDEX: 40.16 KG/M2 | SYSTOLIC BLOOD PRESSURE: 115 MMHG | DIASTOLIC BLOOD PRESSURE: 79 MMHG | WEIGHT: 218.25 LBS | HEIGHT: 62 IN

## 2023-04-11 DIAGNOSIS — I67.1 CEREBRAL ANEURYSM, NONRUPTURED: Primary | ICD-10-CM

## 2023-04-11 DIAGNOSIS — Z72.0 TOBACCO ABUSE: ICD-10-CM

## 2023-04-11 PROCEDURE — 3074F PR MOST RECENT SYSTOLIC BLOOD PRESSURE < 130 MM HG: ICD-10-PCS | Mod: CPTII,S$GLB,, | Performed by: NURSE PRACTITIONER

## 2023-04-11 PROCEDURE — 1160F PR REVIEW ALL MEDS BY PRESCRIBER/CLIN PHARMACIST DOCUMENTED: ICD-10-PCS | Mod: CPTII,S$GLB,, | Performed by: NURSE PRACTITIONER

## 2023-04-11 PROCEDURE — 99215 PR OFFICE/OUTPT VISIT, EST, LEVL V, 40-54 MIN: ICD-10-PCS | Mod: S$GLB,,, | Performed by: NURSE PRACTITIONER

## 2023-04-11 PROCEDURE — 1160F RVW MEDS BY RX/DR IN RCRD: CPT | Mod: CPTII,S$GLB,, | Performed by: NURSE PRACTITIONER

## 2023-04-11 PROCEDURE — 1159F PR MEDICATION LIST DOCUMENTED IN MEDICAL RECORD: ICD-10-PCS | Mod: CPTII,S$GLB,, | Performed by: NURSE PRACTITIONER

## 2023-04-11 PROCEDURE — 99999 PR PBB SHADOW E&M-EST. PATIENT-LVL IV: ICD-10-PCS | Mod: PBBFAC,,, | Performed by: NURSE PRACTITIONER

## 2023-04-11 PROCEDURE — 3008F PR BODY MASS INDEX (BMI) DOCUMENTED: ICD-10-PCS | Mod: CPTII,S$GLB,, | Performed by: NURSE PRACTITIONER

## 2023-04-11 PROCEDURE — 1159F MED LIST DOCD IN RCRD: CPT | Mod: CPTII,S$GLB,, | Performed by: NURSE PRACTITIONER

## 2023-04-11 PROCEDURE — 3078F DIAST BP <80 MM HG: CPT | Mod: CPTII,S$GLB,, | Performed by: NURSE PRACTITIONER

## 2023-04-11 PROCEDURE — 3074F SYST BP LT 130 MM HG: CPT | Mod: CPTII,S$GLB,, | Performed by: NURSE PRACTITIONER

## 2023-04-11 PROCEDURE — 3078F PR MOST RECENT DIASTOLIC BLOOD PRESSURE < 80 MM HG: ICD-10-PCS | Mod: CPTII,S$GLB,, | Performed by: NURSE PRACTITIONER

## 2023-04-11 PROCEDURE — 99999 PR PBB SHADOW E&M-EST. PATIENT-LVL IV: CPT | Mod: PBBFAC,,, | Performed by: NURSE PRACTITIONER

## 2023-04-11 PROCEDURE — 3008F BODY MASS INDEX DOCD: CPT | Mod: CPTII,S$GLB,, | Performed by: NURSE PRACTITIONER

## 2023-04-11 PROCEDURE — 99215 OFFICE O/P EST HI 40 MIN: CPT | Mod: S$GLB,,, | Performed by: NURSE PRACTITIONER

## 2023-04-11 NOTE — PROGRESS NOTES
[facility] VISIT      SUBJECTIVE:    History for Present Illness: Eugenie Laguerre is a 42 y.o.  female with a past medical history of anxiety, hypothyroidism,  migraines, RLS and clotting disorder who presents to me in clinic as a referral from Tricia Gerardo for abnormal imaging finding.  MRA of the brain indicated a 2 mm distal right ICA superior hypophyseal segment saccular aneurysm.  She is accompanied to today's visit by her significant other    At the time of today's visit, the patient denies new or worsening focal neurologic symptoms concerning for new stroke or TIA.She denies associated vertigo, double vision, focal weakness or numbness, gait imbalance,  language or visual disturbances.  Positive familial history (father and aunt) with ruptured cerebral aneurysm.  Patient is independent with ADLs.  She smokes 1 pack/5 days.  Denies alcohol/illicit drug use.  Patient is compliant home medications.      Past Medical History:   Diagnosis Date    Anxiety 2023    Clotting disorder     DVT (deep venous thrombosis) 2007    was felt to be due to ? Factor V Leiden    High risk pregnancy due to history of  labor 2015    Hyperthyroidism 2013    reports repeat testing was WNL and no treatment needed.    Iron deficiency anemia due to chronic blood loss 2015    Migraines     Restless legs syndrome (RLS)     Short cervix affecting pregnancy 2015     Past Surgical History:   Procedure Laterality Date    2 R knee sx; 1 L knee      arthroscopies    BRONCHOSCOPY       SECTION  2015    LAPAROSCOPIC CHOLECYSTECTOMY N/A 2021    Procedure: CHOLECYSTECTOMY, LAPAROSCOPIC;  Surgeon: Pascual Lu MD;  Location: [facility];  Service: General;  Laterality: N/A;    TUBAL LIGATION  2015     Family History   Problem Relation Age of Onset    Fibromyalgia Mother     Depression Mother     Anuerysm Father     Cancer Father         skin    Deep vein  thrombosis Father     Hypertension Father     Bipolar disorder Sister     Ovarian cancer Maternal Aunt     Bladder Cancer Maternal Aunt     Anuerysm Paternal Aunt         AAA    Breast cancer Maternal Grandmother     Diabetes Paternal Grandmother     Anuerysm Paternal Grandfather         AAA    Heart disease Brother     Colon cancer Neg Hx     Stomach cancer Neg Hx     Esophageal cancer Neg Hx         Current Outpatient Medications:     busPIRone (BUSPAR) 15 MG tablet, Take 1 tablet (15 mg total) by mouth 2 (two) times daily., Disp: 60 tablet, Rfl: 5    galcanezumab-gnlm (EMGALITY PEN) 120 mg/mL PnIj, Inject 120 mg into the skin every 28 days., Disp: 1 mL, Rfl: 11    galcanezumab-gnlm 120 mg/mL PnIj, Inject 240 mg (2 injections) subcutaneous at separate sites, once (loading dose). Start maintenance dose 28 days later, Disp: 2 mL, Rfl: 0    ibuprofen (ADVIL,MOTRIN) 800 MG tablet, Take 800 mg by mouth every 8 (eight) hours as needed., Disp: , Rfl:     prochlorperazine (COMPAZINE) 10 MG tablet, Take 1 tablet (10 mg total) by mouth every 6 (six) hours as needed (migraine or nausea)., Disp: 60 tablet, Rfl: 11    rizatriptan (MAXALT) 10 MG tablet, 1 tab PO PRN migraine. May repeat every 2 hours for max 3 tabs in 24 hours. Use no more than 10 days per month., Disp: 9 tablet, Rfl: 11    sertraline (ZOLOFT) 25 MG tablet, Take 1 tablet (25 mg total) by mouth once daily., Disp: 30 tablet, Rfl: 5    tiZANidine (ZANAFLEX) 4 MG tablet, Half or full tablet by mouth at night as needed for muscle spasm, Disp: 30 tablet, Rfl: 11    medroxyPROGESTERone (PROVERA) 10 MG tablet, Take 1 tablet (10 mg total) by mouth once daily., Disp: 10 tablet, Rfl: 0    ondansetron (ZOFRAN) 4 MG tablet, Take 1 tablet (4 mg total) by mouth every 6 (six) hours as needed for Nausea., Disp: 20 tablet, Rfl: 0    promethazine (PHENERGAN) 25 MG tablet, Take 1 tablet (25 mg total) by mouth every 6 (six) hours as needed for Nausea., Disp: 20 tablet, Rfl: 0     "topiramate (TOPAMAX) 50 MG tablet, Take 1.5 tablets (75 mg total) by mouth every evening., Disp: 40 tablet, Rfl: 11     Review of Systems:   Constitutional:  Negative for chills and fever.   HENT:  Negative for sore throat.    Eyes:  Negative visual disturbance. Negative for photophobia, pain and redness.   Respiratory:  Negative for shortness of breath.    Cardiovascular:  Negative for chest pain.   Gastrointestinal:  Negative for nausea.   Genitourinary:  Negative for dysuria.   Musculoskeletal:  Negative for back pain.   Skin:  Negative for rash.   Neurological:  Negative for weakness.   Hematological:  Does not bruise/bleed easily.     OBJECTIVE:    /79   Pulse 69   Ht 5' 2" (1.575 m)   Wt 99 kg (218 lb 4.1 oz)   LMP 02/06/2023 (Exact Date)   BMI 39.92 kg/m²     Physical Exam   Constitution: She appears well nourished. No distress   LOC: Alert and follows request  Head: Normocephalic and atraumatic.   Cardiovascular: Normal rate. Intact distal pulses  Pulmonary/Chest: Effort normal. No respiratory distress  Psychiatric: no pressured speech; normal affect; no evidence of impaired cognition    Neurologic Exam:  Orientation: person, place and time  Language: No aphasia  Speech: No dysarthria  Memory: Recent memory intact; Remote memory intact; age correct; month correct  Visual Fields (CN II):  Full  EOM (CN III, IV, VI): Full intact  Pupils (CN II, III): PERRL  Facial Sensation (CN V): symmetric  Facial Movement (CN VII): Symmetrical facial expressions   Hearing (CN VIII):  Intact bilaterally   Shoulder/Neck (CN XI): SCM-Left: Normal; SCM-Right: Normal; Left Shoulder Shrug: Normal/Symmetric ; Right Shoulder Shrug: Normal/Symmetric  Tongue (CN XII): to midline  Motor examination of all extremities :demonstrates normal bulk and tone in all four limbs. There are no atrophy or fasciculations.        Left Right     Left Right   Deltoid 5/5 5/5   Hip Flexion 5/5 5/5   Biceps 5/5 5/5   Hip Extension 5/5 5/5 "   Triceps 5/5 5/5   Knee Flexion 5/5 5/5   Wrist Ext 5/5 5/5   Knee Extension 5/5 5/5   Finger Abd 5/5 5/5   Ankle dorsiflex 5/5 5/5           Ankle plantar flex 5/5 5/5     Sensory examination: is normal light touch in BUE and BLE.   Gait: Gait steady with normal arm swing and stride length  Coordination: Rapid alternating movements and fine finger movements are intact.                                                                                                                                                                                         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    No occlusion or flow-limiting stenosis noted  Cardiac Imaging     Assessment:  Eugenie Laguerre  is a 42 y.o.  female  seen today in clinic for follow-up assessment and recommendations. The following recommendations and plan were discussed in depth with the patient who voiced understanding and was in agreement.  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  -Will plan for diagnostic cerebral angiogram for better characterization of imaging findings and to help guide future plan of care including need for endovascular intervention if necessary.  -Patient verbalized understanding of risk and benefits of endovascular procedure . All questions were answered to the patients satisfaction.  -Pre-procedure labs:  BMP  -Tobacco dependency: Counseled on smoking cessation.  Tobacco  dependency will increase your future stroke risk and elevated your BP. Patient currently smoking 1pk/5days; continues to work towards cessation.    Patient/Family teaching provided during this visit  -Identifying the signs and symptoms of stroke; emergency action and ER attention  -Risk factor control  -Optimization for secondary stroke prevention including compliance with current medications       I will plan on having Eugenie return to clinic following DSA. The patient can contact my office with any questions or concerns they may have as they arise in the interim.     45 minutes of total time spent on the encounter, which includes face to face time and non-face to face time preparing to see the patient (eg, review of tests), Obtaining and/or reviewing separately obtained history, Documenting clinical information in the electronic or other health record, Independently interpreting results (not separately reported) and communicating results to the patient/family/caregiver, patient/family education and Care coordination (not separately reported).     Gabriela Naik, NP-C  Department of Vascular Neurology  [facility]  [phone]    This note is dictated on M*Modal Fluency Direct word recognition program. There are word recognition mistakes that are occasionally missed on review

## 2023-04-19 DIAGNOSIS — I67.1 UNRUPTURED CEREBRAL ANEURYSM: Primary | ICD-10-CM

## 2023-05-10 ENCOUNTER — PATIENT MESSAGE (OUTPATIENT)
Dept: NEUROLOGY | Facility: CLINIC | Age: 43
End: 2023-05-10
Payer: COMMERCIAL

## 2023-05-11 ENCOUNTER — PATIENT MESSAGE (OUTPATIENT)
Dept: NEUROLOGY | Facility: CLINIC | Age: 43
End: 2023-05-11
Payer: COMMERCIAL

## 2023-05-15 ENCOUNTER — PATIENT MESSAGE (OUTPATIENT)
Dept: NEUROLOGY | Facility: CLINIC | Age: 43
End: 2023-05-15
Payer: COMMERCIAL

## 2023-05-22 ENCOUNTER — LAB VISIT (OUTPATIENT)
Dept: LAB | Facility: HOSPITAL | Age: 43
End: 2023-05-22
Payer: COMMERCIAL

## 2023-05-22 DIAGNOSIS — I67.1 UNRUPTURED CEREBRAL ANEURYSM: ICD-10-CM

## 2023-05-22 LAB
ANION GAP SERPL CALC-SCNC: 10 MMOL/L (ref 8–16)
BUN SERPL-MCNC: 13 MG/DL (ref 6–20)
CALCIUM SERPL-MCNC: 9.5 MG/DL (ref 8.7–10.5)
CHLORIDE SERPL-SCNC: 106 MMOL/L (ref 95–110)
CO2 SERPL-SCNC: 26 MMOL/L (ref 23–29)
CREAT SERPL-MCNC: 0.9 MG/DL (ref 0.5–1.4)
EST. GFR  (NO RACE VARIABLE): >60 ML/MIN/1.73 M^2
GLUCOSE SERPL-MCNC: 76 MG/DL (ref 70–110)
POTASSIUM SERPL-SCNC: 4.4 MMOL/L (ref 3.5–5.1)
SODIUM SERPL-SCNC: 142 MMOL/L (ref 136–145)

## 2023-05-22 PROCEDURE — 36415 COLL VENOUS BLD VENIPUNCTURE: CPT | Mod: PO | Performed by: NURSE PRACTITIONER

## 2023-05-22 PROCEDURE — 80048 BASIC METABOLIC PNL TOTAL CA: CPT | Performed by: NURSE PRACTITIONER

## 2023-05-30 ENCOUNTER — TELEPHONE (OUTPATIENT)
Dept: INTERVENTIONAL RADIOLOGY/VASCULAR | Facility: HOSPITAL | Age: 43
End: 2023-05-30
Payer: COMMERCIAL

## 2023-05-31 ENCOUNTER — HOSPITAL ENCOUNTER (OUTPATIENT)
Dept: INTERVENTIONAL RADIOLOGY/VASCULAR | Facility: HOSPITAL | Age: 43
Discharge: HOME | End: 2023-05-31
Attending: NURSE PRACTITIONER | Admitting: PSYCHIATRY & NEUROLOGY
Payer: COMMERCIAL

## 2023-05-31 VITALS
HEIGHT: 62 IN | WEIGHT: 220 LBS | HEART RATE: 74 BPM | TEMPERATURE: 98 F | DIASTOLIC BLOOD PRESSURE: 78 MMHG | BODY MASS INDEX: 40.48 KG/M2 | RESPIRATION RATE: 18 BRPM | SYSTOLIC BLOOD PRESSURE: 135 MMHG | OXYGEN SATURATION: 95 %

## 2023-05-31 DIAGNOSIS — I67.1 UNRUPTURED CEREBRAL ANEURYSM: ICD-10-CM

## 2023-05-31 LAB
B-HCG UR QL: NEGATIVE
CTP QC/QA: YES

## 2023-05-31 PROCEDURE — 25500020 PHARM REV CODE 255: Performed by: PSYCHIATRY & NEUROLOGY

## 2023-05-31 PROCEDURE — 99152 MOD SED SAME PHYS/QHP 5/>YRS: CPT | Mod: ,,, | Performed by: PSYCHIATRY & NEUROLOGY

## 2023-05-31 PROCEDURE — 99152 MOD SED SAME PHYS/QHP 5/>YRS: CPT | Performed by: PSYCHIATRY & NEUROLOGY

## 2023-05-31 PROCEDURE — 99152 PR MOD CONSCIOUS SEDATION, SAME PHYS, 5+ YRS, FIRST 15 MIN: ICD-10-PCS | Mod: ,,, | Performed by: PSYCHIATRY & NEUROLOGY

## 2023-05-31 PROCEDURE — 36226 PLACE CATH VERTEBRAL ART: CPT | Mod: 50 | Performed by: PSYCHIATRY & NEUROLOGY

## 2023-05-31 PROCEDURE — 36224 PLACE CATH CAROTD ART: CPT | Mod: 50 | Performed by: PSYCHIATRY & NEUROLOGY

## 2023-05-31 PROCEDURE — 36226 PLACE CATH VERTEBRAL ART: CPT | Mod: 50,51,, | Performed by: PSYCHIATRY & NEUROLOGY

## 2023-05-31 PROCEDURE — A4550 SURGICAL TRAYS: HCPCS

## 2023-05-31 PROCEDURE — 36226 PR ANGIO VERTEBRAL ARTERY +/- CERVIOCEREBRAL ARCH, VERTEBRAL ART, SELECTV CATH,S&I: ICD-10-PCS | Mod: 50,51,, | Performed by: PSYCHIATRY & NEUROLOGY

## 2023-05-31 PROCEDURE — G0269 OCCLUSIVE DEVICE IN VEIN ART: HCPCS | Performed by: PSYCHIATRY & NEUROLOGY

## 2023-05-31 PROCEDURE — 63600175 PHARM REV CODE 636 W HCPCS: Performed by: STUDENT IN AN ORGANIZED HEALTH CARE EDUCATION/TRAINING PROGRAM

## 2023-05-31 PROCEDURE — 25000003 PHARM REV CODE 250: Performed by: STUDENT IN AN ORGANIZED HEALTH CARE EDUCATION/TRAINING PROGRAM

## 2023-05-31 PROCEDURE — 81025 URINE PREGNANCY TEST: CPT | Performed by: PSYCHIATRY & NEUROLOGY

## 2023-05-31 PROCEDURE — 99153 MOD SED SAME PHYS/QHP EA: CPT | Performed by: PSYCHIATRY & NEUROLOGY

## 2023-05-31 PROCEDURE — 36224 PLACE CATH CAROTD ART: CPT | Mod: 50,,, | Performed by: PSYCHIATRY & NEUROLOGY

## 2023-05-31 PROCEDURE — 36224 IR ANGIOGRAM CEREBRAL INTRACRANIAL EA ADD VESSEL: ICD-10-PCS | Mod: 50,,, | Performed by: PSYCHIATRY & NEUROLOGY

## 2023-05-31 RX ORDER — MIDAZOLAM HYDROCHLORIDE 1 MG/ML
INJECTION INTRAMUSCULAR; INTRAVENOUS
Status: COMPLETED | OUTPATIENT
Start: 2023-05-31 | End: 2023-05-31

## 2023-05-31 RX ORDER — ACETAMINOPHEN 325 MG/1
650 TABLET ORAL ONCE
Status: COMPLETED | OUTPATIENT
Start: 2023-05-31 | End: 2023-05-31

## 2023-05-31 RX ORDER — ONDANSETRON 8 MG/1
TABLET, ORALLY DISINTEGRATING ORAL
Status: DISCONTINUED
Start: 2023-05-31 | End: 2023-06-01 | Stop reason: HOSPADM

## 2023-05-31 RX ORDER — ACETAMINOPHEN 325 MG/1
TABLET ORAL
Status: DISCONTINUED
Start: 2023-05-31 | End: 2023-06-01 | Stop reason: HOSPADM

## 2023-05-31 RX ORDER — PROCHLORPERAZINE EDISYLATE 5 MG/ML
2.5 INJECTION INTRAMUSCULAR; INTRAVENOUS EVERY 6 HOURS PRN
Status: DISCONTINUED | OUTPATIENT
Start: 2023-05-31 | End: 2023-06-01 | Stop reason: HOSPADM

## 2023-05-31 RX ORDER — OXYCODONE HYDROCHLORIDE 5 MG/1
TABLET ORAL
Status: DISCONTINUED
Start: 2023-05-31 | End: 2023-06-01 | Stop reason: HOSPADM

## 2023-05-31 RX ORDER — OXYCODONE HYDROCHLORIDE 5 MG/1
5 TABLET ORAL ONCE
Status: COMPLETED | OUTPATIENT
Start: 2023-05-31 | End: 2023-05-31

## 2023-05-31 RX ORDER — IODIXANOL 320 MG/ML
65 INJECTION, SOLUTION INTRAVASCULAR
Status: COMPLETED | OUTPATIENT
Start: 2023-05-31 | End: 2023-05-31

## 2023-05-31 RX ORDER — FENTANYL CITRATE 50 UG/ML
INJECTION, SOLUTION INTRAMUSCULAR; INTRAVENOUS
Status: COMPLETED | OUTPATIENT
Start: 2023-05-31 | End: 2023-05-31

## 2023-05-31 RX ORDER — MAGNESIUM SULFATE HEPTAHYDRATE 40 MG/ML
2 INJECTION, SOLUTION INTRAVENOUS ONCE
Status: COMPLETED | OUTPATIENT
Start: 2023-05-31 | End: 2023-05-31

## 2023-05-31 RX ORDER — SODIUM CHLORIDE 0.9 % (FLUSH) 0.9 %
10 SYRINGE (ML) INJECTION
Status: DISCONTINUED | OUTPATIENT
Start: 2023-05-31 | End: 2023-06-01 | Stop reason: HOSPADM

## 2023-05-31 RX ORDER — PROCHLORPERAZINE EDISYLATE 5 MG/ML
INJECTION INTRAMUSCULAR; INTRAVENOUS
Status: DISCONTINUED
Start: 2023-05-31 | End: 2023-05-31 | Stop reason: WASHOUT

## 2023-05-31 RX ORDER — LIDOCAINE HYDROCHLORIDE 10 MG/ML
1 INJECTION, SOLUTION EPIDURAL; INFILTRATION; INTRACAUDAL; PERINEURAL ONCE
Status: DISCONTINUED | OUTPATIENT
Start: 2023-05-31 | End: 2023-06-01 | Stop reason: HOSPADM

## 2023-05-31 RX ORDER — SODIUM CHLORIDE 9 MG/ML
INJECTION, SOLUTION INTRAVENOUS CONTINUOUS
Status: DISCONTINUED | OUTPATIENT
Start: 2023-05-31 | End: 2023-06-01 | Stop reason: HOSPADM

## 2023-05-31 RX ORDER — ONDANSETRON 8 MG/1
8 TABLET, ORALLY DISINTEGRATING ORAL ONCE
Status: COMPLETED | OUTPATIENT
Start: 2023-05-31 | End: 2023-05-31

## 2023-05-31 RX ADMIN — ONDANSETRON 8 MG: 8 TABLET, ORALLY DISINTEGRATING ORAL at 01:05

## 2023-05-31 RX ADMIN — MIDAZOLAM HYDROCHLORIDE 0.5 MG: 1 INJECTION INTRAMUSCULAR; INTRAVENOUS at 11:05

## 2023-05-31 RX ADMIN — ACETAMINOPHEN 650 MG: 325 TABLET ORAL at 01:05

## 2023-05-31 RX ADMIN — FENTANYL CITRATE 100 MCG: 50 INJECTION, SOLUTION INTRAMUSCULAR; INTRAVENOUS at 12:05

## 2023-05-31 RX ADMIN — MIDAZOLAM HYDROCHLORIDE 1 MG: 1 INJECTION INTRAMUSCULAR; INTRAVENOUS at 11:05

## 2023-05-31 RX ADMIN — PROCHLORPERAZINE EDISYLATE 2.5 MG: 5 INJECTION INTRAMUSCULAR; INTRAVENOUS at 02:05

## 2023-05-31 RX ADMIN — FENTANYL CITRATE 25 MCG: 50 INJECTION, SOLUTION INTRAMUSCULAR; INTRAVENOUS at 11:05

## 2023-05-31 RX ADMIN — FENTANYL CITRATE 50 MCG: 50 INJECTION, SOLUTION INTRAMUSCULAR; INTRAVENOUS at 11:05

## 2023-05-31 RX ADMIN — MAGNESIUM SULFATE HEPTAHYDRATE 2 G: 40 INJECTION, SOLUTION INTRAVENOUS at 02:05

## 2023-05-31 RX ADMIN — IODIXANOL 65 ML: 320 INJECTION, SOLUTION INTRAVASCULAR at 12:05

## 2023-05-31 RX ADMIN — OXYCODONE 5 MG: 5 TABLET ORAL at 01:05

## 2023-05-31 NOTE — DISCHARGE INSTRUCTIONS
For scheduling: Call  at [phone]    For questions or concerns call: [facility] 8 AM- 5PM [phone]. Radiology resident on call [phone].    For immediate concerns that are not emergent, you may call our radiology clinic at: [phone]     Cerebral Angiography    What happens during a cerebral angiography?    An IV (intravenous line is started in your arm. You may be given a medicine that helps you relax (sedative)  Youre given an injection to numb the site where the catheter will be inserted. This is usually in the groin area  A small puncture is made into the artery, and the catheter is inserted into the blood vessel. Using X-rays, the catheter is then carefully guided through the artery.  Contrast fluid is injected through the catheter into the artery. You may feel warmth or pressure in your Head, neck, or chest. You may be asked to hold your breath and be still during injections. When the procedure is complete, the catheter is removed and pressure is held at the groin or wrist.    What happens after cerebral angiography    Youll be taken to a recovery area.  You will probably need to lay flat for 2-4 hours    Once you are at home  Dont drive for 24 hours  Avoid walking bending, lifting, and taking stairs for 24 hours.  Avoid lifting anything over 5 pounds for 7 days  Be sure to follow any other instructions from your doctor    When should I call my health care provider?    Call your doctor if you have any of the following:    Severe headache, visual problems, new weakness, dizziness, or trouble speaking  Fever of 100.4 (38C) or higher lasting for 24 to 48 hours  Bleeding, swelling, or a large lump at the insertions site  Sharp or increasing pain at the insertion site  Leg pain, numbness, or a cold leg or foot  Any other symptoms your provider instructed you to report based on your medical condition.    Contact information:    For immediate concerns that are not emergent, you may call our  interventional radiology clinic at [phone] or [phone]    ** After hours and weekends: Call the paging  at [phone] and ask for the Radiology Resident on call**

## 2023-05-31 NOTE — PROCEDURES
Radiology Post-Procedure Note    Pre Op Diagnosis: Right ICA aneurysm     Post Op Diagnosis: See section below     Procedure: Cerebral angiogram    Procedure performed by: Arnol Burnett MD    Written Informed Consent Obtained: Yes    Specimen Removed: NO    Estimated Blood Loss: Minimal    Procedure report:     A 5F sheath was placed into the right femoral artery and a 5F Akin catheter was advanced into the aortic arch. Four vessel angiography of the brain was performed after injection into each of these vessels.    Preliminary interpretation: Prior reported, unruptured right ICA aneurysm was characterized - appears small 3x3 / clinoidal segment / medially projecting.  Please see Imaging report for full details.    A right femoral artery angiogram was performed, the sheath removed and hemostasis achieved using mynx.  No hematoma was present at the time of hemostasis.    The patient tolerated the procedure well.         Inderjit Moreno MD     Neuro Endovascular Surgery Fellow   [facility]      used

## 2023-05-31 NOTE — CARE UPDATE
Pt up at 14:11pm. Right groin CDI, but pt too nauseated and dizzy from headache to discharge. Dr. Moreno at bedside and Mg sulfate and compazine ordered. Will continue to monitor pt.

## 2023-05-31 NOTE — H&P
Radiology History & Physical      SUBJECTIVE:     Chief Complaint: Intracranial aneurysm     History of Present Illness:  Eugenie Laguerre is a 42 y.o. female with medical history of anxiety, depression, RLS with recent MRA evaluation revealing right ICA aneurysm. Hence, referred for diagnostic cerebral angiogram for characterization of intracranial aneurysm & rest of the vasculature.     Past Medical History:   Diagnosis Date    Anxiety 2023    Clotting disorder     DVT (deep venous thrombosis) 2007    was felt to be due to ? Factor V Leiden    High risk pregnancy due to history of  labor 2015    Hyperthyroidism 2013    reports repeat testing was WNL and no treatment needed.    Iron deficiency anemia due to chronic blood loss 2015    Migraines     Restless legs syndrome (RLS)     Short cervix affecting pregnancy 2015     Past Surgical History:   Procedure Laterality Date    2 R knee sx; 1 L knee      arthroscopies    BRONCHOSCOPY       SECTION  2015    LAPAROSCOPIC CHOLECYSTECTOMY N/A 2021    Procedure: CHOLECYSTECTOMY, LAPAROSCOPIC;  Surgeon: Pascual Lu MD;  Location: [facility];  Service: General;  Laterality: N/A;    TUBAL LIGATION  2015       Home Meds:   Prior to Admission medications    Medication Sig Start Date End Date Taking? Authorizing Provider   busPIRone (BUSPAR) 15 MG tablet Take 1 tablet (15 mg total) by mouth 2 (two) times daily. 23  Arlene Gomez NP   galcanezumab-gnlm (EMGALITY PEN) 120 mg/mL PnIj Inject 120 mg into the skin every 28 days. 3/15/23   Tricia Gerardo NP   galcanezumab-jazmínlm 120 mg/mL PnIj Inject 240 mg (2 injections) subcutaneous at separate sites, once (loading dose). Start maintenance dose 28 days later 3/15/23   Tricia Gerardo NP   ibuprofen (ADVIL,MOTRIN) 800 MG tablet Take 800 mg by mouth every 8 (eight) hours as needed. 23   Historical Provider   medroxyPROGESTERone (PROVERA)  10 MG tablet Take 1 tablet (10 mg total) by mouth once daily. 1/24/23 1/24/24  Juliette Pino MD   ondansetron (ZOFRAN) 4 MG tablet Take 1 tablet (4 mg total) by mouth every 6 (six) hours as needed for Nausea. 1/23/23   Arlene Gomez NP   prochlorperazine (COMPAZINE) 10 MG tablet Take 1 tablet (10 mg total) by mouth every 6 (six) hours as needed (migraine or nausea). 3/15/23   Tricia Gerardo NP   promethazine (PHENERGAN) 25 MG tablet Take 1 tablet (25 mg total) by mouth every 6 (six) hours as needed for Nausea. 1/24/23   Arlene Gomez NP   rizatriptan (MAXALT) 10 MG tablet 1 tab PO PRN migraine. May repeat every 2 hours for max 3 tabs in 24 hours. Use no more than 10 days per month. 3/15/23   Tricia Gerardo NP   sertraline (ZOLOFT) 25 MG tablet Take 1 tablet (25 mg total) by mouth once daily. 3/13/23 3/12/24  Arlene Gomez NP   tiZANidine (ZANAFLEX) 4 MG tablet Half or full tablet by mouth at night as needed for muscle spasm 3/15/23   Tricia Gerardo NP   topiramate (TOPAMAX) 50 MG tablet Take 1.5 tablets (75 mg total) by mouth every evening. 9/22/22   Juan Linn MD     Anticoagulants/Antiplatelets: no anticoagulation    Allergies:   Review of patient's allergies indicates:   Allergen Reactions    Amoxicillin     Augmentin [amoxicillin-pot clavulanate] Swelling    Bactrim [sulfamethoxazole-trimethoprim] Itching and Nausea Only    Ropinirole Rash     Sedation History:  no adverse reactions    Review of Systems:   Hematological: no known coagulopathies  Respiratory: no shortness of breath  Cardiovascular: no chest pain  Gastrointestinal: no abdominal pain  Genito-Urinary: no dysuria  Musculoskeletal: negative  Neurological: no TIA or stroke symptoms         OBJECTIVE:     Vital Signs (Most Recent)       Physical Exam:  ASA: 2  Mallampati: 2    General: no acute distress  Mental Status: alert and oriented to person, place and time  HEENT: normocephalic,  atraumatic  Chest: unlabored breathing  Heart: regular heart rate  Abdomen: nondistended  Extremity: moves all extremities    Laboratory  Lab Results   Component Value Date    INR 1.0 09/22/2015       Lab Results   Component Value Date    WBC 6.77 01/23/2023    HGB 15.4 01/23/2023    HCT 46.4 01/23/2023    MCV 89 01/23/2023     01/23/2023      Lab Results   Component Value Date    GLU 76 05/22/2023     05/22/2023    K 4.4 05/22/2023     05/22/2023    CO2 26 05/22/2023    BUN 13 05/22/2023    CREATININE 0.9 05/22/2023    CALCIUM 9.5 05/22/2023    MG 2.3 09/07/2022    ALT 17 01/23/2023    AST 20 01/23/2023    ALBUMIN 4.3 01/23/2023    BILITOT 0.8 01/23/2023       ASSESSMENT/PLAN:     Sedation Plan: Up to moderate      Patient will undergo: diagnostic cerebral angiogram for characterization of intracranial aneurysm & rest of the vasculature.            Inderjit Moreno MD     Neuro Endovascular Surgery Fellow   [facility]

## 2023-05-31 NOTE — CARE UPDATE
Pt fully recovered and states full understanding of discharge instruction. Pt states headache is much better but still 3-4. Pt states she wants to leave pt left with family in wheelchair.

## 2023-06-07 ENCOUNTER — OFFICE VISIT (OUTPATIENT)
Dept: NEUROLOGY | Facility: CLINIC | Age: 43
End: 2023-06-07
Payer: COMMERCIAL

## 2023-06-07 VITALS
HEIGHT: 62 IN | WEIGHT: 217.81 LBS | DIASTOLIC BLOOD PRESSURE: 70 MMHG | SYSTOLIC BLOOD PRESSURE: 108 MMHG | HEART RATE: 70 BPM | BODY MASS INDEX: 40.08 KG/M2

## 2023-06-07 DIAGNOSIS — I67.1 UNRUPTURED CEREBRAL ANEURYSM: Primary | ICD-10-CM

## 2023-06-07 DIAGNOSIS — Z72.0 TOBACCO ABUSE: ICD-10-CM

## 2023-06-07 DIAGNOSIS — I67.1 CEREBRAL ANEURYSM, NONRUPTURED: ICD-10-CM

## 2023-06-07 PROCEDURE — 1159F MED LIST DOCD IN RCRD: CPT | Mod: CPTII,S$GLB,, | Performed by: NURSE PRACTITIONER

## 2023-06-07 PROCEDURE — 3078F PR MOST RECENT DIASTOLIC BLOOD PRESSURE < 80 MM HG: ICD-10-PCS | Mod: CPTII,S$GLB,, | Performed by: NURSE PRACTITIONER

## 2023-06-07 PROCEDURE — 99999 PR PBB SHADOW E&M-EST. PATIENT-LVL IV: ICD-10-PCS | Mod: PBBFAC,,, | Performed by: NURSE PRACTITIONER

## 2023-06-07 PROCEDURE — 1160F RVW MEDS BY RX/DR IN RCRD: CPT | Mod: CPTII,S$GLB,, | Performed by: NURSE PRACTITIONER

## 2023-06-07 PROCEDURE — 3078F DIAST BP <80 MM HG: CPT | Mod: CPTII,S$GLB,, | Performed by: NURSE PRACTITIONER

## 2023-06-07 PROCEDURE — 3074F PR MOST RECENT SYSTOLIC BLOOD PRESSURE < 130 MM HG: ICD-10-PCS | Mod: CPTII,S$GLB,, | Performed by: NURSE PRACTITIONER

## 2023-06-07 PROCEDURE — 99215 OFFICE O/P EST HI 40 MIN: CPT | Mod: S$GLB,,, | Performed by: NURSE PRACTITIONER

## 2023-06-07 PROCEDURE — 1160F PR REVIEW ALL MEDS BY PRESCRIBER/CLIN PHARMACIST DOCUMENTED: ICD-10-PCS | Mod: CPTII,S$GLB,, | Performed by: NURSE PRACTITIONER

## 2023-06-07 PROCEDURE — 3074F SYST BP LT 130 MM HG: CPT | Mod: CPTII,S$GLB,, | Performed by: NURSE PRACTITIONER

## 2023-06-07 PROCEDURE — 99999 PR PBB SHADOW E&M-EST. PATIENT-LVL IV: CPT | Mod: PBBFAC,,, | Performed by: NURSE PRACTITIONER

## 2023-06-07 PROCEDURE — 3008F BODY MASS INDEX DOCD: CPT | Mod: CPTII,S$GLB,, | Performed by: NURSE PRACTITIONER

## 2023-06-07 PROCEDURE — 3008F PR BODY MASS INDEX (BMI) DOCUMENTED: ICD-10-PCS | Mod: CPTII,S$GLB,, | Performed by: NURSE PRACTITIONER

## 2023-06-07 PROCEDURE — 1159F PR MEDICATION LIST DOCUMENTED IN MEDICAL RECORD: ICD-10-PCS | Mod: CPTII,S$GLB,, | Performed by: NURSE PRACTITIONER

## 2023-06-07 PROCEDURE — 99215 PR OFFICE/OUTPT VISIT, EST, LEVL V, 40-54 MIN: ICD-10-PCS | Mod: S$GLB,,, | Performed by: NURSE PRACTITIONER

## 2023-06-07 RX ORDER — CLOPIDOGREL BISULFATE 75 MG/1
75 TABLET ORAL DAILY
Qty: 30 TABLET | Refills: 6 | Status: ON HOLD | OUTPATIENT
Start: 2023-06-07 | End: 2023-07-06 | Stop reason: HOSPADM

## 2023-06-08 NOTE — PROGRESS NOTES
[facility] VISIT      SUBJECTIVE:    History for Present Illness: Eugenie Laguerre is a 42 y.o.  female with a past medical history of anxiety, hypothyroidism,  migraines, RLS and clotting disorder who presents to me in clinic today following successful diagnostic cerebral angiogram.  She was initially seen in clinic by me on 2023 as a referral from Tricia Gerardo for abnormal imaging finding.  MRA of the brain indicated a 2 mm distal right ICA superior hypophyseal segment saccular aneurysm.      At the time of today's visit, the patient denies new or worsening focal neurologic symptoms concerning for new stroke or TIA.She denies associated vertigo, double vision, focal weakness or numbness, gait imbalance,  language or visual disturbances.  Positive familial history (father and aunt) with ruptured cerebral aneurysm.  Patient is independent with ADLs.  History of tobacco dependency; 1 pack/5 days; recently quit.  Denies alcohol/illicit drug use.  Patient is compliant home medications.      Past Medical History:   Diagnosis Date    Anxiety 2023    Clotting disorder     DVT (deep venous thrombosis) 2007    was felt to be due to ? Factor V Leiden    High risk pregnancy due to history of  labor 2015    Hyperthyroidism 2013    reports repeat testing was WNL and no treatment needed.    Iron deficiency anemia due to chronic blood loss 2015    Migraines     Restless legs syndrome (RLS)     Short cervix affecting pregnancy 2015     Past Surgical History:   Procedure Laterality Date    2 R knee sx; 1 L knee      arthroscopies    BRONCHOSCOPY       SECTION  2015    LAPAROSCOPIC CHOLECYSTECTOMY N/A 2021    Procedure: CHOLECYSTECTOMY, LAPAROSCOPIC;  Surgeon: Pascual Lu MD;  Location: [facility];  Service: General;  Laterality: N/A;    TUBAL LIGATION  2015     Family History   Problem Relation Age of Onset    Fibromyalgia Mother      Depression Mother     Anuerysm Father     Cancer Father         skin    Deep vein thrombosis Father     Hypertension Father     Bipolar disorder Sister     Ovarian cancer Maternal Aunt     Bladder Cancer Maternal Aunt     Anuerysm Paternal Aunt         AAA    Breast cancer Maternal Grandmother     Diabetes Paternal Grandmother     Anuerysm Paternal Grandfather         AAA    Heart disease Brother     Colon cancer Neg Hx     Stomach cancer Neg Hx     Esophageal cancer Neg Hx         Current Outpatient Medications:     busPIRone (BUSPAR) 15 MG tablet, Take 1 tablet (15 mg total) by mouth 2 (two) times daily., Disp: 60 tablet, Rfl: 5    galcanezumab-gnlm (EMGALITY PEN) 120 mg/mL PnIj, Inject 120 mg into the skin every 28 days., Disp: 1 mL, Rfl: 11    galcanezumab-gnlm 120 mg/mL PnIj, Inject 240 mg (2 injections) subcutaneous at separate sites, once (loading dose). Start maintenance dose 28 days later, Disp: 2 mL, Rfl: 0    ibuprofen (ADVIL,MOTRIN) 800 MG tablet, Take 800 mg by mouth every 8 (eight) hours as needed., Disp: , Rfl:     medroxyPROGESTERone (PROVERA) 10 MG tablet, Take 1 tablet (10 mg total) by mouth once daily., Disp: 10 tablet, Rfl: 0    ondansetron (ZOFRAN) 4 MG tablet, Take 1 tablet (4 mg total) by mouth every 6 (six) hours as needed for Nausea., Disp: 20 tablet, Rfl: 0    prochlorperazine (COMPAZINE) 10 MG tablet, Take 1 tablet (10 mg total) by mouth every 6 (six) hours as needed (migraine or nausea)., Disp: 60 tablet, Rfl: 11    promethazine (PHENERGAN) 25 MG tablet, Take 1 tablet (25 mg total) by mouth every 6 (six) hours as needed for Nausea., Disp: 20 tablet, Rfl: 0    rizatriptan (MAXALT) 10 MG tablet, 1 tab PO PRN migraine. May repeat every 2 hours for max 3 tabs in 24 hours. Use no more than 10 days per month., Disp: 9 tablet, Rfl: 11    sertraline (ZOLOFT) 25 MG tablet, Take 1 tablet (25 mg total) by mouth once daily., Disp: 30 tablet, Rfl: 5    tiZANidine (ZANAFLEX) 4 MG tablet, Half or  "full tablet by mouth at night as needed for muscle spasm, Disp: 30 tablet, Rfl: 11    topiramate (TOPAMAX) 50 MG tablet, Take 1.5 tablets (75 mg total) by mouth every evening., Disp: 40 tablet, Rfl: 11    clopidogreL (PLAVIX) 75 mg tablet, Take 1 tablet (75 mg total) by mouth once daily., Disp: 30 tablet, Rfl: 6     Review of Systems:   Constitutional:  Negative for chills and fever.   HENT:  Negative for sore throat.    Eyes:  Negative visual disturbance. Negative for photophobia, pain and redness.   Respiratory:  Negative for shortness of breath.    Cardiovascular:  Negative for chest pain.   Gastrointestinal:  Negative for nausea.   Genitourinary:  Negative for dysuria.   Musculoskeletal:  Negative for back pain.   Skin:  Negative for rash.   Neurological:  Negative for weakness.   Hematological:  Does not bruise/bleed easily.     OBJECTIVE:    /70   Pulse 70   Ht 5' 2" (1.575 m)   Wt 98.8 kg (217 lb 13 oz)   LMP 02/02/2023 (Approximate)   BMI 39.84 kg/m²     Physical Exam   Constitution: She appears well nourished. No distress   LOC: Alert and follows request  Head: Normocephalic and atraumatic.   Cardiovascular: Normal rate. Intact distal pulses  Pulmonary/Chest: Effort normal. No respiratory distress  Psychiatric: no pressured speech; normal affect; no evidence of impaired cognition    Neurologic Exam:  Orientation: person, place and time  Language: No aphasia  Speech: No dysarthria  Memory: Recent memory intact; Remote memory intact; age correct; month correct  Visual Fields (CN II):  Full  EOM (CN III, IV, VI): Full intact  Pupils (CN II, III): PERRL  Facial Sensation (CN V): symmetric  Facial Movement (CN VII): Symmetrical facial expressions   Hearing (CN VIII):  Intact bilaterally   Shoulder/Neck (CN XI): SCM-Left: Normal; SCM-Right: Normal; Left Shoulder Shrug: Normal/Symmetric ; Right Shoulder Shrug: Normal/Symmetric  Tongue (CN XII): to midline  Motor examination of all extremities " :demonstrates normal bulk and tone in all four limbs. There are no atrophy or fasciculations.        Left Right     Left Right   Deltoid 5/5 5/5   Hip Flexion 5/5 5/5   Biceps 5/5 5/5   Hip Extension 5/5 5/5   Triceps 5/5 5/5   Knee Flexion 5/5 5/5   Wrist Ext 5/5 5/5   Knee Extension 5/5 5/5   Finger Abd 5/5 5/5   Ankle dorsiflex 5/5 5/5           Ankle plantar flex 5/5 5/5     Sensory examination: is normal light touch in BUE and BLE.   Gait: Gait steady with normal arm swing and stride length  Coordination: Rapid alternating movements and fine finger movements are intact.                                                                                                                                                                                         Relevant Labwork:  Recent Labs   Lab 08/07/20  0424 08/14/20  1003   Hemoglobin A1C  --  5.2   LDL Calculated 119 H  --    HDL 46  --    Triglycerides 36  --    Cholesterol 172  --        Diagnostic Results:  I have personally reviewed the pertinent imaging made available to me today    Brain Imaging     Vessel Imaging   IR angiogram 05/31/2023:  Wide necked aneurysm of the clinoidal segment of the ICA origin measuring 1.5 mm in height by 2.1 mm in with with a 2.2 mm neck.    There is an inferomedial dome projection.   Cardiac Imaging     Assessment:  Eugenie Laguerre  is a 42 y.o.  female  seen today in clinic for follow-up assessment and recommendations. The following recommendations and plan were discussed in depth with the patient who voiced understanding and was in agreement.  Plan:  Cerebral aneurysm without rupture  Successful diagnostic cerebral angiogram with complete characterization of wide necked aneurysm of the clinoidal segment of the ICA origin measuring 1.5 mm in height by 2.1 mm in with with a 2.2 mm neck.  Discussed intracranial aneurysm pathology; risk factors; presentation; diagnosis & imaging findings.   In -depth discussion on the natural  history; annual rupture risk rate; cumulative / lifetime rupture risk.   Counseled on the available treatment options including, conservative management and endovascular therapy options Patient verbalized understanding of risk and benefits and would like to  forward with embolization (flow restricting endothelial device).All questions were answered to the patients satisfaction.  -Counseld on need for DAPT with Aspirin 81 mg daily and Plavix 75 mg daily prior to and for 6 months post procedure.   -Rx for Plavix 75 mg sent to pharmacy. Patient instructed to beging DAPT 14 days prior to procedure.  -Pre-Procedure labs: CBC,BMP,PT/INR, Platelet response test   Tobacco dependency: Counseled on smoking cessation. continues to work towards cessation.      Patient/Family teaching provided during this visit  -Identifying the signs and symptoms of stroke; emergency action and ER attention  -Risk factor control  -Optimization for secondary stroke prevention including compliance with current medications       I will plan on having Eugenie return to clinic following embolization. The patient can contact my office with any questions or concerns they may have as they arise in the interim.     45 minutes of total time spent on the encounter, which includes face to face time and non-face to face time preparing to see the patient (eg, review of tests), Obtaining and/or reviewing separately obtained history, Documenting clinical information in the electronic or other health record, Independently interpreting results (not separately reported) and communicating results to the patient/family/caregiver, patient/family education and Care coordination (not separately reported).     Gabriela Naik, NP-C  Department of Vascular Neurology  [facility]  [phone]    This note is dictated on M*Modal Fluency Direct word recognition program. There are word recognition mistakes that are occasionally missed on  review

## 2023-06-30 ENCOUNTER — LAB VISIT (OUTPATIENT)
Dept: LAB | Facility: HOSPITAL | Age: 43
End: 2023-06-30
Payer: COMMERCIAL

## 2023-06-30 DIAGNOSIS — I67.1 UNRUPTURED CEREBRAL ANEURYSM: ICD-10-CM

## 2023-06-30 LAB
ANION GAP SERPL CALC-SCNC: 12 MMOL/L (ref 8–16)
BASOPHILS # BLD AUTO: 0.04 K/UL (ref 0–0.2)
BASOPHILS NFR BLD: 0.8 % (ref 0–1.9)
BUN SERPL-MCNC: 14 MG/DL (ref 6–20)
CALCIUM SERPL-MCNC: 8.9 MG/DL (ref 8.7–10.5)
CHLORIDE SERPL-SCNC: 109 MMOL/L (ref 95–110)
CO2 SERPL-SCNC: 19 MMOL/L (ref 23–29)
CREAT SERPL-MCNC: 0.8 MG/DL (ref 0.5–1.4)
DIFFERENTIAL METHOD: NORMAL
EOSINOPHIL # BLD AUTO: 0.1 K/UL (ref 0–0.5)
EOSINOPHIL NFR BLD: 1.8 % (ref 0–8)
ERYTHROCYTE [DISTWIDTH] IN BLOOD BY AUTOMATED COUNT: 13 % (ref 11.5–14.5)
EST. GFR  (NO RACE VARIABLE): >60 ML/MIN/1.73 M^2
GLUCOSE SERPL-MCNC: 93 MG/DL (ref 70–110)
HCT VFR BLD AUTO: 41.1 % (ref 37–48.5)
HGB BLD-MCNC: 13.4 G/DL (ref 12–16)
IMM GRANULOCYTES # BLD AUTO: 0.01 K/UL (ref 0–0.04)
IMM GRANULOCYTES NFR BLD AUTO: 0.2 % (ref 0–0.5)
INR PPP: 1 (ref 0.8–1.2)
LYMPHOCYTES # BLD AUTO: 1.5 K/UL (ref 1–4.8)
LYMPHOCYTES NFR BLD: 30.7 % (ref 18–48)
MCH RBC QN AUTO: 29.3 PG (ref 27–31)
MCHC RBC AUTO-ENTMCNC: 32.6 G/DL (ref 32–36)
MCV RBC AUTO: 90 FL (ref 82–98)
MONOCYTES # BLD AUTO: 0.5 K/UL (ref 0.3–1)
MONOCYTES NFR BLD: 9.8 % (ref 4–15)
NEUTROPHILS # BLD AUTO: 2.9 K/UL (ref 1.8–7.7)
NEUTROPHILS NFR BLD: 56.7 % (ref 38–73)
NRBC BLD-RTO: 0 /100 WBC
PLATELET # BLD AUTO: 227 K/UL (ref 150–450)
PLATELET RESPONSE PLAVIX: 189 PRU (ref 194–418)
PMV BLD AUTO: 9.3 FL (ref 9.2–12.9)
POTASSIUM SERPL-SCNC: 3.6 MMOL/L (ref 3.5–5.1)
PROTHROMBIN TIME: 10.9 SEC (ref 9–12.5)
RBC # BLD AUTO: 4.58 M/UL (ref 4–5.4)
SODIUM SERPL-SCNC: 140 MMOL/L (ref 136–145)
WBC # BLD AUTO: 5.02 K/UL (ref 3.9–12.7)

## 2023-06-30 PROCEDURE — 85025 COMPLETE CBC W/AUTO DIFF WBC: CPT | Performed by: NURSE PRACTITIONER

## 2023-06-30 PROCEDURE — 85610 PROTHROMBIN TIME: CPT | Performed by: NURSE PRACTITIONER

## 2023-06-30 PROCEDURE — 36415 COLL VENOUS BLD VENIPUNCTURE: CPT | Performed by: NURSE PRACTITIONER

## 2023-06-30 PROCEDURE — 80048 BASIC METABOLIC PNL TOTAL CA: CPT | Performed by: NURSE PRACTITIONER

## 2023-06-30 PROCEDURE — 85576 BLOOD PLATELET AGGREGATION: CPT | Performed by: NURSE PRACTITIONER

## 2023-07-03 ENCOUNTER — DOCUMENTATION ONLY (OUTPATIENT)
Dept: PREADMISSION TESTING | Facility: HOSPITAL | Age: 43
End: 2023-07-03

## 2023-07-05 ENCOUNTER — ANESTHESIA EVENT (OUTPATIENT)
Dept: INTERVENTIONAL RADIOLOGY/VASCULAR | Facility: HOSPITAL | Age: 43
DRG: 027 | End: 2023-07-05
Payer: COMMERCIAL

## 2023-07-05 ENCOUNTER — HOSPITAL ENCOUNTER (INPATIENT)
Dept: INTERVENTIONAL RADIOLOGY/VASCULAR | Facility: HOSPITAL | Age: 43
LOS: 1 days | Discharge: HOME | DRG: 027 | End: 2023-07-06
Attending: PSYCHIATRY & NEUROLOGY | Admitting: PSYCHIATRY & NEUROLOGY
Payer: COMMERCIAL

## 2023-07-05 DIAGNOSIS — I67.1 UNRUPTURED CEREBRAL ANEURYSM: ICD-10-CM

## 2023-07-05 LAB
ABO + RH BLD: NORMAL
BLD GP AB SCN CELLS X3 SERPL QL: NORMAL
POCT GLUCOSE: 88 MG/DL (ref 70–110)
SPECIMEN OUTDATE: NORMAL

## 2023-07-05 PROCEDURE — 25500020 PHARM REV CODE 255: Performed by: PSYCHIATRY & NEUROLOGY

## 2023-07-05 PROCEDURE — 86900 BLOOD TYPING SEROLOGIC ABO: CPT

## 2023-07-05 PROCEDURE — 36224 PLACE CATH CAROTD ART: CPT | Performed by: PSYCHIATRY & NEUROLOGY

## 2023-07-05 PROCEDURE — 20000000 HC ICU ROOM

## 2023-07-05 PROCEDURE — 36224 PR ANGIO INTRCRNL ART +/- CERVIOCEREBRAL ARCH, INTRNL CAROTID ART, SELECTV CATH ,S&I: ICD-10-PCS | Mod: 51,RT,, | Performed by: PSYCHIATRY & NEUROLOGY

## 2023-07-05 PROCEDURE — 25000003 PHARM REV CODE 250: Performed by: STUDENT IN AN ORGANIZED HEALTH CARE EDUCATION/TRAINING PROGRAM

## 2023-07-05 PROCEDURE — 25000003 PHARM REV CODE 250: Performed by: NURSE ANESTHETIST, CERTIFIED REGISTERED

## 2023-07-05 PROCEDURE — 25000003 PHARM REV CODE 250: Performed by: FAMILY MEDICINE

## 2023-07-05 PROCEDURE — 75898 PR  ANGIOGRAM,F/U STUDY,CATH THER/EMBOL/INF: ICD-10-PCS | Mod: 26,,, | Performed by: PSYCHIATRY & NEUROLOGY

## 2023-07-05 PROCEDURE — D9220A PRA ANESTHESIA: ICD-10-PCS | Mod: ANES,,, | Performed by: ANESTHESIOLOGY

## 2023-07-05 PROCEDURE — 25000003 PHARM REV CODE 250: Performed by: PHYSICIAN ASSISTANT

## 2023-07-05 PROCEDURE — 37000008 HC ANESTHESIA 1ST 15 MINUTES

## 2023-07-05 PROCEDURE — 75894 X-RAYS TRANSCATH THERAPY: CPT | Mod: TC | Performed by: PSYCHIATRY & NEUROLOGY

## 2023-07-05 PROCEDURE — 63600175 PHARM REV CODE 636 W HCPCS: Performed by: NURSE ANESTHETIST, CERTIFIED REGISTERED

## 2023-07-05 PROCEDURE — A4550 SURGICAL TRAYS: HCPCS

## 2023-07-05 PROCEDURE — 36224 PLACE CATH CAROTD ART: CPT | Mod: 51,RT,, | Performed by: PSYCHIATRY & NEUROLOGY

## 2023-07-05 PROCEDURE — 75898 FOLLOW-UP ANGIOGRAPHY: CPT | Mod: TC | Performed by: PSYCHIATRY & NEUROLOGY

## 2023-07-05 PROCEDURE — D9220A PRA ANESTHESIA: Mod: ANES,,, | Performed by: ANESTHESIOLOGY

## 2023-07-05 PROCEDURE — D9220A PRA ANESTHESIA: Mod: CRNA,,, | Performed by: NURSE ANESTHETIST, CERTIFIED REGISTERED

## 2023-07-05 PROCEDURE — D9220A PRA ANESTHESIA: ICD-10-PCS | Mod: CRNA,,, | Performed by: NURSE ANESTHETIST, CERTIFIED REGISTERED

## 2023-07-05 PROCEDURE — 75894 X-RAYS TRANSCATH THERAPY: CPT | Mod: 26,,, | Performed by: PSYCHIATRY & NEUROLOGY

## 2023-07-05 PROCEDURE — 61624 TCAT PERM OCCLS/EMBOLJ CNS: CPT | Performed by: PSYCHIATRY & NEUROLOGY

## 2023-07-05 PROCEDURE — 61624 IR EMBOLIZATION (CNS) INTRACRANIAL: ICD-10-PCS | Mod: ,,, | Performed by: PSYCHIATRY & NEUROLOGY

## 2023-07-05 PROCEDURE — 75894 X-RAYS TRANSCATH THERAPY: CPT | Mod: TC

## 2023-07-05 PROCEDURE — 75898 FOLLOW-UP ANGIOGRAPHY: CPT | Mod: 26,,, | Performed by: PSYCHIATRY & NEUROLOGY

## 2023-07-05 PROCEDURE — 75894 PR  TRANSCATHETER RX EMBOLIZATN: ICD-10-PCS | Mod: 26,,, | Performed by: PSYCHIATRY & NEUROLOGY

## 2023-07-05 PROCEDURE — 37000009 HC ANESTHESIA EA ADD 15 MINS

## 2023-07-05 PROCEDURE — 25000003 PHARM REV CODE 250

## 2023-07-05 PROCEDURE — 61624 TCAT PERM OCCLS/EMBOLJ CNS: CPT | Mod: ,,, | Performed by: PSYCHIATRY & NEUROLOGY

## 2023-07-05 PROCEDURE — G0269 OCCLUSIVE DEVICE IN VEIN ART: HCPCS | Performed by: PSYCHIATRY & NEUROLOGY

## 2023-07-05 RX ORDER — SODIUM,POTASSIUM PHOSPHATES 280-250MG
2 POWDER IN PACKET (EA) ORAL
Status: DISCONTINUED | OUTPATIENT
Start: 2023-07-05 | End: 2023-07-06 | Stop reason: HOSPADM

## 2023-07-05 RX ORDER — LANOLIN ALCOHOL/MO/W.PET/CERES
800 CREAM (GRAM) TOPICAL
Status: DISCONTINUED | OUTPATIENT
Start: 2023-07-05 | End: 2023-07-06 | Stop reason: HOSPADM

## 2023-07-05 RX ORDER — IBUPROFEN 200 MG
16 TABLET ORAL
Status: DISCONTINUED | OUTPATIENT
Start: 2023-07-05 | End: 2023-07-06 | Stop reason: HOSPADM

## 2023-07-05 RX ORDER — INSULIN ASPART 100 [IU]/ML
1-10 INJECTION, SOLUTION INTRAVENOUS; SUBCUTANEOUS
Status: DISCONTINUED | OUTPATIENT
Start: 2023-07-05 | End: 2023-07-06 | Stop reason: HOSPADM

## 2023-07-05 RX ORDER — TIZANIDINE 2 MG/1
4 TABLET ORAL 3 TIMES DAILY PRN
Status: DISCONTINUED | OUTPATIENT
Start: 2023-07-05 | End: 2023-07-06 | Stop reason: HOSPADM

## 2023-07-05 RX ORDER — ACETAMINOPHEN 325 MG/1
650 TABLET ORAL EVERY 6 HOURS PRN
Status: DISCONTINUED | OUTPATIENT
Start: 2023-07-05 | End: 2023-07-06 | Stop reason: HOSPADM

## 2023-07-05 RX ORDER — LABETALOL HCL 20 MG/4 ML
10 SYRINGE (ML) INTRAVENOUS EVERY 6 HOURS PRN
Status: DISCONTINUED | OUTPATIENT
Start: 2023-07-05 | End: 2023-07-05

## 2023-07-05 RX ORDER — ONDANSETRON 4 MG/1
4 TABLET, FILM COATED ORAL EVERY 6 HOURS PRN
Status: DISCONTINUED | OUTPATIENT
Start: 2023-07-05 | End: 2023-07-06 | Stop reason: HOSPADM

## 2023-07-05 RX ORDER — ASPIRIN 81 MG/1
81 TABLET ORAL DAILY
COMMUNITY

## 2023-07-05 RX ORDER — IBUPROFEN 200 MG
24 TABLET ORAL
Status: DISCONTINUED | OUTPATIENT
Start: 2023-07-05 | End: 2023-07-06 | Stop reason: HOSPADM

## 2023-07-05 RX ORDER — NAPROXEN SODIUM 220 MG/1
81 TABLET, FILM COATED ORAL DAILY
Status: DISCONTINUED | OUTPATIENT
Start: 2023-07-06 | End: 2023-07-06 | Stop reason: HOSPADM

## 2023-07-05 RX ORDER — MIDAZOLAM HYDROCHLORIDE 1 MG/ML
INJECTION, SOLUTION INTRAMUSCULAR; INTRAVENOUS
Status: DISCONTINUED | OUTPATIENT
Start: 2023-07-05 | End: 2023-07-05

## 2023-07-05 RX ORDER — GABAPENTIN 300 MG/1
300 CAPSULE ORAL 3 TIMES DAILY
Status: DISCONTINUED | OUTPATIENT
Start: 2023-07-05 | End: 2023-07-06 | Stop reason: HOSPADM

## 2023-07-05 RX ORDER — FENTANYL CITRATE 50 UG/ML
INJECTION, SOLUTION INTRAMUSCULAR; INTRAVENOUS
Status: DISCONTINUED | OUTPATIENT
Start: 2023-07-05 | End: 2023-07-05

## 2023-07-05 RX ORDER — OXYCODONE HYDROCHLORIDE 5 MG/1
5 TABLET ORAL EVERY 6 HOURS PRN
Status: COMPLETED | OUTPATIENT
Start: 2023-07-05 | End: 2023-07-05

## 2023-07-05 RX ORDER — ROCURONIUM BROMIDE 10 MG/ML
INJECTION, SOLUTION INTRAVENOUS
Status: DISCONTINUED | OUTPATIENT
Start: 2023-07-05 | End: 2023-07-05

## 2023-07-05 RX ORDER — SERTRALINE HYDROCHLORIDE 25 MG/1
25 TABLET, FILM COATED ORAL NIGHTLY
Status: DISCONTINUED | OUTPATIENT
Start: 2023-07-05 | End: 2023-07-06 | Stop reason: HOSPADM

## 2023-07-05 RX ORDER — LIDOCAINE HYDROCHLORIDE 20 MG/ML
INJECTION, SOLUTION EPIDURAL; INFILTRATION; INTRACAUDAL; PERINEURAL
Status: DISCONTINUED | OUTPATIENT
Start: 2023-07-05 | End: 2023-07-05

## 2023-07-05 RX ORDER — SODIUM CHLORIDE 9 MG/ML
INJECTION, SOLUTION INTRAVENOUS CONTINUOUS
Status: DISCONTINUED | OUTPATIENT
Start: 2023-07-05 | End: 2023-07-06 | Stop reason: HOSPADM

## 2023-07-05 RX ORDER — SODIUM CHLORIDE 0.9 % (FLUSH) 0.9 %
10 SYRINGE (ML) INJECTION
Status: DISCONTINUED | OUTPATIENT
Start: 2023-07-05 | End: 2023-07-06 | Stop reason: HOSPADM

## 2023-07-05 RX ORDER — DEXAMETHASONE SODIUM PHOSPHATE 4 MG/ML
INJECTION, SOLUTION INTRA-ARTICULAR; INTRALESIONAL; INTRAMUSCULAR; INTRAVENOUS; SOFT TISSUE
Status: DISCONTINUED | OUTPATIENT
Start: 2023-07-05 | End: 2023-07-05

## 2023-07-05 RX ORDER — LABETALOL HCL 20 MG/4 ML
10 SYRINGE (ML) INTRAVENOUS EVERY 6 HOURS PRN
Status: DISCONTINUED | OUTPATIENT
Start: 2023-07-05 | End: 2023-07-06 | Stop reason: HOSPADM

## 2023-07-05 RX ORDER — IODIXANOL 320 MG/ML
90 INJECTION, SOLUTION INTRAVASCULAR
Status: COMPLETED | OUTPATIENT
Start: 2023-07-05 | End: 2023-07-05

## 2023-07-05 RX ORDER — GLUCAGON 1 MG
1 KIT INJECTION
Status: DISCONTINUED | OUTPATIENT
Start: 2023-07-05 | End: 2023-07-06 | Stop reason: HOSPADM

## 2023-07-05 RX ORDER — HEPARIN SODIUM 1000 [USP'U]/ML
INJECTION, SOLUTION INTRAVENOUS; SUBCUTANEOUS
Status: DISCONTINUED | OUTPATIENT
Start: 2023-07-05 | End: 2023-07-05

## 2023-07-05 RX ORDER — ONDANSETRON 2 MG/ML
INJECTION INTRAMUSCULAR; INTRAVENOUS
Status: DISCONTINUED | OUTPATIENT
Start: 2023-07-05 | End: 2023-07-05

## 2023-07-05 RX ORDER — PROPOFOL 10 MG/ML
VIAL (ML) INTRAVENOUS
Status: DISCONTINUED | OUTPATIENT
Start: 2023-07-05 | End: 2023-07-05

## 2023-07-05 RX ADMIN — ROCURONIUM BROMIDE 50 MG: 10 INJECTION INTRAVENOUS at 02:07

## 2023-07-05 RX ADMIN — FENTANYL CITRATE 50 MCG: 50 INJECTION, SOLUTION INTRAMUSCULAR; INTRAVENOUS at 03:07

## 2023-07-05 RX ADMIN — SODIUM CHLORIDE: 0.9 INJECTION, SOLUTION INTRAVENOUS at 02:07

## 2023-07-05 RX ADMIN — LIDOCAINE HYDROCHLORIDE 100 MG: 20 INJECTION, SOLUTION EPIDURAL; INFILTRATION; INTRACAUDAL; PERINEURAL at 02:07

## 2023-07-05 RX ADMIN — ACETAMINOPHEN 650 MG: 325 TABLET ORAL at 07:07

## 2023-07-05 RX ADMIN — MIDAZOLAM HYDROCHLORIDE 2 MG: 1 INJECTION, SOLUTION INTRAMUSCULAR; INTRAVENOUS at 02:07

## 2023-07-05 RX ADMIN — TICAGRELOR 180 MG: 90 TABLET ORAL at 12:07

## 2023-07-05 RX ADMIN — BUSPIRONE HYDROCHLORIDE 15 MG: 10 TABLET ORAL at 08:07

## 2023-07-05 RX ADMIN — GABAPENTIN 300 MG: 300 CAPSULE ORAL at 08:07

## 2023-07-05 RX ADMIN — TIZANIDINE 4 MG: 2 TABLET ORAL at 10:07

## 2023-07-05 RX ADMIN — OXYCODONE HYDROCHLORIDE 5 MG: 5 TABLET ORAL at 10:07

## 2023-07-05 RX ADMIN — HEPARIN SODIUM 7500 UNITS: 1000 INJECTION, SOLUTION INTRAVENOUS; SUBCUTANEOUS at 03:07

## 2023-07-05 RX ADMIN — DEXAMETHASONE SODIUM PHOSPHATE 4 MG: 4 INJECTION, SOLUTION INTRAMUSCULAR; INTRAVENOUS at 02:07

## 2023-07-05 RX ADMIN — ROCURONIUM BROMIDE 10 MG: 10 INJECTION INTRAVENOUS at 03:07

## 2023-07-05 RX ADMIN — SUGAMMADEX 200 MG: 100 INJECTION, SOLUTION INTRAVENOUS at 03:07

## 2023-07-05 RX ADMIN — SERTRALINE HYDROCHLORIDE 25 MG: 25 TABLET ORAL at 08:07

## 2023-07-05 RX ADMIN — ONDANSETRON 4 MG: 2 INJECTION INTRAMUSCULAR; INTRAVENOUS at 02:07

## 2023-07-05 RX ADMIN — FENTANYL CITRATE 50 MCG: 50 INJECTION, SOLUTION INTRAMUSCULAR; INTRAVENOUS at 02:07

## 2023-07-05 RX ADMIN — IODIXANOL 90 ML: 320 INJECTION, SOLUTION INTRAVASCULAR at 03:07

## 2023-07-05 RX ADMIN — PROPOFOL 150 MG: 10 INJECTION, EMULSION INTRAVENOUS at 02:07

## 2023-07-05 NOTE — ANESTHESIA PREPROCEDURE EVALUATION
07/05/2023  Eugenie Laguerre is a 42 y.o., female with a past medical history of anxiety, hypothyroidism,  migraines, RLS and clotting disorder.  MRA of the brain indicated a 2 mm distal right ICA superior hypophyseal segment saccular aneurysm. Here for embolization.    Lab Results   Component Value Date    WBC 5.02 06/30/2023    HGB 13.4 06/30/2023    HCT 41.1 06/30/2023    MCV 90 06/30/2023     06/30/2023       Chemistry        Component Value Date/Time     06/30/2023 0935    K 3.6 06/30/2023 0935     06/30/2023 0935    CO2 19 (L) 06/30/2023 0935    BUN 14 06/30/2023 0935    CREATININE 0.8 06/30/2023 0935    GLU 93 06/30/2023 0935        Component Value Date/Time    CALCIUM 8.9 06/30/2023 0935    ALKPHOS 76 01/23/2023 1522    AST 20 01/23/2023 1522    ALT 17 01/23/2023 1522    BILITOT 0.8 01/23/2023 1522    ESTGFRAFRICA >60 11/08/2021 1357    EGFRNONAA >60 11/08/2021 1357              Pre-op Assessment    I have reviewed the Patient Summary Reports.     I have reviewed the Nursing Notes. I have reviewed the NPO Status.      Review of Systems  Anesthesia Hx:  No problems with previous Anesthesia    Endocrine:  Obesity / BMI > 30      Physical Exam  General: Well nourished, Cooperative, Alert and Oriented    Airway:  Mallampati: I   Mouth Opening: Normal  TM Distance: Normal  Tongue: Normal  Neck ROM: Normal ROM    Dental:    Chest/Lungs:  Normal Respiratory Rate        Anesthesia Plan  Type of Anesthesia, risks & benefits discussed:    Anesthesia Type: Gen ETT  Intra-op Monitoring Plan: Standard ASA Monitors  Post Op Pain Control Plan: multimodal analgesia  Induction:  IV  Airway Plan: Direct, Post-Induction  Informed Consent: Informed consent signed with the Patient and all parties understand the risks and agree with anesthesia plan.  All questions answered. Patient consented to blood  products? Yes  ASA Score: 2    Ready For Surgery From Anesthesia Perspective.     .

## 2023-07-05 NOTE — HPI
Eugenie Laguerre is a 42y F with medical history of anxiety, depression, and restless leg syndrome with recent MRA evaluation revealing right ICA aneurysm. Patient presented for a cerebral angiogram for characterization of intracranial aneurysm & MJ flow diversion treatment. Admitted to [facility] for higher level monitoring.

## 2023-07-05 NOTE — H&P
Radiology History & Physical      SUBJECTIVE:     Chief Complaint: Intracranial aneurysm     History of Present Illness:  Eugenie Laguerre is a 42 y.o. female with medical history of anxiety, depression, RLS with recent MRA evaluation revealing right ICA clinoidal segment aneurysm. S/p DSA characterization - presenting for cerebral angiogram for characterization of intracranial aneurysm & MJ flow diversion treatment.    Past Medical History:   Diagnosis Date    Anxiety 2023    Clotting disorder     DVT (deep venous thrombosis) 2007    was felt to be due to ? Factor V Leiden    High risk pregnancy due to history of  labor 2015    Hyperthyroidism 2013    reports repeat testing was WNL and no treatment needed.    Iron deficiency anemia due to chronic blood loss 2015    Migraines     Restless legs syndrome (RLS)     Short cervix affecting pregnancy 2015     Past Surgical History:   Procedure Laterality Date    2 R knee sx; 1 L knee      arthroscopies    BRONCHOSCOPY       SECTION  2015    LAPAROSCOPIC CHOLECYSTECTOMY N/A 2021    Procedure: CHOLECYSTECTOMY, LAPAROSCOPIC;  Surgeon: Pascual Lu MD;  [facility]: [facility];  Service: General;  Laterality: N/A;    TUBAL LIGATION  2015       Home Meds:   Prior to Admission medications    Medication Sig Start Date End Date Taking? Authorizing Provider   busPIRone (BUSPAR) 15 MG tablet Take 1 tablet (15 mg total) by mouth 2 (two) times daily. 23  Arlene Gomez NP   clopidogreL (PLAVIX) 75 mg tablet Take 1 tablet (75 mg total) by mouth once daily. 6/7/23 1/3/24  Gabriela Naik NP   galcanezumab-gnlm (EMGALITY PEN) 120 mg/mL PnIj Inject 120 mg into the skin every 28 days. 3/15/23   Tricia Gerardo NP   galcanezumab-gnlm 120 mg/mL PnIj Inject 240 mg (2 injections) subcutaneous at separate sites, once (loading dose). Start maintenance dose 28 days later 3/15/23   Tricia Gerardo NP    ibuprofen (ADVIL,MOTRIN) 800 MG tablet Take 800 mg by mouth every 8 (eight) hours as needed. 1/20/23   Historical Provider   medroxyPROGESTERone (PROVERA) 10 MG tablet Take 1 tablet (10 mg total) by mouth once daily. 1/24/23 1/24/24  Juliette Pino MD   ondansetron (ZOFRAN) 4 MG tablet Take 1 tablet (4 mg total) by mouth every 6 (six) hours as needed for Nausea. 1/23/23   Arlene Gomez NP   prochlorperazine (COMPAZINE) 10 MG tablet Take 1 tablet (10 mg total) by mouth every 6 (six) hours as needed (migraine or nausea). 3/15/23   Tricia Gerardo NP   promethazine (PHENERGAN) 25 MG tablet Take 1 tablet (25 mg total) by mouth every 6 (six) hours as needed for Nausea. 1/24/23   Arlene Gomez NP   rizatriptan (MAXALT) 10 MG tablet 1 tab PO PRN migraine. May repeat every 2 hours for max 3 tabs in 24 hours. Use no more than 10 days per month. 3/15/23   Tricia Gerardo NP   sertraline (ZOLOFT) 25 MG tablet Take 1 tablet (25 mg total) by mouth once daily.  Patient taking differently: Take 25 mg by mouth every evening. 3/13/23 3/12/24  Arlene Gomez NP   tiZANidine (ZANAFLEX) 4 MG tablet Half or full tablet by mouth at night as needed for muscle spasm 3/15/23   Tricia Gerardo NP   topiramate (TOPAMAX) 50 MG tablet Take 1.5 tablets (75 mg total) by mouth every evening. 9/22/22   Juan Linn MD     Anticoagulants/Antiplatelets: no anticoagulation    Allergies:   Review of patient's allergies indicates:   Allergen Reactions    Amoxicillin     Augmentin [amoxicillin-pot clavulanate] Swelling    Bactrim [sulfamethoxazole-trimethoprim] Itching and Nausea Only    Ropinirole Rash     Sedation History:  no adverse reactions    Review of Systems:   Hematological: no known coagulopathies  Respiratory: no shortness of breath  Cardiovascular: no chest pain  Gastrointestinal: no abdominal pain  Genito-Urinary: no dysuria  Musculoskeletal: negative  Neurological: no TIA or stroke  symptoms         OBJECTIVE:     Vital Signs (Most Recent)       Physical Exam:  ASA: 3  Mallampati: 2    General: no acute distress  Mental Status: alert and oriented to person, place and time  HEENT: normocephalic, atraumatic  Chest: unlabored breathing  Heart: regular heart rate  Abdomen: nondistended  Extremity: moves all extremities    Laboratory  Lab Results   Component Value Date    INR 1.0 06/30/2023       Lab Results   Component Value Date    WBC 5.02 06/30/2023    HGB 13.4 06/30/2023    HCT 41.1 06/30/2023    MCV 90 06/30/2023     06/30/2023      Lab Results   Component Value Date    GLU 93 06/30/2023     06/30/2023    K 3.6 06/30/2023     06/30/2023    CO2 19 (L) 06/30/2023    BUN 14 06/30/2023    CREATININE 0.8 06/30/2023    CALCIUM 8.9 06/30/2023    MG 2.3 09/07/2022    ALT 17 01/23/2023    AST 20 01/23/2023    ALBUMIN 4.3 01/23/2023    BILITOT 0.8 01/23/2023       ASSESSMENT/PLAN:     Sedation Plan:  general anesthesia    Patient will undergo: cerebral angiogram for characterization of intracranial aneurysm & MJ flow diversion treatment.          Inderjit Moreno MD     Neuro Endovascular Surgery Fellow   [facility]

## 2023-07-05 NOTE — SUBJECTIVE & OBJECTIVE
Past Medical History:   Diagnosis Date    Anxiety 2023    Clotting disorder     DVT (deep venous thrombosis) 2007    was felt to be due to ? Factor V Leiden    High risk pregnancy due to history of  labor 2015    Hyperthyroidism 2013    reports repeat testing was WNL and no treatment needed.    Iron deficiency anemia due to chronic blood loss 2015    Migraines     Restless legs syndrome (RLS)     Short cervix affecting pregnancy 2015     Past Surgical History:   Procedure Laterality Date    2 R knee sx; 1 L knee      arthroscopies    BRONCHOSCOPY       SECTION  2015    LAPAROSCOPIC CHOLECYSTECTOMY N/A 2021    Procedure: CHOLECYSTECTOMY, LAPAROSCOPIC;  Surgeon: Pascual Lu MD;  Location: [facility] OR;  Service: General;  Laterality: N/A;    TUBAL LIGATION  2015      No current facility-administered medications on file prior to encounter.     Current Outpatient Medications on File Prior to Encounter   Medication Sig Dispense Refill    aspirin (ECOTRIN) 81 MG EC tablet Take 81 mg by mouth once daily.      busPIRone (BUSPAR) 15 MG tablet Take 1 tablet (15 mg total) by mouth 2 (two) times daily. 60 tablet 5    clopidogreL (PLAVIX) 75 mg tablet Take 1 tablet (75 mg total) by mouth once daily. 30 tablet 6    prochlorperazine (COMPAZINE) 10 MG tablet Take 1 tablet (10 mg total) by mouth every 6 (six) hours as needed (migraine or nausea). 60 tablet 11    rizatriptan (MAXALT) 10 MG tablet 1 tab PO PRN migraine. May repeat every 2 hours for max 3 tabs in 24 hours. Use no more than 10 days per month. 9 tablet 11    sertraline (ZOLOFT) 25 MG tablet Take 1 tablet (25 mg total) by mouth once daily. (Patient taking differently: Take 25 mg by mouth every evening.) 30 tablet 5    tiZANidine (ZANAFLEX) 4 MG tablet Half or full tablet by mouth at night as needed for muscle spasm 30 tablet 11    topiramate (TOPAMAX) 50 MG tablet Take 1.5 tablets (75 mg total) by mouth  every evening. 40 tablet 11    galcanezumab-gnlm (EMGALITY PEN) 120 mg/mL PnIj Inject 120 mg into the skin every 28 days. 1 mL 11    galcanezumab-gnlm 120 mg/mL PnIj Inject 240 mg (2 injections) subcutaneous at separate sites, once (loading dose). Start maintenance dose 28 days later 2 mL 0    ibuprofen (ADVIL,MOTRIN) 800 MG tablet Take 800 mg by mouth every 8 (eight) hours as needed.      medroxyPROGESTERone (PROVERA) 10 MG tablet Take 1 tablet (10 mg total) by mouth once daily. 10 tablet 0    ondansetron (ZOFRAN) 4 MG tablet Take 1 tablet (4 mg total) by mouth every 6 (six) hours as needed for Nausea. 20 tablet 0    promethazine (PHENERGAN) 25 MG tablet Take 1 tablet (25 mg total) by mouth every 6 (six) hours as needed for Nausea. 20 tablet 0      Allergies: Amoxicillin, Augmentin [amoxicillin-pot clavulanate], Bactrim [sulfamethoxazole-trimethoprim], and Ropinirole    Family History   Problem Relation Age of Onset    Fibromyalgia Mother     Depression Mother     Anuerysm Father     Cancer Father         skin    Deep vein thrombosis Father     Hypertension Father     Bipolar disorder Sister     Ovarian cancer Maternal Aunt     Bladder Cancer Maternal Aunt     Anuerysm Paternal Aunt         AAA    Breast cancer Maternal Grandmother     Diabetes Paternal Grandmother     Anuerysm Paternal Grandfather         AAA    Heart disease Brother     Colon cancer Neg Hx     Stomach cancer Neg Hx     Esophageal cancer Neg Hx      Social History     Tobacco Use    Smoking status: Former     Packs/day: 0.50     Years: 0.00     Pack years: 0.00     Types: Cigarettes    Smokeless tobacco: Former     Quit date: 8/1/2021   Substance Use Topics    Alcohol use: Yes     Alcohol/week: 0.0 standard drinks     Comment: rare    Drug use: No     Review of Systems   Constitutional:  Negative for chills, diaphoresis and fever.   HENT:  Negative for postnasal drip and sore throat.    Eyes:  Negative for photophobia and visual disturbance.    Respiratory:  Negative for cough and shortness of breath.    Cardiovascular:  Negative for chest pain and leg swelling.   Gastrointestinal:  Negative for abdominal pain, nausea and vomiting.   Endocrine: Negative for polydipsia and polyuria.   Musculoskeletal:  Negative for back pain, myalgias and neck stiffness.   Skin:  Negative for rash.   Neurological:  Positive for headaches. Negative for speech difficulty and weakness.   All other systems reviewed and are negative.  Objective:     Vitals:    Temp: 97.8 °F (36.6 °C)  Pulse: 64  BP: (!) 154/93  MAP (mmHg): 115  Resp: 11  SpO2: 98 %    Temp  Min: 97.7 °F (36.5 °C)  Max: 97.8 °F (36.6 °C)  Pulse  Min: 58  Max: 64  BP  Min: 119/66  Max: 154/93  MAP (mmHg)  Min: 115  Max: 115  Resp  Min: 11  Max: 18  SpO2  Min: 96 %  Max: 98 %    No intake/output data recorded.            Physical Exam  Vitals and nursing note reviewed.   Constitutional:       General: She is not in acute distress.     Appearance: She is not ill-appearing.   Eyes:      General: No visual field deficit or scleral icterus.     Extraocular Movements: Extraocular movements intact.      Conjunctiva/sclera: Conjunctivae normal.      Pupils: Pupils are equal, round, and reactive to light.   Cardiovascular:      Rate and Rhythm: Normal rate and regular rhythm.   Pulmonary:      Effort: Pulmonary effort is normal. No respiratory distress.      Breath sounds: Normal breath sounds. No wheezing or rales.   Abdominal:      General: Abdomen is flat. Bowel sounds are normal.      Palpations: Abdomen is soft.   Musculoskeletal:         General: Normal range of motion.      Cervical back: No tenderness.   Skin:     General: Skin is warm and dry.      Capillary Refill: Capillary refill takes less than 2 seconds.      Coloration: Skin is not jaundiced.   Neurological:      Mental Status: She is alert and oriented to person, place, and time.      GCS: GCS eye subscore is 4. GCS verbal subscore is 5. GCS motor  subscore is 6.      Cranial Nerves: Cranial nerves 2-12 are intact. No cranial nerve deficit, dysarthria or facial asymmetry.      Sensory: Sensation is intact. No sensory deficit.      Motor: Motor function is intact.      Comments: Not sedated              Today I personally reviewed pertinent medications, lines/drains/airways, imaging, cardiology results, laboratory results, microbiology results,

## 2023-07-05 NOTE — PLAN OF CARE
Procedure complete. Pt tolerated well. VSS. Site CDI. Pt transferred to 90 and report to be given bedside.

## 2023-07-05 NOTE — H&P
[facility] - Neuro Critical Care  Neurocritical Care  History & Physical    Admit Date: 2023  Service Date: 2023  Length of Stay: 0    Subjective:     Chief Complaint: Cerebral aneurysm, nonruptured    History of Present Illness: Eugenie Laguerre is a 42y F with medical history of anxiety, depression, and restless leg syndrome with recent MRA evaluation revealing right ICA aneurysm. Patient presented for a cerebral angiogram for characterization of intracranial aneurysm & MJ flow diversion treatment. Admitted to [facility] for higher level monitoring.      Past Medical History:   Diagnosis Date    Anxiety 2023    Clotting disorder     DVT (deep venous thrombosis) 2007    was felt to be due to ? Factor V Leiden    High risk pregnancy due to history of  labor 2015    Hyperthyroidism 2013    reports repeat testing was WNL and no treatment needed.    Iron deficiency anemia due to chronic blood loss 2015    Migraines     Restless legs syndrome (RLS)     Short cervix affecting pregnancy 2015     Past Surgical History:   Procedure Laterality Date    2 R knee sx; 1 L knee      arthroscopies    BRONCHOSCOPY       SECTION  2015    LAPAROSCOPIC CHOLECYSTECTOMY N/A 2021    Procedure: CHOLECYSTECTOMY, LAPAROSCOPIC;  Surgeon: Pascual Lu MD;  Location: [facility];  Service: General;  Laterality: N/A;    TUBAL LIGATION  2015      No current facility-administered medications on file prior to encounter.     Current Outpatient Medications on File Prior to Encounter   Medication Sig Dispense Refill    aspirin (ECOTRIN) 81 MG EC tablet Take 81 mg by mouth once daily.      busPIRone (BUSPAR) 15 MG tablet Take 1 tablet (15 mg total) by mouth 2 (two) times daily. 60 tablet 5    clopidogreL (PLAVIX) 75 mg tablet Take 1 tablet (75 mg total) by mouth once daily. 30 tablet 6    prochlorperazine (COMPAZINE) 10 MG tablet Take 1 tablet (10 mg total) by mouth  every 6 (six) hours as needed (migraine or nausea). 60 tablet 11    rizatriptan (MAXALT) 10 MG tablet 1 tab PO PRN migraine. May repeat every 2 hours for max 3 tabs in 24 hours. Use no more than 10 days per month. 9 tablet 11    sertraline (ZOLOFT) 25 MG tablet Take 1 tablet (25 mg total) by mouth once daily. (Patient taking differently: Take 25 mg by mouth every evening.) 30 tablet 5    tiZANidine (ZANAFLEX) 4 MG tablet Half or full tablet by mouth at night as needed for muscle spasm 30 tablet 11    topiramate (TOPAMAX) 50 MG tablet Take 1.5 tablets (75 mg total) by mouth every evening. 40 tablet 11    galcanezumab-gnlm (EMGALITY PEN) 120 mg/mL PnIj Inject 120 mg into the skin every 28 days. 1 mL 11    galcanezumab-gnlm 120 mg/mL PnIj Inject 240 mg (2 injections) subcutaneous at separate sites, once (loading dose). Start maintenance dose 28 days later 2 mL 0    ibuprofen (ADVIL,MOTRIN) 800 MG tablet Take 800 mg by mouth every 8 (eight) hours as needed.      medroxyPROGESTERone (PROVERA) 10 MG tablet Take 1 tablet (10 mg total) by mouth once daily. 10 tablet 0    ondansetron (ZOFRAN) 4 MG tablet Take 1 tablet (4 mg total) by mouth every 6 (six) hours as needed for Nausea. 20 tablet 0    promethazine (PHENERGAN) 25 MG tablet Take 1 tablet (25 mg total) by mouth every 6 (six) hours as needed for Nausea. 20 tablet 0      Allergies: Amoxicillin, Augmentin [amoxicillin-pot clavulanate], Bactrim [sulfamethoxazole-trimethoprim], and Ropinirole    Family History   Problem Relation Age of Onset    Fibromyalgia Mother     Depression Mother     Anuerysm Father     Cancer Father         skin    Deep vein thrombosis Father     Hypertension Father     Bipolar disorder Sister     Ovarian cancer Maternal Aunt     Bladder Cancer Maternal Aunt     Anuerysm Paternal Aunt         AAA    Breast cancer Maternal Grandmother     Diabetes Paternal Grandmother     Anuerysm Paternal Grandfather         AAA    Heart  disease Brother     Colon cancer Neg Hx     Stomach cancer Neg Hx     Esophageal cancer Neg Hx      Social History     Tobacco Use    Smoking status: Former     Packs/day: 0.50     Years: 0.00     Pack years: 0.00     Types: Cigarettes    Smokeless tobacco: Former     Quit date: 8/1/2021   Substance Use Topics    Alcohol use: Yes     Alcohol/week: 0.0 standard drinks     Comment: rare    Drug use: No     Review of Systems   Constitutional:  Negative for chills, diaphoresis and fever.   HENT:  Negative for postnasal drip and sore throat.    Eyes:  Negative for photophobia and visual disturbance.   Respiratory:  Negative for cough and shortness of breath.    Cardiovascular:  Negative for chest pain and leg swelling.   Gastrointestinal:  Negative for abdominal pain, nausea and vomiting.   Endocrine: Negative for polydipsia and polyuria.   Musculoskeletal:  Negative for back pain, myalgias and neck stiffness.   Skin:  Negative for rash.   Neurological:  Positive for headaches. Negative for speech difficulty and weakness.   All other systems reviewed and are negative.  Objective:     Vitals:    Temp: 97.8 °F (36.6 °C)  Pulse: 64  BP: (!) 154/93  MAP (mmHg): 115  Resp: 11  SpO2: 98 %    Temp  Min: 97.7 °F (36.5 °C)  Max: 97.8 °F (36.6 °C)  Pulse  Min: 58  Max: 64  BP  Min: 119/66  Max: 154/93  MAP (mmHg)  Min: 115  Max: 115  Resp  Min: 11  Max: 18  SpO2  Min: 96 %  Max: 98 %    No intake/output data recorded.            Physical Exam  Vitals and nursing note reviewed.   Constitutional:       General: She is not in acute distress.     Appearance: She is not ill-appearing.   Eyes:      General: No visual field deficit or scleral icterus.     Extraocular Movements: Extraocular movements intact.      Conjunctiva/sclera: Conjunctivae normal.      Pupils: Pupils are equal, round, and reactive to light.   Cardiovascular:      Rate and Rhythm: Normal rate and regular rhythm.   Pulmonary:      Effort: Pulmonary effort is  normal. No respiratory distress.      Breath sounds: Normal breath sounds. No wheezing or rales.   Abdominal:      General: Abdomen is flat. Bowel sounds are normal.      Palpations: Abdomen is soft.   Musculoskeletal:         General: Normal range of motion.      Cervical back: No tenderness.   Skin:     General: Skin is warm and dry.      Capillary Refill: Capillary refill takes less than 2 seconds.      Coloration: Skin is not jaundiced.   Neurological:      Mental Status: She is alert and oriented to person, place, and time.      GCS: GCS eye subscore is 4. GCS verbal subscore is 5. GCS motor subscore is 6.      Cranial Nerves: Cranial nerves 2-12 are intact. No cranial nerve deficit, dysarthria or facial asymmetry.      Sensory: Sensation is intact. No sensory deficit.      Motor: Motor function is intact.      Comments: Not sedated              Today I personally reviewed pertinent medications, lines/drains/airways, imaging, cardiology results, laboratory results, microbiology results,       Assessment/Plan:     Neuro  * Cerebral aneurysm, nonruptured  Eugenie Laguerre is a 42y F with medical history of anxiety, depression, and restless leg syndrome with recent MRA evaluation revealing right ICA aneurysm. Patient presented for a cerebral angiogram for characterization of intracranial aneurysm & MJ flow diversion treatment.    - S/p cerebral angiogram, MJ flow diversion treatment  - gabapentin, tylenol given for HA  - SBP goal <160  - DAPT with brillinta and ASA starting tomorrow  - plan to d/c tomorrow    Intractable chronic migraine without aura and without status migrainosus  - gabapentin, tylenol given  - holding rizatriptan while in [facility]    Psychiatric  Anxiety  Home meds given  - tizanidine prn  - sertraline   - gabapentin  - buspirone    Endocrine  Severe obesity (BMI 35.0-39.9) with comorbidity  - diabetic diet    Other  Tobacco abuse  Pt reports that she quit smoking about 1 month ago  - denies nicotine  patch at this time        The patient is being Prophylaxed for:  Venous Thromboembolism with: None  Stress Ulcer with: None  Ventilator Pneumonia with: not applicable    Activity Orders          Progressive Mobility Protocol (mobilize patient to their highest level of functioning at least twice daily) starting at 07/05 2000    Turn patient starting at 07/05 1800    Diet diabetic [facility]; 2000 Calorie; Standard Tray: Diabetic starting at 07/05 1717    Elevate HOB starting at 07/05 1708        Full Code    Hayden Barajas MD  Neurocritical Care  [facility] - Neuro Critical Care

## 2023-07-05 NOTE — PLAN OF CARE
Pt arrived to IR 4 for Cerebral Angiogram -- MJ. Pt oriented to unit and staff. Plan of care reviewed with patient, patient verbalizes understanding. Comfort measures utilized. Pt safely transferred from stretcher to procedural table. Fall risk reviewed with patient, fall risk interventions maintained. Safety strap applied, positioner pillows utilized to minimize pressure points. Blankets applied. Pt prepped and draped utilizing standard sterile technique. Patient placed on continuous monitoring, as required by sedation policy. Timeouts completed utilizing standard universal time-out, per department and facility policy. RN to remain at bedside, continuous monitoring maintained. Pt resting comfortably. Denies pain/discomfort. Will continue to monitor. See flow sheets for monitoring, medication administration, and updates.

## 2023-07-05 NOTE — PLAN OF CARE
Pt arrived to unit accompanied by her .  Pre op orders and assessment initiated.  Pt remains npo.  Call bell within reach.

## 2023-07-05 NOTE — ANESTHESIA PROCEDURE NOTES
Intubation    Date/Time: 7/5/2023 2:55 PM  Performed by: Too Gill CRNA  Authorized by: Jimmy Kaur MD     Intubation:     Induction:  Intravenous    Intubated:  Postinduction    Mask Ventilation:  Easy mask    Attempts:  1    Attempted By:  CRNA    Method of Intubation:  Video laryngoscopy    Blade:  Pavon 3    Laryngeal View Grade: Grade I - full view of cords      Difficult Airway Encountered?: No      Complications:  None    Airway Device:  Oral endotracheal tube    Airway Device Size:  7.0    Style/Cuff Inflation:  Cuffed    Inflation Amount (mL):  8    Tube secured:  21    Secured at:  The teeth    Placement Verified By:  Capnometry    Complicating Factors:  None    Findings Post-Intubation:  BS equal bilateral and atraumatic/condition of teeth unchanged

## 2023-07-05 NOTE — ASSESSMENT & PLAN NOTE
Eugenie Laguerre is a 42y F with medical history of anxiety, depression, and restless leg syndrome with recent MRA evaluation revealing right ICA aneurysm. Patient presented for a cerebral angiogram for characterization of intracranial aneurysm & MJ flow diversion treatment.    - S/p cerebral angiogram, MJ flow diversion treatment  - gabapentin, tylenol given for HA  - SBP goal <160  - DAPT with brillinta and ASA starting tomorrow  - plan to d/c tomorrow

## 2023-07-05 NOTE — TRANSFER OF CARE
"Anesthesia Transfer of Care Note    Patient: Eugenie Laguerre    Procedure(s) Performed: * No procedures listed *    Patient location: ICU    Anesthesia Type: general    Transport from OR: Transported from OR on 6-10 L/min O2 by face mask with adequate spontaneous ventilation. Continuous ECG monitoring in transport. Continuous SpO2 monitoring in transport    Post pain: adequate analgesia    Post assessment: no apparent anesthetic complications    Post vital signs: stable    Level of consciousness: awake    Nausea/Vomiting: no nausea/vomiting    Complications: none    Transfer of care protocol was followed      Last vitals:   Visit Vitals  /66 (BP Location: Left arm, Patient Position: Lying)   Pulse (!) 58   Temp 36.5 °C (97.7 °F) (Temporal)   Resp 18   Ht 5' 2.5" (1.588 m)   Wt 99.8 kg (220 lb)   LMP 07/04/2023   SpO2 96%   Breastfeeding No   BMI 39.60 kg/m²     "

## 2023-07-05 NOTE — PROCEDURES
Radiology Post-Procedure Note    Pre Op Diagnosis: Unruptured right ICA aneurysm     Post Op Diagnosis: See section below     Procedure: Cerebral angiogram    Procedure performed by: Arnol Burnett MD    Written Informed Consent Obtained: Yes    Specimen Removed: NO    Estimated Blood Loss: Minimal    Procedure report:     A 8F sheath was placed into the right femoral artery and a 5F Akin / Neuronmax catheter was advanced into the aortic arch.  The right CCA, ICA arteries were subselected and angiography of the brain was performed after injection into each of these vessels.    Preliminary interpretation: Right ICA clinoidal segment aneurysm was characterized - performed MJ flow diversion using klaus / headway 27 / synchro.  Please see Imaging report for full details.    A right femoral artery angiogram was performed, the sheath removed and hemostasis achieved using perclose.  No hematoma was present at the time of hemostasis.        Inderjit Moreno MD     Neuro Endovascular Surgery Fellow   [facility]

## 2023-07-05 NOTE — PLAN OF CARE
[facility] Care Plan    POC reviewed with Eugenie Laguerre and family at 1400. Pt verbalized understanding. Questions and concerns addressed. No acute events today. Pt progressing toward goals. Will continue to monitor. See below and flowsheets for full assessment and VS info.             Is this a stroke patient? no    Neuro:  [place] Coma Scale  Best Eye Response: 4-->(E4) spontaneous  Best Motor Response: 6-->(M6) obeys commands  Best Verbal Response: 5-->(V5) oriented  [place] Coma Scale Score: 15  Pupil PERRLA: yes     24 hr Temp:  [97.7 °F (36.5 °C)-97.8 °F (36.6 °C)]     CV:   Rhythm: normal sinus rhythm  BP goals:   SBP < 160  MAP > 65    Resp:           Plan: N/A    GI/:     Diet/Nutrition Received: NPO  Last Bowel Movement: 07/04/23       Intake/Output Summary (Last 24 hours) at 7/5/2023 1824  Last data filed at 7/5/2023 1801  Gross per 24 hour   Intake 1000 ml   Output --   Net 1000 ml          Labs/Accuchecks:  Recent Labs   Lab 06/30/23  0935   WBC 5.02   RBC 4.58   HGB 13.4   HCT 41.1         Recent Labs   Lab 06/30/23  0935      K 3.6   CO2 19*      BUN 14   CREATININE 0.8      Recent Labs   Lab 06/30/23  0935   INR 1.0    No results for input(s): CPK, CPKMB, TROPONINI, MB in the last 168 hours.    Electrolytes: N/A - electrolytes WDL  Accuchecks: none    Gtts:   sodium chloride 0.9% Stopped (07/05/23 1612)       LDA/Wounds:  Lines/Drains/Airways       Peripheral Intravenous Line  Duration                  Peripheral IV - Single Lumen 07/05/23 1234 20 G Left Hand <1 day         Peripheral IV - Single Lumen 07/05/23 1249 20 G Left Hand <1 day                  Wounds: No  Wound care consulted: No   Problem: Adult Inpatient Plan of Care  Goal: Plan of Care Review  Outcome: Ongoing, Progressing  Goal: Patient-Specific Goal (Individualized)  Outcome: Ongoing, Progressing  Goal: Absence of Hospital-Acquired Illness or Injury  Outcome: Ongoing, Progressing  Goal: Optimal Comfort and  Wellbeing  Outcome: Ongoing, Progressing  Goal: Readiness for Transition of Care  Outcome: Ongoing, Progressing     Problem: Adjustment to Illness (Stroke, Hemorrhagic)  Goal: Optimal Coping  Outcome: Ongoing, Progressing     Problem: Bowel Elimination Impaired (Stroke, Hemorrhagic)  Goal: Effective Bowel Elimination  Outcome: Ongoing, Progressing     Problem: Pain (Stroke, Hemorrhagic)  Goal: Acceptable Pain Control  Outcome: Ongoing, Progressing     Problem: Communication Impairment (Stroke, Hemorrhagic)  Goal: Effective Communication Skills  Outcome: Ongoing, Progressing

## 2023-07-05 NOTE — PROCEDURES
Right ICA artery aneurysm / elective MJ flow diversion management    - CPT 57291        Inderjit Moreno MD     Neuro Endovascular Surgery Fellow   [facility]

## 2023-07-06 ENCOUNTER — TELEPHONE (OUTPATIENT)
Dept: FAMILY MEDICINE | Facility: CLINIC | Age: 43
End: 2023-07-06

## 2023-07-06 VITALS
DIASTOLIC BLOOD PRESSURE: 67 MMHG | TEMPERATURE: 98 F | OXYGEN SATURATION: 98 % | RESPIRATION RATE: 20 BRPM | WEIGHT: 220 LBS | HEIGHT: 63 IN | HEART RATE: 69 BPM | BODY MASS INDEX: 38.98 KG/M2 | SYSTOLIC BLOOD PRESSURE: 130 MMHG

## 2023-07-06 LAB
ALBUMIN SERPL BCP-MCNC: 3.5 G/DL (ref 3.5–5.2)
ALP SERPL-CCNC: 64 U/L (ref 55–135)
ALT SERPL W/O P-5'-P-CCNC: 16 U/L (ref 10–44)
ANION GAP SERPL CALC-SCNC: 9 MMOL/L (ref 8–16)
APTT PPP: 27.2 SEC (ref 21–32)
AST SERPL-CCNC: 18 U/L (ref 10–40)
BASOPHILS # BLD AUTO: 0.01 K/UL (ref 0–0.2)
BASOPHILS NFR BLD: 0.1 % (ref 0–1.9)
BILIRUB SERPL-MCNC: 0.4 MG/DL (ref 0.1–1)
BUN SERPL-MCNC: 16 MG/DL (ref 6–20)
CALCIUM SERPL-MCNC: 9.1 MG/DL (ref 8.7–10.5)
CHLORIDE SERPL-SCNC: 110 MMOL/L (ref 95–110)
CO2 SERPL-SCNC: 19 MMOL/L (ref 23–29)
CREAT SERPL-MCNC: 0.8 MG/DL (ref 0.5–1.4)
DIFFERENTIAL METHOD: ABNORMAL
EOSINOPHIL # BLD AUTO: 0 K/UL (ref 0–0.5)
EOSINOPHIL NFR BLD: 0 % (ref 0–8)
ERYTHROCYTE [DISTWIDTH] IN BLOOD BY AUTOMATED COUNT: 12.4 % (ref 11.5–14.5)
EST. GFR  (NO RACE VARIABLE): >60 ML/MIN/1.73 M^2
GLUCOSE SERPL-MCNC: 138 MG/DL (ref 70–110)
HCT VFR BLD AUTO: 39.2 % (ref 37–48.5)
HGB BLD-MCNC: 13.1 G/DL (ref 12–16)
IMM GRANULOCYTES # BLD AUTO: 0.01 K/UL (ref 0–0.04)
IMM GRANULOCYTES NFR BLD AUTO: 0.1 % (ref 0–0.5)
INR PPP: 1 (ref 0.8–1.2)
LYMPHOCYTES # BLD AUTO: 0.6 K/UL (ref 1–4.8)
LYMPHOCYTES NFR BLD: 8.8 % (ref 18–48)
MAGNESIUM SERPL-MCNC: 2.1 MG/DL (ref 1.6–2.6)
MCH RBC QN AUTO: 29.1 PG (ref 27–31)
MCHC RBC AUTO-ENTMCNC: 33.4 G/DL (ref 32–36)
MCV RBC AUTO: 87 FL (ref 82–98)
MONOCYTES # BLD AUTO: 0.1 K/UL (ref 0.3–1)
MONOCYTES NFR BLD: 2 % (ref 4–15)
NEUTROPHILS # BLD AUTO: 6.3 K/UL (ref 1.8–7.7)
NEUTROPHILS NFR BLD: 89 % (ref 38–73)
NRBC BLD-RTO: 0 /100 WBC
PHOSPHATE SERPL-MCNC: 3.5 MG/DL (ref 2.7–4.5)
PLATELET # BLD AUTO: 238 K/UL (ref 150–450)
PMV BLD AUTO: 9.1 FL (ref 9.2–12.9)
POTASSIUM SERPL-SCNC: 4.3 MMOL/L (ref 3.5–5.1)
PROT SERPL-MCNC: 7 G/DL (ref 6–8.4)
PROTHROMBIN TIME: 11.1 SEC (ref 9–12.5)
RBC # BLD AUTO: 4.5 M/UL (ref 4–5.4)
SODIUM SERPL-SCNC: 138 MMOL/L (ref 136–145)
WBC # BLD AUTO: 7.08 K/UL (ref 3.9–12.7)

## 2023-07-06 PROCEDURE — 99232 PR SUBSEQUENT HOSPITAL CARE,LEVL II: ICD-10-PCS | Mod: ,,, | Performed by: NURSE PRACTITIONER

## 2023-07-06 PROCEDURE — 80053 COMPREHEN METABOLIC PANEL: CPT

## 2023-07-06 PROCEDURE — 94761 N-INVAS EAR/PLS OXIMETRY MLT: CPT

## 2023-07-06 PROCEDURE — 85025 COMPLETE CBC W/AUTO DIFF WBC: CPT

## 2023-07-06 PROCEDURE — 25000003 PHARM REV CODE 250

## 2023-07-06 PROCEDURE — 85610 PROTHROMBIN TIME: CPT

## 2023-07-06 PROCEDURE — 25000003 PHARM REV CODE 250: Performed by: STUDENT IN AN ORGANIZED HEALTH CARE EDUCATION/TRAINING PROGRAM

## 2023-07-06 PROCEDURE — 99232 SBSQ HOSP IP/OBS MODERATE 35: CPT | Mod: ,,, | Performed by: NURSE PRACTITIONER

## 2023-07-06 PROCEDURE — 85730 THROMBOPLASTIN TIME PARTIAL: CPT

## 2023-07-06 PROCEDURE — 83735 ASSAY OF MAGNESIUM: CPT

## 2023-07-06 PROCEDURE — 84100 ASSAY OF PHOSPHORUS: CPT

## 2023-07-06 RX ORDER — OXYCODONE HYDROCHLORIDE 5 MG/1
5 TABLET ORAL ONCE
Status: COMPLETED | OUTPATIENT
Start: 2023-07-06 | End: 2023-07-06

## 2023-07-06 RX ORDER — BUTALBITAL, ACETAMINOPHEN AND CAFFEINE 50; 325; 40 MG/1; MG/1; MG/1
1 TABLET ORAL EVERY 4 HOURS PRN
Status: DISCONTINUED | OUTPATIENT
Start: 2023-07-06 | End: 2023-07-06 | Stop reason: HOSPADM

## 2023-07-06 RX ADMIN — GABAPENTIN 300 MG: 300 CAPSULE ORAL at 02:07

## 2023-07-06 RX ADMIN — BUTALBITAL, ACETAMINOPHEN, AND CAFFEINE 1 TABLET: 50; 325; 40 TABLET ORAL at 08:07

## 2023-07-06 RX ADMIN — BUSPIRONE HYDROCHLORIDE 15 MG: 10 TABLET ORAL at 08:07

## 2023-07-06 RX ADMIN — ASPIRIN 81 MG 81 MG: 81 TABLET ORAL at 08:07

## 2023-07-06 RX ADMIN — ACETAMINOPHEN 650 MG: 325 TABLET ORAL at 02:07

## 2023-07-06 RX ADMIN — TICAGRELOR 90 MG: 90 TABLET ORAL at 08:07

## 2023-07-06 RX ADMIN — BUTALBITAL, ACETAMINOPHEN, AND CAFFEINE 1 TABLET: 50; 325; 40 TABLET ORAL at 04:07

## 2023-07-06 RX ADMIN — TICAGRELOR 90 MG: 90 TABLET ORAL at 06:07

## 2023-07-06 RX ADMIN — GABAPENTIN 300 MG: 300 CAPSULE ORAL at 08:07

## 2023-07-06 RX ADMIN — OXYCODONE HYDROCHLORIDE 5 MG: 5 TABLET ORAL at 06:07

## 2023-07-06 RX ADMIN — BUTALBITAL, ACETAMINOPHEN, AND CAFFEINE 1 TABLET: 50; 325; 40 TABLET ORAL at 12:07

## 2023-07-06 NOTE — DISCHARGE SUMMARY
[facility] Neuro Critical [facility]  Neurosurgery  Discharge Summary      Patient Name: Eugenie Laguerre  MRN: [id number]  Admission Date: 7/5/2023  Hospital Length of Stay: 1 days  Discharge Date and Time:  07/06/2023 7:17 AM  Attending Physician: Suzanne Gracia MD   Discharging Provider: Pascual Moe MD  Primary Care Provider: Danny Gerardo MD    HPI:   No notes on file    * No procedures listed *     Hospital Course: Patient was admitted for DSA+MJ placement for R hypophyseal aneurysm on 07/05/23 and underwent procedure without perioperative complications. She recovered in the ICU and was maintained on the appropriate DAPT during his stay. At the time of discharge, the patient was tolerating PO intake without N/V, dysphagia, denied bowel or bladder dysfunction, denied new neurological symptoms, and reported pain controlled with current regimen. The patient was accompanied by family and woke from anesthesia at baseline neuro-status. The groin was soft without a hematoma, distal pulses were palpable, and vital signs were stable. The patient will follow up in clinic as indicated in discharge instructions. All questions were answered and continued treatment/woundcare instructions were discussed in detail prior to discharge.      Goals of Care Treatment Preferences:  Code Status: Full Code      Consults:   Consults (From admission, onward)        Status Ordering Provider     Inpatient consult to Registered Dietitian/Nutritionist  Once        Provider:  (Not yet assigned)    Acknowledged MAXINE PARKS     IP consult to case management/social work  Once        Provider:  (Not yet assigned)    Acknowledged MAXINE PARKS          Significant Diagnostic Studies: Reviewed prior to discharge    Pending Diagnostic Studies:     Procedure Component Value Units Date/Time    IR Embolization (CNS) Intracranial [[id number]] Resulted: 07/05/23 1530    Order Status: Sent Lab Status: In process Updated: 07/05/23 1541        Final Active  Diagnoses:    Diagnosis Date Noted POA    PRINCIPAL PROBLEM:  Cerebral aneurysm, nonruptured [I67.1] 04/11/2023 Yes    Intractable chronic migraine without aura and without status migrainosus [G43.719] 03/15/2023 Yes    Anxiety [F41.9] 01/24/2023 Yes    Severe obesity (BMI 35.0-39.9) with comorbidity [E66.01] 08/30/2022 Yes    Tobacco abuse [Z72.0] 09/30/2019 Yes      Problems Resolved During this Admission:      Discharged Condition: good     Disposition: Home or Self Care    Follow Up:   Follow-up Information     Inderjit Moreno MD Follow up in 4 week(s).    Specialty: Neurology  Why: s/p MJ placement  Contact information:  [address] [facility]  [address]  [facility] [address] [phone]                       Patient Instructions:      Basic metabolic panel   Standing Status: Future Standing Exp. Date: 07/28/23     Medications:  Reconciled Home Medications:      Medication List      START taking these medications    ticagrelor 90 mg tablet  Commonly known as: BRILINTA  Take 1 tablet (90 mg total) by mouth 2 (two) times daily.        CHANGE how you take these medications    sertraline 25 MG tablet  Commonly known as: ZOLOFT  Take 1 tablet (25 mg total) by mouth once daily.  What changed: when to take this        CONTINUE taking these medications    aspirin 81 MG EC tablet  Commonly known as: ECOTRIN  Take 81 mg by mouth once daily.     busPIRone 15 MG tablet  Commonly known as: BUSPAR  Take 1 tablet (15 mg total) by mouth 2 (two) times daily.     * EMGALITY  mg/mL Pnij  Generic drug: galcanezumab-gnlm  Inject 120 mg into the skin every 28 days.     * EMGALITY  mg/mL Pnij  Generic drug: galcanezumab-gnlm  Inject 240 mg (2 injections) subcutaneous at separate sites, once (loading dose). Start maintenance dose 28 days later     ibuprofen 800 MG tablet  Commonly known as: ADVIL,MOTRIN  Take 800 mg by mouth every 8 (eight) hours as needed.     medroxyPROGESTERone 10 MG tablet  Commonly known  as: PROVERA  Take 1 tablet (10 mg total) by mouth once daily.     ondansetron 4 MG tablet  Commonly known as: ZOFRAN  Take 1 tablet (4 mg total) by mouth every 6 (six) hours as needed for Nausea.     prochlorperazine 10 MG tablet  Commonly known as: COMPAZINE  Take 1 tablet (10 mg total) by mouth every 6 (six) hours as needed (migraine or nausea).     promethazine 25 MG tablet  Commonly known as: PHENERGAN  Take 1 tablet (25 mg total) by mouth every 6 (six) hours as needed for Nausea.     rizatriptan 10 MG tablet  Commonly known as: MAXALT  1 tab PO PRN migraine. May repeat every 2 hours for max 3 tabs in 24 hours. Use no more than 10 days per month.     tiZANidine 4 MG tablet  Commonly known as: ZANAFLEX  Half or full tablet by mouth at night as needed for muscle spasm     topiramate 50 MG tablet  Commonly known as: TOPAMAX  Take 1.5 tablets (75 mg total) by mouth every evening.         * This list has 2 medication(s) that are the same as other medications prescribed for you. Read the directions carefully, and ask your doctor or other care provider to review them with you.            STOP taking these medications    clopidogreL 75 mg tablet  Commonly known as: PLAVIX            Pascual Moe MD  Neurosurgery  [facility] - Neuro Critical Care

## 2023-07-06 NOTE — ASSESSMENT & PLAN NOTE
Eugenie Laguerre is a 42y F with medical history of anxiety, depression, and restless leg syndrome with recent MRA evaluation revealing right ICA aneurysm. Patient presented for a cerebral angiogram for characterization of intracranial aneurysm & MJ flow diversion treatment.    - S/p cerebral angiogram, MJ flow diversion treatment  - gabapentin, tylenol given for HA  - SBP goal <160  - DAPT with brillinta and ASA starting tomorrow  - plan to d/c tomorrow  7/6/2023: JOSE EDUARDO, plan to discharge today by NSGY team

## 2023-07-06 NOTE — PLAN OF CARE
[facility] Care Plan    POC reviewed with Eugenie Laguerre and family at 0300. Pt verbalized understanding. Questions and concerns addressed. No acute events overnight. Pt progressing toward goals. Will continue to monitor. See below and flowsheets for full assessment and VS info.           Is this a stroke patient? no    Neuro:  Sugar Coma Scale  Best Eye Response: 4-->(E4) spontaneous  Best Motor Response: 6-->(M6) obeys commands  Best Verbal Response: 5-->(V5) oriented  Sugar Coma Scale Score: 15  Pupil PERRLA: yes     24hr Temp:  [97.7 °F (36.5 °C)-98.8 °F (37.1 °C)]     CV:   Rhythm: normal sinus rhythm  BP goals:   SBP < 160  MAP > 65    Resp:           Plan: N/A    GI/:     Diet/Nutrition Received: NPO  Last Bowel Movement: 07/04/23       Intake/Output Summary (Last 24 hours) at 7/6/2023 0537  Last data filed at 7/6/2023 0251  Gross per 24 hour   Intake 1000 ml   Output 1000 ml   Net 0 ml          Labs/Accuchecks:  Recent Labs   Lab 07/06/23  0134   WBC 7.08   RBC 4.50   HGB 13.1   HCT 39.2         Recent Labs   Lab 07/06/23  0134      K 4.3   CO2 19*      BUN 16   CREATININE 0.8   ALKPHOS 64   ALT 16   AST 18   BILITOT 0.4      Recent Labs   Lab 07/06/23  0134   INR 1.0   APTT 27.2    No results for input(s): CPK, CPKMB, TROPONINI, MB in the last 168 hours.    Electrolytes: N/A - electrolytes WDL  Accuchecks: none    Gtts:   sodium chloride 0.9% Stopped (07/05/23 1612)       LDA/Wounds:  Lines/Drains/Airways       Peripheral Intravenous Line  Duration                  Peripheral IV - Single Lumen 07/05/23 1234 20 G Left Hand <1 day                  Wounds: No  Wound care consulted: No

## 2023-07-06 NOTE — PLAN OF CARE
[facility] Neuro Critical Care  Discharge Final Note    Primary Care Provider: Danny Gerardo MD    Expected Discharge Date: 7/6/2023    Patient to discharge to home with no post acute needs per MD.  PCP did not have availability until the end of the month.  A nurse will call the patient to schedule the follow up appointment.     Future Appointments   Date Time Provider Department Center   7/18/2023  9:30 AM Gabriela Naik NP [facility] STROKE8 [facility]   8/2/2023 10:30 AM Danny Gerardo MD [facility]   8/2/2023 11:30 AM Tricia Gerardo NP [facility] HEADACH [place]         Final Discharge Note (most recent)       Final Note - 07/06/23 1038          Final Note    Assessment Type Final Discharge Note     Anticipated Discharge Disposition Home or Self Care     What phone number can be called within the next 1-3 days to see how you are doing after discharge? [phone]                     Important Message from   NA             Contact Info       Inderjit Moreno MD   Specialty: Neurology    [address] [facility] [address] [place] [address]   Phone: [phone]       Next Steps: Follow up in 4 week(s)    Instructions: s/p MJ Gerardo MD   Specialty: Internal Medicine   Relationship: PCP - General    [address] [facility] [address]   Phone: [phone]       Next Steps: Schedule an appointment as soon as possible for a visit in 1 week(s)    Instructions: Hospital follow up.  A nurse will call you with an appointment.  If you do not receive a call by tomorrow, please call to schedule            Janee Kiran RN, CCRN-K, [facility]  Neuro-Critical Care   X [phone]

## 2023-07-06 NOTE — CONSULTS
RD consulted to assess dietary needs. Noted discharge orders signed and discharge summary inputted. RD will complete consult tomorrow if pt still remains here.     Thanks!  Justine Meza RDN,LDN

## 2023-07-06 NOTE — PROGRESS NOTES
[facility] Neuro Critical Care  Neurocritical Care  Progress Note    Admit Date: 7/5/2023  Service Date: 07/06/2023  Length of Stay: 1    Subjective:     Chief Complaint: Cerebral aneurysm, nonruptured    History of Present Illness: Eugenie Laguerre is a 42y F with medical history of anxiety, depression, and restless leg syndrome with recent MRA evaluation revealing right ICA aneurysm. Patient presented for a cerebral angiogram for characterization of intracranial aneurysm & MJ flow diversion treatment. Admitted to [facility] for higher level monitoring.      Hospital Course: 7/6/2023: NAEON, plan to get discharged by NSGY          Review of Systems  Constitutional: Denies fevers, weight loss, chills, or weakness.  Eyes: Denies changes in vision.  ENT: Denies dysphagia, nasal discharge, ear pain or discharge.  Cardiovascular: Denies chest pain, palpitations, orthopnea, or claudication.  Respiratory: Denies shortness of breath, cough, hemoptysis, or wheezing.  GI: Denies nausea/vomitting, hematochezia, melena, abd pain, or changes in appetite.  : Denies dysuria, incontinence, or hematuria.  Musculoskeletal: Denies joint pain or myalgias.  Skin/breast: Denies rashes, lumps, lesions, or discharge.  Neurologic: Denies headache, dizziness, vertigo, or paresthesias.  Psychiatric: Denies changes in mood or hallucinations.  Endocrine: Denies polyuria, polydipsia, heat/cold intolerance.  Hematologic/Lymph: Denies lymphadenopathy, easy bruising or easy bleeding.  Allergic/Immunologic: Denies rash, rhinitis.    Objective:     Vitals:  Temp: 98 °F (36.7 °C)  Pulse: 71  Rhythm: normal sinus rhythm  BP: 114/62  MAP (mmHg): 83  Resp: (!) 23  SpO2: 98 %    Temp  Min: 97.8 °F (36.6 °C)  Max: 98.8 °F (37.1 °C)  Pulse  Min: 50  Max: 81  BP  Min: 91/51  Max: 154/93  MAP (mmHg)  Min: 66  Max: 115  Resp  Min: 8  Max: 32  SpO2  Min: 92 %  Max: 99 %    07/05 0701 - 07/06 0700  In: 1000 [P.O.:300; I.V.:700]  Out: 1000 [Urine:1000]             Physical Exam  GA: Alert, comfortable, no acute distress.   HEENT: No scleral icterus or JVD.   Pulmonary: Clear to auscultation A/P/L. No wheezing, crackles, or rhonchi.  Cardiac: RRR S1 & S2 w/o rubs/murmurs/gallops.   Abdominal: Bowel sounds present x 4. No appreciable hepatosplenomegaly.  Skin: No jaundice, rashes, or visible lesions.  Neuro:  --GCS: E4 V5 M6  --Mental Status:  awake, oriented X4, follows commands, fluent speech  --CN II-XII grossly intact.   --Pupils 3mm, PERRL.   --Corneal reflex, gag, cough intact.  --MCKNIGHT spont       Medications:  Continuoussodium chloride 0.9%, Last Rate: Stopped (07/05/23 1612)    Scheduledaspirin, 81 mg, Daily  busPIRone, 15 mg, BID  gabapentin, 300 mg, TID  sertraline, 25 mg, QHS  ticagrelor, 90 mg, BID    PRNacetaminophen, 650 mg, Q6H PRN  butalbital-acetaminophen-caffeine -40 mg, 1 tablet, Q4H PRN  dextrose 10%, 12.5 g, PRN  dextrose 10%, 25 g, PRN  glucagon (human recombinant), 1 mg, PRN  glucose, 16 g, PRN  glucose, 24 g, PRN  insulin aspart U-100, 1-10 Units, QID (AC + HS) PRN  labetalol, 10 mg, Q6H PRN  magnesium oxide, 800 mg, PRN  magnesium oxide, 800 mg, PRN  ondansetron, 4 mg, Q6H PRN  potassium bicarbonate, 35 mEq, PRN  potassium bicarbonate, 50 mEq, PRN  potassium bicarbonate, 60 mEq, PRN  potassium, sodium phosphates, 2 packet, PRN  potassium, sodium phosphates, 2 packet, PRN  potassium, sodium phosphates, 2 packet, PRN  sodium chloride 0.9%, 10 mL, PRN  sodium chloride 0.9%, 10 mL, PRN  tiZANidine, 4 mg, TID PRN      Today I personally reviewed pertinent medications, lines/drains/airways, imaging, cardiology results, laboratory results, microbiology results,    Diet  Diet diabetic [facility]; 2000 Calorie; Standard Tray          Assessment/Plan:     Neuro  * Cerebral aneurysm, nonruptured  Tamatha Laguerre is a 42y F with medical history of anxiety, depression, and restless leg syndrome with recent MRA evaluation revealing right ICA aneurysm.  Patient presented for a cerebral angiogram for characterization of intracranial aneurysm & MJ flow diversion treatment.    - S/p cerebral angiogram, MJ flow diversion treatment  - gabapentin, tylenol given for HA  - SBP goal <160  - DAPT with brillinta and ASA starting tomorrow  - plan to d/c tomorrow  7/6/2023: JOSE EDUARDO, plan to discharge today by NSGY team    Intractable chronic migraine without aura and without status migrainosus  - gabapentin, tylenol given  - holding rizatriptan while in [facility]    Psychiatric  Anxiety  Home meds given  - tizanidine prn  - sertraline   - gabapentin  - buspirone    Endocrine  Severe obesity (BMI 35.0-39.9) with comorbidity  - diabetic diet    Other  Tobacco abuse  Pt reports that she quit smoking about 1 month ago  - denies nicotine patch at this time          The patient is being Prophylaxed for:  Venous Thromboembolism with: Mechanical  Stress Ulcer with: None  Ventilator Pneumonia with: not applicable    Activity Orders          Progressive Mobility Protocol (mobilize patient to their highest level of functioning at least twice daily) starting at 07/05 2000    Turn patient starting at 07/05 1800    Diet diabetic [facility]; 2000 Calorie; Standard Tray: Diabetic starting at 07/05 1717    Elevate HOB starting at 07/05 1708        Full Code    Christie Becerra NP  Neurocritical Care  [facility] - Neuro Critical Care

## 2023-07-06 NOTE — HOSPITAL COURSE
Patient was admitted for DSA+MJ placement for R hypophyseal aneurysm on 07/05/23 and underwent procedure without perioperative complications. She recovered in the ICU and was maintained on the appropriate DAPT during his stay. At the time of discharge, the patient was tolerating PO intake without N/V, dysphagia, denied bowel or bladder dysfunction, denied new neurological symptoms, and reported pain controlled with current regimen. The patient was accompanied by family and woke from anesthesia at baseline neuro-status. The groin was soft without a hematoma, distal pulses were palpable, and vital signs were stable. The patient will follow up in clinic as indicated in discharge instructions. All questions were answered and continued treatment/woundcare instructions were discussed in detail prior to discharge.

## 2023-07-06 NOTE — SUBJECTIVE & OBJECTIVE
Review of Systems  Constitutional: Denies fevers, weight loss, chills, or weakness.  Eyes: Denies changes in vision.  ENT: Denies dysphagia, nasal discharge, ear pain or discharge.  Cardiovascular: Denies chest pain, palpitations, orthopnea, or claudication.  Respiratory: Denies shortness of breath, cough, hemoptysis, or wheezing.  GI: Denies nausea/vomitting, hematochezia, melena, abd pain, or changes in appetite.  : Denies dysuria, incontinence, or hematuria.  Musculoskeletal: Denies joint pain or myalgias.  Skin/breast: Denies rashes, lumps, lesions, or discharge.  Neurologic: Denies headache, dizziness, vertigo, or paresthesias.  Psychiatric: Denies changes in mood or hallucinations.  Endocrine: Denies polyuria, polydipsia, heat/cold intolerance.  Hematologic/Lymph: Denies lymphadenopathy, easy bruising or easy bleeding.  Allergic/Immunologic: Denies rash, rhinitis.    Objective:     Vitals:  Temp: 98 °F (36.7 °C)  Pulse: 71  Rhythm: normal sinus rhythm  BP: 114/62  MAP (mmHg): 83  Resp: (!) 23  SpO2: 98 %    Temp  Min: 97.8 °F (36.6 °C)  Max: 98.8 °F (37.1 °C)  Pulse  Min: 50  Max: 81  BP  Min: 91/51  Max: 154/93  MAP (mmHg)  Min: 66  Max: 115  Resp  Min: 8  Max: 32  SpO2  Min: 92 %  Max: 99 %    07/05 0701 - 07/06 0700  In: 1000 [P.O.:300; I.V.:700]  Out: 1000 [Urine:1000]            Physical Exam  GA: Alert, comfortable, no acute distress.   HEENT: No scleral icterus or JVD.   Pulmonary: Clear to auscultation A/P/L. No wheezing, crackles, or rhonchi.  Cardiac: RRR S1 & S2 w/o rubs/murmurs/gallops.   Abdominal: Bowel sounds present x 4. No appreciable hepatosplenomegaly.  Skin: No jaundice, rashes, or visible lesions.  Neuro:  --GCS: E4 V5 M6  --Mental Status:  awake, oriented X4, follows commands, fluent speech  --CN II-XII grossly intact.   --Pupils 3mm, PERRL.   --Corneal reflex, gag, cough intact.  --MCKNIGHT spont       Medications:  Continuoussodium chloride 0.9%, Last Rate: Stopped (07/05/23  1612)    Scheduledaspirin, 81 mg, Daily  busPIRone, 15 mg, BID  gabapentin, 300 mg, TID  sertraline, 25 mg, QHS  ticagrelor, 90 mg, BID    PRNacetaminophen, 650 mg, Q6H PRN  butalbital-acetaminophen-caffeine -40 mg, 1 tablet, Q4H PRN  dextrose 10%, 12.5 g, PRN  dextrose 10%, 25 g, PRN  glucagon (human recombinant), 1 mg, PRN  glucose, 16 g, PRN  glucose, 24 g, PRN  insulin aspart U-100, 1-10 Units, QID (AC + HS) PRN  labetalol, 10 mg, Q6H PRN  magnesium oxide, 800 mg, PRN  magnesium oxide, 800 mg, PRN  ondansetron, 4 mg, Q6H PRN  potassium bicarbonate, 35 mEq, PRN  potassium bicarbonate, 50 mEq, PRN  potassium bicarbonate, 60 mEq, PRN  potassium, sodium phosphates, 2 packet, PRN  potassium, sodium phosphates, 2 packet, PRN  potassium, sodium phosphates, 2 packet, PRN  sodium chloride 0.9%, 10 mL, PRN  sodium chloride 0.9%, 10 mL, PRN  tiZANidine, 4 mg, TID PRN      Today I personally reviewed pertinent medications, lines/drains/airways, imaging, cardiology results, laboratory results, microbiology results,    Diet  Diet diabetic [facility]; 2000 Calorie; Standard Tray

## 2023-07-06 NOTE — DISCHARGE INSTRUCTIONS
--Patient stable for discharge to home    --Please take prescriptions as detailed in medication list      -If you are prescribed Plavix or Brilinta DO NOT stop taking unless your Neurosurgeon specifically tells you, also DO NOT take nexium or protonix while on these medications      -If there are any issues getting your Brilinta for any reason, call the Neurosurgery Clinic immediately    --All questions/concerns addressed and answered    --Please followup with neurovascular clinic in 2-4 weeks to be arranged by Clinic     --Please call immediately for any new onset nausea/vomiting/fever/chills, numbness/tingling/weakness, groin swelling, or lower extremity color changes    Groin Care Instructions:  -If you have any dressings at discharge, please remove 24 hours after the surgery.  -You may shower daily but do not soak or submerge wound in water for 7 days  -Keep all groin site clean, dry, and open to air.  -Do not apply creams or ointments to the wound.  -Call Neurosurgery if the wound opens, drains, or becomes red, or groin become swollen.  -No lifting greater than 10 pounds   -No spreading of legs or stretching groin muscles for one week (no sexual activity) for 7 days  -No driving

## 2023-07-06 NOTE — TELEPHONE ENCOUNTER
----- Message from Amaris Zhu sent at 7/6/2023  9:26 AM CDT -----  Contact: [facility]  Type:  Needs Medical Advice    Who Called: [facility]    Symptoms (please be specific): Hospital discharge     How long has patient had these symptoms:  7/6/23    Pharmacy name and phone #:    [id number] - [place] - [address]  [address] [address]  Phone: [phone] Fax: [phone]    [facility] [place] [place] - [address]  [address] [facility] [address]  Phone: [phone] Fax: [phone]      Would the patient rather a call back or a response via [facility]? Call    Best Call Back Number: [phone] (home)     Additional Information: Please advise. Bria appt shows as 7/31/23

## 2023-07-06 NOTE — PLAN OF CARE
[facility] - Neuro Critical Care  Initial Discharge Assessment       Primary Care Provider: Danny Gerardo MD    Admission Diagnosis: Unruptured cerebral aneurysm [I67.1]    Admission Date: 2023  Expected Discharge Date: 2023    Transition of Care Barriers: None    Payor: BLUE CROSS BLUE SHIELD / Plan: [place] PPO / Product Type: PPO /     Extended Emergency Contact Information  Primary Emergency Contact: Paramjit Laguerre   [place]  Mobile Phone: [phone]  Relation: Spouse    Discharge Plan A: Home with family  Discharge Plan B: Home with family      [id number] - [place], [place] - [address] [facility] [address]  [address]  [place] [address]  Phone: [phone] Fax: [phone]    [facility]  for [place], [place] - [address]  [address] [address]  Phone: [phone] Fax: [phone]      Transferred from: home    Past Medical History:   Diagnosis Date    Anxiety 2023    Clotting disorder     DVT (deep venous thrombosis) 2007    was felt to be due to ? Factor V Leiden    High risk pregnancy due to history of  labor 2015    Hyperthyroidism 2013    reports repeat testing was WNL and no treatment needed.    Iron deficiency anemia due to chronic blood loss 2015    Migraines     Restless legs syndrome (RLS)     Short cervix affecting pregnancy 2015         CM met with patient and Paramjit Laguerre (spouse) [phone] in room for Discharge Planning Assessment.  Patient is able to answer questions.  Per patient, she lives with her  in a first floor apartment with 3 step(s) to enter and a rail on the left.   Per patient, she was independent with ADLS and used no dme for ambulation.  [facility]   Discharge Planning Booklet given to patient/family and discussed.  All questions addressed.  CM will follow for needs.    Initial  Assessment (most recent)       Adult Discharge Assessment - 07/06/23 1011          Discharge Assessment    Assessment Type Discharge Planning Assessment     Confirmed/corrected address, phone number and insurance Yes     Confirmed Demographics Correct on Facesheet     Source of Information patient     Communicated KELLEY with patient/caregiver Yes     Reason For Admission Cerebral aneurysm, nonruptured     People in Home spouse     Facility Arrived From: home     Do you expect to return to your current living situation? No     Do you have help at home or someone to help you manage your care at home? Yes     Who are your caregiver(s) and their phone number(s)? Paramjit Laguerre (spouse) [phone]     Prior to hospitilization cognitive status: Alert/Oriented     Current cognitive status: Alert/Oriented     Walking or Climbing Stairs --   independent    Dressing/Bathing --   independent    Home Accessibility stairs to enter home     Number of Stairs, Main Entrance three     Stair Railings, Main Entrance railing on left side (ascending)     Home Layout Able to live on 1st floor     Readmission within 30 days? No     Patient currently being followed by outpatient case management? No     Do you currently have service(s) that help you manage your care at home? No     Do you take prescription medications? Yes     Do you have prescription coverage? Yes     Coverage BCBS     Do you have any problems affording any of your prescribed medications? No     Is the patient taking medications as prescribed? yes     Who is going to help you get home at discharge? Paramjit Laguerre (spouse) [phone]     How do you get to doctors appointments? family or friend will provide     Are you on dialysis? No     Do you take coumadin? No     Discharge Plan A Home with family     Discharge Plan B Home with family     Discharge Plan discussed with: Patient;Spouse/sig other     Name(s) and Number(s) Paramjit Laguerre (spouse) [phone] (at bedside)      Transition of Care Barriers None        Physical Activity    On average, how many days per week do you engage in moderate to strenuous exercise (like a brisk walk)? 7 days     On average, how many minutes do you engage in exercise at this level? 10 min        Financial Resource Strain    How hard is it for you to pay for the very basics like food, housing, medical care, and heating? Not hard at all        Housing Stability    In the last 12 months, was there a time when you were not able to pay the mortgage or rent on time? Yes     In the last 12 months, how many places have you lived? 2     In the last 12 months, was there a time when you did not have a steady place to sleep or slept in a shelter (including now)? No        Transportation Needs    In the past 12 months, has lack of transportation kept you from medical appointments or from getting medications? No     In the past 12 months, has lack of transportation kept you from meetings, work, or from getting things needed for daily living? No        Food Insecurity    Within the past 12 months, you worried that your food would run out before you got the money to buy more. Never true     Within the past 12 months, the food you bought just didn't last and you didn't have money to get more. Never true        Stress    Do you feel stress - tense, restless, nervous, or anxious, or unable to sleep at night because your mind is troubled all the time - these days? Not at all        Social Connections    In a typical week, how many times do you talk on the phone with family, friends, or neighbors? More than three times a week     How often do you get together with friends or relatives? More than three times a week     How often do you attend Taoism or Restoration services? Never     Do you belong to any clubs or organizations such as Taoism groups, unions, fraternal or athletic groups, or school groups? No     How often do you attend meetings of the clubs or organizations  you belong to? Never     Are you , , , , never , or living with a partner?         Alcohol Use    Q1: How often do you have a drink containing alcohol? Monthly or less     Q2: How many drinks containing alcohol do you have on a typical day when you are drinking? 1 or 2     Q3: How often do you have six or more drinks on one occasion? Never        OTHER    Name(s) of People in Home Paramjit Laguerre (spouse) [phone]                      Janee Kiran RN, CCRN-K, [facility]  Neuro-Critical Care   [phone]

## 2023-07-07 ENCOUNTER — NURSE TRIAGE (OUTPATIENT)
Dept: ADMINISTRATIVE | Facility: CLINIC | Age: 43
End: 2023-07-07

## 2023-07-07 ENCOUNTER — HOSPITAL ENCOUNTER (INPATIENT)
Facility: HOSPITAL | Age: 43
LOS: 4 days | Discharge: HOME | DRG: 123 | End: 2023-07-14
Attending: EMERGENCY MEDICINE | Admitting: STUDENT IN AN ORGANIZED HEALTH CARE EDUCATION/TRAINING PROGRAM
Payer: COMMERCIAL

## 2023-07-07 ENCOUNTER — PATIENT OUTREACH (OUTPATIENT)
Dept: ADMINISTRATIVE | Facility: CLINIC | Age: 43
End: 2023-07-07

## 2023-07-07 DIAGNOSIS — H46.9 OPTIC NEURITIS: ICD-10-CM

## 2023-07-07 DIAGNOSIS — R07.9 CHEST PAIN: ICD-10-CM

## 2023-07-07 DIAGNOSIS — F41.9 ANXIETY DISORDER, UNSPECIFIED TYPE: ICD-10-CM

## 2023-07-07 DIAGNOSIS — H54.60 MONOCULAR VISION LOSS: Primary | ICD-10-CM

## 2023-07-07 DIAGNOSIS — F33.1 MAJOR DEPRESSIVE DISORDER, RECURRENT, MODERATE: ICD-10-CM

## 2023-07-07 DIAGNOSIS — I67.1 CEREBRAL ANEURYSM, NONRUPTURED: ICD-10-CM

## 2023-07-07 PROCEDURE — 96366 THER/PROPH/DIAG IV INF ADDON: CPT

## 2023-07-07 PROCEDURE — 99285 EMERGENCY DEPT VISIT HI MDM: CPT

## 2023-07-07 PROCEDURE — 96365 THER/PROPH/DIAG IV INF INIT: CPT

## 2023-07-07 PROCEDURE — 96375 TX/PRO/DX INJ NEW DRUG ADDON: CPT

## 2023-07-07 RX ORDER — PROCHLORPERAZINE EDISYLATE 5 MG/ML
10 INJECTION INTRAMUSCULAR; INTRAVENOUS
Status: COMPLETED | OUTPATIENT
Start: 2023-07-08 | End: 2023-07-08

## 2023-07-07 NOTE — TELEPHONE ENCOUNTER
OOC NT incoming call TCC escalation -  Pt reports severe headache, blurred vision tort side only and severe dizziness requiring her to have unsteady gait and now using assistance with ambulation. Pt did have neuro surgery on 7/5/23. Post op / Dizziness protocol followed and pt advised  to call 911 now. Pt reports her  is also home and she will hang up and call 911. Encounter routed to PCP and [place] Gerardo NP with neurology.   Reason for Disposition   Sounds like a life-threatening emergency to the triager    Additional Information   Negative: Sounds like a life-threatening emergency to the triager   Dizziness is severe, or persists > 24 hours after surgery   Negative: SEVERE difficulty breathing (e.g., struggling for each breath, speaks in single words)   Negative: Shock suspected (e.g., cold/pale/clammy skin, too weak to stand, low BP, rapid pulse)   Negative: Difficult to awaken or acting confused (e.g., disoriented, slurred speech)   Negative: Fainted, and still feels dizzy afterwards   Negative: Overdose (accidental or intentional) of medications   Negative: New neurologic deficit that is present now: * Weakness of the face, arm, or leg on one side of the body * Numbness of the face, arm, or leg on one side of the body * Loss of speech or garbled speech   Negative: Heart beating < 50 beats per minute OR > 140 beats per minute    Protocols used: Post-Op Symptoms and Nisrtadqt-I-TF, Dizziness-A-OH

## 2023-07-07 NOTE — NURSING
Patient discharged home with . Headache has improved. Vital signs stable. No acute distress noted.

## 2023-07-08 PROBLEM — H46.9 OPTIC NEURITIS: Status: ACTIVE | Noted: 2023-07-08

## 2023-07-08 PROBLEM — F41.9 ANXIETY: Chronic | Status: ACTIVE | Noted: 2023-01-24

## 2023-07-08 PROCEDURE — 99222 1ST HOSP IP/OBS MODERATE 55: CPT | Mod: ,,, | Performed by: STUDENT IN AN ORGANIZED HEALTH CARE EDUCATION/TRAINING PROGRAM

## 2023-07-08 PROCEDURE — 99223 PR INITIAL HOSPITAL CARE,LEVL III: ICD-10-PCS | Mod: ,,, | Performed by: STUDENT IN AN ORGANIZED HEALTH CARE EDUCATION/TRAINING PROGRAM

## 2023-07-08 PROCEDURE — 99223 1ST HOSP IP/OBS HIGH 75: CPT | Mod: ,,, | Performed by: STUDENT IN AN ORGANIZED HEALTH CARE EDUCATION/TRAINING PROGRAM

## 2023-07-08 PROCEDURE — G0378 HOSPITAL OBSERVATION PER HR: HCPCS

## 2023-07-08 PROCEDURE — 63600175 PHARM REV CODE 636 W HCPCS

## 2023-07-08 PROCEDURE — 99222 PR INITIAL HOSPITAL CARE,LEVL II: ICD-10-PCS | Mod: ,,, | Performed by: STUDENT IN AN ORGANIZED HEALTH CARE EDUCATION/TRAINING PROGRAM

## 2023-07-08 PROCEDURE — 96372 THER/PROPH/DIAG INJ SC/IM: CPT | Performed by: STUDENT IN AN ORGANIZED HEALTH CARE EDUCATION/TRAINING PROGRAM

## 2023-07-08 PROCEDURE — 63600175 PHARM REV CODE 636 W HCPCS: Performed by: STUDENT IN AN ORGANIZED HEALTH CARE EDUCATION/TRAINING PROGRAM

## 2023-07-08 PROCEDURE — 25000003 PHARM REV CODE 250

## 2023-07-08 RX ORDER — GLUCAGON 1 MG
1 KIT INJECTION
Status: DISCONTINUED | OUTPATIENT
Start: 2023-07-08 | End: 2023-07-14 | Stop reason: HOSPADM

## 2023-07-08 RX ORDER — HYDROCODONE BITARTRATE AND ACETAMINOPHEN 5; 325 MG/1; MG/1
1 TABLET ORAL EVERY 4 HOURS PRN
Status: DISCONTINUED | OUTPATIENT
Start: 2023-07-08 | End: 2023-07-12

## 2023-07-08 RX ORDER — ENOXAPARIN SODIUM 100 MG/ML
40 INJECTION SUBCUTANEOUS EVERY 24 HOURS
Status: DISCONTINUED | OUTPATIENT
Start: 2023-07-08 | End: 2023-07-08

## 2023-07-08 RX ORDER — IBUPROFEN 200 MG
24 TABLET ORAL
Status: DISCONTINUED | OUTPATIENT
Start: 2023-07-08 | End: 2023-07-14 | Stop reason: HOSPADM

## 2023-07-08 RX ORDER — TIZANIDINE 4 MG/1
4 TABLET ORAL DAILY PRN
COMMUNITY

## 2023-07-08 RX ORDER — ENOXAPARIN SODIUM 100 MG/ML
40 INJECTION SUBCUTANEOUS EVERY 12 HOURS
Status: DISCONTINUED | OUTPATIENT
Start: 2023-07-08 | End: 2023-07-14 | Stop reason: HOSPADM

## 2023-07-08 RX ORDER — DROPERIDOL 2.5 MG/ML
2.5 INJECTION, SOLUTION INTRAMUSCULAR; INTRAVENOUS
Status: COMPLETED | OUTPATIENT
Start: 2023-07-08 | End: 2023-07-08

## 2023-07-08 RX ORDER — PROCHLORPERAZINE EDISYLATE 5 MG/ML
2.5 INJECTION INTRAMUSCULAR; INTRAVENOUS
Status: COMPLETED | OUTPATIENT
Start: 2023-07-08 | End: 2023-07-08

## 2023-07-08 RX ORDER — ASPIRIN 81 MG/1
81 TABLET ORAL DAILY
Status: DISCONTINUED | OUTPATIENT
Start: 2023-07-08 | End: 2023-07-14 | Stop reason: HOSPADM

## 2023-07-08 RX ORDER — IBUPROFEN 200 MG
16 TABLET ORAL
Status: DISCONTINUED | OUTPATIENT
Start: 2023-07-08 | End: 2023-07-14 | Stop reason: HOSPADM

## 2023-07-08 RX ORDER — PROCHLORPERAZINE MALEATE 10 MG
10 TABLET ORAL CONTINUOUS PRN
Status: ON HOLD | COMMUNITY
End: 2023-07-14 | Stop reason: HOSPADM

## 2023-07-08 RX ORDER — NALOXONE HCL 0.4 MG/ML
0.02 VIAL (ML) INJECTION
Status: DISCONTINUED | OUTPATIENT
Start: 2023-07-08 | End: 2023-07-14 | Stop reason: HOSPADM

## 2023-07-08 RX ORDER — ONDANSETRON 8 MG/1
8 TABLET, ORALLY DISINTEGRATING ORAL EVERY 8 HOURS PRN
Status: DISCONTINUED | OUTPATIENT
Start: 2023-07-08 | End: 2023-07-14 | Stop reason: HOSPADM

## 2023-07-08 RX ORDER — MAGNESIUM SULFATE HEPTAHYDRATE 40 MG/ML
2 INJECTION, SOLUTION INTRAVENOUS ONCE
Status: COMPLETED | OUTPATIENT
Start: 2023-07-08 | End: 2023-07-08

## 2023-07-08 RX ORDER — TOPIRAMATE 25 MG/1
75 TABLET ORAL NIGHTLY
Status: DISCONTINUED | OUTPATIENT
Start: 2023-07-08 | End: 2023-07-12

## 2023-07-08 RX ORDER — LORAZEPAM 1 MG/1
1 TABLET ORAL ONCE AS NEEDED
Status: COMPLETED | OUTPATIENT
Start: 2023-07-08 | End: 2023-07-08

## 2023-07-08 RX ORDER — SODIUM CHLORIDE 0.9 % (FLUSH) 0.9 %
10 SYRINGE (ML) INJECTION EVERY 12 HOURS PRN
Status: DISCONTINUED | OUTPATIENT
Start: 2023-07-08 | End: 2023-07-14 | Stop reason: HOSPADM

## 2023-07-08 RX ORDER — SERTRALINE HYDROCHLORIDE 25 MG/1
25 TABLET, FILM COATED ORAL NIGHTLY
Status: DISCONTINUED | OUTPATIENT
Start: 2023-07-08 | End: 2023-07-14 | Stop reason: HOSPADM

## 2023-07-08 RX ADMIN — HYDROCODONE BITARTRATE AND ACETAMINOPHEN 1 TABLET: 5; 325 TABLET ORAL at 01:07

## 2023-07-08 RX ADMIN — ENOXAPARIN SODIUM 40 MG: 40 INJECTION SUBCUTANEOUS at 09:07

## 2023-07-08 RX ADMIN — ONDANSETRON 8 MG: 8 TABLET, ORALLY DISINTEGRATING ORAL at 01:07

## 2023-07-08 RX ADMIN — ASPIRIN 81 MG: 81 TABLET, COATED ORAL at 09:07

## 2023-07-08 RX ADMIN — PROCHLORPERAZINE EDISYLATE 2.5 MG: 5 INJECTION INTRAMUSCULAR; INTRAVENOUS at 08:07

## 2023-07-08 RX ADMIN — MAGNESIUM SULFATE 2 G: 2 INJECTION INTRAVENOUS at 01:07

## 2023-07-08 RX ADMIN — TICAGRELOR 90 MG: 90 TABLET ORAL at 09:07

## 2023-07-08 RX ADMIN — HYDROCODONE BITARTRATE AND ACETAMINOPHEN 1 TABLET: 5; 325 TABLET ORAL at 07:07

## 2023-07-08 RX ADMIN — DROPERIDOL 2.5 MG: 2.5 INJECTION, SOLUTION INTRAMUSCULAR; INTRAVENOUS at 05:07

## 2023-07-08 RX ADMIN — PROCHLORPERAZINE EDISYLATE 10 MG: 5 INJECTION INTRAMUSCULAR; INTRAVENOUS at 01:07

## 2023-07-08 RX ADMIN — BUSPIRONE HYDROCHLORIDE 15 MG: 10 TABLET ORAL at 09:07

## 2023-07-08 RX ADMIN — LORAZEPAM 1 MG: 1 TABLET ORAL at 01:07

## 2023-07-08 RX ADMIN — SERTRALINE HYDROCHLORIDE 25 MG: 25 TABLET ORAL at 10:07

## 2023-07-08 RX ADMIN — TOPIRAMATE 75 MG: 25 TABLET, FILM COATED ORAL at 09:07

## 2023-07-08 NOTE — H&P
"[facility] - Emergency Dept  [facility] Medicine  History & Physical    Patient Name: Eugenie Laguerre  MRN: [id number]  Patient Class: OP- Observation  Admission Date: 2023  Attending Physician: Salty Bedolla MD   Primary Care Provider: Danny Gerardo MD         Patient information was obtained from ER records, patient    Subjective:     Principal Problem:Optic neuritis    Chief Complaint:   Chief Complaint   Patient presents with    Blurred Vision     Arrives via EMS from [facility] c/o blurriness in right eye, H/A and right sided weakness beginning 0900 this AM. Had MRA done at [facility], sent here for optho.     Transfer        HPI: Eugenie Laguerre is a 42 y.o. 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 She presented to ED as a transfer from [facility] for vision loss in the R eye. Patient reports acute vision loss in the R eye this morning, stating that upon waking up, she could only see motion and shadows with her right eye. In addition, she reports eye pain around the orbit that, during here hospital course, has progressed to severe pain of the globe itself. Patient' reports constant pressure in her eye "like its about to explode" and that this pain was first noticed right after waking up from her MJ procedure. She reports that she has never experienced pain or visual symptoms like this before.      Past Medical History:   Diagnosis Date    Anxiety 2023    Clotting disorder     DVT (deep venous thrombosis) 2007    was felt to be due to ? Factor V Leiden    High risk pregnancy due to history of  labor 2015    Hyperthyroidism 2013    reports repeat testing was WNL and no treatment needed.    Iron deficiency anemia due to chronic blood loss 2015    Migraines     Restless " legs syndrome (RLS)     Short cervix affecting pregnancy 2015       Past Surgical History:   Procedure Laterality Date    2 R knee sx; 1 L knee      arthroscopies    BRONCHOSCOPY       SECTION  2015    LAPAROSCOPIC CHOLECYSTECTOMY N/A 2021    Procedure: CHOLECYSTECTOMY, LAPAROSCOPIC;  Surgeon: Pascual Lu MD;  Location: [facility] OR;  Service: General;  Laterality: N/A;    TUBAL LIGATION  2015       Review of patient's allergies indicates:   Allergen Reactions    Amoxicillin     Augmentin [amoxicillin-pot clavulanate] Swelling    Bactrim [sulfamethoxazole-trimethoprim] Itching and Nausea Only    Ropinirole Rash       Current Facility-Administered Medications on File Prior to Encounter   Medication    [COMPLETED] HYDROmorphone injection 0.5 mg    [COMPLETED] HYDROmorphone injection 0.5 mg    [COMPLETED] ondansetron injection 4 mg    [COMPLETED] ticagrelor tablet 180 mg     Current Outpatient Medications on File Prior to Encounter   Medication Sig    aspirin (ECOTRIN) 81 MG EC tablet Take 81 mg by mouth once daily.    busPIRone (BUSPAR) 15 MG tablet Take 1 tablet (15 mg total) by mouth 2 (two) times daily.    butalbital-acetaminophen-caffeine -40 mg (FIORICET, ESGIC) -40 mg per tablet Take 1 tablet by mouth every 4 (four) hours as needed for Pain.    galcanezumab-gnlm (EMGALITY PEN) 120 mg/mL PnIj Inject 120 mg into the skin every 28 days.    galcanezumab-gnlm 120 mg/mL PnIj Inject 240 mg (2 injections) subcutaneous at separate sites, once (loading dose). Start maintenance dose 28 days later    ibuprofen (ADVIL,MOTRIN) 800 MG tablet Take 800 mg by mouth every 8 (eight) hours as needed.    medroxyPROGESTERone (PROVERA) 10 MG tablet Take 1 tablet (10 mg total) by mouth once daily.    ondansetron (ZOFRAN-ODT) 4 MG TbDL Take 1 tablet (4 mg total) by mouth every 8 (eight) hours as needed (nausea).    rizatriptan (MAXALT) 10 MG tablet 1 tab PO PRN migraine. May repeat every 2  "hours for max 3 tabs in 24 hours. Use no more than 10 days per month.    sertraline (ZOLOFT) 25 MG tablet Take 1 tablet (25 mg total) by mouth once daily. (Patient taking differently: Take 25 mg by mouth every evening.)    ticagrelor (BRILINTA) 90 mg tablet Take 1 tablet (90 mg total) by mouth 2 (two) times daily.    topiramate (TOPAMAX) 50 MG tablet Take 1.5 tablets (75 mg total) by mouth every evening.     Family History       Problem Relation (Age of Onset)    Anuerysm Father, Paternal Aunt, Paternal Grandfather    Bipolar disorder Sister    Bladder Cancer Maternal Aunt    Breast cancer Maternal Grandmother    Cancer Father    Deep vein thrombosis Father    Depression Mother    Diabetes Paternal Grandmother    Fibromyalgia Mother    Heart disease Brother    Hypertension Father    Ovarian cancer Maternal Aunt          Tobacco Use    Smoking status: Former     Packs/day: 0.50     Years: 0.00     Pack years: 0.00     Types: Cigarettes    Smokeless tobacco: Former     Quit date: 8/1/2021   Substance and Sexual Activity    Alcohol use: Yes     Alcohol/week: 0.0 standard drinks     Comment: rare    Drug use: No    Sexual activity: Yes     Partners: Male     Birth control/protection: None     Review of Systems   Constitutional:  Negative for chills, fatigue and fever.   HENT:  Negative for ear pain, facial swelling and hearing loss.    Eyes:  Positive for pain and visual disturbance.        Severe eye pain and pressure in R eye  Only able to see "shadows" from R eye, L eye vision is not decreased   Respiratory:  Negative for cough and chest tightness.    Cardiovascular:  Negative for chest pain.   Gastrointestinal:  Negative for abdominal distention and abdominal pain.   Endocrine: Negative for cold intolerance, polydipsia and polyuria.   Genitourinary:  Negative for difficulty urinating and dysuria.   Musculoskeletal:  Negative for arthralgias, back pain and joint swelling.   Neurological:  Positive for headaches. "   Psychiatric/Behavioral:  Negative for agitation, behavioral problems and confusion.      Objective:     Vital Signs (Most Recent):  Temp: 98.1 °F (36.7 °C) (07/08/23 0500)  Pulse: 72 (07/08/23 0633)  Resp: 16 (07/08/23 0633)  BP: 120/69 (07/08/23 0633)  SpO2: 100 % (07/08/23 0633) Vital Signs (24h Range):  Temp:  [97.8 °F (36.6 °C)-98.1 °F (36.7 °C)] 98.1 °F (36.7 °C)  Pulse:  [49-81] 72  Resp:  [14-18] 16  SpO2:  [95 %-100 %] 100 %  BP: (109-154)/() 120/69     Weight: 104.8 kg (231 lb)  Body mass index is 42.25 kg/m².     Physical Exam  Constitutional:       Appearance: Normal appearance.   HENT:      Head: Normocephalic and atraumatic.      Nose: Nose normal.      Mouth/Throat:      Mouth: Mucous membranes are moist.   Eyes:      General:         Right eye: No discharge.         Left eye: No discharge.      Extraocular Movements: Extraocular movements intact and EOM normal.      Pupils: Pupils are equal, round, and reactive to light.   Cardiovascular:      Rate and Rhythm: Normal rate and regular rhythm.   Pulmonary:      Effort: Pulmonary effort is normal.      Breath sounds: Normal breath sounds.   Abdominal:      General: Abdomen is flat.   Neurological:      General: No focal deficit present.      Mental Status: She is alert and oriented to person, place, and time.            CRANIAL NERVES     CN II   Right visual field deficit: none  Left visual field deficit: none     CN III, IV, VI   Pupils are equal, round, and reactive to light.  Extraocular motions are normal.     CN V   Facial sensation intact.     CN VII   Facial expression full, symmetric.     CN XII   CN XII normal.      Significant Labs: All pertinent labs within the past 24 hours have been reviewed.    Significant Imaging: I have reviewed all pertinent imaging results/findings within the past 24 hours.    Assessment/Plan:     * Optic neuritis  42 y.o. female with Hx of migraines, factor V leiden mutation, prior DVTs, and a family hx of  ruptured cerebral aneurysms, right ICA artery aneurysm s/p MJ stent on 07/05/23 and DAPT [place] [facility] [facility] for Monocular vision loss of the R eye    -- s/p MRI non con, mild increased signal within the bilateral optic nerve sheaths, minimal symmetric increased FLAIR signal along the lateral ventricles not seen on the prior exam and potentially artifactual.   -- f/u MRI Orbits w w/out contrast, thin slices and fat suppression per optho  -- Neuro consult , f/u with recs      Monocular vision loss  See optic neuritis      Cerebral aneurysm, nonruptured  -- S/p MJ stent 7/8/23      Anxiety  -- buspirone 15 mg  -- sertraline 25 mg      Severe obesity (BMI 35.0-39.9) with comorbidity  Body mass index is 42.25 kg/m². Morbid obesity complicates all aspects of disease management from diagnostic modalities to treatment. Weight loss encouraged and health benefits explained to patient.         Intractable migraine with aura without status migrainosus    -- hold triptans  -- Mag 2g  -- Norco q4 PRN      VTE Risk Mitigation (From admission, onward)           Ordered     enoxaparin injection 40 mg  Every 12 hours         07/08/23 0834     IP VTE HIGH RISK PATIENT  Once         07/08/23 0822     Place sequential compression device  Until discontinued         07/08/23 0822                           On 07/08/2023, patient should be placed in hospital observation services under my care in collaboration with Salty Bedolla.    Biju Snowden MD  Department of Hospital Medicine  [facility] - Emergency Dept

## 2023-07-08 NOTE — CONSULTS
"Consultation Report  Ophthalmology Service    Date: 07/08/2023    Reason for Consult: "transfer,monocular vision loss"     History of Present Illness: Eugenie Laguerre is a 42 y.o. 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 Patient reports acute vision loss in the right eye this morning, stating that she woke up only seeing motion and shadows with her right eye. Reports seeing lots of floaters, but denies flashes of light or curtains in vision.  Affirms right sided eye pain and pain around her eyelid.  Patient's reports constant pressure in her eye ("like its about to explode") and that this pain was first noticed right after waking up from her MJ procedure. She reports that she has never experienced pain or visual symptoms like this before; her migraines are usually right sided but less painful and associated with blur.     Patient also reports that she had issues obtaining one dose of her Brilinta Friday morning, but was given a double dose at OSH.     POcularHx: Denies history of ocular problems or past ocular surgeries.    Current eye gtts: Denies     Family Hx: Denies family history of glaucoma, macular degeneration, or blindness. family history includes Anuerysm in her father, paternal aunt, and paternal grandfather; Bipolar disorder in her sister; Bladder Cancer in her maternal aunt; Breast cancer in her maternal grandmother; Cancer in her father; Deep vein thrombosis in her father; Depression in her mother; Diabetes in her paternal grandmother; Fibromyalgia in her mother; Heart disease in her brother; Hypertension in her father; Ovarian cancer in her maternal aunt.     PMHx:  has a past medical history of Anxiety (1/24/2023), Clotting disorder, DVT (deep venous thrombosis) (03/2007), High " risk pregnancy due to history of  labor (2015), Hyperthyroidism (2013), Iron deficiency anemia due to chronic blood loss (2015), Migraines, Restless legs syndrome (RLS), and Short cervix affecting pregnancy (2015).     PSurgHx:  has a past surgical history that includes Bronchoscopy; 2 R knee sx; 1 L knee;  section (2015); Tubal ligation (2015); and Laparoscopic cholecystectomy (N/A, 2021).     Home Medications:   Prior to Admission medications    Medication Sig Start Date End Date Taking? Authorizing Provider   aspirin (ECOTRIN) 81 MG EC tablet Take 81 mg by mouth once daily.    Historical Provider   busPIRone (BUSPAR) 15 MG tablet Take 1 tablet (15 mg total) by mouth 2 (two) times daily. 23  Arlene Gomez NP   butalbital-acetaminophen-caffeine -40 mg (FIORICET, ESGIC) -40 mg per tablet Take 1 tablet by mouth every 4 (four) hours as needed for Pain. 23  Abelardo Snowden Jr., MD   galcanezumab-gnlm (EMGALITY PEN) 120 mg/mL PnIj Inject 120 mg into the skin every 28 days. 3/15/23   Tricia Gerardo NP   galcanezumab-gnlm 120 mg/mL PnIj Inject 240 mg (2 injections) subcutaneous at separate sites, once (loading dose). Start maintenance dose 28 days later 3/15/23   Tricia Gerardo NP   ibuprofen (ADVIL,MOTRIN) 800 MG tablet Take 800 mg by mouth every 8 (eight) hours as needed. 23   Historical Provider   medroxyPROGESTERone (PROVERA) 10 MG tablet Take 1 tablet (10 mg total) by mouth once daily. 23  Juliette Pino MD   ondansetron (ZOFRAN-ODT) 4 MG TbDL Take 1 tablet (4 mg total) by mouth every 8 (eight) hours as needed (nausea). 23   Abelardo Snowden Jr., MD   rizatriptan (MAXALT) 10 MG tablet 1 tab PO PRN migraine. May repeat every 2 hours for max 3 tabs in 24 hours. Use no more than 10 days per month. 3/15/23   Tricia Gerardo NP   sertraline (ZOLOFT) 25 MG tablet Take 1 tablet (25 mg  total) by mouth once daily.  Patient taking differently: Take 25 mg by mouth every evening. 3/13/23 3/12/24  Arlene Gomez NP   ticagrelor (BRILINTA) 90 mg tablet Take 1 tablet (90 mg total) by mouth 2 (two) times daily. 7/7/23 8/6/23  Abelardo Snowden Jr., MD   topiramate (TOPAMAX) 50 MG tablet Take 1.5 tablets (75 mg total) by mouth every evening. 9/22/22   Juan Linn MD        Medications this encounter:    magnesium sulfate IVPB  2 g Intravenous Once       Allergies: is allergic to amoxicillin, augmentin [amoxicillin-pot clavulanate], bactrim [sulfamethoxazole-trimethoprim], and ropinirole.     Social:  reports that she has quit smoking. Her smoking use included cigarettes. She smoked an average of 0.50 packs per day. She quit smokeless tobacco use about 23 months ago. She reports current alcohol use. She reports that she does not use drugs.     ROS: As per HPI    Ocular examination/Dilated fundus examination:  Base Eye Exam       Visual Acuity (Snellen - Linear)         Right Left    Dist cc HM 20/20    Dist ph cc NI               Tonometry (Tonopen, 1:34 AM)         Right Left    Pressure 15 14              Pupils         Pupils Dark Light Shape React APD    Right PERRL 3 2 Round Brisk None    Left PERRL 3 2 Round Brisk None              Visual Fields         Right Left     Full Full              Extraocular Movement         Right Left     Full, Ortho Full, Ortho              Neuro/Psych       Oriented x3: Yes                  Slit Lamp and Fundus Exam       External Exam         Right Left    External Normal Normal              Slit Lamp Exam         Right Left    Lids/Lashes Normal Normal    Conjunctiva/Sclera white and quiet, slightly enlarged episcleral veins white and quiet    Cornea small staining area inferonasally clear    Anterior Chamber Deep and quiet Deep and quiet    Iris round, reactive round, reactive    Lens Clear Clear    Anterior Vitreous Normal Normal              Fundus Exam          Right Left    Disc sharp, slightly hyperemic pink, sharp    C/D Ratio .2 0.2    Macula flat, two hyperpigmented lesions noted near inferior arcade Normal    Vessels normal Normal    Periphery normal Normal                      Assessment/Plan:     #Acute monocular vision loss, Right Eye  #Concern for Optic Neuritis, Right Eye  - Patient with 2 days of severe right sided headache / retrobulbar pain reportedly immediately after MJ procedure of right ICA on 7/05.   - Woke up on 7/7 with significantly decreased vision this morning. Has been worsening throughout the day  -  Patient presents with eye pain worse with eye movement of the affected eye, photophobia, floaters and periorbital tenderness.   - Base exam with VA HM // 20/20. IOP WNL. No obvious APD on examination. Unable to test color plates due to poor vision.   - Denies prior episodes of eye pain or decreased vision.   - Confrontational fields full OU  - Anterior segment exam with small inferonasal area of staining, otherwise unremarkable.   - Posterior segment exam with trace disk hyperemia OD, otherwise WNL OU  - Reviewed MRI/MRA brain obtained today. Unable to fully evaluate for optic nerve enhancement due to limited scans.  Since 2/3 of optic neuritis presentations are retrobulbar, it is not unusual to have normal disc exam findings in optic neuritis cases.  - Given patient's presentation of acute vision loss in context of prior aneurysmal stenting, and factor v leiden mutation, highly concerning for retinal ischemia, however due to severe retrobulbar pain, pain with EOM, and globe tenderness, would recommend MRI as below to rule out treatable causes of vision loss such as posterior scleritis or optic neuritis.     Recommendations:  - Recommend MRI of Brain/Orbits with Contrast, fat suppression, and thin slices (especially through orbit).  - Recommend neurology consult    Patient's Best Contact Number: [phone] (Paramjit, pt's )    Seen with  Dr. Sharon Workman MD   [facility] Ophthalmology  07/08/2023  12:52 AM    I have reviewed the history and exam of the patient and agree with the resident's exam, assessment and plan.

## 2023-07-08 NOTE — HPI
"Eugenie Laguerre is a 42 y.o. 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 She presented to ED as a transfer from [facility] for vision loss in the R eye. Patient reports acute vision loss in the R eye this morning, stating that upon waking up, she could only see motion and shadows with her right eye. In addition, she reports eye pain around the orbit that, during here hospital course, has progressed to severe pain of the globe itself. Patient' reports constant pressure in her eye "like its about to explode" and that this pain was first noticed right after waking up from her MJ procedure. She reports that she has never experienced pain or visual symptoms like this before.  "

## 2023-07-08 NOTE — ED TRIAGE NOTES
Eugenie Laguerre, a 42 y.o. female presents to the ED as a transfer from [facility] for optho. Pt states burning started around 0900 this morning. MRA done at [facility]. Pt AAOx4, GCS of 15 at this time. No other deficits noted.     Triage note:  Chief Complaint   Patient presents with    Blurred Vision     Arrives via EMS from [facility] c/o blurriness in right eye, H/A and right sided weakness beginning 0900 this AM. Had MRA done at [facility], sent here for optho.     Transfer     Review of patient's allergies indicates:   Allergen Reactions    Amoxicillin     Augmentin [amoxicillin-pot clavulanate] Swelling    Bactrim [sulfamethoxazole-trimethoprim] Itching and Nausea Only    Ropinirole Rash     Past Medical History:   Diagnosis Date    Anxiety 2023    Clotting disorder     DVT (deep venous thrombosis) 2007    was felt to be due to ? Factor V Leiden    High risk pregnancy due to history of  labor 2015    Hyperthyroidism 2013    reports repeat testing was WNL and no treatment needed.    Iron deficiency anemia due to chronic blood loss 2015    Migraines     Restless legs syndrome (RLS)     Short cervix affecting pregnancy 2015

## 2023-07-08 NOTE — SUBJECTIVE & OBJECTIVE
Past Medical History:   Diagnosis Date    Anxiety 2023    Clotting disorder     DVT (deep venous thrombosis) 2007    was felt to be due to ? Factor V Leiden    High risk pregnancy due to history of  labor 2015    Hyperthyroidism 2013    reports repeat testing was WNL and no treatment needed.    Iron deficiency anemia due to chronic blood loss 2015    Migraines     Restless legs syndrome (RLS)     Short cervix affecting pregnancy 2015       Past Surgical History:   Procedure Laterality Date    2 R knee sx; 1 L knee      arthroscopies    BRONCHOSCOPY       SECTION  2015    LAPAROSCOPIC CHOLECYSTECTOMY N/A 2021    Procedure: CHOLECYSTECTOMY, LAPAROSCOPIC;  Surgeon: Pascual Lu MD;  Location: [facility];  Service: General;  Laterality: N/A;    TUBAL LIGATION  2015       Review of patient's allergies indicates:   Allergen Reactions    Amoxicillin     Augmentin [amoxicillin-pot clavulanate] Swelling    Bactrim [sulfamethoxazole-trimethoprim] Itching and Nausea Only    Ropinirole Rash       Current Facility-Administered Medications on File Prior to Encounter   Medication    [COMPLETED] HYDROmorphone injection 0.5 mg    [COMPLETED] HYDROmorphone injection 0.5 mg    [COMPLETED] ondansetron injection 4 mg    [COMPLETED] ticagrelor tablet 180 mg     Current Outpatient Medications on File Prior to Encounter   Medication Sig    aspirin (ECOTRIN) 81 MG EC tablet Take 81 mg by mouth once daily.    busPIRone (BUSPAR) 15 MG tablet Take 1 tablet (15 mg total) by mouth 2 (two) times daily.    butalbital-acetaminophen-caffeine -40 mg (FIORICET, ESGIC) -40 mg per tablet Take 1 tablet by mouth every 4 (four) hours as needed for Pain.    galcanezumab-gnlm (EMGALITY PEN) 120 mg/mL PnIj Inject 120 mg into the skin every 28 days.    galcanezumab-gnlm 120 mg/mL PnIj Inject 240 mg (2 injections) subcutaneous at separate sites, once (loading dose). Start maintenance  "dose 28 days later    ibuprofen (ADVIL,MOTRIN) 800 MG tablet Take 800 mg by mouth every 8 (eight) hours as needed.    medroxyPROGESTERone (PROVERA) 10 MG tablet Take 1 tablet (10 mg total) by mouth once daily.    ondansetron (ZOFRAN-ODT) 4 MG TbDL Take 1 tablet (4 mg total) by mouth every 8 (eight) hours as needed (nausea).    rizatriptan (MAXALT) 10 MG tablet 1 tab PO PRN migraine. May repeat every 2 hours for max 3 tabs in 24 hours. Use no more than 10 days per month.    sertraline (ZOLOFT) 25 MG tablet Take 1 tablet (25 mg total) by mouth once daily. (Patient taking differently: Take 25 mg by mouth every evening.)    ticagrelor (BRILINTA) 90 mg tablet Take 1 tablet (90 mg total) by mouth 2 (two) times daily.    topiramate (TOPAMAX) 50 MG tablet Take 1.5 tablets (75 mg total) by mouth every evening.     Family History       Problem Relation (Age of Onset)    Anuerysm Father, Paternal Aunt, Paternal Grandfather    Bipolar disorder Sister    Bladder Cancer Maternal Aunt    Breast cancer Maternal Grandmother    Cancer Father    Deep vein thrombosis Father    Depression Mother    Diabetes Paternal Grandmother    Fibromyalgia Mother    Heart disease Brother    Hypertension Father    Ovarian cancer Maternal Aunt          Tobacco Use    Smoking status: Former     Packs/day: 0.50     Years: 0.00     Pack years: 0.00     Types: Cigarettes    Smokeless tobacco: Former     Quit date: 8/1/2021   Substance and Sexual Activity    Alcohol use: Yes     Alcohol/week: 0.0 standard drinks     Comment: rare    Drug use: No    Sexual activity: Yes     Partners: Male     Birth control/protection: None     Review of Systems   Constitutional:  Negative for chills, fatigue and fever.   HENT:  Negative for ear pain, facial swelling and hearing loss.    Eyes:  Positive for pain and visual disturbance.        Severe eye pain and pressure in R eye  Only able to see "shadows" from R eye, L eye vision is not decreased   Respiratory:  Negative " for cough and chest tightness.    Cardiovascular:  Negative for chest pain.   Gastrointestinal:  Negative for abdominal distention and abdominal pain.   Endocrine: Negative for cold intolerance, polydipsia and polyuria.   Genitourinary:  Negative for difficulty urinating and dysuria.   Musculoskeletal:  Negative for arthralgias, back pain and joint swelling.   Neurological:  Positive for headaches.   Psychiatric/Behavioral:  Negative for agitation, behavioral problems and confusion.      Objective:     Vital Signs (Most Recent):  Temp: 98.1 °F (36.7 °C) (07/08/23 0500)  Pulse: 72 (07/08/23 0633)  Resp: 16 (07/08/23 0633)  BP: 120/69 (07/08/23 0633)  SpO2: 100 % (07/08/23 0633) Vital Signs (24h Range):  Temp:  [97.8 °F (36.6 °C)-98.1 °F (36.7 °C)] 98.1 °F (36.7 °C)  Pulse:  [49-81] 72  Resp:  [14-18] 16  SpO2:  [95 %-100 %] 100 %  BP: (109-154)/() 120/69     Weight: 104.8 kg (231 lb)  Body mass index is 42.25 kg/m².     Physical Exam  Constitutional:       Appearance: Normal appearance.   HENT:      Head: Normocephalic and atraumatic.      Nose: Nose normal.      Mouth/Throat:      Mouth: Mucous membranes are moist.   Eyes:      General:         Right eye: No discharge.         Left eye: No discharge.      Extraocular Movements: Extraocular movements intact and EOM normal.      Pupils: Pupils are equal, round, and reactive to light.   Cardiovascular:      Rate and Rhythm: Normal rate and regular rhythm.   Pulmonary:      Effort: Pulmonary effort is normal.      Breath sounds: Normal breath sounds.   Abdominal:      General: Abdomen is flat.   Neurological:      General: No focal deficit present.      Mental Status: She is alert and oriented to person, place, and time.            CRANIAL NERVES     CN II   Right visual field deficit: none  Left visual field deficit: none     CN III, IV, VI   Pupils are equal, round, and reactive to light.  Extraocular motions are normal.     CN V   Facial sensation intact.      CN VII   Facial expression full, symmetric.     CN XII   CN XII normal.      Significant Labs: All pertinent labs within the past 24 hours have been reviewed.    Significant Imaging: I have reviewed all pertinent imaging results/findings within the past 24 hours.

## 2023-07-08 NOTE — ASSESSMENT & PLAN NOTE
42 y.o. 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   -- f/u MRI Orbits w w/out contrast, thin slices and fat suppression per optho  -- Neuro consult , f/u with recs

## 2023-07-08 NOTE — ED PROVIDER NOTES
Source of History:  Patient  Chart    Chief complaint:  Blurred Vision (Arrives via EMS from [facility] c/o blurriness in right eye, H/A and right sided weakness beginning 0900 this AM. Had MRA done at [facility], sent here for optho. ) and Transfer      HPI:  Eugenie Laguerre is a 42 y.o. female with history of factor 5 Leiden, migraine, hyperthyroidism, tobacco abuse, anxiety, prior DVT, presenting to emergency department as transfer from outside hospital for ophthalmology evaluation.    Patient had recent 1 day hospitalization at this facility for DSA+MJ placement for a right hypophyseal aneurysm on 07/05.  There were no perioperative complications and she recovered in the neuro ICU.  She was maintained on dual antiplatelet therapy.    She presented to outside hospital earlier today with complaint of right-sided weakness.  She also had vision loss in the right eye.  She complained of a frontal headache as well.  She had been unable to get her Brilinta filled secondary to an issue with the prescription.    She was evaluated by tele radiology who recommended MRI brain as well as MRA head and neck.  A CT head was normal.  There was apparently an abnormality on the MRI which was thought to be artifact.  With respect to the recent procedure, the recommendation from tele Neurology was to fill the Brilinta prescription and follow-up in the office.  With respect to the vision loss, plan was for transfer to [facility] for ophthalmology evaluation.  At outside hospital she was able to see light and movements in the right eye but could not count fingers.    Review of patient's allergies indicates:   Allergen Reactions    Amoxicillin     Augmentin [amoxicillin-pot clavulanate] Swelling    Bactrim [sulfamethoxazole-trimethoprim] Itching and Nausea Only    Ropinirole Rash       Current Facility-Administered Medications on File Prior to Encounter   Medication Dose Route Frequency Provider Last Rate Last Admin    [COMPLETED]  HYDROmorphone injection 0.5 mg  0.5 mg Intravenous ED 1 Time Abelardo Snowden Jr., MD   0.5 mg at 07/07/23 1245    [COMPLETED] HYDROmorphone injection 0.5 mg  0.5 mg Intravenous ED 1 Time Abelardo Snowden Jr., MD   0.5 mg at 07/07/23 1603    [COMPLETED] ondansetron injection 4 mg  4 mg Intravenous ED 1 Time Abelardo Snowden Jr., MD   4 mg at 07/07/23 1245    [COMPLETED] ticagrelor tablet 180 mg  180 mg Oral ED 1 Time Abelardo Snowden Jr., MD   180 mg at 07/07/23 1514     Current Outpatient Medications on File Prior to Encounter   Medication Sig Dispense Refill    aspirin (ECOTRIN) 81 MG EC tablet Take 81 mg by mouth once daily.      busPIRone (BUSPAR) 15 MG tablet Take 1 tablet (15 mg total) by mouth 2 (two) times daily. 60 tablet 5    butalbital-acetaminophen-caffeine -40 mg (FIORICET, ESGIC) -40 mg per tablet Take 1 tablet by mouth every 4 (four) hours as needed for Pain. 12 tablet 0    galcanezumab-gnlm (EMGALITY PEN) 120 mg/mL PnIj Inject 120 mg into the skin every 28 days. 1 mL 11    galcanezumab-gnlm 120 mg/mL PnIj Inject 240 mg (2 injections) subcutaneous at separate sites, once (loading dose). Start maintenance dose 28 days later 2 mL 0    ibuprofen (ADVIL,MOTRIN) 800 MG tablet Take 800 mg by mouth every 8 (eight) hours as needed.      medroxyPROGESTERone (PROVERA) 10 MG tablet Take 1 tablet (10 mg total) by mouth once daily. 10 tablet 0    ondansetron (ZOFRAN-ODT) 4 MG TbDL Take 1 tablet (4 mg total) by mouth every 8 (eight) hours as needed (nausea). 20 tablet 0    rizatriptan (MAXALT) 10 MG tablet 1 tab PO PRN migraine. May repeat every 2 hours for max 3 tabs in 24 hours. Use no more than 10 days per month. 9 tablet 11    sertraline (ZOLOFT) 25 MG tablet Take 1 tablet (25 mg total) by mouth once daily. (Patient taking differently: Take 25 mg by mouth every evening.) 30 tablet 5    ticagrelor (BRILINTA) 90 mg tablet Take 1 tablet (90 mg total) by mouth 2 (two) times daily. 60 tablet 3    topiramate  (TOPAMAX) 50 MG tablet Take 1.5 tablets (75 mg total) by mouth every evening. 40 tablet 11       PMH:  As per HPI and below:  Past Medical History:   Diagnosis Date    Anxiety 2023    Clotting disorder     DVT (deep venous thrombosis) 2007    was felt to be due to ? Factor V Leiden    High risk pregnancy due to history of  labor 2015    Hyperthyroidism 2013    reports repeat testing was WNL and no treatment needed.    Iron deficiency anemia due to chronic blood loss 2015    Migraines     Restless legs syndrome (RLS)     Short cervix affecting pregnancy 2015     Past Surgical History:   Procedure Laterality Date    2 R knee sx; 1 L knee      arthroscopies    BRONCHOSCOPY       SECTION  2015    LAPAROSCOPIC CHOLECYSTECTOMY N/A 2021    Procedure: CHOLECYSTECTOMY, LAPAROSCOPIC;  Surgeon: Pascual Lu MD;  Location: [facility];  Service: General;  Laterality: N/A;    TUBAL LIGATION  2015       Social History     Socioeconomic History    Marital status:     Number of children: 2   Tobacco Use    Smoking status: Former     Packs/day: 0.50     Years: 0.00     Pack years: 0.00     Types: Cigarettes    Smokeless tobacco: Former     Quit date: 2021   Substance and Sexual Activity    Alcohol use: Yes     Alcohol/week: 0.0 standard drinks     Comment: rare    Drug use: No    Sexual activity: Yes     Partners: Male     Birth control/protection: None     Social Determinants of Health     Financial Resource Strain: Low Risk     Difficulty of Paying Living Expenses: Not hard at all   Food Insecurity: No Food Insecurity    Worried About Running Out of Food in the Last Year: Never true    Ran Out of Food in the Last Year: Never true   Transportation Needs: No Transportation Needs    Lack of Transportation (Medical): No    Lack of Transportation (Non-Medical): No   Physical Activity: Insufficiently Active    Days of Exercise per Week: 7 days    Minutes of  Exercise per Session: 10 min   Stress: No Stress Concern Present    Feeling of Stress : Not at all   Social Connections: Moderately Isolated    Frequency of Communication with Friends and Family: More than three times a week    Frequency of Social Gatherings with Friends and Family: More than three times a week    Attends [employer] Services: Never    Active Member of Clubs or Organizations: No    Attends Club or Organization Meetings: Never    Marital Status:    Housing Stability: High Risk    Unable to Pay for Housing in the Last Year: Yes    Number of Places Lived in the Last Year: 2    Unstable Housing in the Last Year: No       Family History   Problem Relation Age of Onset    Fibromyalgia Mother     Depression Mother     Anuerysm Father     Cancer Father         skin    Deep vein thrombosis Father     Hypertension Father     Bipolar disorder Sister     Ovarian cancer Maternal Aunt     Bladder Cancer Maternal Aunt     Anuerysm Paternal Aunt         AAA    Breast cancer Maternal Grandmother     Diabetes Paternal Grandmother     Anuerysm Paternal Grandfather         AAA    Heart disease Brother     Colon cancer Neg Hx     Stomach cancer Neg Hx     Esophageal cancer Neg Hx        Physical Exam:      Vitals:    07/08/23 0500   BP: 137/88   Pulse: 68   Resp: 14   Temp:      Gen: AxOx3, well nourished, appears stated age, no pallor, no jaundice, appears well hydrated  Eye: EOMI, no scleral icterus, no periorbital edema or ecchymosis  O Dextra  - visual acuity significantly reduced and only see shadows  - no periorbital swelling  - no tearing / discharge visualized  - no scleral injection  - pupil round and reactive to light but dilated  - extraocular movements intact    O Sinistra  - visual acuity preserved  - no periorbital swelling  - no tearing / discharge visualized  - no scleral injection  - pupil round and reactive to light but dilated  - extraocular movements intact    Head: Normocephalic, atraumatic, no  lesions, scalp appears normal  ENT: Neck supple, no stridor, no masses, no drooling or voice changes  CVS: All distal pulses intact with normal rate and rhythm, no JVD, normal S1/S2, no murmur  Pulm: Normal breath sounds, no wheezes, rales or rhonchi, no increased work of breathing  Abd:  Nondistended, soft, nontender, no organomegaly, no CVAT  Ext: No edema, no lesions, rashes, or deformity  Neuro:   GCS15  -Right-sided facial droop sparing forehead  - Decreased sensation over lower right-sided face on right side  Pupils equal and reactive to light  Right-sided pronator drift  RUE  - power 3/5  - /tone/sensation intact   RLE  - power/tone/sensation intact   LUE  - power/tone/sensation intact   LLE  - power/tone/sensation intact   no DDK.     Psych: normal affect, cooperative, well groomed, makes good eye contact      Laboratory Studies:  Labs Reviewed - No data to display    Chart reviewed.     Imaging Results              MRI Orbits Without Contrast (In process)  Result time 07/08/23 06:01:05   Procedure changed from MRI Orbits W W/O Contrast                   Medications Given:  Medications   prochlorperazine injection Soln 10 mg (10 mg Intravenous Given 7/8/23 0105)   magnesium sulfate 2g in water 50mL IVPB (premix) (0 g Intravenous Stopped 7/8/23 0329)   droPERidol injection 2.5 mg (2.5 mg Intravenous Given 7/8/23 0522)       Discussed with:  Ophthalmology    MDM:    42 y.o. female with monocular vision loss after cerebral aneurysm aneurysm procedure yesterday, sent for ophthalmology evaluation.    Examination concerning for right-sided weakness with decreased vision of right eye. Patient is able to vocalise, breathing spontaneously, hemodynamically stable, oriented, moving all 4 limbs spontaneously.    Optho consulted who recommended MRI of orbits. [facility] neuro consulted who saw patient and reviewed imaging, who have low suspicion for acute CVA given normal MRA.     Patient became claustrophobic during MRI of  orbits and scan  w/ contrast aborted.    Patient signed out to oncoming team pending MRI of orbits and ophtho review of imagine. I anticipate patient being admitted for ongoing R sided weakness for OT/PT and optimisation.        Diagnostic Impression:    1. Monocular vision loss               Patient  understands the plan and is in agreement, verbalized understanding, questions answered    Nandini Rey MD  Emergency Medicine         Jose Enciso MD  Resident  07/08/23 7769       Nandini Rey MD  07/08/23 5464

## 2023-07-08 NOTE — PROGRESS NOTES
Pharmacist Renal Dose Adjustment Note    Eugenie Laguerre is a 42 y.o. female being treated with the medication enoxaparin    Patient Data:    Vital Signs (Most Recent):  Temp: 98.1 °F (36.7 °C) (07/08/23 0500)  Pulse: 72 (07/08/23 0633)  Resp: 16 (07/08/23 0633)  BP: 120/69 (07/08/23 0633)  SpO2: 100 % (07/08/23 0633) Vital Signs (72h Range):  Temp:  [97.7 °F (36.5 °C)-98.8 °F (37.1 °C)]   Pulse:  [49-81]   Resp:  [8-32]   BP: ()/()   SpO2:  [92 %-100 %]      Recent Labs   Lab 07/06/23  0134 07/07/23  1146   CREATININE 0.8 1.12     Serum creatinine: 1.12 mg/dL 07/07/23 1146  Estimated creatinine clearance: 75.1 mL/min    Medication: enoxaparin 40 mg daily will be changed to enoxaparin 40 mg twice daily    Za WhitingD

## 2023-07-08 NOTE — PHARMACY MED REC
"Admission Medication History     The home medication history was taken by Chance Nolasco.    You may go to "Admission" then "Reconcile Home Medications" tabs to review and/or act upon these items.     The home medication list has been updated by the Pharmacy department.   Please read ALL comments highlighted in yellow.   Please address this information as you see fit.    Feel free to contact us if you have any questions or require assistance.      The medications listed below were removed from the home medication list. Please reorder if appropriate:  Patient reports no longer taking the following medication(s):  FIORICET -40 MG TABLET  EMAGLITY 120 MG PEN  IBUPROFEN 800 MG TABLET  MEDROXYPROGESTERONE 10 MG TABLET  ONDANSETRON ODT 4  MG TABLET      Current Outpatient Medications on File Prior to Encounter   Medication Sig    aspirin (ECOTRIN) 81 MG EC tablet   Take 81 mg by mouth once daily.    busPIRone (BUSPAR) 15 MG tablet   Take 1 tablet (15 mg total) by mouth 2 (two) times daily.    prochlorperazine (COMPAZINE) 10 MG tablet   Take 10 mg by mouth continuous prn (NAUSEA).    rizatriptan (MAXALT) 10 MG tablet     1 tab PO PRN migraine. May repeat every 2 hours for max 3 tabs in 24 hours. Use no more than 10 days per month.    sertraline (ZOLOFT) 25 MG tablet   Take 25 mg by mouth every evening.    tiZANidine (ZANAFLEX) 4 MG tablet   Take 4 mg by mouth daily as needed (MUSCLE SPASMS).    topiramate (TOPAMAX) 50 MG tablet     Take 1.5 tablets (75 mg total) by mouth every evening. (Patient not taking: Reported on 7/8/2023)       Potential issues to be addressed PRIOR TO DISCHARGE  Please discuss with the patient barriers to adherence with medication treatment plans  Patient requires education regarding drug therapies     Chance Nolasco  EXT [id number]                  .        "

## 2023-07-08 NOTE — ED TRIAGE NOTES
Eugenie Laguerre, a 42 y.o. female presents to the ED w/ complaint of blurriness in right eye and right sided weakness starting yesterday at 9am, sent here for optho    Adult Physical Assessment  LOC: Eugenie Laguerre, 42 y.o. female verified via two identifiers.  The patient is awake, alert, oriented and speaking appropriately at this time.  APPEARANCE: Patient resting comfortably and appears to be in no acute distress at this time. Patient is clean and well groomed, patient's clothing is properly fastened.  SKIN:The skin is warm and dry, color consistent with ethnicity, patient has normal skin turgor and moist mucus membranes, skin intact, no breakdown or brusing noted.  MUSCULOSKELETAL: Patient moving all extremities well, no obvious swelling or deformities noted.  RESPIRATORY: Airway is open and patent, respirations are spontaneous, patient has a normal effort and rate, no accessory muscle use noted.  CARDIAC: Patient has a normal rate and rhythm, no periphreal edema noted in any extremity, capillary refill < 3 seconds in all extremities  ABDOMEN: Soft and non tender to palpation, no abdominal distention noted. Bowel sounds present in all four quadrants.  NEUROLOGIC: Eyes open spontaneously, behavior appropriate to situation, follows commands, facial expression symmetrical, bilateral hand grasp equal and even, purposeful motor response noted, normal sensation in all extremities when touched with a finger. Patient c/o right eye blurriness and right sided weakness that started yesterday. Weakness has somewhat resolved      Triage note:  Chief Complaint   Patient presents with    Blurred Vision     Arrives via EMS from [facility] c/o blurriness in right eye, H/A and right sided weakness beginning 0900 this AM. Had MRA done at [facility], sent here for optho.     Transfer     Review of patient's allergies indicates:   Allergen Reactions    Amoxicillin     Augmentin [amoxicillin-pot clavulanate] Swelling    Bactrim  [sulfamethoxazole-trimethoprim] Itching and Nausea Only    Ropinirole Rash     Past Medical History:   Diagnosis Date    Anxiety 2023    Clotting disorder     DVT (deep venous thrombosis) 2007    was felt to be due to ? Factor V Leiden    High risk pregnancy due to history of  labor 2015    Hyperthyroidism 2013    reports repeat testing was WNL and no treatment needed.    Iron deficiency anemia due to chronic blood loss 2015    Migraines     Restless legs syndrome (RLS)     Short cervix affecting pregnancy 2015

## 2023-07-08 NOTE — ED NOTES
Patient identifiers verified and correct for Eugenie Laguerre  LOC: The patient is awake, alert and aware of environment with an appropriate affect, the patient is oriented x 3 and speaking appropriately.   APPEARANCE: Patient appears comfortable and in no acute distress, patient is clean and well groomed.  SKIN: The skin is warm and dry, color consistent with ethnicity, patient has normal skin turgor and moist mucus membranes, skin intact, no breakdown or bruising noted.   MUSCULOSKELETAL: Patient moving all extremities spontaneously, no swelling noted.  RESPIRATORY: Airway is open and patent, respirations are spontaneous, patient has a normal effort and rate, no accessory muscle use noted, pt placed on continuous pulse ox with O2 sats noted at 97% on room air.  CARDIAC: Pt placed on cardiac monitor. Patient has a normal rate and regular rhythm, no edema noted, capillary refill < 3 seconds.   GASTRO: Soft and non tender to palpation, no distention noted, normoactive bowel sounds present in all four quadrants. Pt states bowel movements have been regular.  : Pt denies any pain or frequency with urination.  NEURO: Pt opens eyes spontaneously, behavior appropriate to situation, follows commands, facial expression symmetrical, bilateral hand grasp equal and even, purposeful motor response noted, normal sensation in all extremities when touched with a finger.

## 2023-07-08 NOTE — PROVIDER PROGRESS NOTES - EMERGENCY DEPT.
Encounter Date: 7/7/2023    ED Physician Progress Notes        Physician Note:   Pt signed out to me at 6 AM    Briefly: pt is a 42 year old female with PMHx significant for factor V leiden, DVT s/p DSA + MJ placement for right hypophyseal aneurysm on 7/5/23 who presents as transfer for optho eval of acute right monocular vision loss and right sided weakness. Pt presented to outside facility for these symptoms. Work up with MRI helga and MRA head and neck performed without acute abnormality. Vascular neurology consulted who evaluated the pt and reviewed imaging, they have low suspicion for acute CVA. [place] [facility] [place] due to concern for possible optic neuritis. MRI orbits concerning for inflammation of optic sheath. Optho consulted, plan to admit to hospital medicine for further management and evaluation of optic neuritis.

## 2023-07-08 NOTE — CONSULTS
43 y/o female who presented to the [facility] on 7-5-23 for DSA+MJ placement for R hypophyseal aneurysm on 07/05/23 and underwent procedure without perioperative complications. She was discharged home on 7-6-23. On 7-7-23 she woke up with severe headache and vision loss right eye. She was seen in ED at [facility] and was seen in telemedicine stroke by Dr Christianson and Concern for embolus to ophthalmic artery in setting of recent ICA aneurysm stenting.  Differential also includes complicated migraine.  Recommend MRI Brain, MRA head/neck for further evaluation.  Ophtho examination of fundus would be preferred as well if available.  MRI/MRA Brain and neck complete and ED MD spoke with Dr Burnett who did the procedure on 7-5-23 and recommendation was to d/c home and make sure she gets her Brilinta that she was unable to get filled correctly post procedure.     She was sent to the ED at [facility] to be evaluated by opthalmology. Their recommendation is for Recommend MRI of Brain/Orbits with Contrast, fat suppression, and thin slices (especially through orbit).    No vascular neurology needs. Did recommend IV Mag for possible complicated migraine.

## 2023-07-08 NOTE — ASSESSMENT & PLAN NOTE
Body mass index is 42.25 kg/m². Morbid obesity complicates all aspects of disease management from diagnostic modalities to treatment. Weight loss encouraged and health benefits explained to patient.

## 2023-07-09 PROBLEM — R90.89 ABNORMAL BRAIN MRI: Status: ACTIVE | Noted: 2023-07-09

## 2023-07-09 LAB
ALBUMIN SERPL BCP-MCNC: 3.7 G/DL (ref 3.5–5.2)
ALP SERPL-CCNC: 68 U/L (ref 55–135)
ALT SERPL W/O P-5'-P-CCNC: 31 U/L (ref 10–44)
ANION GAP SERPL CALC-SCNC: 11 MMOL/L (ref 8–16)
AST SERPL-CCNC: 42 U/L (ref 10–40)
BASOPHILS # BLD AUTO: 0.04 K/UL (ref 0–0.2)
BASOPHILS NFR BLD: 0.6 % (ref 0–1.9)
BILIRUB SERPL-MCNC: 0.5 MG/DL (ref 0.1–1)
BUN SERPL-MCNC: 12 MG/DL (ref 6–20)
CALCIUM SERPL-MCNC: 8.8 MG/DL (ref 8.7–10.5)
CHLORIDE SERPL-SCNC: 108 MMOL/L (ref 95–110)
CO2 SERPL-SCNC: 18 MMOL/L (ref 23–29)
CREAT SERPL-MCNC: 0.9 MG/DL (ref 0.5–1.4)
DIFFERENTIAL METHOD: NORMAL
EOSINOPHIL # BLD AUTO: 0.1 K/UL (ref 0–0.5)
EOSINOPHIL NFR BLD: 1.9 % (ref 0–8)
ERYTHROCYTE [DISTWIDTH] IN BLOOD BY AUTOMATED COUNT: 13.2 % (ref 11.5–14.5)
EST. GFR  (NO RACE VARIABLE): >60 ML/MIN/1.73 M^2
GLUCOSE SERPL-MCNC: 104 MG/DL (ref 70–110)
HCT VFR BLD AUTO: 42.4 % (ref 37–48.5)
HGB BLD-MCNC: 14.2 G/DL (ref 12–16)
IMM GRANULOCYTES # BLD AUTO: 0.01 K/UL (ref 0–0.04)
IMM GRANULOCYTES NFR BLD AUTO: 0.2 % (ref 0–0.5)
LYMPHOCYTES # BLD AUTO: 1.6 K/UL (ref 1–4.8)
LYMPHOCYTES NFR BLD: 24.1 % (ref 18–48)
MAGNESIUM SERPL-MCNC: 2.4 MG/DL (ref 1.6–2.6)
MCH RBC QN AUTO: 29.8 PG (ref 27–31)
MCHC RBC AUTO-ENTMCNC: 33.5 G/DL (ref 32–36)
MCV RBC AUTO: 89 FL (ref 82–98)
MONOCYTES # BLD AUTO: 0.5 K/UL (ref 0.3–1)
MONOCYTES NFR BLD: 8 % (ref 4–15)
NEUTROPHILS # BLD AUTO: 4.2 K/UL (ref 1.8–7.7)
NEUTROPHILS NFR BLD: 65.2 % (ref 38–73)
NRBC BLD-RTO: 0 /100 WBC
PLATELET # BLD AUTO: 281 K/UL (ref 150–450)
PLATELET RESPONSE PLAVIX: 71 PRU (ref 194–418)
PMV BLD AUTO: 9.5 FL (ref 9.2–12.9)
POTASSIUM SERPL-SCNC: 4 MMOL/L (ref 3.5–5.1)
PROT SERPL-MCNC: 7.4 G/DL (ref 6–8.4)
RBC # BLD AUTO: 4.76 M/UL (ref 4–5.4)
SODIUM SERPL-SCNC: 137 MMOL/L (ref 136–145)
WBC # BLD AUTO: 6.46 K/UL (ref 3.9–12.7)

## 2023-07-09 PROCEDURE — 63600175 PHARM REV CODE 636 W HCPCS: Performed by: STUDENT IN AN ORGANIZED HEALTH CARE EDUCATION/TRAINING PROGRAM

## 2023-07-09 PROCEDURE — 63600175 PHARM REV CODE 636 W HCPCS

## 2023-07-09 PROCEDURE — 25000003 PHARM REV CODE 250

## 2023-07-09 PROCEDURE — 99233 PR SUBSEQUENT HOSPITAL CARE,LEVL III: ICD-10-PCS | Mod: ,,, | Performed by: STUDENT IN AN ORGANIZED HEALTH CARE EDUCATION/TRAINING PROGRAM

## 2023-07-09 PROCEDURE — G0378 HOSPITAL OBSERVATION PER HR: HCPCS

## 2023-07-09 PROCEDURE — 80053 COMPREHEN METABOLIC PANEL: CPT

## 2023-07-09 PROCEDURE — 85576 BLOOD PLATELET AGGREGATION: CPT | Performed by: STUDENT IN AN ORGANIZED HEALTH CARE EDUCATION/TRAINING PROGRAM

## 2023-07-09 PROCEDURE — 96372 THER/PROPH/DIAG INJ SC/IM: CPT | Performed by: STUDENT IN AN ORGANIZED HEALTH CARE EDUCATION/TRAINING PROGRAM

## 2023-07-09 PROCEDURE — 83735 ASSAY OF MAGNESIUM: CPT

## 2023-07-09 PROCEDURE — 25500020 PHARM REV CODE 255: Performed by: STUDENT IN AN ORGANIZED HEALTH CARE EDUCATION/TRAINING PROGRAM

## 2023-07-09 PROCEDURE — 99233 SBSQ HOSP IP/OBS HIGH 50: CPT | Mod: ,,, | Performed by: STUDENT IN AN ORGANIZED HEALTH CARE EDUCATION/TRAINING PROGRAM

## 2023-07-09 PROCEDURE — 85025 COMPLETE CBC W/AUTO DIFF WBC: CPT

## 2023-07-09 PROCEDURE — 96372 THER/PROPH/DIAG INJ SC/IM: CPT | Mod: 59 | Performed by: STUDENT IN AN ORGANIZED HEALTH CARE EDUCATION/TRAINING PROGRAM

## 2023-07-09 RX ORDER — POLYETHYLENE GLYCOL 3350 17 G/17G
17 POWDER, FOR SOLUTION ORAL DAILY
Status: DISCONTINUED | OUTPATIENT
Start: 2023-07-10 | End: 2023-07-14 | Stop reason: HOSPADM

## 2023-07-09 RX ORDER — DIPHENHYDRAMINE HYDROCHLORIDE 50 MG/ML
25 INJECTION INTRAMUSCULAR; INTRAVENOUS ONCE AS NEEDED
Status: COMPLETED | OUTPATIENT
Start: 2023-07-09 | End: 2023-07-09

## 2023-07-09 RX ADMIN — HYDROCODONE BITARTRATE AND ACETAMINOPHEN 1 TABLET: 5; 325 TABLET ORAL at 07:07

## 2023-07-09 RX ADMIN — HYDROCODONE BITARTRATE AND ACETAMINOPHEN 1 TABLET: 5; 325 TABLET ORAL at 08:07

## 2023-07-09 RX ADMIN — BUSPIRONE HYDROCHLORIDE 15 MG: 10 TABLET ORAL at 07:07

## 2023-07-09 RX ADMIN — IOHEXOL 75 ML: 350 INJECTION, SOLUTION INTRAVENOUS at 09:07

## 2023-07-09 RX ADMIN — ENOXAPARIN SODIUM 40 MG: 40 INJECTION SUBCUTANEOUS at 10:07

## 2023-07-09 RX ADMIN — TICAGRELOR 90 MG: 90 TABLET ORAL at 07:07

## 2023-07-09 RX ADMIN — ASPIRIN 81 MG: 81 TABLET, COATED ORAL at 08:07

## 2023-07-09 RX ADMIN — ENOXAPARIN SODIUM 40 MG: 40 INJECTION SUBCUTANEOUS at 09:07

## 2023-07-09 RX ADMIN — DIPHENHYDRAMINE HYDROCHLORIDE 25 MG: 50 INJECTION, SOLUTION INTRAMUSCULAR; INTRAVENOUS at 11:07

## 2023-07-09 RX ADMIN — ONDANSETRON 8 MG: 8 TABLET, ORALLY DISINTEGRATING ORAL at 08:07

## 2023-07-09 RX ADMIN — SERTRALINE HYDROCHLORIDE 25 MG: 25 TABLET ORAL at 09:07

## 2023-07-09 RX ADMIN — TOPIRAMATE 75 MG: 25 TABLET, FILM COATED ORAL at 09:07

## 2023-07-09 RX ADMIN — TICAGRELOR 90 MG: 90 TABLET ORAL at 09:07

## 2023-07-09 RX ADMIN — HYDROCODONE BITARTRATE AND ACETAMINOPHEN 1 TABLET: 5; 325 TABLET ORAL at 03:07

## 2023-07-09 RX ADMIN — BUSPIRONE HYDROCHLORIDE 15 MG: 10 TABLET ORAL at 09:07

## 2023-07-09 NOTE — HPI
43 yo F with PMH of R ICA clinoidal aneurysm who recently had flow diverter placed on 7/5/23 with Dr. Burnett who presents back to ED on Friday (2 days after her procedure) with new R eye vision issues. She was discharged POD1 on aspirin and brilinta without issues and woke up the next morning at 9am with R eye vision issues. She could not get her Brilinta from the pharmacy so went to the hospital where she was given her aspirin and brilinta that afternoon. MRI and MRA unremarkable except for artifact around R ICA flow diverter unable to rule out thrombus. Patient seen today with ongoing R eye vision issues, only able to see movement, light/shadows in her R eye.

## 2023-07-09 NOTE — ED NOTES
Assumed care of patient.Bed placed in low locked position, side rails up x2, call bell is within reach.

## 2023-07-09 NOTE — ASSESSMENT & PLAN NOTE
41 yo F with PMH of R ICA clinoidal aneurysm who recently had flow diverter placed on 7/5/23 with Dr. Burnett who presents back to ED on Friday (2 days after her procedure) with new R eye vision issues. She was discharged POD1 on aspirin and brilinta without issues and woke up the next morning at 9am with R eye vision issues. She could not get her Brilinta from the pharmacy so went to the hospital where she was given her aspirin and brilinta that afternoon. MRI and MRA unremarkable except for artifact around R ICA flow diverter unable to rule out thrombus. Patient seen today with ongoing R eye vision issues, only able to see movement, light/shadows in her R eye.    - admitted to  team  - neurology following, rec MRI orbits w wo to workup optic neuritis  - CTA head/neck pending for workup with concern that patient has R ophthalmic artery occlusion  - continue aspirin 81mg and brilinta 90mg bid  - final recs pending imaging review

## 2023-07-09 NOTE — HPI
Neurology consulted for optic neuritis evaluation in Eugenie Laguerre, a 42 y.o. 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 History obtained from patient, spouse, and EMR.     In brief, patient reports new, acute onset painful right eye blurry vision after awakening 07/07/2023 with progression to vision loss characterized as only seeing shadows/movement. As symptoms did not improve, patient presented to [facility] ED for further evaluation. Of note, she missed doses of Ticagrelor following her recent stent procedure. TeleVascular Neurology evaluated and recommended further imaging as well as Ophthalmology evaluation. Ophthalmology evaluation notable for decreased visual acuity and slightly enlarged episcleral veins with flat macular, slightly hyperemic disc, and two hyperpigmented lesions noted near inferior arcade on fundoscopy. Imaging thus far ([facility], MRI Brain WO Contrast, MRA Brain WO Contrast, MRI Orbits WO Contrast) notable for a possible filling defect/absent flow related enhancement in the right terminal ICA (intraluminal thrombus vs artifact), mild increased signal within the bilateral optic nerve sheaths (optic neuritis vs artifact), and minimal symmetric increased FLAIR signal along the medial aspect of the bilateral occipital horns of the lateral ventricles/splenium of the corpus callosum (artifact). Due to mild increased signal in patients bilateral optic nerve sheaths Neurology was consulted.

## 2023-07-09 NOTE — SUBJECTIVE & OBJECTIVE
(Not in a hospital admission)      Review of patient's allergies indicates:   Allergen Reactions    Amoxicillin     Augmentin [amoxicillin-pot clavulanate] Swelling    Bactrim [sulfamethoxazole-trimethoprim] Itching and Nausea Only    Ropinirole Rash       Past Medical History:   Diagnosis Date    Anxiety 2023    Clotting disorder     DVT (deep venous thrombosis) 2007    was felt to be due to ? Factor V Leiden    High risk pregnancy due to history of  labor 2015    Hyperthyroidism 2013    reports repeat testing was WNL and no treatment needed.    Iron deficiency anemia due to chronic blood loss 2015    Migraines     Restless legs syndrome (RLS)     Short cervix affecting pregnancy 2015     Past Surgical History:   Procedure Laterality Date    2 R knee sx; 1 L knee      arthroscopies    BRONCHOSCOPY       SECTION  2015    LAPAROSCOPIC CHOLECYSTECTOMY N/A 2021    Procedure: CHOLECYSTECTOMY, LAPAROSCOPIC;  Surgeon: Pascual Lu MD;  Location: [facility];  Service: General;  Laterality: N/A;    TUBAL LIGATION  2015     Family History       Problem Relation (Age of Onset)    Anuerysm Father, Paternal Aunt, Paternal Grandfather    Bipolar disorder Sister    Bladder Cancer Maternal Aunt    Breast cancer Maternal Grandmother    Cancer Father    Deep vein thrombosis Father    Depression Mother    Diabetes Paternal Grandmother    Fibromyalgia Mother    Heart disease Brother    Hypertension Father    Ovarian cancer Maternal Aunt          Tobacco Use    Smoking status: Former     Packs/day: 0.50     Years: 0.00     Pack years: 0.00     Types: Cigarettes    Smokeless tobacco: Former     Quit date: 2021   Substance and Sexual Activity    Alcohol use: Yes     Alcohol/week: 0.0 standard drinks     Comment: rare    Drug use: No    Sexual activity: Yes     Partners: Male     Birth control/protection: None     Review of Systems   Eyes:  Positive for visual  disturbance.   All other systems reviewed and are negative.  Objective:     Weight: 104.8 kg (231 lb)  Body mass index is 42.25 kg/m².  Vital Signs (Most Recent):  Temp: 98.3 °F (36.8 °C) (07/09/23 1211)  Pulse: 82 (07/09/23 1211)  Resp: 16 (07/09/23 1211)  BP: 130/66 (07/09/23 1211)  SpO2: 100 % (07/09/23 1211) Vital Signs (24h Range):  Temp:  [98.1 °F (36.7 °C)-98.3 °F (36.8 °C)] 98.3 °F (36.8 °C)  Pulse:  [] 82  Resp:  [15-18] 16  SpO2:  [95 %-100 %] 100 %  BP: (111-130)/(57-87) 130/66                                 Physical Exam  Constitutional:       Appearance: She is well-developed and well-nourished.   Eyes:      Extraocular Movements: EOM normal.      Conjunctiva/sclera: Conjunctivae normal.      Pupils: Pupils are equal, round, and reactive to light.   Cardiovascular:      Pulses: Normal pulses.   Abdominal:      Palpations: Abdomen is soft.   Neurological:      Mental Status: She is alert and oriented to person, place, and time.      Comments: AAOx4  PERRL  R eye vision loss, only able to see light and shapes  L eye grossly normal vision  No peripheral vision loss bilaterally  Full strength all extremities  SILT  No drift   Psychiatric:         Mood and Affect: Mood and affect normal.            Physical Exam:    Constitutional: She appears well-developed and well-nourished.     Eyes: Pupils are equal, round, and reactive to light. Conjunctivae and EOM are normal.     Cardiovascular: Normal pulses.     Abdominal: Soft.     Psych/Behavior: She is alert. She is oriented to person, place, and time. She has a normal mood and affect.     Neurological:   AAOx4  PERRL  R eye vision loss, only able to see light and shapes  L eye grossly normal vision  No peripheral vision loss bilaterally  Full strength all extremities  SILT  No drift     Significant Labs:  Recent Labs   Lab 07/09/23  0324         K 4.0      CO2 18*   BUN 12   CREATININE 0.9   CALCIUM 8.8   MG 2.4     Recent Labs   Lab  07/09/23  0324   WBC 6.46   HGB 14.2   HCT 42.4        No results for input(s): LABPT, INR, APTT in the last 48 hours.  Microbiology Results (last 7 days)       ** No results found for the last 168 hours. **          All pertinent labs from the last 24 hours have been reviewed.    Significant Diagnostics:  I have reviewed all pertinent imaging results/findings within the past 24 hours.

## 2023-07-09 NOTE — PLAN OF CARE
[facility] - Emergency Dept  Initial Discharge Assessment       Primary Care Provider: Danny Gerardo MD    Admission Diagnosis: Optic neuritis [H46.9]    Admission Date: 7/7/2023  Expected Discharge Date:     Transition of Care Barriers: (P) None    Payor: BLUE CROSS BLUE SHIELD / Plan: [place] PPO / Product Type: PPO /     Extended Emergency Contact Information  Primary Emergency Contact: Paramjit Laguerre   [place]  Mobile Phone: [phone]  Relation: Spouse    Discharge Plan A: (P) Home, Home with family         [id number] - [place], [place] - [address] [facility] [address]  [address] [place] [address]  Phone: [phone] Fax: [phone]    [facility] - [place], [place] - [address]  [address] [address]  Phone: [phone] Fax: [phone]      Initial Assessment (most recent)       Adult Discharge Assessment - 07/09/23 0308          Discharge Assessment    Assessment Type Discharge Planning Assessment (P)      Confirmed/corrected address, phone number and insurance Yes (P)      Confirmed Demographics Correct on Facesheet (P)      Source of Information patient (P)      When was your last doctors appointment? -- (P)    next appt is on August 4th    Does patient/caregiver understand observation status Yes (P)      Communicated KELLEY with patient/caregiver Yes (P)      Reason For Admission Migraine and blurred vision (P)      People in Home spouse (P)      Facility Arrived From: Home (P)      Do you expect to return to your current living situation? Yes (P)      Do you have help at home or someone to help you manage your care at home? Yes (P)      Who are your caregiver(s) and their phone number(s)?  (P)      Prior to hospitilization cognitive status: Alert/Oriented (P)      Current cognitive status: Alert/Oriented (P)      Home Accessibility not wheelchair accessible (P)      Home Layout Able to live on 1st  floor (P)      Equipment Currently Used at Home none (P)      Readmission within 30 days? Yes (P)      Patient currently being followed by outpatient case management? No (P)      Do you currently have service(s) that help you manage your care at home? No (P)      Do you take prescription medications? Yes (P)      Do you have prescription coverage? Yes (P)      Coverage [place] (P)      Do you have any problems affording any of your prescribed medications? No (P)      Is the patient taking medications as prescribed? yes (P)      Who is going to help you get home at discharge?  and Sister (P)      Are you on dialysis? No (P)      Do you take coumadin? No (P)      Discharge Plan A Home;Home with family (P)      DME Needed Upon Discharge  none (P)      Discharge Plan discussed with: Patient (P)      Transition of Care Barriers None (P)         Physical Activity    On average, how many days per week do you engage in moderate to strenuous exercise (like a brisk walk)? 0 days (P)      On average, how many minutes do you engage in exercise at this level? 0 min (P)         Financial Resource Strain    How hard is it for you to pay for the very basics like food, housing, medical care, and heating? Not hard at all (P)         Housing Stability    In the last 12 months, was there a time when you were not able to pay the mortgage or rent on time? No (P)      In the last 12 months, how many places have you lived? 1 (P)      In the last 12 months, was there a time when you did not have a steady place to sleep or slept in a shelter (including now)? No (P)         Transportation Needs    In the past 12 months, has lack of transportation kept you from medical appointments or from getting medications? No (P)      In the past 12 months, has lack of transportation kept you from meetings, work, or from getting things needed for daily living? No (P)         Food Insecurity    Within the past 12 months, you worried that your food  would run out before you got the money to buy more. Never true (P)      Within the past 12 months, the food you bought just didn't last and you didn't have money to get more. Never true (P)         Stress    Do you feel stress - tense, restless, nervous, or anxious, or unable to sleep at night because your mind is troubled all the time - these days? Not at all (P)         Social Connections    In a typical week, how many times do you talk on the phone with family, friends, or neighbors? More than three times a week (P)      How often do you get together with friends or relatives? More than three times a week (P)      How often do you attend Worship or Denominational services? Never (P)      Do you belong to any clubs or organizations such as Worship groups, unions, fraternal or athletic groups, or school groups? No (P)      How often do you attend meetings of the clubs or organizations you belong to? Never (P)      Are you , , , , never , or living with a partner?  (P)         Alcohol Use    Q1: How often do you have a drink containing alcohol? Monthly or less (P)      Q2: How many drinks containing alcohol do you have on a typical day when you are drinking? 1 or 2 (P)      Q3: How often do you have six or more drinks on one occasion? Never (P)         OTHER    Name(s) of People in Home  (P)

## 2023-07-09 NOTE — SUBJECTIVE & OBJECTIVE
Interval History: NAEON, VSS. Resting with lights off and headaches this morning.     Review of Systems   Constitutional:  Negative for activity change, appetite change, chills, fatigue and fever.   Eyes:  Positive for photophobia, pain and visual disturbance. Negative for discharge and redness.   Objective:     Vital Signs (Most Recent):  Temp: 98.3 °F (36.8 °C) (07/09/23 1211)  Pulse: 82 (07/09/23 1211)  Resp: 16 (07/09/23 1211)  BP: 130/66 (07/09/23 1211)  SpO2: 100 % (07/09/23 1211) Vital Signs (24h Range):  Temp:  [98.1 °F (36.7 °C)-98.3 °F (36.8 °C)] 98.3 °F (36.8 °C)  Pulse:  [] 82  Resp:  [15-18] 16  SpO2:  [95 %-100 %] 100 %  BP: (111-130)/(57-87) 130/66     Weight: 104.8 kg (231 lb)  Body mass index is 42.25 kg/m².  No intake or output data in the 24 hours ending 07/09/23 1344      Physical Exam  Constitutional:       Appearance: Normal appearance.   HENT:      Head: Normocephalic and atraumatic.      Nose: Nose normal.      Mouth/Throat:      Mouth: Mucous membranes are moist.   Eyes:      General:         Right eye: No discharge.         Left eye: No discharge.      Extraocular Movements: Extraocular movements intact.      Pupils: Pupils are equal, round, and reactive to light.   Neurological:      Mental Status: She is alert.      Comments: Vision loss R eye           Significant Labs: All pertinent labs within the past 24 hours have been reviewed.    Significant Imaging: I have reviewed all pertinent imaging results/findings within the past 24 hours.

## 2023-07-09 NOTE — SUBJECTIVE & OBJECTIVE
Past Medical History:   Diagnosis Date    Anxiety 2023    Clotting disorder     DVT (deep venous thrombosis) 2007    was felt to be due to ? Factor V Leiden    High risk pregnancy due to history of  labor 2015    Hyperthyroidism 2013    reports repeat testing was WNL and no treatment needed.    Iron deficiency anemia due to chronic blood loss 2015    Migraines     Restless legs syndrome (RLS)     Short cervix affecting pregnancy 2015       Past Surgical History:   Procedure Laterality Date    2 R knee sx; 1 L knee      arthroscopies    BRONCHOSCOPY       SECTION  2015    LAPAROSCOPIC CHOLECYSTECTOMY N/A 2021    Procedure: CHOLECYSTECTOMY, LAPAROSCOPIC;  Surgeon: Pascual Lu MD;  Location: [facility];  Service: General;  Laterality: N/A;    TUBAL LIGATION  2015       Review of patient's allergies indicates:   Allergen Reactions    Amoxicillin     Augmentin [amoxicillin-pot clavulanate] Swelling    Bactrim [sulfamethoxazole-trimethoprim] Itching and Nausea Only    Ropinirole Rash       No current facility-administered medications on file prior to encounter.     Current Outpatient Medications on File Prior to Encounter   Medication Sig    aspirin (ECOTRIN) 81 MG EC tablet Take 81 mg by mouth once daily.    busPIRone (BUSPAR) 15 MG tablet Take 1 tablet (15 mg total) by mouth 2 (two) times daily.    prochlorperazine (COMPAZINE) 10 MG tablet Take 10 mg by mouth continuous prn (NAUSEA).    rizatriptan (MAXALT) 10 MG tablet 1 tab PO PRN migraine. May repeat every 2 hours for max 3 tabs in 24 hours. Use no more than 10 days per month.    sertraline (ZOLOFT) 25 MG tablet Take 1 tablet (25 mg total) by mouth once daily. (Patient taking differently: Take 25 mg by mouth every evening.)    tiZANidine (ZANAFLEX) 4 MG tablet Take 4 mg by mouth daily as needed (MUSCLE SPASMS).    topiramate (TOPAMAX) 50 MG tablet Take 1.5 tablets (75 mg total) by mouth every evening.  "(Patient not taking: Reported on 7/8/2023)     Family History       Problem Relation (Age of Onset)    Anuerysm Father, Paternal Aunt, Paternal Grandfather    Bipolar disorder Sister    Bladder Cancer Maternal Aunt    Breast cancer Maternal Grandmother    Cancer Father    Deep vein thrombosis Father    Depression Mother    Diabetes Paternal Grandmother    Fibromyalgia Mother    Heart disease Brother    Hypertension Father    Ovarian cancer Maternal Aunt          Tobacco Use    Smoking status: Former     Packs/day: 0.50     Years: 0.00     Pack years: 0.00     Types: Cigarettes    Smokeless tobacco: Former     Quit date: 8/1/2021   Substance and Sexual Activity    Alcohol use: Yes     Alcohol/week: 0.0 standard drinks     Comment: rare    Drug use: No    Sexual activity: Yes     Partners: Male     Birth control/protection: None     Review of Systems   Constitutional:  Negative for chills, fatigue and fever.   HENT:  Negative for ear pain, facial swelling, hearing loss, trouble swallowing and voice change.    Eyes:  Positive for pain and visual disturbance.        Severe eye pain and pressure in R eye  Only able to see "shadows" from R eye, L eye vision is not decreased   Respiratory:  Negative for cough and chest tightness.    Cardiovascular:  Negative for chest pain.   Gastrointestinal:  Negative for abdominal distention and abdominal pain.   Endocrine: Negative for cold intolerance, polydipsia and polyuria.   Genitourinary:  Negative for difficulty urinating and dysuria.   Musculoskeletal:  Negative for arthralgias, back pain and joint swelling.   Neurological:  Positive for weakness (right upper extremity; resolved) and headaches. Negative for facial asymmetry, speech difficulty and numbness.   Psychiatric/Behavioral:  Negative for agitation, behavioral problems and confusion.    Objective:     Vital Signs (Most Recent):  Temp: 98.3 °F (36.8 °C) (07/09/23 1211)  Pulse: 82 (07/09/23 1211)  Resp: 16 (07/09/23 " 1211)  BP: 130/66 (07/09/23 1211)  SpO2: 100 % (07/09/23 1211) Vital Signs (24h Range):  Temp:  [98.1 °F (36.7 °C)-98.3 °F (36.8 °C)] 98.3 °F (36.8 °C)  Pulse:  [] 82  Resp:  [15-18] 16  SpO2:  [95 %-100 %] 100 %  BP: (111-130)/(57-87) 130/66     Weight: 104.8 kg (231 lb)  Body mass index is 42.25 kg/m².     Physical Exam  Vitals and nursing note reviewed.   Constitutional:       General: She is not in acute distress.     Appearance: She is not toxic-appearing or diaphoretic.   Cardiovascular:      Rate and Rhythm: Normal rate and regular rhythm.   Pulmonary:      Effort: Pulmonary effort is normal. No respiratory distress.   Neurological:      Mental Status: She is alert and oriented to person, place, and time.      Cranial Nerves: Cranial nerves 2-12 are intact.      Coordination: Finger-Nose-Finger Test normal.   Psychiatric:         Speech: Speech normal.        NEUROLOGICAL EXAMINATION:     MENTAL STATUS   Oriented to person, place, and time.   Follows 1 step commands.   Attention: normal. Concentration: normal.   Speech: speech is normal   Level of consciousness: alert    CRANIAL NERVES   Cranial nerves II through XII intact.     CN II   Visual acuity: decreased (OD decreased, OS normal)    MOTOR EXAM        4 extremities anti-gravity without drift     SENSORY EXAM   Light touch normal.     GAIT AND COORDINATION      Coordination   Finger to nose coordination: normal    Significant Labs: All pertinent lab results from the past 24 hours have been reviewed.    Significant Imaging: I have reviewed all pertinent imaging results/findings within the past 24 hours.

## 2023-07-09 NOTE — ASSESSMENT & PLAN NOTE
Clinical presentation and imaging incongruent (unilateral change with bilateral changes on imaging). Likely artifactual on review, however, as imaging was not completed, recommend repeat MRI Orbits W/WO Contrast to evaluate optic nerves to rule out optic neuritis prior to further CSF diagnostics. Will follow up imaging results when available for review. Suspect etiology is secondary to recent stenting procedure, therefore, recommend input from Interventional Neurology.    Recommendations  Diagnostic:  - MRI Orbits W/WO Contrast  Therapeutic  - Consider discussion with Interventionalist

## 2023-07-09 NOTE — NURSING
Nurses Note -- 4 Eyes      7/9/2023   2:09 PM      Skin assessed during: Admit      [] No Altered Skin Integrity Present    []Prevention Measures Documented      [x] Yes- Altered Skin Integrity Present or Discovered   [x] LDA Added if Not in Epic (Describe Wound)   [] New Altered Skin Integrity was Present on Admit and Documented in LDA   [] Wound Image Taken    Wound Care Consulted? No    Attending Nurse:  Sabrina Pate LPN     Second RN/Staff Member:  Carl LittleRN

## 2023-07-09 NOTE — PLAN OF CARE
Patient had an uneventful day.  Problem: Adult Inpatient Plan of Care  Goal: Plan of Care Review  Outcome: Ongoing, Not Progressing  Goal: Patient-Specific Goal (Individualized)  Outcome: Ongoing, Not Progressing  Goal: Absence of Hospital-Acquired Illness or Injury  Outcome: Ongoing, Not Progressing  Goal: Optimal Comfort and Wellbeing  Outcome: Ongoing, Not Progressing  Goal: Readiness for Transition of Care  Outcome: Ongoing, Not Progressing     Problem: Bariatric Environmental Safety  Goal: Safety Maintained with Care  Outcome: Ongoing, Not Progressing     Problem: Impaired Wound Healing  Goal: Optimal Wound Healing  Outcome: Ongoing, Not Progressing

## 2023-07-09 NOTE — CONSULTS
[facility]  Neurology  Consult Note    Patient Name: Eugenie Laguerre  MRN: [id number]  Admission Date: 7/7/2023  Hospital Length of Stay: 0 days  Code Status: Full Code   Attending Provider: Salty Bedolla MD   Consulting Provider: Rayshawn Moraes MD  Primary Care Physician: Danny Gerardo MD  Principal Problem:Optic neuritis    Inpatient consult to Neurology  Consult performed by: Rayshawn Moraes MD  Consult ordered by: Biju Snowden MD         Subjective:     Chief Complaint:  Painful, unilateral vision loss     HPI:   Neurology consulted for optic neuritis evaluation in Eugenie Laguerre, a 42 y.o. 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 History obtained from patient, spouse, and EMR.     In brief, patient reports new, acute onset painful right eye blurry vision after awakening 07/07/2023 with progression to vision loss characterized as only seeing shadows/movement. As symptoms did not improve, patient presented to [facility] ED for further evaluation. Of note, she missed doses of Ticagrelor following her recent stent procedure. [facility] evaluated and recommended further imaging as well as Ophthalmology evaluation. Ophthalmology evaluation notable for decreased visual acuity and slightly enlarged episcleral veins with flat macular, slightly hyperemic disc, and two hyperpigmented lesions noted near inferior arcade on fundoscopy. Imaging thus far (NCCTH, MRI Brain WO Contrast, MRA Brain WO Contrast, MRI Orbits WO Contrast) notable for a possible filling defect/absent flow related enhancement in the right terminal ICA (intraluminal thrombus vs artifact), mild increased signal within the bilateral optic nerve sheaths (optic neuritis vs  artifact), and minimal symmetric increased FLAIR signal along the medial aspect of the bilateral occipital horns of the lateral ventricles/splenium of the corpus callosum (artifact). Due to mild increased signal in patients bilateral optic nerve sheaths Neurology was consulted.        Past Medical History:   Diagnosis Date    Anxiety 2023    Clotting disorder     DVT (deep venous thrombosis) 2007    was felt to be due to ? Factor V Leiden    High risk pregnancy due to history of  labor 2015    Hyperthyroidism 2013    reports repeat testing was WNL and no treatment needed.    Iron deficiency anemia due to chronic blood loss 2015    Migraines     Restless legs syndrome (RLS)     Short cervix affecting pregnancy 2015       Past Surgical History:   Procedure Laterality Date    2 R knee sx; 1 L knee      arthroscopies    BRONCHOSCOPY       SECTION  2015    LAPAROSCOPIC CHOLECYSTECTOMY N/A 2021    Procedure: CHOLECYSTECTOMY, LAPAROSCOPIC;  Surgeon: Pascual Lu MD;  Location: [facility];  Service: General;  Laterality: N/A;    TUBAL LIGATION  2015       Review of patient's allergies indicates:   Allergen Reactions    Amoxicillin     Augmentin [amoxicillin-pot clavulanate] Swelling    Bactrim [sulfamethoxazole-trimethoprim] Itching and Nausea Only    Ropinirole Rash       No current facility-administered medications on file prior to encounter.     Current Outpatient Medications on File Prior to Encounter   Medication Sig    aspirin (ECOTRIN) 81 MG EC tablet Take 81 mg by mouth once daily.    busPIRone (BUSPAR) 15 MG tablet Take 1 tablet (15 mg total) by mouth 2 (two) times daily.    prochlorperazine (COMPAZINE) 10 MG tablet Take 10 mg by mouth continuous prn (NAUSEA).    rizatriptan (MAXALT) 10 MG tablet 1 tab PO PRN migraine. May repeat every 2 hours for max 3 tabs in 24 hours. Use no more than 10 days per month.    sertraline  "(ZOLOFT) 25 MG tablet Take 1 tablet (25 mg total) by mouth once daily. (Patient taking differently: Take 25 mg by mouth every evening.)    tiZANidine (ZANAFLEX) 4 MG tablet Take 4 mg by mouth daily as needed (MUSCLE SPASMS).    topiramate (TOPAMAX) 50 MG tablet Take 1.5 tablets (75 mg total) by mouth every evening. (Patient not taking: Reported on 7/8/2023)     Family History       Problem Relation (Age of Onset)    Anuerysm Father, Paternal Aunt, Paternal Grandfather    Bipolar disorder Sister    Bladder Cancer Maternal Aunt    Breast cancer Maternal Grandmother    Cancer Father    Deep vein thrombosis Father    Depression Mother    Diabetes Paternal Grandmother    Fibromyalgia Mother    Heart disease Brother    Hypertension Father    Ovarian cancer Maternal Aunt          Tobacco Use    Smoking status: Former     Packs/day: 0.50     Years: 0.00     Pack years: 0.00     Types: Cigarettes    Smokeless tobacco: Former     Quit date: 8/1/2021   Substance and Sexual Activity    Alcohol use: Yes     Alcohol/week: 0.0 standard drinks     Comment: rare    Drug use: No    Sexual activity: Yes     Partners: Male     Birth control/protection: None     Review of Systems   Constitutional:  Negative for chills, fatigue and fever.   HENT:  Negative for ear pain, facial swelling, hearing loss, trouble swallowing and voice change.    Eyes:  Positive for pain and visual disturbance.        Severe eye pain and pressure in R eye  Only able to see "shadows" from R eye, L eye vision is not decreased   Respiratory:  Negative for cough and chest tightness.    Cardiovascular:  Negative for chest pain.   Gastrointestinal:  Negative for abdominal distention and abdominal pain.   Endocrine: Negative for cold intolerance, polydipsia and polyuria.   Genitourinary:  Negative for difficulty urinating and dysuria.   Musculoskeletal:  Negative for arthralgias, back pain and joint swelling.   Neurological:  Positive for weakness (right upper " extremity; resolved) and headaches. Negative for facial asymmetry, speech difficulty and numbness.   Psychiatric/Behavioral:  Negative for agitation, behavioral problems and confusion.    Objective:     Vital Signs (Most Recent):  Temp: 98.3 °F (36.8 °C) (07/09/23 1211)  Pulse: 82 (07/09/23 1211)  Resp: 16 (07/09/23 1211)  BP: 130/66 (07/09/23 1211)  SpO2: 100 % (07/09/23 1211) Vital Signs (24h Range):  Temp:  [98.1 °F (36.7 °C)-98.3 °F (36.8 °C)] 98.3 °F (36.8 °C)  Pulse:  [] 82  Resp:  [15-18] 16  SpO2:  [95 %-100 %] 100 %  BP: (111-130)/(57-87) 130/66     Weight: 104.8 kg (231 lb)  Body mass index is 42.25 kg/m².     Physical Exam  Vitals and nursing note reviewed.   Constitutional:       General: She is not in acute distress.     Appearance: She is not toxic-appearing or diaphoretic.   Cardiovascular:      Rate and Rhythm: Normal rate and regular rhythm.   Pulmonary:      Effort: Pulmonary effort is normal. No respiratory distress.   Neurological:      Mental Status: She is alert and oriented to person, place, and time.      Cranial Nerves: Cranial nerves 2-12 are intact.      Coordination: Finger-Nose-Finger Test normal.   Psychiatric:         Speech: Speech normal.        NEUROLOGICAL EXAMINATION:     MENTAL STATUS   Oriented to person, place, and time.   Follows 1 step commands.   Attention: normal. Concentration: normal.   Speech: speech is normal   Level of consciousness: alert    CRANIAL NERVES   Cranial nerves II through XII intact.     CN II   Visual acuity: decreased (OD decreased, OS normal)    MOTOR EXAM        4 extremities anti-gravity without drift     SENSORY EXAM   Light touch normal.     GAIT AND COORDINATION      Coordination   Finger to nose coordination: normal    Significant Labs: All pertinent lab results from the past 24 hours have been reviewed.    Significant Imaging: I have reviewed all pertinent imaging results/findings within the past 24 hours.    Assessment and Plan:      Abnormal brain MRI  Clinical presentation and imaging incongruent (unilateral change with bilateral changes on imaging). Likely artifactual on review, however, as imaging was not completed, recommend repeat MRI Orbits W/WO Contrast to evaluate optic nerves to rule out optic neuritis prior to further CSF diagnostics. Will follow up imaging results when available for review. Suspect etiology is secondary to recent stenting procedure, therefore, recommend input from Interventional Neurology.    Recommendations  Diagnostic:  - MRI Orbits W/WO Contrast  Therapeutic  - Consider discussion with Interventionalist          VTE Risk Mitigation (From admission, onward)         Ordered     enoxaparin injection 40 mg  Every 12 hours         07/08/23 0834     IP VTE HIGH RISK PATIENT  Once         07/08/23 0822     Place sequential compression device  Until discontinued         07/08/23 0822                Thank you for your consult. I will follow-up with patient. Please contact us if you have any additional questions.    Rayshawn Moraes MD  Neurology  [facility]

## 2023-07-09 NOTE — HOSPITAL COURSE
Presented to ED as a transfer from [facility] for acute vision loss of the R eye, optho consulted and at bedside, MRI head evaluated by consulting physicians. Concern for optic neuritis vs ophthalmic artery occlusion. 7/9 - neurology and NSGY consulted today, CTA head with contrast ordered, without any new occlusion of ophthalmic artery. 7/10 - MRI orbits w contrast demonstrating continued subtle enhancement of R optic nerve. Patient completed pulse-dose steroids per Neurology reassessed and adjusted pain regimen with good results. Pt is medically able and willing to return home today.

## 2023-07-09 NOTE — PROGRESS NOTES
"[facility] Medicine  Progress Note    Patient Name: Eugenie Laguerre  MRN: [id number]  Patient Class: OP- Observation   Admission Date: 7/7/2023  Length of Stay: 0 days  Attending Physician: Salty Bedolla MD  Primary Care Provider: Danny Gerardo MD        Subjective:     Principal Problem:Optic neuritis        HPI:  Eugenie Laguerre is a 42 y.o. 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 She presented to ED as a transfer from [facility] for vision loss in the R eye. Patient reports acute vision loss in the R eye this morning, stating that upon waking up, she could only see motion and shadows with her right eye. In addition, she reports eye pain around the orbit that, during here hospital course, has progressed to severe pain of the globe itself. Patient' reports constant pressure in her eye "like its about to explode" and that this pain was first noticed right after waking up from her MJ procedure. She reports that she has never experienced pain or visual symptoms like this before.      Overview/Hospital Course:  Presented to ED as a transfer from [facility] for acute vision loss of the R eye, optho consulted and at bedside, MRI head evaluated by consulting physicians. 7/9 - neurology and NSGY consulted today, CTA head with contrast      Interval History: NAEON, VSS. Resting with lights off and headaches this morning, has not ambulated, appetite is normal per patient     Review of Systems   Constitutional:  Negative for activity change, appetite change, chills, fatigue and fever.   Eyes:  Positive for photophobia, pain and visual disturbance. Negative for discharge and redness.   Objective:     Vital Signs (Most Recent):  Temp: 98.3 °F (36.8 °C) (07/09/23 1211)  Pulse: 82 (07/09/23 " 1211)  Resp: 16 (07/09/23 1211)  BP: 130/66 (07/09/23 1211)  SpO2: 100 % (07/09/23 1211) Vital Signs (24h Range):  Temp:  [98.1 °F (36.7 °C)-98.3 °F (36.8 °C)] 98.3 °F (36.8 °C)  Pulse:  [] 82  Resp:  [15-18] 16  SpO2:  [95 %-100 %] 100 %  BP: (111-130)/(57-87) 130/66     Weight: 104.8 kg (231 lb)  Body mass index is 42.25 kg/m².  No intake or output data in the 24 hours ending 07/09/23 1344      Physical Exam  Constitutional:       Appearance: Normal appearance.   HENT:      Head: Normocephalic and atraumatic.      Nose: Nose normal.      Mouth/Throat:      Mouth: Mucous membranes are moist.   Eyes:      General:         Right eye: No discharge.         Left eye: No discharge.      Extraocular Movements: Extraocular movements intact.      Pupils: Pupils are equal, round, and reactive to light.   Neurological:      Mental Status: She is alert.      Comments: Vision loss R eye           Significant Labs: All pertinent labs within the past 24 hours have been reviewed.    Significant Imaging: I have reviewed all pertinent imaging results/findings within the past 24 hours.      Assessment/Plan:      * Optic neuritis  42 y.o. female with Hx of migraines, factor V leiden mutation, prior DVTs, and a family hx of ruptured cerebral aneurysms, right ICA artery aneurysm s/p MJ stent on 07/05/23 and DAPT who presented to [facility] as a transfer from [facility] for Monocular vision loss of the R eye    -- s/p MRI non con, mild increased signal within the bilateral optic nerve sheaths, minimal symmetric increased FLAIR signal along the lateral ventricles not seen on the prior exam and potentially artifactual.   -- f/u MRI Orbits w w/out contrast, thin slices and fat suppression per optho  -- Neuro consult, f/u with recs  -- NSGY -- CTA head/neck pending for workup with concern that patient has R ophthalmic artery occlusion  - continue aspirin 81mg and brilinta 90mg bid per NSGY      Monocular vision loss  See optic  neuritis      Cerebral aneurysm, nonruptured  -- S/p MJ stent 7/5/23      Anxiety  -- buspirone 15 mg  -- sertraline 25 mg      Severe obesity (BMI 35.0-39.9) with comorbidity  Body mass index is 42.25 kg/m². Morbid obesity complicates all aspects of disease management from diagnostic modalities to treatment. Weight loss encouraged and health benefits explained to patient.         Intractable migraine with aura without status migrainosus  -- hold triptans  -- Mag 2g  -- Norco q4 PRN      VTE Risk Mitigation (From admission, onward)           Ordered     enoxaparin injection 40 mg  Every 12 hours         07/08/23 0834     IP VTE HIGH RISK PATIENT  Once         07/08/23 0822     Place sequential compression device  Until discontinued         07/08/23 0822                    Discharge Planning   KELLEY:      Code Status: Full Code   Is the patient medically ready for discharge?: No    Reason for patient still in hospital (select all that apply): Laboratory test and Treatment  Discharge Plan A: Home, Home with family   Discharge Delays: None known at this time              Biju Snowden MD  Department of Hospital Medicine   [facility] Surg

## 2023-07-09 NOTE — ED NOTES
Received report from CHARMAINE Sun. Will assume care. The patient is awake, alert and cooperative with a calm affect, patient is aware of environment, airway is open and patent, respirations are spontaneous, normal effort and rate noted, skin warm and dry, moves all extremities well, appearance: no apparent distress noted, bed placed in low position, side rails up x 2, call light is within reach of patient, explanation of care provided to patient, alarms set and turned on for monitor, pulse ox, plan of care: observe and reassure, position of comfort, patient offers no complaints at this time, awaiting further MD orders and bed assignment, will continue to monitor.

## 2023-07-09 NOTE — CONSULTS
[facility]  Neurosurgery  Consult Note    Inpatient consult to Neurosurgery  Consult performed by: Ranulfo Melo MD  Consult ordered by: Tj Zepeda MD        Subjective:     Chief Complaint/Reason for Admission: R vision issues    History of Present Illness: 43 yo F with PMH of R ICA clinoidal aneurysm who recently had flow diverter placed on 23 with Dr. Burnett who presents back to ED on Friday (2 days after her procedure) with new R eye vision issues. She was discharged POD1 on aspirin and brilinta without issues and woke up the next morning at 9am with R eye vision issues. She could not get her Brilinta from the pharmacy so went to the hospital where she was given her aspirin and brilinta that afternoon. MRI and MRA unremarkable except for artifact around R ICA flow diverter unable to rule out thrombus. Patient seen today with ongoing R eye vision issues, only able to see movement, light/shadows in her R eye.      (Not in a hospital admission)      Review of patient's allergies indicates:   Allergen Reactions    Amoxicillin     Augmentin [amoxicillin-pot clavulanate] Swelling    Bactrim [sulfamethoxazole-trimethoprim] Itching and Nausea Only    Ropinirole Rash       Past Medical History:   Diagnosis Date    Anxiety 2023    Clotting disorder     DVT (deep venous thrombosis) 2007    was felt to be due to ? Factor V Leiden    High risk pregnancy due to history of  labor 2015    Hyperthyroidism 2013    reports repeat testing was WNL and no treatment needed.    Iron deficiency anemia due to chronic blood loss 2015    Migraines     Restless legs syndrome (RLS)     Short cervix affecting pregnancy 2015     Past Surgical History:   Procedure Laterality Date    2 R knee sx; 1 L knee      arthroscopies    BRONCHOSCOPY       SECTION  2015    LAPAROSCOPIC CHOLECYSTECTOMY N/A 2021    Procedure: CHOLECYSTECTOMY,  LAPAROSCOPIC;  Surgeon: Pascual Lu MD;  Location: [facility];  Service: General;  Laterality: N/A;    TUBAL LIGATION  07/30/2015     Family History       Problem Relation (Age of Onset)    Anuerysm Father, Paternal Aunt, Paternal Grandfather    Bipolar disorder Sister    Bladder Cancer Maternal Aunt    Breast cancer Maternal Grandmother    Cancer Father    Deep vein thrombosis Father    Depression Mother    Diabetes Paternal Grandmother    Fibromyalgia Mother    Heart disease Brother    Hypertension Father    Ovarian cancer Maternal Aunt          Tobacco Use    Smoking status: Former     Packs/day: 0.50     Years: 0.00     Pack years: 0.00     Types: Cigarettes    Smokeless tobacco: Former     Quit date: 8/1/2021   Substance and Sexual Activity    Alcohol use: Yes     Alcohol/week: 0.0 standard drinks     Comment: rare    Drug use: No    Sexual activity: Yes     Partners: Male     Birth control/protection: None     Review of Systems   Eyes:  Positive for visual disturbance.   All other systems reviewed and are negative.  Objective:     Weight: 104.8 kg (231 lb)  Body mass index is 42.25 kg/m².  Vital Signs (Most Recent):  Temp: 98.3 °F (36.8 °C) (07/09/23 1211)  Pulse: 82 (07/09/23 1211)  Resp: 16 (07/09/23 1211)  BP: 130/66 (07/09/23 1211)  SpO2: 100 % (07/09/23 1211) Vital Signs (24h Range):  Temp:  [98.1 °F (36.7 °C)-98.3 °F (36.8 °C)] 98.3 °F (36.8 °C)  Pulse:  [] 82  Resp:  [15-18] 16  SpO2:  [95 %-100 %] 100 %  BP: (111-130)/(57-87) 130/66                                 Physical Exam  Constitutional:       Appearance: She is well-developed and well-nourished.   Eyes:      Extraocular Movements: EOM normal.      Conjunctiva/sclera: Conjunctivae normal.      Pupils: Pupils are equal, round, and reactive to light.   Cardiovascular:      Pulses: Normal pulses.   Abdominal:      Palpations: Abdomen is soft.   Neurological:      Mental Status: She is alert and oriented to person, place, and time.       Comments: AAOx4  PERRL  R eye vision loss, only able to see light and shapes  L eye grossly normal vision  No peripheral vision loss bilaterally  Full strength all extremities  SILT  No drift   Psychiatric:         Mood and Affect: Mood and affect normal.            Physical Exam:    Constitutional: She appears well-developed and well-nourished.     Eyes: Pupils are equal, round, and reactive to light. Conjunctivae and EOM are normal.     Cardiovascular: Normal pulses.     Abdominal: Soft.     Psych/Behavior: She is alert. She is oriented to person, place, and time. She has a normal mood and affect.     Neurological:   AAOx4  PERRL  R eye vision loss, only able to see light and shapes  L eye grossly normal vision  No peripheral vision loss bilaterally  Full strength all extremities  SILT  No drift     Significant Labs:  Recent Labs   Lab 07/09/23  0324         K 4.0      CO2 18*   BUN 12   CREATININE 0.9   CALCIUM 8.8   MG 2.4     Recent Labs   Lab 07/09/23  0324   WBC 6.46   HGB 14.2   HCT 42.4        No results for input(s): LABPT, INR, APTT in the last 48 hours.  Microbiology Results (last 7 days)       ** No results found for the last 168 hours. **          All pertinent labs from the last 24 hours have been reviewed.    Significant Diagnostics:  I have reviewed all pertinent imaging results/findings within the past 24 hours.    Assessment/Plan:     Monocular vision loss  43 yo F with PMH of R ICA clinoidal aneurysm who recently had flow diverter placed on 7/5/23 with Dr. Burnett who presents back to ED on Friday (2 days after her procedure) with new R eye vision issues. She was discharged POD1 on aspirin and brilinta without issues and woke up the next morning at 9am with R eye vision issues. She could not get her Brilinta from the pharmacy so went to the hospital where she was given her aspirin and brilinta that afternoon. MRI and MRA unremarkable except for artifact around R ICA flow  diverter unable to rule out thrombus. Patient seen today with ongoing R eye vision issues, only able to see movement, light/shadows in her R eye.    - admitted to  team  - neurology following, rec MRI orbits w wo to workup optic neuritis  - CTA head/neck pending for workup with concern that patient has R ophthalmic artery occlusion  - continue aspirin 81mg and brilinta 90mg bid  - final recs pending imaging review        Thank you for your consult. I will follow-up with patient. Please contact us if you have any additional questions.    Ranulfo Melo MD  Neurosurgery  [facility]

## 2023-07-09 NOTE — ASSESSMENT & PLAN NOTE
42 y.o. female with Hx of migraines, factor V leiden mutation, prior DVTs, and a family hx of ruptured cerebral aneurysms, right ICA artery aneurysm s/p JM stent on 07/05/23 and DAPT who presented to [facility] as a transfer from [facility] for Monocular vision loss of the R eye    -- s/p MRI non con, mild increased signal within the bilateral optic nerve sheaths, minimal symmetric increased FLAIR signal along the lateral ventricles not seen on the prior exam and potentially artifactual.   -- f/u MRI Orbits w w/out contrast, thin slices and fat suppression per optho  -- Neuro consult, f/u with recs  -- NSGY consult - CTA head pending, continue taking AC

## 2023-07-09 NOTE — PLAN OF CARE
07/09/23 0322   Post-Acute Status   Post-Acute Authorization Other   Coverage [employer]   Other Status No Post-Acute Service Needs   Discharge Delays None known at this time   Discharge Plan   Discharge Plan A Home;Home with family     Patient stated that she lives at home with her     Patient stated that she she can safely return to her home on discharge.    Patient denies any post acute needs at this time.     SHELLEY Reid, MSW-LMSW  Medical Social Worker/  ER Department

## 2023-07-10 ENCOUNTER — TELEPHONE (OUTPATIENT)
Dept: FAMILY MEDICINE | Facility: CLINIC | Age: 43
End: 2023-07-10

## 2023-07-10 LAB
ALBUMIN SERPL BCP-MCNC: 3.7 G/DL (ref 3.5–5.2)
ALP SERPL-CCNC: 77 U/L (ref 55–135)
ALT SERPL W/O P-5'-P-CCNC: 33 U/L (ref 10–44)
ANION GAP SERPL CALC-SCNC: 9 MMOL/L (ref 8–16)
AST SERPL-CCNC: 25 U/L (ref 10–40)
BASOPHILS # BLD AUTO: 0.05 K/UL (ref 0–0.2)
BASOPHILS NFR BLD: 0.9 % (ref 0–1.9)
BILIRUB SERPL-MCNC: 0.4 MG/DL (ref 0.1–1)
BUN SERPL-MCNC: 12 MG/DL (ref 6–20)
CALCIUM SERPL-MCNC: 9.1 MG/DL (ref 8.7–10.5)
CHLORIDE SERPL-SCNC: 108 MMOL/L (ref 95–110)
CO2 SERPL-SCNC: 21 MMOL/L (ref 23–29)
CREAT SERPL-MCNC: 1 MG/DL (ref 0.5–1.4)
DIFFERENTIAL METHOD: ABNORMAL
EOSINOPHIL # BLD AUTO: 0.1 K/UL (ref 0–0.5)
EOSINOPHIL NFR BLD: 2.2 % (ref 0–8)
ERYTHROCYTE [DISTWIDTH] IN BLOOD BY AUTOMATED COUNT: 13.1 % (ref 11.5–14.5)
EST. GFR  (NO RACE VARIABLE): >60 ML/MIN/1.73 M^2
GLUCOSE SERPL-MCNC: 103 MG/DL (ref 70–110)
HCT VFR BLD AUTO: 42.1 % (ref 37–48.5)
HGB BLD-MCNC: 14 G/DL (ref 12–16)
IMM GRANULOCYTES # BLD AUTO: 0.01 K/UL (ref 0–0.04)
IMM GRANULOCYTES NFR BLD AUTO: 0.2 % (ref 0–0.5)
LYMPHOCYTES # BLD AUTO: 1.9 K/UL (ref 1–4.8)
LYMPHOCYTES NFR BLD: 32.8 % (ref 18–48)
MAGNESIUM SERPL-MCNC: 2.2 MG/DL (ref 1.6–2.6)
MCH RBC QN AUTO: 29.4 PG (ref 27–31)
MCHC RBC AUTO-ENTMCNC: 33.3 G/DL (ref 32–36)
MCV RBC AUTO: 88 FL (ref 82–98)
MONOCYTES # BLD AUTO: 0.5 K/UL (ref 0.3–1)
MONOCYTES NFR BLD: 7.9 % (ref 4–15)
NEUTROPHILS # BLD AUTO: 3.3 K/UL (ref 1.8–7.7)
NEUTROPHILS NFR BLD: 56 % (ref 38–73)
NRBC BLD-RTO: 0 /100 WBC
PLATELET # BLD AUTO: 254 K/UL (ref 150–450)
PMV BLD AUTO: 8.9 FL (ref 9.2–12.9)
POTASSIUM SERPL-SCNC: 3.8 MMOL/L (ref 3.5–5.1)
PROT SERPL-MCNC: 7.1 G/DL (ref 6–8.4)
RBC # BLD AUTO: 4.76 M/UL (ref 4–5.4)
SODIUM SERPL-SCNC: 138 MMOL/L (ref 136–145)
WBC # BLD AUTO: 5.8 K/UL (ref 3.9–12.7)

## 2023-07-10 PROCEDURE — 99223 PR INITIAL HOSPITAL CARE,LEVL III: ICD-10-PCS | Mod: ,,, | Performed by: PSYCHIATRY & NEUROLOGY

## 2023-07-10 PROCEDURE — 36415 COLL VENOUS BLD VENIPUNCTURE: CPT

## 2023-07-10 PROCEDURE — A9585 GADOBUTROL INJECTION: HCPCS | Performed by: STUDENT IN AN ORGANIZED HEALTH CARE EDUCATION/TRAINING PROGRAM

## 2023-07-10 PROCEDURE — 63600175 PHARM REV CODE 636 W HCPCS: Performed by: STUDENT IN AN ORGANIZED HEALTH CARE EDUCATION/TRAINING PROGRAM

## 2023-07-10 PROCEDURE — 25500020 PHARM REV CODE 255: Performed by: STUDENT IN AN ORGANIZED HEALTH CARE EDUCATION/TRAINING PROGRAM

## 2023-07-10 PROCEDURE — 99232 SBSQ HOSP IP/OBS MODERATE 35: CPT | Mod: ,,, | Performed by: PHYSICIAN ASSISTANT

## 2023-07-10 PROCEDURE — 11000001 HC ACUTE MED/SURG PRIVATE ROOM

## 2023-07-10 PROCEDURE — 99233 SBSQ HOSP IP/OBS HIGH 50: CPT | Mod: ,,, | Performed by: STUDENT IN AN ORGANIZED HEALTH CARE EDUCATION/TRAINING PROGRAM

## 2023-07-10 PROCEDURE — 83735 ASSAY OF MAGNESIUM: CPT

## 2023-07-10 PROCEDURE — 99223 1ST HOSP IP/OBS HIGH 75: CPT | Mod: ,,, | Performed by: PSYCHIATRY & NEUROLOGY

## 2023-07-10 PROCEDURE — 25000003 PHARM REV CODE 250: Performed by: STUDENT IN AN ORGANIZED HEALTH CARE EDUCATION/TRAINING PROGRAM

## 2023-07-10 PROCEDURE — 96372 THER/PROPH/DIAG INJ SC/IM: CPT | Mod: 59 | Performed by: STUDENT IN AN ORGANIZED HEALTH CARE EDUCATION/TRAINING PROGRAM

## 2023-07-10 PROCEDURE — 99232 PR SUBSEQUENT HOSPITAL CARE,LEVL II: ICD-10-PCS | Mod: ,,, | Performed by: PHYSICIAN ASSISTANT

## 2023-07-10 PROCEDURE — 63600175 PHARM REV CODE 636 W HCPCS

## 2023-07-10 PROCEDURE — 80053 COMPREHEN METABOLIC PANEL: CPT

## 2023-07-10 PROCEDURE — 99233 PR SUBSEQUENT HOSPITAL CARE,LEVL III: ICD-10-PCS | Mod: ,,, | Performed by: STUDENT IN AN ORGANIZED HEALTH CARE EDUCATION/TRAINING PROGRAM

## 2023-07-10 PROCEDURE — 85025 COMPLETE CBC W/AUTO DIFF WBC: CPT

## 2023-07-10 PROCEDURE — 25000003 PHARM REV CODE 250

## 2023-07-10 RX ORDER — LORAZEPAM 2 MG/ML
2 INJECTION INTRAMUSCULAR ONCE AS NEEDED
Status: COMPLETED | OUTPATIENT
Start: 2023-07-10 | End: 2023-07-10

## 2023-07-10 RX ORDER — METHYLPREDNISOLONE SOD SUCC 125 MG
250 VIAL (EA) INJECTION EVERY 6 HOURS
Status: DISPENSED | OUTPATIENT
Start: 2023-07-10 | End: 2023-07-13

## 2023-07-10 RX ORDER — GADOBUTROL 604.72 MG/ML
10 INJECTION INTRAVENOUS
Status: COMPLETED | OUTPATIENT
Start: 2023-07-10 | End: 2023-07-10

## 2023-07-10 RX ORDER — HYDROMORPHONE HYDROCHLORIDE 1 MG/ML
1 INJECTION, SOLUTION INTRAMUSCULAR; INTRAVENOUS; SUBCUTANEOUS EVERY 4 HOURS PRN
Status: DISCONTINUED | OUTPATIENT
Start: 2023-07-10 | End: 2023-07-12

## 2023-07-10 RX ORDER — LORAZEPAM 1 MG/1
2 TABLET ORAL ONCE AS NEEDED
Status: DISCONTINUED | OUTPATIENT
Start: 2023-07-10 | End: 2023-07-10

## 2023-07-10 RX ADMIN — POLYETHYLENE GLYCOL 3350 17 G: 17 POWDER, FOR SOLUTION ORAL at 08:07

## 2023-07-10 RX ADMIN — HYDROMORPHONE HYDROCHLORIDE 1 MG: 1 INJECTION, SOLUTION INTRAMUSCULAR; INTRAVENOUS; SUBCUTANEOUS at 08:07

## 2023-07-10 RX ADMIN — LORAZEPAM 2 MG: 2 INJECTION INTRAMUSCULAR; INTRAVENOUS at 11:07

## 2023-07-10 RX ADMIN — METHYLPREDNISOLONE SODIUM SUCCINATE 250 MG: 125 INJECTION, POWDER, FOR SOLUTION INTRAMUSCULAR; INTRAVENOUS at 06:07

## 2023-07-10 RX ADMIN — TICAGRELOR 90 MG: 90 TABLET ORAL at 09:07

## 2023-07-10 RX ADMIN — ASPIRIN 81 MG: 81 TABLET, COATED ORAL at 08:07

## 2023-07-10 RX ADMIN — HYDROCODONE BITARTRATE AND ACETAMINOPHEN 1 TABLET: 5; 325 TABLET ORAL at 02:07

## 2023-07-10 RX ADMIN — HYDROMORPHONE HYDROCHLORIDE 1 MG: 1 INJECTION, SOLUTION INTRAMUSCULAR; INTRAVENOUS; SUBCUTANEOUS at 11:07

## 2023-07-10 RX ADMIN — HYDROCODONE BITARTRATE AND ACETAMINOPHEN 1 TABLET: 5; 325 TABLET ORAL at 09:07

## 2023-07-10 RX ADMIN — HYDROCODONE BITARTRATE AND ACETAMINOPHEN 1 TABLET: 5; 325 TABLET ORAL at 12:07

## 2023-07-10 RX ADMIN — METHYLPREDNISOLONE SODIUM SUCCINATE 250 MG: 125 INJECTION, POWDER, FOR SOLUTION INTRAMUSCULAR; INTRAVENOUS at 11:07

## 2023-07-10 RX ADMIN — ONDANSETRON 8 MG: 8 TABLET, ORALLY DISINTEGRATING ORAL at 10:07

## 2023-07-10 RX ADMIN — SERTRALINE HYDROCHLORIDE 25 MG: 25 TABLET ORAL at 09:07

## 2023-07-10 RX ADMIN — GADOBUTROL 10 ML: 604.72 INJECTION INTRAVENOUS at 12:07

## 2023-07-10 RX ADMIN — TOPIRAMATE 75 MG: 25 TABLET, FILM COATED ORAL at 09:07

## 2023-07-10 RX ADMIN — BUSPIRONE HYDROCHLORIDE 15 MG: 10 TABLET ORAL at 08:07

## 2023-07-10 RX ADMIN — HYDROMORPHONE HYDROCHLORIDE 1 MG: 1 INJECTION, SOLUTION INTRAMUSCULAR; INTRAVENOUS; SUBCUTANEOUS at 06:07

## 2023-07-10 RX ADMIN — ENOXAPARIN SODIUM 40 MG: 40 INJECTION SUBCUTANEOUS at 09:07

## 2023-07-10 RX ADMIN — BUSPIRONE HYDROCHLORIDE 15 MG: 10 TABLET ORAL at 09:07

## 2023-07-10 RX ADMIN — ENOXAPARIN SODIUM 40 MG: 40 INJECTION SUBCUTANEOUS at 08:07

## 2023-07-10 RX ADMIN — TICAGRELOR 90 MG: 90 TABLET ORAL at 08:07

## 2023-07-10 NOTE — PROGRESS NOTES
[facility] - Med Surg  Neurosurgery  Progress Note    Subjective:     History of Present Illness: 41 yo F with PMH of R ICA clinoidal aneurysm who recently had flow diverter placed on 7/5/23 with Dr. Burnett who presents back to ED on Friday (2 days after her procedure) with new R eye vision issues. She was discharged POD1 on aspirin and brilinta without issues and woke up the next morning at 9am with R eye vision issues. She could not get her Brilinta from the pharmacy so went to the hospital where she was given her aspirin and brilinta that afternoon. MRI and MRA unremarkable except for artifact around R ICA flow diverter unable to rule out thrombus. Patient seen today with ongoing R eye vision issues, only able to see movement, light/shadows in her R eye.      Post-Op Info:  Procedure(s) (LRB):  MRI (Magnetic Resonance Imagine) (Bilateral)         Interval History: NAEON. Pt reports ongoing R eye vision loss and ocular HA that is worse with movement of the eye, unchanged from prior. Denies any other deficits or concerns.     Medications:  Continuous Infusions:  Scheduled Meds:   aspirin  81 mg Oral Daily    busPIRone  15 mg Oral BID    enoxparin  40 mg Subcutaneous Q12H (prophylaxis, 0900/2100)    methylPREDNISolone sodium succinate injection  250 mg Intravenous Q6H    polyethylene glycol  17 g Oral Daily    sertraline  25 mg Oral QHS    ticagrelor  90 mg Oral BID    topiramate  75 mg Oral QHS     PRN Meds:dextrose 10%, dextrose 10%, glucagon (human recombinant), glucose, glucose, HYDROcodone-acetaminophen, HYDROmorphone, naloxone, ondansetron, sodium chloride 0.9%     Review of Systems   HENT:  Negative for facial swelling, hearing loss and trouble swallowing.    Eyes:  Positive for pain and visual disturbance. Negative for discharge.   Gastrointestinal:  Negative for nausea and vomiting.   Genitourinary:  Negative for difficulty urinating and dysuria.   Musculoskeletal:  Negative for neck pain and neck  stiffness.   Neurological:  Positive for headaches. Negative for dizziness, seizures, facial asymmetry, speech difficulty, weakness and numbness.   Psychiatric/Behavioral:  Negative for agitation and confusion.    Objective:     Weight: 104.8 kg (231 lb)  Body mass index is 42.25 kg/m².  Vital Signs (Most Recent):  Temp: 96.4 °F (35.8 °C) (07/10/23 0840)  Pulse: 64 (07/10/23 0840)  Resp: 18 (07/10/23 1414)  BP: 117/66 (07/10/23 0840)  SpO2: 97 % (07/10/23 0840) Vital Signs (24h Range):  Temp:  [96.4 °F (35.8 °C)-98.2 °F (36.8 °C)] 96.4 °F (35.8 °C)  Pulse:  [64-82] 64  Resp:  [17-18] 18  SpO2:  [92 %-97 %] 97 %  BP: (105-117)/(56-66) 117/66                                 Physical Exam         Neurosurgery Physical Exam    Constitutional:       Appearance: She is well-developed and well-nourished.   Eyes:      Extraocular Movements: EOM normal.      Conjunctiva/sclera: Conjunctivae normal.      Pupils: Pupils are equal, round, and reactive to light.   Cardiovascular:      Pulses: Normal pulses.   Abdominal:      Palpations: Abdomen is soft.   Neurological:      Mental Status: She is alert and oriented to person, place, and time.      Comments: AAOx4  PERRL  R eye vision loss, only able to see light and shapes  L eye grossly normal vision  No peripheral vision loss bilaterally  Full strength all extremities  SILT  No drift   Psychiatric:         Mood and Affect: Mood and affect normal.     Significant Labs:  Recent Labs   Lab 07/09/23  0324 07/10/23  0509    103    138   K 4.0 3.8    108   CO2 18* 21*   BUN 12 12   CREATININE 0.9 1.0   CALCIUM 8.8 9.1   MG 2.4 2.2     Recent Labs   Lab 07/09/23  0324 07/10/23  0509   WBC 6.46 5.80   HGB 14.2 14.0   HCT 42.4 42.1    254     No results for input(s): LABPT, INR, APTT in the last 48 hours.  Microbiology Results (last 7 days)       ** No results found for the last 168 hours. **          All pertinent labs from the last 24 hours have been  reviewed.    Significant Diagnostics:  EXAMINATION:  CTA HEAD AND NECK (XPD)    CTA head:     Anterior circulation: There is a patent right ICA stent.  The bilateral intracranial ICAs are patent and unremarkable.  The right A1 segment is hypoplastic.  The bilateral anterior and middle cerebral arteries are otherwise within normal limits, without hemodynamically significant stenosis or occlusion.     Posterior circulation: The left vertebral artery is dominant.  Vertebrobasilar system is otherwise within normal limits without focal abnormality.  Persistent fetal origin of the left PCA.  The bilateral posterior cerebral arteries are otherwise within normal limits, without hemodynamically significant stenosis or occlusion.     There is no intracranial aneurysm.     CTA neck:     There is a 2 vessel aortic arch, a normal variant.     The common and internal carotid arteries are normal in course and caliber. No significant stenosis in either carotid bifurcation.     The vertebral origins are patent. The cervical vertebral arteries are normal in course and caliber.     The soft tissues of the neck are unremarkable.  The visualized lung apices are clear.     Remaining visualized osseous structures are intact.     Impression:     1. Right ICA stent in place.  No vessel occlusion or high-grade stenosis.  2. Mild thickening of the bilateral maxillary sinuses        Electronically signed by: Carter Chavez  Date:                                            07/09/2023  Time:                                           21:34    EXAMINATION:  MRI ORBITS W W/O CONTRAST    FINDINGS:  There is the suggestion of subtle enhancement of the right optic nerve within the orbit and optic canal on axial imaging although this is not confirmed on coronal imaging.     No advanced inflammatory changes within the retro bulbar fat.  No compressive mass.  The cavernous sinuses are symmetric without enlargement of the superior ophthalmic veins.     No  enhancing intracranial lesion is otherwise identified.     Impression:     Suggestion of subtle enhancement of the right optic nerve that can be seen with optic neuritis but is nonspecific in light of the history.        Electronically signed by: Jamin Ching  Date:                                            07/10/2023  Time:                                           12:21      I have reviewed and interpreted all pertinent imaging results/findings within the past 24 hours.    Assessment/Plan:     Monocular vision loss  43 yo F with PMH of R ICA clinoidal aneurysm who recently had flow diverter placed on 7/5/23 with Dr. Burnett who presents back to ED on Friday (2 days after her procedure) with new R eye vision issues. She was discharged POD1 on aspirin and brilinta without issues and woke up the next morning at 9am with R eye vision issues. She could not get her Brilinta from the pharmacy so went to the hospital where she was given her aspirin and brilinta that afternoon. MRI and MRA unremarkable except for artifact around R ICA flow diverter unable to rule out thrombus. Patient seen today with ongoing R eye vision issues, only able to see movement, light/shadows in her R eye.    Imaging reviewed:   - CTA head/neck 7/9: R ICA stent appears patent, negative for apparent occlusion or high grade stenosis, appears to have good flow to the R ophthalmic artery   - MRI orbits w/wo contrast 7/10: subtle enhancement of R optic nerve    Plan/recommendations:  - No evidence of occluded stent or R ophthalmic artery occlusion on CTA  - continue aspirin 81mg and brilinta 90mg bid (PRU therapeutic)  - MRI orbits showing some enhancement of R optic nerve which may be suggestive of optic neuritis  - Neurology following, steroids per recommendations  - No NSGY intervention. Pt may follow up with Dr. Burnett as planned outpatient for further care s/p embolization.  - NSGY will sign off. Please contact us with any further  questions/concerns.    Discussed with attending staff DOMINIQUE Leos-C  Neurosurgery  [facility]

## 2023-07-10 NOTE — SUBJECTIVE & OBJECTIVE
Interval History: NAEON. Pt reports ongoing R eye vision loss and ocular HA that is worse with movement of the eye, unchanged from prior. Denies any other deficits or concerns.     Medications:  Continuous Infusions:  Scheduled Meds:   aspirin  81 mg Oral Daily    busPIRone  15 mg Oral BID    enoxparin  40 mg Subcutaneous Q12H (prophylaxis, 0900/2100)    methylPREDNISolone sodium succinate injection  250 mg Intravenous Q6H    polyethylene glycol  17 g Oral Daily    sertraline  25 mg Oral QHS    ticagrelor  90 mg Oral BID    topiramate  75 mg Oral QHS     PRN Meds:dextrose 10%, dextrose 10%, glucagon (human recombinant), glucose, glucose, HYDROcodone-acetaminophen, HYDROmorphone, naloxone, ondansetron, sodium chloride 0.9%     Review of Systems   HENT:  Negative for facial swelling, hearing loss and trouble swallowing.    Eyes:  Positive for pain and visual disturbance. Negative for discharge.   Gastrointestinal:  Negative for nausea and vomiting.   Genitourinary:  Negative for difficulty urinating and dysuria.   Musculoskeletal:  Negative for neck pain and neck stiffness.   Neurological:  Positive for headaches. Negative for dizziness, seizures, facial asymmetry, speech difficulty, weakness and numbness.   Psychiatric/Behavioral:  Negative for agitation and confusion.    Objective:     Weight: 104.8 kg (231 lb)  Body mass index is 42.25 kg/m².  Vital Signs (Most Recent):  Temp: 96.4 °F (35.8 °C) (07/10/23 0840)  Pulse: 64 (07/10/23 0840)  Resp: 18 (07/10/23 1414)  BP: 117/66 (07/10/23 0840)  SpO2: 97 % (07/10/23 0840) Vital Signs (24h Range):  Temp:  [96.4 °F (35.8 °C)-98.2 °F (36.8 °C)] 96.4 °F (35.8 °C)  Pulse:  [64-82] 64  Resp:  [17-18] 18  SpO2:  [92 %-97 %] 97 %  BP: (105-117)/(56-66) 117/66                                 Physical Exam         Neurosurgery Physical Exam    Constitutional:       Appearance: She is well-developed and well-nourished.   Eyes:      Extraocular Movements: EOM normal.       Conjunctiva/sclera: Conjunctivae normal.      Pupils: Pupils are equal, round, and reactive to light.   Cardiovascular:      Pulses: Normal pulses.   Abdominal:      Palpations: Abdomen is soft.   Neurological:      Mental Status: She is alert and oriented to person, place, and time.      Comments: AAOx4  PERRL  R eye vision loss, only able to see light and shapes  L eye grossly normal vision  No peripheral vision loss bilaterally  Full strength all extremities  SILT  No drift   Psychiatric:         Mood and Affect: Mood and affect normal.     Significant Labs:  Recent Labs   Lab 07/09/23 0324 07/10/23  0509    103    138   K 4.0 3.8    108   CO2 18* 21*   BUN 12 12   CREATININE 0.9 1.0   CALCIUM 8.8 9.1   MG 2.4 2.2     Recent Labs   Lab 07/09/23 0324 07/10/23  0509   WBC 6.46 5.80   HGB 14.2 14.0   HCT 42.4 42.1    254     No results for input(s): LABPT, INR, APTT in the last 48 hours.  Microbiology Results (last 7 days)       ** No results found for the last 168 hours. **          All pertinent labs from the last 24 hours have been reviewed.    Significant Diagnostics:  EXAMINATION:  CTA HEAD AND NECK (XPD)    CTA head:     Anterior circulation: There is a patent right ICA stent.  The bilateral intracranial ICAs are patent and unremarkable.  The right A1 segment is hypoplastic.  The bilateral anterior and middle cerebral arteries are otherwise within normal limits, without hemodynamically significant stenosis or occlusion.     Posterior circulation: The left vertebral artery is dominant.  Vertebrobasilar system is otherwise within normal limits without focal abnormality.  Persistent fetal origin of the left PCA.  The bilateral posterior cerebral arteries are otherwise within normal limits, without hemodynamically significant stenosis or occlusion.     There is no intracranial aneurysm.     CTA neck:     There is a 2 vessel aortic arch, a normal variant.     The common and internal  carotid arteries are normal in course and caliber. No significant stenosis in either carotid bifurcation.     The vertebral origins are patent. The cervical vertebral arteries are normal in course and caliber.     The soft tissues of the neck are unremarkable.  The visualized lung apices are clear.     Remaining visualized osseous structures are intact.     Impression:     1. Right ICA stent in place.  No vessel occlusion or high-grade stenosis.  2. Mild thickening of the bilateral maxillary sinuses        Electronically signed by: Carter Chavez  Date:                                            07/09/2023  Time:                                           21:34    EXAMINATION:  MRI ORBITS W W/O CONTRAST    FINDINGS:  There is the suggestion of subtle enhancement of the right optic nerve within the orbit and optic canal on axial imaging although this is not confirmed on coronal imaging.     No advanced inflammatory changes within the retro bulbar fat.  No compressive mass.  The cavernous sinuses are symmetric without enlargement of the superior ophthalmic veins.     No enhancing intracranial lesion is otherwise identified.     Impression:     Suggestion of subtle enhancement of the right optic nerve that can be seen with optic neuritis but is nonspecific in light of the history.        Electronically signed by: Jamin Ching  Date:                                            07/10/2023  Time:                                           12:21      I have reviewed and interpreted all pertinent imaging results/findings within the past 24 hours.

## 2023-07-10 NOTE — ASSESSMENT & PLAN NOTE
42 y.o. female with Hx of migraines, factor V leiden mutation, prior DVTs, and a family hx of ruptured cerebral aneurysms, right ICA artery aneurysm s/p MJ stent on 07/05/23 and DAPT who presented to [facility] [place] a transfer from [facility] for Monocular vision loss of the R eye    -- s/p MRI non con, mild increased signal within the bilateral optic nerve sheaths, minimal symmetric increased FLAIR signal along the lateral ventricles not seen on the prior exam and potentially artifactual.   -- MRI optics with contrast redemonstrating enhancement of R optic nerve  -- Solumedrol 250mg q6h x3 days   -- Neuro consult, f/u with recs  -- NSGY consult, f/u final recs

## 2023-07-10 NOTE — TELEPHONE ENCOUNTER
----- Message from Mary Corrales sent at 7/10/2023  1:01 PM CDT -----  Type:  Sooner Appointment Request    Name of Caller:  Tyshawn Dsouza from [facility]    Best Call Back Number:  [phone] pt    Additional Information:  Pt is needing a 1 wk hospital f/u appt. She gets discharged 7/11. Please call back to advise. Thanks!

## 2023-07-10 NOTE — SUBJECTIVE & OBJECTIVE
Subjective:     Interval History: No acute or concerning events noted by overnight staff. Vital signs are stable and within normal limits. Patient is conscious and comfortable. Reports headache and persistent visual change.     Current Facility-Administered Medications   Medication Dose Route Frequency Provider Last Rate Last Admin    aspirin EC tablet 81 mg  81 mg Oral Daily Tj Zepeda MD   81 mg at 07/10/23 0848    busPIRone tablet 15 mg  15 mg Oral BID Tj Zepeda MD   15 mg at 07/10/23 0848    dextrose 10% bolus 125 mL 125 mL  12.5 g Intravenous PRN Tj Zepeda MD        dextrose 10% bolus 250 mL 250 mL  25 g Intravenous PRN Tj Zepeda MD        enoxaparin injection 40 mg  40 mg Subcutaneous Q12H (prophylaxis, 0900/2100) Salty Bedolla MD   40 mg at 07/10/23 0848    glucagon (human recombinant) injection 1 mg  1 mg Intramuscular PRN Tj Zepeda MD        glucose chewable tablet 16 g  16 g Oral PRN Tj Zepeda MD        glucose chewable tablet 24 g  24 g Oral PRN Tj Zepeda MD        HYDROcodone-acetaminophen 5-325 mg per tablet 1 tablet  1 tablet Oral Q4H PRN Tj Zepeda MD   1 tablet at 07/10/23 1414    HYDROmorphone injection 1 mg  1 mg Intravenous Q4H PRRUDY Zepeda MD   1 mg at 07/10/23 0844    methylPREDNISolone sodium succinate injection 250 mg  250 mg Intravenous Q6H Tj Zepeda MD        naloxone 0.4 mg/mL injection 0.02 mg  0.02 mg Intravenous PRN Tj Zepeda MD        ondansetron disintegrating tablet 8 mg  8 mg Oral Q8H PRN Tj Zepeda MD   8 mg at 07/10/23 1057    polyethylene glycol packet 17 g  17 g Oral Daily Tia Granado DO   17 g at 07/10/23 0848    sertraline tablet 25 mg  25 mg Oral QHS Tj Zepeda MD   25 mg at 07/09/23 2151    sodium chloride 0.9% flush 10 mL  10 mL Intravenous Q12H PRRUDY Zepeda MD        ticagrelor tablet 90 mg  90 mg Oral BID Tj Zepeda MD   90 mg at 07/10/23 0848    topiramate tablet 75 mg  75 mg Oral QHS  "Tj Zepeda MD   75 mg at 07/09/23 1240       Review of Systems   Constitutional:  Negative for chills, fatigue and fever.   HENT:  Negative for ear pain, facial swelling, hearing loss, trouble swallowing and voice change.    Eyes:  Positive for pain and visual disturbance.        Severe eye pain and pressure in R eye  Only able to see "shadows" from R eye, L eye vision is not decreased   Respiratory:  Negative for cough and chest tightness.    Cardiovascular:  Negative for chest pain.   Gastrointestinal:  Negative for abdominal distention and abdominal pain.   Endocrine: Negative for cold intolerance, polydipsia and polyuria.   Genitourinary:  Negative for difficulty urinating and dysuria.   Musculoskeletal:  Negative for arthralgias, back pain and joint swelling.   Neurological:  Positive for weakness (right upper extremity; resolved) and headaches. Negative for facial asymmetry, speech difficulty and numbness.   Psychiatric/Behavioral:  Negative for agitation, behavioral problems and confusion.    Objective:     Vital Signs (Most Recent):  Temp: 96.4 °F (35.8 °C) (07/10/23 0840)  Pulse: 64 (07/10/23 0840)  Resp: 18 (07/10/23 1414)  BP: 117/66 (07/10/23 0840)  SpO2: 97 % (07/10/23 0840) Vital Signs (24h Range):  Temp:  [96.4 °F (35.8 °C)-98.2 °F (36.8 °C)] 96.4 °F (35.8 °C)  Pulse:  [64-89] 64  Resp:  [17-18] 18  SpO2:  [92 %-97 %] 97 %  BP: (105-134)/(56-76) 117/66     Weight: 104.8 kg (231 lb)  Body mass index is 42.25 kg/m².     Physical Exam  Vitals and nursing note reviewed.   Constitutional:       General: She is not in acute distress.     Appearance: She is not toxic-appearing or diaphoretic.   Cardiovascular:      Rate and Rhythm: Normal rate and regular rhythm.   Pulmonary:      Effort: Pulmonary effort is normal. No respiratory distress.   Neurological:      Mental Status: She is alert and oriented to person, place, and time.      Cranial Nerves: Cranial nerves 2-12 are intact.      Coordination: " Finger-Nose-Finger Test normal.   Psychiatric:         Speech: Speech normal.        NEUROLOGICAL EXAMINATION:     MENTAL STATUS   Oriented to person, place, and time.   Follows 1 step commands.   Attention: normal. Concentration: normal.   Speech: speech is normal   Level of consciousness: alert    CRANIAL NERVES   Cranial nerves II through XII intact.     CN II   Visual acuity: decreased (OD decreased, OS normal)    MOTOR EXAM        4 extremities anti-gravity without drift     SENSORY EXAM   Light touch normal.     GAIT AND COORDINATION      Coordination   Finger to nose coordination: normal    Significant Labs: All pertinent lab results from the past 24 hours have been reviewed.    Significant Imaging:   MRI Orbits W W/O Contrast 07/10/2023    Narrative  EXAMINATION:  MRI ORBITS W W/O CONTRAST    CLINICAL HISTORY:  Optic neuritis suspected;    TECHNIQUE:  Thin slice dedicated multiplanar multisequence MR imaging of the orbits was performed before and after the administration of 10 mL Gadavist intravenous contrast.  Postcontrast MR imaging of the brain was also obtained at this time.    COMPARISON:  7-8-2023    FINDINGS:  There is the suggestion of subtle enhancement of the right optic nerve within the orbit and optic canal on axial imaging although this is not confirmed on coronal imaging.    No advanced inflammatory changes within the retro bulbar fat.  No compressive mass.  The cavernous sinuses are symmetric without enlargement of the superior ophthalmic veins.    No enhancing intracranial lesion is otherwise identified.    Impression  Suggestion of subtle enhancement of the right optic nerve that can be seen with optic neuritis but is nonspecific in light of the history.      Electronically signed by: Jamin Ching  Date:    07/10/2023  Time:    12:21    I have reviewed all pertinent imaging results/findings within the past 24 hours.

## 2023-07-10 NOTE — PROGRESS NOTES
[facility] - [facility] Surg  Neurology  Progress Note    Patient Name: Eugenie Laguerre  MRN: [id number]  Admission Date: 7/7/2023  Hospital Length of Stay: 0 days  Code Status: Full Code   Attending Provider: Salty Bedolla MD  Primary Care Physician: Danny Gerardo MD   Principal Problem:Optic neuritis    HPI:   Neurology consulted for optic neuritis evaluation in Eugenie Laguerre, a 42 y.o. 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 History obtained from patient, spouse, and EMR.     In brief, patient reports new, acute onset painful right eye blurry vision after awakening 07/07/2023 with progression to vision loss characterized as only seeing shadows/movement. As symptoms did not improve, patient [place] for further evaluation. Of note, she missed doses of Ticagrelor following her recent stent procedure. [facility] Neurology evaluated and recommended further imaging as well as Ophthalmology evaluation. Ophthalmology evaluation notable for decreased visual acuity and slightly enlarged episcleral veins with flat macular, slightly hyperemic disc, and two hyperpigmented lesions noted near inferior arcade on fundoscopy. Imaging thus far (NCCTH, MRI Brain WO Contrast, MRA Brain WO Contrast, MRI Orbits WO Contrast) notable for a possible filling defect/absent flow related enhancement in the right terminal ICA (intraluminal thrombus vs artifact), mild increased signal within the bilateral optic nerve sheaths (optic neuritis vs artifact), and minimal symmetric increased FLAIR signal along the medial aspect of the bilateral occipital horns of the lateral ventricles/splenium of the corpus callosum (artifact). Due to mild increased signal in patients bilateral optic nerve  sheaths Neurology was consulted.       Overview/Hospital Course:  No notes on file        Subjective:     Interval History: No acute or concerning events noted by overnight staff. Vital signs are stable and within normal limits. Patient is conscious and comfortable. Reports headache and persistent visual change.     Current Facility-Administered Medications   Medication Dose Route Frequency Provider Last Rate Last Admin    aspirin EC tablet 81 mg  81 mg Oral Daily Tj Zepeda MD   81 mg at 07/10/23 0848    busPIRone tablet 15 mg  15 mg Oral BID Tj Zepeda MD   15 mg at 07/10/23 0848    dextrose 10% bolus 125 mL 125 mL  12.5 g Intravenous PRN Tj Zepeda MD        dextrose 10% bolus 250 mL 250 mL  25 g Intravenous PRN Tj Zepeda MD        enoxaparin injection 40 mg  40 mg Subcutaneous Q12H (prophylaxis, 0900/2100) Salty Bedolla MD   40 mg at 07/10/23 0848    glucagon (human recombinant) injection 1 mg  1 mg Intramuscular PRN Tj Zepeda MD        glucose chewable tablet 16 g  16 g Oral PRN Tj Zepeda MD        glucose chewable tablet 24 g  24 g Oral PRN Tj Zepeda MD        HYDROcodone-acetaminophen 5-325 mg per tablet 1 tablet  1 tablet Oral Q4H PRN Tj Zepeda MD   1 tablet at 07/10/23 1414    HYDROmorphone injection 1 mg  1 mg Intravenous Q4H PRRUDY Zepeda MD   1 mg at 07/10/23 0844    methylPREDNISolone sodium succinate injection 250 mg  250 mg Intravenous Q6H Tj Zepeda MD        naloxone 0.4 mg/mL injection 0.02 mg  0.02 mg Intravenous PRN Tj Zepeda MD        ondansetron disintegrating tablet 8 mg  8 mg Oral Q8H PRN Tj Zepeda MD   8 mg at 07/10/23 1057    polyethylene glycol packet 17 g  17 g Oral Daily Tia Granado DO   17 g at 07/10/23 0848    sertraline tablet 25 mg  25 mg Oral QHS Tj Zpeeda MD   25 mg at 07/09/23 2151    sodium chloride 0.9% flush 10 mL  10 mL Intravenous Q12H PRRUDY Zepeda MD        ticagrelor tablet 90 mg  90 mg Oral BID  "Tj Zepeda MD   90 mg at 07/10/23 0848    topiramate tablet 75 mg  75 mg Oral QHS Tj Zepeda MD   75 mg at 07/09/23 2152       Review of Systems   Constitutional:  Negative for chills, fatigue and fever.   HENT:  Negative for ear pain, facial swelling, hearing loss, trouble swallowing and voice change.    Eyes:  Positive for pain and visual disturbance.        Severe eye pain and pressure in R eye  Only able to see "shadows" from R eye, L eye vision is not decreased   Respiratory:  Negative for cough and chest tightness.    Cardiovascular:  Negative for chest pain.   Gastrointestinal:  Negative for abdominal distention and abdominal pain.   Endocrine: Negative for cold intolerance, polydipsia and polyuria.   Genitourinary:  Negative for difficulty urinating and dysuria.   Musculoskeletal:  Negative for arthralgias, back pain and joint swelling.   Neurological:  Positive for weakness (right upper extremity; resolved) and headaches. Negative for facial asymmetry, speech difficulty and numbness.   Psychiatric/Behavioral:  Negative for agitation, behavioral problems and confusion.    Objective:     Vital Signs (Most Recent):  Temp: 96.4 °F (35.8 °C) (07/10/23 0840)  Pulse: 64 (07/10/23 0840)  Resp: 18 (07/10/23 1414)  BP: 117/66 (07/10/23 0840)  SpO2: 97 % (07/10/23 0840) Vital Signs (24h Range):  Temp:  [96.4 °F (35.8 °C)-98.2 °F (36.8 °C)] 96.4 °F (35.8 °C)  Pulse:  [64-89] 64  Resp:  [17-18] 18  SpO2:  [92 %-97 %] 97 %  BP: (105-134)/(56-76) 117/66     Weight: 104.8 kg (231 lb)  Body mass index is 42.25 kg/m².     Physical Exam  Vitals and nursing note reviewed.   Constitutional:       General: She is not in acute distress.     Appearance: She is not toxic-appearing or diaphoretic.   Cardiovascular:      Rate and Rhythm: Normal rate and regular rhythm.   Pulmonary:      Effort: Pulmonary effort is normal. No respiratory distress.   Neurological:      Mental Status: She is alert and oriented to person, place, " and time.      Cranial Nerves: Cranial nerves 2-12 are intact.      Coordination: Finger-Nose-Finger Test normal.   Psychiatric:         Speech: Speech normal.        NEUROLOGICAL EXAMINATION:     MENTAL STATUS   Oriented to person, place, and time.   Follows 1 step commands.   Attention: normal. Concentration: normal.   Speech: speech is normal   Level of consciousness: alert    CRANIAL NERVES   Cranial nerves II through XII intact.     CN II   Visual acuity: decreased (OD decreased, OS normal)    MOTOR EXAM        4 extremities anti-gravity without drift     SENSORY EXAM   Light touch normal.     GAIT AND COORDINATION      Coordination   Finger to nose coordination: normal    Significant Labs: All pertinent lab results from the past 24 hours have been reviewed.    Significant Imaging:   MRI Orbits W W/O Contrast 07/10/2023    Narrative  EXAMINATION:  MRI ORBITS W W/O CONTRAST    CLINICAL HISTORY:  Optic neuritis suspected;    TECHNIQUE:  Thin slice dedicated multiplanar multisequence MR imaging of the orbits was performed before and after the administration of 10 mL Gadavist intravenous contrast.  Postcontrast MR imaging of the brain was also obtained at this time.    COMPARISON:  7-8-2023    FINDINGS:  There is the suggestion of subtle enhancement of the right optic nerve within the orbit and optic canal on axial imaging although this is not confirmed on coronal imaging.    No advanced inflammatory changes within the retro bulbar fat.  No compressive mass.  The cavernous sinuses are symmetric without enlargement of the superior ophthalmic veins.    No enhancing intracranial lesion is otherwise identified.    Impression  Suggestion of subtle enhancement of the right optic nerve that can be seen with optic neuritis but is nonspecific in light of the history.      Electronically signed by: Jamin Ching  Date:    07/10/2023  Time:    12:21    I have reviewed all pertinent imaging results/findings within the past 24  hours.    Assessment and Plan:     Abnormal brain MRI  Clinical presentation and imaging incongruent (unilateral change with bilateral changes on imaging). Likely artifactual on review, however, repeat imaging with subtle optic nerve enhancement. As the patient is still with debilitating symptoms, recommend pulse dose steroid course with caveat that if visual symptoms worsen, to discontinue immediately and STAT page Ophthalmology as steroids pose the adverse risk of glaucoma.     Recommendations  Diagnostic:  - N/A  Therapeutic  - methylprednisolone x 3 days   - discontinue if symptoms worsen          VTE Risk Mitigation (From admission, onward)           Ordered     enoxaparin injection 40 mg  Every 12 hours         07/08/23 0834     IP VTE HIGH RISK PATIENT  Once         07/08/23 0822     Place sequential compression device  Until discontinued         07/08/23 0822                    Rayshawn Moraes MD  Neurology  [facility] Surg

## 2023-07-10 NOTE — ASSESSMENT & PLAN NOTE
43 yo F with PMH of R ICA clinoidal aneurysm who recently had flow diverter placed on 7/5/23 with Dr. Burnett who presents back to ED on Friday (2 days after her procedure) with new R eye vision issues. She was discharged POD1 on aspirin and brilinta without issues and woke up the next morning at 9am with R eye vision issues. She could not get her Brilinta from the pharmacy so went to the hospital where she was given her aspirin and brilinta that afternoon. MRI and MRA unremarkable except for artifact around R ICA flow diverter unable to rule out thrombus. Patient seen today with ongoing R eye vision issues, only able to see movement, light/shadows in her R eye.    Imaging reviewed:   - CTA head/neck 7/9: R ICA stent appears patent, negative for apparent occlusion or high grade stenosis, appears to have good flow to the R ophthalmic artery   - MRI orbits w/wo contrast 7/10: subtle enhancement of R optic nerve    Plan/recommendations:  - No evidence of occluded stent or R ophthalmic artery occlusion on CTA  - continue aspirin 81mg and brilinta 90mg bid (PRU therapeutic)  - MRI orbits showing some enhancement of R optic nerve which may be suggestive of optic neuritis  - Neurology following, steroids per recommendations  - No NSGY intervention. Pt may follow up with Dr. Burnett as planned outpatient for further care s/p embolization.  - NSGY will sign off. Please contact us with any further questions/concerns.    Discussed with attending staff Dr. Armstrong

## 2023-07-10 NOTE — ASSESSMENT & PLAN NOTE
-- S/p MJ stent 7/5/23  -- continue ASA, brillinta   -- CTA head without evidence of new thrombus but will f/u NSGY recs

## 2023-07-10 NOTE — SUBJECTIVE & OBJECTIVE
Interval History: NAEON, VSS. Pending MRI this morning upon evaluation. Patient in good spirits though reports continued headache.     Review of Systems   Constitutional:  Negative for activity change, appetite change, chills, fatigue and fever.   Eyes:  Positive for photophobia, pain and visual disturbance. Negative for discharge and redness.   Neurological:  Positive for headaches.   Objective:     Vital Signs (Most Recent):  Temp: 96.4 °F (35.8 °C) (07/10/23 0840)  Pulse: 64 (07/10/23 0840)  Resp: 18 (07/10/23 1414)  BP: 117/66 (07/10/23 0840)  SpO2: 97 % (07/10/23 0840) Vital Signs (24h Range):  Temp:  [96.4 °F (35.8 °C)-98.2 °F (36.8 °C)] 96.4 °F (35.8 °C)  Pulse:  [64-89] 64  Resp:  [17-18] 18  SpO2:  [92 %-97 %] 97 %  BP: (105-134)/(56-76) 117/66     Weight: 104.8 kg (231 lb)  Body mass index is 42.25 kg/m².  No intake or output data in the 24 hours ending 07/10/23 1505      Physical Exam  Constitutional:       Appearance: Normal appearance.   HENT:      Head: Normocephalic and atraumatic.      Nose: Nose normal.      Mouth/Throat:      Mouth: Mucous membranes are moist.   Eyes:      General:         Right eye: No discharge.         Left eye: No discharge.      Extraocular Movements: Extraocular movements intact.      Pupils: Pupils are equal, round, and reactive to light.   Neurological:      Mental Status: She is alert.      Comments: Vision loss R eye           Significant Labs: All pertinent labs within the past 24 hours have been reviewed.    Significant Imaging: I have reviewed all pertinent imaging results/findings within the past 24 hours.    MRI Orbits W W/O Contrast   Final Result      Suggestion of subtle enhancement of the right optic nerve that can be seen with optic neuritis but is nonspecific in light of the history.         Electronically signed by: Jamin Ching   Date:    07/10/2023   Time:    12:21      CTA Head and Neck (xpd)   Final Result      1. Right ICA stent in place.  No vessel  occlusion or high-grade stenosis.   2. Mild thickening of the bilateral maxillary sinuses         Electronically signed by: Carter Chavez   Date:    07/09/2023   Time:    21:34      MRI Brain Without Contrast   Final Result      No acute intracranial process.  Please note the patient could not tolerate completing the study and as a result postcontrast imaging of the brain and orbits was not performed.  Only limited noncontrast MR imaging of the brain was obtained at this time.         Electronically signed by: Jamin Ching   Date:    07/08/2023   Time:    15:54      MRI Orbits Without Contrast   Final Result      Mild increased signal within the bilateral optic nerve sheaths.  Underlying optic neuritis not excluded on limited noncontrast sequences, as the exam was prematurely ended at the patient's request.  Correlation with clinical findings will be needed.      Minimal symmetric increased FLAIR signal along the medial aspect of the bilateral occipital horns of the lateral ventricles/splenium of the corpus callosum, not seen on the prior exam and potentially artifactual.      No abnormal restricted diffusion.      Scattered foci of susceptibility better evaluated on recent outside MRI secondary to differences in technique.      Electronically signed by resident: Amelie Barlow   Date:    07/08/2023   Time:    06:01      Electronically signed by: Magdaleno Trent MD   Date:    07/08/2023   Time:    06:59

## 2023-07-10 NOTE — PROGRESS NOTES
"[facility] Medicine  Progress Note    Patient Name: Eugenie Laguerre  MRN: [id number]  Patient Class: OP- Observation   Admission Date: 7/7/2023  Length of Stay: 0 days  Attending Physician: Salty Bedolla MD  Primary Care Provider: Danny Gerardo MD        Subjective:     Principal Problem:Optic neuritis        HPI:  Eugenie Laguerre is a 42 y.o. 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 She presented to ED as a transfer from [facility] for vision loss in the R eye. Patient reports acute vision loss in the R eye this morning, stating that upon waking up, she could only see motion and shadows with her right eye. In addition, she reports eye pain around the orbit that, during here hospital course, has progressed to severe pain of the globe itself. Patient' reports constant pressure in her eye "like its about to explode" and that this pain was first noticed right after waking up from her MJ procedure. She reports that she has never experienced pain or visual symptoms like this before.      Overview/Hospital Course:  Presented to ED as a transfer from [facility] for acute vision loss of the R eye, optho consulted and at bedside, MRI head evaluated by consulting physicians. Concern for optic neuritis vs ophthalmic artery occlusion. 7/9 - neurology and NSGY consulted today, CTA head with contrast ordered, without any new occlusion of ophthalmic artery. 7/10 - MRI orbits w contrast demonstrating continued subtle enhancement of R optic nerve. Patient started on pulse-dose steroids per Neurology and will assess response.       Interval History: NAEON, VSS. Pending MRI this morning upon evaluation. Patient in good spirits though reports continued headache.     Review of Systems "   Constitutional:  Negative for activity change, appetite change, chills, fatigue and fever.   Eyes:  Positive for photophobia, pain and visual disturbance. Negative for discharge and redness.   Neurological:  Positive for headaches.   Objective:     Vital Signs (Most Recent):  Temp: 96.4 °F (35.8 °C) (07/10/23 0840)  Pulse: 64 (07/10/23 0840)  Resp: 18 (07/10/23 1414)  BP: 117/66 (07/10/23 0840)  SpO2: 97 % (07/10/23 0840) Vital Signs (24h Range):  Temp:  [96.4 °F (35.8 °C)-98.2 °F (36.8 °C)] 96.4 °F (35.8 °C)  Pulse:  [64-89] 64  Resp:  [17-18] 18  SpO2:  [92 %-97 %] 97 %  BP: (105-134)/(56-76) 117/66     Weight: 104.8 kg (231 lb)  Body mass index is 42.25 kg/m².  No intake or output data in the 24 hours ending 07/10/23 1505      Physical Exam  Constitutional:       Appearance: Normal appearance.   HENT:      Head: Normocephalic and atraumatic.      Nose: Nose normal.      Mouth/Throat:      Mouth: Mucous membranes are moist.   Eyes:      General:         Right eye: No discharge.         Left eye: No discharge.      Extraocular Movements: Extraocular movements intact.      Pupils: Pupils are equal, round, and reactive to light.   Neurological:      Mental Status: She is alert.      Comments: Vision loss R eye           Significant Labs: All pertinent labs within the past 24 hours have been reviewed.    Significant Imaging: I have reviewed all pertinent imaging results/findings within the past 24 hours.    MRI Orbits W W/O Contrast   Final Result      Suggestion of subtle enhancement of the right optic nerve that can be seen with optic neuritis but is nonspecific in light of the history.         Electronically signed by: Jamin Ching   Date:    07/10/2023   Time:    12:21      CTA Head and Neck (xpd)   Final Result      1. Right ICA stent in place.  No vessel occlusion or high-grade stenosis.   2. Mild thickening of the bilateral maxillary sinuses         Electronically signed by: Carter Chavez    Date:    07/09/2023   Time:    21:34      MRI Brain Without Contrast   Final Result      No acute intracranial process.  Please note the patient could not tolerate completing the study and as a result postcontrast imaging of the brain and orbits was not performed.  Only limited noncontrast MR imaging of the brain was obtained at this time.         Electronically signed by: Jamin Ching   Date:    07/08/2023   Time:    15:54      MRI Orbits Without Contrast   Final Result      Mild increased signal within the bilateral optic nerve sheaths.  Underlying optic neuritis not excluded on limited noncontrast sequences, as the exam was prematurely ended at the patient's request.  Correlation with clinical findings will be needed.      Minimal symmetric increased FLAIR signal along the medial aspect of the bilateral occipital horns of the lateral ventricles/splenium of the corpus callosum, not seen on the prior exam and potentially artifactual.      No abnormal restricted diffusion.      Scattered foci of susceptibility better evaluated on recent outside MRI secondary to differences in technique.      Electronically signed by resident: Amelie Barlow   Date:    07/08/2023   Time:    06:01      Electronically signed by: Magdaleno Trent MD   Date:    07/08/2023   Time:    06:59              Assessment/Plan:      * Optic neuritis  42 y.o. female with Hx of migraines, factor V leiden mutation, prior DVTs, and a family hx of ruptured cerebral aneurysms, right ICA artery aneurysm s/p MJ stent on 07/05/23 and DAPT who presented to [facility] as a transfer from [facility] for Monocular vision loss of the R eye    -- s/p MRI non con, mild increased signal within the bilateral optic nerve sheaths, minimal symmetric increased FLAIR signal along the lateral ventricles not seen on the prior exam and potentially artifactual.   -- MRI optics with contrast redemonstrating enhancement of R optic nerve  -- Solumedrol 250mg q6h x3 days    -- Neuro consult, f/u with recs  -- NSGY consult, f/u final recs     Abnormal brain MRI  -- enhancement of R optic nerve       Monocular vision loss  See optic neuritis      Cerebral aneurysm, nonruptured  -- S/p MJ stent 7/5/23  -- continue ASA, brillinta   -- CTA head without evidence of new thrombus but will f/u NSGY recs       Anxiety  -- buspirone 15 mg  -- sertraline 25 mg    Tobacco abuse        Severe obesity (BMI 35.0-39.9) with comorbidity  Body mass index is 42.25 kg/m². Morbid obesity complicates all aspects of disease management from diagnostic modalities to treatment. Weight loss encouraged and health benefits explained to patient.         Intractable migraine with aura without status migrainosus  -- hold triptans  -- Mag 2g  -- Norco q4 PRN      VTE Risk Mitigation (From admission, onward)         Ordered     enoxaparin injection 40 mg  Every 12 hours         07/08/23 0834     IP VTE HIGH RISK PATIENT  Once         07/08/23 0822     Place sequential compression device  Until discontinued         07/08/23 0822                Discharge Planning   KELLEY: 7/11/2023     Code Status: Full Code   Is the patient medically ready for discharge?: No    Reason for patient still in hospital (select all that apply): Treatment  Discharge Plan A: Home, Home with family   Discharge Delays: None known at this time              Tj Zepeda MD  Department of Hospital Medicine   [facility]

## 2023-07-10 NOTE — ASSESSMENT & PLAN NOTE
Clinical presentation and imaging incongruent (unilateral change with bilateral changes on imaging). Likely artifactual on review, however, repeat imaging with subtle optic nerve enhancement. As the patient is still with debilitating symptoms, recommend pulse dose steroid course with caveat that if visual symptoms worsen, to discontinue immediately and STAT page Ophthalmology as steroids pose the adverse risk of glaucoma.     Recommendations  Diagnostic:  - N/A  Therapeutic  - methylprednisolone x 3 days   - discontinue if symptoms worsen

## 2023-07-11 LAB
ALBUMIN SERPL BCP-MCNC: 3.8 G/DL (ref 3.5–5.2)
ALP SERPL-CCNC: 82 U/L (ref 55–135)
ALT SERPL W/O P-5'-P-CCNC: 52 U/L (ref 10–44)
ANION GAP SERPL CALC-SCNC: 9 MMOL/L (ref 8–16)
AST SERPL-CCNC: 32 U/L (ref 10–40)
BASOPHILS # BLD AUTO: 0.01 K/UL (ref 0–0.2)
BASOPHILS NFR BLD: 0.1 % (ref 0–1.9)
BILIRUB SERPL-MCNC: 0.3 MG/DL (ref 0.1–1)
BUN SERPL-MCNC: 12 MG/DL (ref 6–20)
CALCIUM SERPL-MCNC: 9.6 MG/DL (ref 8.7–10.5)
CHLORIDE SERPL-SCNC: 107 MMOL/L (ref 95–110)
CO2 SERPL-SCNC: 21 MMOL/L (ref 23–29)
CREAT SERPL-MCNC: 1.1 MG/DL (ref 0.5–1.4)
DIFFERENTIAL METHOD: ABNORMAL
EOSINOPHIL # BLD AUTO: 0 K/UL (ref 0–0.5)
EOSINOPHIL NFR BLD: 0 % (ref 0–8)
ERYTHROCYTE [DISTWIDTH] IN BLOOD BY AUTOMATED COUNT: 12.6 % (ref 11.5–14.5)
EST. GFR  (NO RACE VARIABLE): >60 ML/MIN/1.73 M^2
ESTIMATED AVG GLUCOSE: 103 MG/DL (ref 68–131)
GLUCOSE SERPL-MCNC: 178 MG/DL (ref 70–110)
HBA1C MFR BLD: 5.2 % (ref 4–5.6)
HCT VFR BLD AUTO: 42.9 % (ref 37–48.5)
HGB BLD-MCNC: 14.4 G/DL (ref 12–16)
IMM GRANULOCYTES # BLD AUTO: 0.02 K/UL (ref 0–0.04)
IMM GRANULOCYTES NFR BLD AUTO: 0.3 % (ref 0–0.5)
LYMPHOCYTES # BLD AUTO: 0.5 K/UL (ref 1–4.8)
LYMPHOCYTES NFR BLD: 7.6 % (ref 18–48)
MAGNESIUM SERPL-MCNC: 2.1 MG/DL (ref 1.6–2.6)
MCH RBC QN AUTO: 29.5 PG (ref 27–31)
MCHC RBC AUTO-ENTMCNC: 33.6 G/DL (ref 32–36)
MCV RBC AUTO: 88 FL (ref 82–98)
MONOCYTES # BLD AUTO: 0 K/UL (ref 0.3–1)
MONOCYTES NFR BLD: 0.3 % (ref 4–15)
NEUTROPHILS # BLD AUTO: 6.3 K/UL (ref 1.8–7.7)
NEUTROPHILS NFR BLD: 91.7 % (ref 38–73)
NRBC BLD-RTO: 0 /100 WBC
PLATELET # BLD AUTO: 283 K/UL (ref 150–450)
PMV BLD AUTO: 8.9 FL (ref 9.2–12.9)
POTASSIUM SERPL-SCNC: 4.1 MMOL/L (ref 3.5–5.1)
PROT SERPL-MCNC: 7.7 G/DL (ref 6–8.4)
RBC # BLD AUTO: 4.88 M/UL (ref 4–5.4)
SODIUM SERPL-SCNC: 137 MMOL/L (ref 136–145)
WBC # BLD AUTO: 6.82 K/UL (ref 3.9–12.7)

## 2023-07-11 PROCEDURE — 99232 SBSQ HOSP IP/OBS MODERATE 35: CPT | Mod: ,,, | Performed by: STUDENT IN AN ORGANIZED HEALTH CARE EDUCATION/TRAINING PROGRAM

## 2023-07-11 PROCEDURE — 25000003 PHARM REV CODE 250

## 2023-07-11 PROCEDURE — 80053 COMPREHEN METABOLIC PANEL: CPT

## 2023-07-11 PROCEDURE — 85025 COMPLETE CBC W/AUTO DIFF WBC: CPT

## 2023-07-11 PROCEDURE — 83735 ASSAY OF MAGNESIUM: CPT

## 2023-07-11 PROCEDURE — 63600175 PHARM REV CODE 636 W HCPCS: Performed by: STUDENT IN AN ORGANIZED HEALTH CARE EDUCATION/TRAINING PROGRAM

## 2023-07-11 PROCEDURE — 11000001 HC ACUTE MED/SURG PRIVATE ROOM

## 2023-07-11 PROCEDURE — 25000003 PHARM REV CODE 250: Performed by: STUDENT IN AN ORGANIZED HEALTH CARE EDUCATION/TRAINING PROGRAM

## 2023-07-11 PROCEDURE — 83036 HEMOGLOBIN GLYCOSYLATED A1C: CPT

## 2023-07-11 PROCEDURE — 36415 COLL VENOUS BLD VENIPUNCTURE: CPT

## 2023-07-11 PROCEDURE — 99232 PR SUBSEQUENT HOSPITAL CARE,LEVL II: ICD-10-PCS | Mod: ,,, | Performed by: STUDENT IN AN ORGANIZED HEALTH CARE EDUCATION/TRAINING PROGRAM

## 2023-07-11 PROCEDURE — 63600175 PHARM REV CODE 636 W HCPCS

## 2023-07-11 RX ORDER — DIPHENHYDRAMINE HCL 25 MG
25 CAPSULE ORAL EVERY 6 HOURS PRN
Status: DISCONTINUED | OUTPATIENT
Start: 2023-07-11 | End: 2023-07-14 | Stop reason: HOSPADM

## 2023-07-11 RX ORDER — DIVALPROEX SODIUM 250 MG/1
1000 TABLET, DELAYED RELEASE ORAL DAILY
Status: DISCONTINUED | OUTPATIENT
Start: 2023-07-12 | End: 2023-07-14 | Stop reason: HOSPADM

## 2023-07-11 RX ORDER — FAMOTIDINE 20 MG/1
20 TABLET, FILM COATED ORAL 2 TIMES DAILY
Status: DISCONTINUED | OUTPATIENT
Start: 2023-07-11 | End: 2023-07-14 | Stop reason: HOSPADM

## 2023-07-11 RX ORDER — MECLIZINE HCL 12.5 MG 12.5 MG/1
25 TABLET ORAL 3 TIMES DAILY PRN
Status: DISCONTINUED | OUTPATIENT
Start: 2023-07-11 | End: 2023-07-14 | Stop reason: HOSPADM

## 2023-07-11 RX ORDER — MAGNESIUM SULFATE HEPTAHYDRATE 40 MG/ML
2 INJECTION, SOLUTION INTRAVENOUS DAILY
Status: DISCONTINUED | OUTPATIENT
Start: 2023-07-12 | End: 2023-07-14 | Stop reason: HOSPADM

## 2023-07-11 RX ADMIN — HYDROMORPHONE HYDROCHLORIDE 1 MG: 1 INJECTION, SOLUTION INTRAMUSCULAR; INTRAVENOUS; SUBCUTANEOUS at 06:07

## 2023-07-11 RX ADMIN — TICAGRELOR 90 MG: 90 TABLET ORAL at 09:07

## 2023-07-11 RX ADMIN — ENOXAPARIN SODIUM 40 MG: 40 INJECTION SUBCUTANEOUS at 09:07

## 2023-07-11 RX ADMIN — SERTRALINE HYDROCHLORIDE 25 MG: 25 TABLET ORAL at 09:07

## 2023-07-11 RX ADMIN — POLYETHYLENE GLYCOL 3350 17 G: 17 POWDER, FOR SOLUTION ORAL at 09:07

## 2023-07-11 RX ADMIN — BUSPIRONE HYDROCHLORIDE 15 MG: 10 TABLET ORAL at 09:07

## 2023-07-11 RX ADMIN — ONDANSETRON 8 MG: 8 TABLET, ORALLY DISINTEGRATING ORAL at 06:07

## 2023-07-11 RX ADMIN — METHYLPREDNISOLONE SODIUM SUCCINATE 250 MG: 125 INJECTION, POWDER, FOR SOLUTION INTRAMUSCULAR; INTRAVENOUS at 06:07

## 2023-07-11 RX ADMIN — ONDANSETRON 8 MG: 8 TABLET, ORALLY DISINTEGRATING ORAL at 12:07

## 2023-07-11 RX ADMIN — FAMOTIDINE 20 MG: 20 TABLET, FILM COATED ORAL at 01:07

## 2023-07-11 RX ADMIN — HYDROMORPHONE HYDROCHLORIDE 1 MG: 1 INJECTION, SOLUTION INTRAMUSCULAR; INTRAVENOUS; SUBCUTANEOUS at 09:07

## 2023-07-11 RX ADMIN — DIPHENHYDRAMINE HYDROCHLORIDE 25 MG: 25 CAPSULE ORAL at 10:07

## 2023-07-11 RX ADMIN — TOPIRAMATE 75 MG: 25 TABLET, FILM COATED ORAL at 09:07

## 2023-07-11 RX ADMIN — ASPIRIN 81 MG: 81 TABLET, COATED ORAL at 09:07

## 2023-07-11 RX ADMIN — HYDROCODONE BITARTRATE AND ACETAMINOPHEN 1 TABLET: 5; 325 TABLET ORAL at 04:07

## 2023-07-11 RX ADMIN — HYDROCODONE BITARTRATE AND ACETAMINOPHEN 1 TABLET: 5; 325 TABLET ORAL at 01:07

## 2023-07-11 RX ADMIN — HYDROCODONE BITARTRATE AND ACETAMINOPHEN 1 TABLET: 5; 325 TABLET ORAL at 10:07

## 2023-07-11 RX ADMIN — METHYLPREDNISOLONE SODIUM SUCCINATE 250 MG: 125 INJECTION, POWDER, FOR SOLUTION INTRAMUSCULAR; INTRAVENOUS at 04:07

## 2023-07-11 RX ADMIN — FAMOTIDINE 20 MG: 20 TABLET, FILM COATED ORAL at 09:07

## 2023-07-11 NOTE — PROGRESS NOTES
"[facility] Medicine  Progress Note    Patient Name: Eugenie Laguerre  MRN: [id number]  Patient Class: IP- Inpatient   Admission Date: 7/7/2023  Length of Stay: 1 days  Attending Physician: Salty Bedolla MD  Primary Care Provider: Danny Gerardo MD        Subjective:     Principal Problem:Optic neuritis        HPI:  Eugenie Laguerre is a 42 y.o. 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 She presented to ED as a transfer from [facility] for vision loss in the R eye. Patient reports acute vision loss in the R eye this morning, stating that upon waking up, she could only see motion and shadows with her right eye. In addition, she reports eye pain around the orbit that, during here hospital course, has progressed to severe pain of the globe itself. Patient' reports constant pressure in her eye "like its about to explode" and that this pain was first noticed right after waking up from her MJ procedure. She reports that she has never experienced pain or visual symptoms like this before.      Overview/Hospital Course:  Presented to ED as a transfer from [facility] for acute vision loss of the R eye, optho consulted and at bedside, MRI head evaluated by consulting physicians. Concern for optic neuritis vs ophthalmic artery occlusion. 7/9 - neurology and NSGY consulted today, CTA head with contrast ordered, without any new occlusion of ophthalmic artery. 7/10 - MRI orbits w contrast demonstrating continued subtle enhancement of R optic nerve. Patient started on pulse-dose steroids per Neurology and will assess response.       Interval History: NAEON, VSS no changes in vision of the R eye, pt can only see shapes and shadows. Ambulation and eating without issues, pt is using the bathroom without " issue. Resting comfortably in bed this morning      Review of Systems   HENT:  Negative for hearing loss.    Eyes:  Positive for pain. Negative for discharge.   Cardiovascular:  Negative for chest pain and leg swelling.   Neurological:  Positive for headaches.   Objective:     Vital Signs (Most Recent):  Temp: 98.7 °F (37.1 °C) (07/11/23 1123)  Pulse: 79 (07/11/23 1123)  Resp: 18 (07/11/23 1353)  BP: 127/66 (07/11/23 1123)  SpO2: (!) 93 % (07/11/23 1123) Vital Signs (24h Range):  Temp:  [97 °F (36.1 °C)-98.7 °F (37.1 °C)] 98.7 °F (37.1 °C)  Pulse:  [] 79  Resp:  [16-18] 18  SpO2:  [92 %-93 %] 93 %  BP: (110-133)/(58-73) 127/66     Weight: 104.8 kg (231 lb)  Body mass index is 42.25 kg/m².    Intake/Output Summary (Last 24 hours) at 7/11/2023 1500  Last data filed at 7/11/2023 0633  Gross per 24 hour   Intake 400 ml   Output --   Net 400 ml         Physical Exam  Constitutional:       Appearance: Normal appearance.   HENT:      Head: Normocephalic and atraumatic.   Eyes:      Extraocular Movements: Extraocular movements intact.      Pupils: Pupils are equal, round, and reactive to light.      Comments: R eye movement very painful this morning, has not changed from admission   Neurological:      Mental Status: She is alert.      Cranial Nerves: Cranial nerve deficit present.      Comments: E eye loss of vision and pain, only able to see shadows and shapes   Psychiatric:         Mood and Affect: Mood normal.         Behavior: Behavior normal.             Significant Labs: All pertinent labs within the past 24 hours have been reviewed.    Significant Imaging: I have reviewed all pertinent imaging results/findings within the past 24 hours.      Assessment/Plan:      * Optic neuritis  42 y.o. female with Hx of migraines, factor V leiden mutation, prior DVTs, and a family hx of ruptured cerebral aneurysms, right ICA artery aneurysm s/p MJ stent on 07/05/23 and DAPT who presented to [facility] as a transfer from   [facility] for Monocular vision loss of the R eye    -- s/p MRI non con, mild increased signal within the bilateral optic nerve sheaths, minimal symmetric increased FLAIR signal along the lateral ventricles not seen on the prior exam and potentially artifactual.   -- MRI optics with contrast redemonstrating enhancement of R optic nerve  -- Solumedrol 250mg q6h x3 days , today (7/11) day 2/3    --discontinue if symptoms worsen  -- Neuro consult, add depakote 1 g daily and 2 g mag IV daily with steroid course for headaches  -- NSGY signed off, no evidence of arterial occlusion                  Abnormal brain MRI  -- enhancement of R optic nerve       Monocular vision loss  See optic neuritis      Cerebral aneurysm, nonruptured  -- S/p MJ stent 7/5/23  -- continue ASA, brillinta   -- CTA head without evidence of new thrombus but will f/u NSGY recs       Anxiety  -- buspirone 15 mg  -- sertraline 25 mg    Tobacco abuse        Severe obesity (BMI 35.0-39.9) with comorbidity  Body mass index is 42.25 kg/m². Morbid obesity complicates all aspects of disease management from diagnostic modalities to treatment. Weight loss encouraged and health benefits explained to patient.         Intractable migraine with aura without status migrainosus  -- hold triptans  -- Mag 2g  -- Norco q4 PRN      VTE Risk Mitigation (From admission, onward)         Ordered     enoxaparin injection 40 mg  Every 12 hours         07/08/23 0834     IP VTE HIGH RISK PATIENT  Once         07/08/23 0822     Place sequential compression device  Until discontinued         07/08/23 0822                Discharge Planning   KELLEY: 7/13/2023     Code Status: Full Code   Is the patient medically ready for discharge?: No    Reason for patient still in hospital (select all that apply): Treatment  Discharge Plan A: Home with family   Discharge Delays: None known at this time    Biju Snowden MD  Department of Hospital Medicine   [facility] Surg

## 2023-07-11 NOTE — ASSESSMENT & PLAN NOTE
Clinical presentation and imaging incongruent (unilateral change with bilateral changes on imaging). Likely artifactual on review, however, repeat imaging with subtle optic nerve enhancement. As the patient is still with debilitating symptoms, recommend pulse dose steroid course with caveat that if visual symptoms worsen, to discontinue immediately and STAT page Ophthalmology as steroids pose the adverse risk of glaucoma.     Recommendations  Diagnostic:  - N/A  Therapeutic  - methylprednisolone x 3 days; continue course   - discontinue if symptoms worsen   - add depakon 1 g daily and 2 g mag IV daily with steroid course for headaches

## 2023-07-11 NOTE — ASSESSMENT & PLAN NOTE
42 y.o. female with Hx of migraines, factor V leiden mutation, prior DVTs, and a family hx of ruptured cerebral aneurysms, right ICA artery aneurysm s/p MJ stent on 07/05/23 and DAPT who presented to [facility] as a transfer from [facility] for Monocular vision loss of the R eye    -- s/p MRI non con, mild increased signal within the bilateral optic nerve sheaths, minimal symmetric increased FLAIR signal along the lateral ventricles not seen on the prior exam and potentially artifactual.   -- MRI optics with contrast redemonstrating enhancement of R optic nerve  -- Solumedrol 250mg q6h x3 days , today (7/11) day 2/3    --discontinue if symptoms worsen  -- Neuro consult, add depakote 1 g daily and 2 g mag IV daily with steroid course for headaches  -- NSGY signed off, no evidence of arterial occlusion

## 2023-07-11 NOTE — PLAN OF CARE
07/11/23 1057   Post-Acute Status   Post-Acute Authorization Other   Other Status No Post-Acute Service Needs   Hospital Resources/Appts/Education Provided Appointments scheduled and added to AVS   Discharge Delays None known at this time   Discharge Plan   Discharge Plan A Home with family   Discharge Plan B Home

## 2023-07-11 NOTE — PLAN OF CARE
[facility] S[facility]  Discharge Reassessment    Primary Care Provider: Danny Gerardo MD    Expected Discharge Date: 7/13/2023    Reassessment (most recent)       Discharge Reassessment - 07/11/23 1058          Discharge Reassessment    Assessment Type Discharge Planning Reassessment     Did the patient's condition or plan change since previous assessment? No     Discharge Plan discussed with: Patient     Communicated KELLEY with patient/caregiver Yes     Discharge Plan A Home with family     Discharge Plan B Home     DME Needed Upon Discharge  none     Transition of Care Barriers None     Why the patient remains in the hospital Requires continued medical care        Post-Acute Status    Post-Acute Authorization Other     Other Status No Post-Acute Service Needs     Hospital Resources/Appts/Education Provided Appointments scheduled and added to AVS     Discharge Delays None known at this time

## 2023-07-11 NOTE — SUBJECTIVE & OBJECTIVE
Interval History: NAEON, VSS no changes in vision of the R eye, pt can only see shapes and shadows. Ambulation and eating without issues, pt is using the bathroom without issue. Resting comfortably in bed this morning      Review of Systems   HENT:  Negative for hearing loss.    Eyes:  Positive for pain. Negative for discharge.   Cardiovascular:  Negative for chest pain and leg swelling.   Neurological:  Positive for headaches.   Objective:     Vital Signs (Most Recent):  Temp: 98.7 °F (37.1 °C) (07/11/23 1123)  Pulse: 79 (07/11/23 1123)  Resp: 18 (07/11/23 1353)  BP: 127/66 (07/11/23 1123)  SpO2: (!) 93 % (07/11/23 1123) Vital Signs (24h Range):  Temp:  [97 °F (36.1 °C)-98.7 °F (37.1 °C)] 98.7 °F (37.1 °C)  Pulse:  [] 79  Resp:  [16-18] 18  SpO2:  [92 %-93 %] 93 %  BP: (110-133)/(58-73) 127/66     Weight: 104.8 kg (231 lb)  Body mass index is 42.25 kg/m².    Intake/Output Summary (Last 24 hours) at 7/11/2023 1500  Last data filed at 7/11/2023 0633  Gross per 24 hour   Intake 400 ml   Output --   Net 400 ml         Physical Exam  Constitutional:       Appearance: Normal appearance.   HENT:      Head: Normocephalic and atraumatic.   Eyes:      Extraocular Movements: Extraocular movements intact.      Pupils: Pupils are equal, round, and reactive to light.      Comments: R eye movement very painful this morning, has not changed from admission   Neurological:      Mental Status: She is alert.      Cranial Nerves: Cranial nerve deficit present.      Comments: E eye loss of vision and pain, only able to see shadows and shapes   Psychiatric:         Mood and Affect: Mood normal.         Behavior: Behavior normal.             Significant Labs: All pertinent labs within the past 24 hours have been reviewed.    Significant Imaging: I have reviewed all pertinent imaging results/findings within the past 24 hours.

## 2023-07-11 NOTE — PROGRESS NOTES
[facility]  Neurology  Progress Note    Patient Name: Eugenie Laguerre  MRN: [id number]  Admission Date: 7/7/2023  Hospital Length of Stay: 1 days  Code Status: Full Code   Attending Provider: Salty Bedolla MD  Primary Care Physician: Danny Gerardo MD   Principal Problem:Optic neuritis    HPI:   Neurology consulted for optic neuritis evaluation in Eugenie Laguerre, a 42 y.o. 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 History obtained from patient, spouse, and EMR.     In brief, patient reports new, acute onset painful right eye blurry vision after awakening 07/07/2023 with progression to vision loss characterized as only seeing shadows/movement. As symptoms did not improve, patient presented to [facility] ED for further evaluation. Of note, she missed doses of Ticagrelor following her recent stent procedure. [facility] evaluated and recommended further imaging as well as Ophthalmology evaluation. Ophthalmology evaluation notable for decreased visual acuity and slightly enlarged episcleral veins with flat macular, slightly hyperemic disc, and two hyperpigmented lesions noted near inferior arcade on fundoscopy. Imaging thus far (NCCTH, MRI Brain WO Contrast, MRA Brain WO Contrast, MRI Orbits WO Contrast) notable for a possible filling defect/absent flow related enhancement in the right terminal ICA (intraluminal thrombus vs artifact), mild increased signal within the bilateral optic nerve sheaths (optic neuritis vs artifact), and minimal symmetric increased FLAIR signal along the medial aspect of the bilateral occipital horns of the lateral ventricles/splenium of the corpus callosum (artifact). Due to mild increased signal in patients bilateral optic nerve  [facility] Neurology was consulted.       Subjective:     Interval History: No acute or concerning events noted by overnight staff. Vital signs are stable and within normal limits. Patient is conscious and comfortable. Reports headache and persistent visual change. No improvement following 1st dose of steroids.    Objective:     Vital signs are stable and within normal limits except for mild decrease in O2 saturations. Patient is conscious and comfortable. No improvement in visual acuity. Headache persists.    Significant Labs:   All pertinent lab results from the past 24 hours have been reviewed.    Significant Imaging:   I have reviewed all pertinent imaging results/findings within the past 24 hours.    Assessment and Plan:     Abnormal brain MRI  Clinical presentation and imaging incongruent (unilateral change with bilateral changes on imaging). Likely artifactual on review, however, repeat imaging with subtle optic nerve enhancement. As the patient is still with debilitating symptoms, recommend pulse dose steroid course with caveat that if visual symptoms worsen, to discontinue immediately and STAT page Ophthalmology as steroids pose the adverse risk of glaucoma.     Recommendations  Diagnostic:  - N/A  Therapeutic  - methylprednisolone x 3 days; continue course   - discontinue if symptoms worsen   - add depakon 1 g daily and 2 g mag IV daily with steroid course for headaches              Rayshawn Moraes MD  Neurology  [facility] - Med Surg

## 2023-07-11 NOTE — SUBJECTIVE & OBJECTIVE
Subjective:     Interval History: No acute or concerning events noted by overnight staff. Vital signs are stable and within normal limits. Patient is conscious and comfortable. Reports headache and persistent visual change. No improvement following 1st dose of steroids.    Objective:     Vital signs are stable and within normal limits except for mild decrease in O2 saturations. Patient is conscious and comfortable. No change in visual acuity.     Significant Labs:   All pertinent lab results from the past 24 hours have been reviewed.    Significant Imaging:   I have reviewed all pertinent imaging results/findings within the past 24 hours.

## 2023-07-12 PROBLEM — T38.0X5A STEROID-INDUCED HYPERGLYCEMIA: Status: ACTIVE | Noted: 2023-07-12

## 2023-07-12 PROBLEM — I67.1 CEREBRAL ANEURYSM, NONRUPTURED: Chronic | Status: ACTIVE | Noted: 2023-04-11

## 2023-07-12 PROBLEM — F33.1 MAJOR DEPRESSIVE DISORDER, RECURRENT, MODERATE: Status: ACTIVE | Noted: 2023-07-12

## 2023-07-12 PROBLEM — E66.01 SEVERE OBESITY (BMI 35.0-39.9) WITH COMORBIDITY: Chronic | Status: ACTIVE | Noted: 2022-08-30

## 2023-07-12 PROBLEM — R73.9 STEROID-INDUCED HYPERGLYCEMIA: Status: ACTIVE | Noted: 2023-07-12

## 2023-07-12 LAB
ALBUMIN SERPL BCP-MCNC: 3.8 G/DL (ref 3.5–5.2)
ALP SERPL-CCNC: 80 U/L (ref 55–135)
ALT SERPL W/O P-5'-P-CCNC: 40 U/L (ref 10–44)
ANION GAP SERPL CALC-SCNC: 10 MMOL/L (ref 8–16)
AST SERPL-CCNC: 25 U/L (ref 10–40)
BASOPHILS # BLD AUTO: 0.02 K/UL (ref 0–0.2)
BASOPHILS NFR BLD: 0.1 % (ref 0–1.9)
BILIRUB SERPL-MCNC: 0.2 MG/DL (ref 0.1–1)
BUN SERPL-MCNC: 17 MG/DL (ref 6–20)
CALCIUM SERPL-MCNC: 9.5 MG/DL (ref 8.7–10.5)
CHLORIDE SERPL-SCNC: 109 MMOL/L (ref 95–110)
CO2 SERPL-SCNC: 19 MMOL/L (ref 23–29)
CREAT SERPL-MCNC: 1 MG/DL (ref 0.5–1.4)
DIFFERENTIAL METHOD: ABNORMAL
EOSINOPHIL # BLD AUTO: 0 K/UL (ref 0–0.5)
EOSINOPHIL NFR BLD: 0 % (ref 0–8)
ERYTHROCYTE [DISTWIDTH] IN BLOOD BY AUTOMATED COUNT: 12.9 % (ref 11.5–14.5)
EST. GFR  (NO RACE VARIABLE): >60 ML/MIN/1.73 M^2
GLUCOSE SERPL-MCNC: 166 MG/DL (ref 70–110)
HCT VFR BLD AUTO: 42 % (ref 37–48.5)
HGB BLD-MCNC: 13.7 G/DL (ref 12–16)
IMM GRANULOCYTES # BLD AUTO: 0.08 K/UL (ref 0–0.04)
IMM GRANULOCYTES NFR BLD AUTO: 0.5 % (ref 0–0.5)
LYMPHOCYTES # BLD AUTO: 0.6 K/UL (ref 1–4.8)
LYMPHOCYTES NFR BLD: 3.8 % (ref 18–48)
MAGNESIUM SERPL-MCNC: 2.2 MG/DL (ref 1.6–2.6)
MCH RBC QN AUTO: 29.4 PG (ref 27–31)
MCHC RBC AUTO-ENTMCNC: 32.6 G/DL (ref 32–36)
MCV RBC AUTO: 90 FL (ref 82–98)
MONOCYTES # BLD AUTO: 0.1 K/UL (ref 0.3–1)
MONOCYTES NFR BLD: 0.9 % (ref 4–15)
NEUTROPHILS # BLD AUTO: 15.1 K/UL (ref 1.8–7.7)
NEUTROPHILS NFR BLD: 94.7 % (ref 38–73)
NRBC BLD-RTO: 0 /100 WBC
PLATELET # BLD AUTO: 297 K/UL (ref 150–450)
PMV BLD AUTO: 9.2 FL (ref 9.2–12.9)
POTASSIUM SERPL-SCNC: 4.1 MMOL/L (ref 3.5–5.1)
PROT SERPL-MCNC: 7.5 G/DL (ref 6–8.4)
RBC # BLD AUTO: 4.66 M/UL (ref 4–5.4)
SODIUM SERPL-SCNC: 138 MMOL/L (ref 136–145)
WBC # BLD AUTO: 15.95 K/UL (ref 3.9–12.7)

## 2023-07-12 PROCEDURE — 11000001 HC ACUTE MED/SURG PRIVATE ROOM

## 2023-07-12 PROCEDURE — 25000003 PHARM REV CODE 250

## 2023-07-12 PROCEDURE — 63600175 PHARM REV CODE 636 W HCPCS

## 2023-07-12 PROCEDURE — 25000003 PHARM REV CODE 250: Performed by: STUDENT IN AN ORGANIZED HEALTH CARE EDUCATION/TRAINING PROGRAM

## 2023-07-12 PROCEDURE — 90791 PSYCH DIAGNOSTIC EVALUATION: CPT | Mod: ,,, | Performed by: STUDENT IN AN ORGANIZED HEALTH CARE EDUCATION/TRAINING PROGRAM

## 2023-07-12 PROCEDURE — 90791 PR PSYCHIATRIC DIAGNOSTIC EVALUATION: ICD-10-PCS | Mod: ,,, | Performed by: STUDENT IN AN ORGANIZED HEALTH CARE EDUCATION/TRAINING PROGRAM

## 2023-07-12 PROCEDURE — 36415 COLL VENOUS BLD VENIPUNCTURE: CPT

## 2023-07-12 PROCEDURE — 99232 SBSQ HOSP IP/OBS MODERATE 35: CPT | Mod: ,,, | Performed by: STUDENT IN AN ORGANIZED HEALTH CARE EDUCATION/TRAINING PROGRAM

## 2023-07-12 PROCEDURE — 63600175 PHARM REV CODE 636 W HCPCS: Performed by: STUDENT IN AN ORGANIZED HEALTH CARE EDUCATION/TRAINING PROGRAM

## 2023-07-12 PROCEDURE — 80053 COMPREHEN METABOLIC PANEL: CPT

## 2023-07-12 PROCEDURE — 83735 ASSAY OF MAGNESIUM: CPT

## 2023-07-12 PROCEDURE — 85025 COMPLETE CBC W/AUTO DIFF WBC: CPT

## 2023-07-12 PROCEDURE — 99232 PR SUBSEQUENT HOSPITAL CARE,LEVL II: ICD-10-PCS | Mod: ,,, | Performed by: STUDENT IN AN ORGANIZED HEALTH CARE EDUCATION/TRAINING PROGRAM

## 2023-07-12 RX ORDER — HYDROCODONE BITARTRATE AND ACETAMINOPHEN 7.5; 325 MG/1; MG/1
1 TABLET ORAL EVERY 6 HOURS PRN
Status: DISCONTINUED | OUTPATIENT
Start: 2023-07-12 | End: 2023-07-13

## 2023-07-12 RX ORDER — GABAPENTIN 300 MG/1
300 CAPSULE ORAL NIGHTLY
Status: DISCONTINUED | OUTPATIENT
Start: 2023-07-12 | End: 2023-07-13

## 2023-07-12 RX ORDER — TOPIRAMATE 25 MG/1
100 TABLET ORAL NIGHTLY
Status: DISCONTINUED | OUTPATIENT
Start: 2023-07-12 | End: 2023-07-14 | Stop reason: HOSPADM

## 2023-07-12 RX ADMIN — TICAGRELOR 90 MG: 90 TABLET ORAL at 08:07

## 2023-07-12 RX ADMIN — ENOXAPARIN SODIUM 40 MG: 40 INJECTION SUBCUTANEOUS at 08:07

## 2023-07-12 RX ADMIN — METHYLPREDNISOLONE SODIUM SUCCINATE 250 MG: 125 INJECTION, POWDER, FOR SOLUTION INTRAMUSCULAR; INTRAVENOUS at 12:07

## 2023-07-12 RX ADMIN — TOPIRAMATE 100 MG: 25 TABLET, FILM COATED ORAL at 08:07

## 2023-07-12 RX ADMIN — METHYLPREDNISOLONE SODIUM SUCCINATE 250 MG: 125 INJECTION, POWDER, FOR SOLUTION INTRAMUSCULAR; INTRAVENOUS at 06:07

## 2023-07-12 RX ADMIN — HYDROCODONE BITARTRATE AND ACETAMINOPHEN 1 TABLET: 7.5; 325 TABLET ORAL at 06:07

## 2023-07-12 RX ADMIN — FAMOTIDINE 20 MG: 20 TABLET, FILM COATED ORAL at 08:07

## 2023-07-12 RX ADMIN — HYDROMORPHONE HYDROCHLORIDE 1 MG: 1 INJECTION, SOLUTION INTRAMUSCULAR; INTRAVENOUS; SUBCUTANEOUS at 05:07

## 2023-07-12 RX ADMIN — HYDROMORPHONE HYDROCHLORIDE 1 MG: 1 INJECTION, SOLUTION INTRAMUSCULAR; INTRAVENOUS; SUBCUTANEOUS at 02:07

## 2023-07-12 RX ADMIN — GABAPENTIN 300 MG: 300 CAPSULE ORAL at 08:07

## 2023-07-12 RX ADMIN — FAMOTIDINE 20 MG: 20 TABLET, FILM COATED ORAL at 09:07

## 2023-07-12 RX ADMIN — ENOXAPARIN SODIUM 40 MG: 40 INJECTION SUBCUTANEOUS at 09:07

## 2023-07-12 RX ADMIN — BUSPIRONE HYDROCHLORIDE 15 MG: 10 TABLET ORAL at 09:07

## 2023-07-12 RX ADMIN — TICAGRELOR 90 MG: 90 TABLET ORAL at 09:07

## 2023-07-12 RX ADMIN — ONDANSETRON 8 MG: 8 TABLET, ORALLY DISINTEGRATING ORAL at 02:07

## 2023-07-12 RX ADMIN — MECLIZINE HYDROCHLORIDE 25 MG: 12.5 TABLET ORAL at 09:07

## 2023-07-12 RX ADMIN — DIPHENHYDRAMINE HYDROCHLORIDE 25 MG: 25 CAPSULE ORAL at 08:07

## 2023-07-12 RX ADMIN — DIVALPROEX SODIUM 1000 MG: 250 TABLET, DELAYED RELEASE ORAL at 09:07

## 2023-07-12 RX ADMIN — HYDROMORPHONE HYDROCHLORIDE 1 MG: 1 INJECTION, SOLUTION INTRAMUSCULAR; INTRAVENOUS; SUBCUTANEOUS at 12:07

## 2023-07-12 RX ADMIN — ASPIRIN 81 MG: 81 TABLET, COATED ORAL at 09:07

## 2023-07-12 RX ADMIN — POLYETHYLENE GLYCOL 3350 17 G: 17 POWDER, FOR SOLUTION ORAL at 09:07

## 2023-07-12 RX ADMIN — MAGNESIUM SULFATE 2 G: 2 INJECTION INTRAVENOUS at 09:07

## 2023-07-12 RX ADMIN — DIPHENHYDRAMINE HYDROCHLORIDE 25 MG: 25 CAPSULE ORAL at 02:07

## 2023-07-12 RX ADMIN — HYDROCODONE BITARTRATE AND ACETAMINOPHEN 1 TABLET: 5; 325 TABLET ORAL at 09:07

## 2023-07-12 RX ADMIN — METHYLPREDNISOLONE SODIUM SUCCINATE 250 MG: 125 INJECTION, POWDER, FOR SOLUTION INTRAMUSCULAR; INTRAVENOUS at 05:07

## 2023-07-12 RX ADMIN — BUSPIRONE HYDROCHLORIDE 15 MG: 10 TABLET ORAL at 08:07

## 2023-07-12 RX ADMIN — SERTRALINE HYDROCHLORIDE 25 MG: 25 TABLET ORAL at 08:07
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
## 2023-07-12 NOTE — PROGRESS NOTES
"Consultation Report  Ophthalmology Service    Date: 07/12/2023    Reason for Consult: "transfer,monocular vision loss"     History of Present Illness: Eugenie Laguerre is a 42 y.o. 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 Patient reports acute vision loss in the right eye this morning, stating that she woke up only seeing motion and shadows with her right eye. Reports seeing lots of floaters, but denies flashes of light or curtains in vision.  Affirms right sided eye pain and pain around her eyelid.  Patient's reports constant pressure in her eye ("like its about to explode") and that this pain was first noticed right after waking up from her MJ procedure. She reports that she has never experienced pain or visual symptoms like this before; her migraines are usually right sided but less painful and associated with blur.     Patient also reports that she had issues obtaining one dose of her Brilinta Friday morning, but was given a double dose at OSH.        Medications this encounter:    aspirin  81 mg Oral Daily    busPIRone  15 mg Oral BID    divalproex  1,000 mg Oral Daily    enoxparin  40 mg Subcutaneous Q12H (prophylaxis, 0900/2100)    famotidine  20 mg Oral BID    magnesium sulfate IVPB  2 g Intravenous Daily    methylPREDNISolone sodium succinate injection  250 mg Intravenous Q6H    polyethylene glycol  17 g Oral Daily    sertraline  25 mg Oral QHS    ticagrelor  90 mg Oral BID    topiramate  75 mg Oral QHS       Allergies: is allergic to amoxicillin, augmentin [amoxicillin-pot clavulanate], bactrim [sulfamethoxazole-trimethoprim], and ropinirole.     Social:  reports that she has quit smoking. Her smoking use included cigarettes. She smoked an average of 0.50 packs per day. She quit " smokeless tobacco use about 1 years ago. She reports current alcohol use. She reports that she does not use drugs.     ROS: As per HPI    Ocular examination/Dilated fundus examination:  Base Eye Exam       Visual Acuity (Snellen - Linear)         Right Left    Dist cc HM 20/20              Tonometry (Tonopen, 10:36 AM)         Right Left    Pressure 14 12              Pupils         Pupils Dark Light Shape React APD    Right PERRL 3 2 Round Brisk None    Left PERRL 3 2 Round Brisk None              Visual Fields         Right Left     Full Full              Extraocular Movement         Right Left     Full, Ortho Full, Ortho              Neuro/Psych       Oriented x3: Yes                  Additional Tests       Color    Relative red top desaturation OD                 Slit Lamp and Fundus Exam       External Exam         Right Left    External Normal Normal              Slit Lamp Exam         Right Left    Lids/Lashes Normal Normal    Conjunctiva/Sclera white and quiet white and quiet    Cornea Clear clear    Anterior Chamber Deep and formed Deep and formed    Iris Round and reactive Round and reactive    Lens Clear Clear    Anterior Vitreous Normal Normal                  MRI Orbits W/WO 7/11/2023  Impression:     Suggestion of subtle enhancement of the right optic nerve that can be seen with optic neuritis but is nonspecific in light of the history.    CTA Head and Neck 7/9/2023  Impression:     1. Right ICA stent in place.  No vessel occlusion or high-grade stenosis.  2. Mild thickening of the bilateral maxillary sinuses    Assessment/Plan:     #Acute monocular vision loss, Right Eye  #Concern for Optic Neuritis, Right Eye  - Patient with 2 days of severe right sided headache / retrobulbar pain reportedly immediately after MJ procedure of right ICA on 7/05.   - Woke up on 7/7 with significantly decreased vision this morning. Has been worsening throughout the day  -  Patient presents with eye pain worse with eye  movement of the affected eye, photophobia, floaters and periorbital tenderness.   - Base exam with VA HM // 20/20. IOP WNL. No obvious APD on examination. Unable to test color plates due to poor vision.   - Repeat exam today without improvement of VA or collor discrimination   - Still endorsing photophobia and pain with EOMs OD  - Confrontational fields full OU  - Anterior segment exam with small inferonasal area of staining, otherwise unremarkable.   - Previous posterior segment exam with trace disk hyperemia OD, otherwise WNL OU  - MRI with subtle enhancement that can be seen with optic neuritis, but not definitive  - No vessel occlusion or high grade stenosis seen on CTA      Recommendations:  - Steroid course per Neurology  - IOP wnl today despite steroid therapy  - Will schedule follow up once Out Patient with Neuro Ophth    Patient's Best Contact Number: [phone] (Paramjit, pt's )      Eric Jaimes MD   [facility]  07/12/2023  12:52 AM

## 2023-07-12 NOTE — ASSESSMENT & PLAN NOTE
Headaches improved with new regimen    -- depakote 1 g daily  -- 2 g mag IV daily   -- hold triptans  -- Norco q4 PRN

## 2023-07-12 NOTE — PROGRESS NOTES
"[facility] Medicine  Progress Note    Patient Name: Eugenie Laguerre  MRN: [id number]  Patient Class: IP- Inpatient   Admission Date: 7/7/2023  Length of Stay: 2 days  Attending Physician: Salty Bedolla MD  Primary Care Provider: Danny Gerardo MD        Subjective:     Principal Problem:Optic neuritis        HPI:  Eugenie Laguerre is a 42 y.o. 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 She presented to ED as a transfer from [facility] for vision loss in the R eye. Patient reports acute vision loss in the R eye this morning, stating that upon waking up, she could only see motion and shadows with her right eye. In addition, she reports eye pain around the orbit that, during here hospital course, has progressed to severe pain of the globe itself. Patient' reports constant pressure in her eye "like its about to explode" and that this pain was first noticed right after waking up from her MJ procedure. She reports that she has never experienced pain or visual symptoms like this before.      Overview/Hospital Course:  Presented to ED as a transfer from [facility] for acute vision loss of the R eye, optho consulted and at bedside, MRI head evaluated by consulting physicians. Concern for optic neuritis vs ophthalmic artery occlusion. 7/9 - neurology and NSGY consulted today, CTA head with contrast ordered, without any new occlusion of ophthalmic artery. 7/10 - MRI orbits w contrast demonstrating continued subtle enhancement of R optic nerve. Patient started on pulse-dose steroids per Neurology and will assess response.       Interval History: Pt reports increased itchiness throughout upper limbs, requested benadryl overnight , VSS no changes in vision of the R eye, pt can only see shapes and " shadows. Ambulation and eating without issues, pt is using the bathroom without issue. Resting comfortably in bed this morning      Review of Systems   HENT:  Negative for hearing loss.    Eyes:  Positive for pain. Negative for discharge.   Cardiovascular:  Negative for chest pain and leg swelling.   Neurological:  Positive for headaches.   Objective:     Vital Signs (Most Recent):  Temp: 97 °F (36.1 °C) (07/12/23 1609)  Pulse: 68 (07/12/23 1609)  Resp: 18 (07/12/23 1609)  BP: 125/60 (07/12/23 1609)  SpO2: (!) 94 % (07/12/23 1609) Vital Signs (24h Range):  Temp:  [97 °F (36.1 °C)-97.8 °F (36.6 °C)] 97 °F (36.1 °C)  Pulse:  [68-85] 68  Resp:  [18-20] 18  SpO2:  [90 %-94 %] 94 %  BP: (104-125)/(53-61) 125/60     Weight: 104.8 kg (231 lb)  Body mass index is 42.25 kg/m².    Intake/Output Summary (Last 24 hours) at 7/12/2023 1630  Last data filed at 7/11/2023 2153  Gross per 24 hour   Intake 200 ml   Output --   Net 200 ml           Physical Exam  Constitutional:       Appearance: Normal appearance.   HENT:      Head: Normocephalic and atraumatic.   Eyes:      Extraocular Movements: Extraocular movements intact.      Pupils: Pupils are equal, round, and reactive to light.      Comments: R eye movement very painful this morning, has not changed from admission   Neurological:      Mental Status: She is alert.      Cranial Nerves: Cranial nerve deficit present.      Comments: E eye loss of vision and pain, only able to see shadows and shapes   Psychiatric:         Mood and Affect: Mood normal.         Behavior: Behavior normal.             Significant Labs: All pertinent labs within the past 24 hours have been reviewed.    Significant Imaging: I have reviewed all pertinent imaging results/findings within the past 24 hours.      Assessment/Plan:      * Optic neuritis  42 y.o. female with Hx of migraines, factor V leiden mutation, prior DVTs, and a family hx of ruptured cerebral aneurysms, right ICA artery aneurysm s/p MJ  stent on 07/05/23 and DAPT who presented to [facility] as a transfer from [facility] for Monocular vision loss of the R eye    -- s/p MRI non con, mild increased signal within the bilateral optic nerve sheaths, minimal symmetric increased FLAIR signal along the lateral ventricles not seen on the prior exam and potentially artifactual.   -- MRI optics with contrast redemonstrating enhancement of R optic nerve  -- Solumedrol 250mg q6h x3 days , today (7/11) day 2/3    --discontinue if symptoms worsen  -- NSGY signed off, no evidence of arterial occlusion                  Abnormal brain MRI  -- enhancement of R optic nerve       Monocular vision loss  See optic neuritis      Cerebral aneurysm, nonruptured  -- S/p MJ stent 7/5/23  -- continue ASA, brillinta   -- CTA head without evidence of new thrombus but will f/u NSGY recs       Anxiety  -- buspirone 15 mg  -- sertraline 25 mg    Tobacco abuse        Obesity (BMI >= 40)  Body mass index is 42.25 kg/m². Morbid obesity complicates all aspects of disease management from diagnostic modalities to treatment. Weight loss encouraged and health benefits explained to patient.         Intractable migraine with aura without status migrainosus  Headaches improved with new regimen    -- depakote 1 g daily  -- 2 g mag IV daily   -- hold triptans  -- Norco q4 PRN      VTE Risk Mitigation (From admission, onward)         Ordered     enoxaparin injection 40 mg  Every 12 hours         07/08/23 0834     IP VTE HIGH RISK PATIENT  Once         07/08/23 0822     Place sequential compression device  Until discontinued         07/08/23 0822                Discharge Planning   KELLEY: 7/13/2023     Code Status: Full Code   Is the patient medically ready for discharge?: No    Reason for patient still in hospital (select all that apply): Treatment  Discharge Plan A: Home with family   Discharge Delays: None known at this time              Biju Snowden MD  Department of Hospital Medicine    [facility] Med [facility]

## 2023-07-12 NOTE — PROGRESS NOTES
[facility]  Neurology    Completing day 3/3 of steroids.    Neurological exam grossly unchanged.    Labs/micro/imaging reviewed.    No pending diagnostics to follow up on.    Recommend discontinuing opioid PRNs, increasing Topiramate to 100 mg nightly, and adding gabapentin 300 mg PRN.  For other recommendations please see our previous note completed on: 07/11/2023.  Discussed patient with staff.   Neurology will follow-up with patient. Please contact Neurology for any questions or concerns.       Rayshawn Moraes MD  Neurology  [facility] [place], [place]

## 2023-07-12 NOTE — SUBJECTIVE & OBJECTIVE
Interval History: Pt reports increased itchiness throughout upper limbs, requested benadryl overnight , VSS no changes in vision of the R eye, pt can only see shapes and shadows. Ambulation and eating without issues, pt is using the bathroom without issue. Resting comfortably in bed this morning      Review of Systems   HENT:  Negative for hearing loss.    Eyes:  Positive for pain. Negative for discharge.   Cardiovascular:  Negative for chest pain and leg swelling.   Neurological:  Positive for headaches.   Objective:     Vital Signs (Most Recent):  Temp: 97 °F (36.1 °C) (07/12/23 1609)  Pulse: 68 (07/12/23 1609)  Resp: 18 (07/12/23 1609)  BP: 125/60 (07/12/23 1609)  SpO2: (!) 94 % (07/12/23 1609) Vital Signs (24h Range):  Temp:  [97 °F (36.1 °C)-97.8 °F (36.6 °C)] 97 °F (36.1 °C)  Pulse:  [68-85] 68  Resp:  [18-20] 18  SpO2:  [90 %-94 %] 94 %  BP: (104-125)/(53-61) 125/60     Weight: 104.8 kg (231 lb)  Body mass index is 42.25 kg/m².    Intake/Output Summary (Last 24 hours) at 7/12/2023 1630  Last data filed at 7/11/2023 2153  Gross per 24 hour   Intake 200 ml   Output --   Net 200 ml           Physical Exam  Constitutional:       Appearance: Normal appearance.   HENT:      Head: Normocephalic and atraumatic.   Eyes:      Extraocular Movements: Extraocular movements intact.      Pupils: Pupils are equal, round, and reactive to light.      Comments: R eye movement very painful this morning, has not changed from admission   Neurological:      Mental Status: She is alert.      Cranial Nerves: Cranial nerve deficit present.      Comments: E eye loss of vision and pain, only able to see shadows and shapes   Psychiatric:         Mood and Affect: Mood normal.         Behavior: Behavior normal.             Significant Labs: All pertinent labs within the past 24 hours have been reviewed.    Significant Imaging: I have reviewed all pertinent imaging results/findings within the past 24 hours.

## 2023-07-12 NOTE — ASSESSMENT & PLAN NOTE
42 y.o. female with Hx of migraines, factor V leiden mutation, prior DVTs, and a family hx of ruptured cerebral aneurysms, right ICA artery aneurysm s/p MJ stent on 07/05/23 and DAPT who presented to [facility] as a transfer from [facility] for Monocular vision loss of the R eye    -- s/p MRI non con, mild increased signal within the bilateral optic nerve sheaths, minimal symmetric increased FLAIR signal along the lateral ventricles not seen on the prior exam and potentially artifactual.   -- MRI optics with contrast redemonstrating enhancement of R optic nerve  -- Solumedrol 250mg q6h x3 days , today (7/11) day 2/3    --discontinue if symptoms worsen  -- NSGY signed off, no evidence of arterial occlusion

## 2023-07-13 ENCOUNTER — TELEPHONE (OUTPATIENT)
Dept: OPHTHALMOLOGY | Facility: CLINIC | Age: 43
End: 2023-07-13

## 2023-07-13 LAB
ALBUMIN SERPL BCP-MCNC: 3.6 G/DL (ref 3.5–5.2)
ALP SERPL-CCNC: 78 U/L (ref 55–135)
ALT SERPL W/O P-5'-P-CCNC: 37 U/L (ref 10–44)
ANION GAP SERPL CALC-SCNC: 11 MMOL/L (ref 8–16)
AST SERPL-CCNC: 18 U/L (ref 10–40)
B-HCG UR QL: NEGATIVE
BASOPHILS # BLD AUTO: 0.02 K/UL (ref 0–0.2)
BASOPHILS NFR BLD: 0.1 % (ref 0–1.9)
BILIRUB SERPL-MCNC: 0.2 MG/DL (ref 0.1–1)
BUN SERPL-MCNC: 17 MG/DL (ref 6–20)
CALCIUM SERPL-MCNC: 8.7 MG/DL (ref 8.7–10.5)
CHLORIDE SERPL-SCNC: 110 MMOL/L (ref 95–110)
CO2 SERPL-SCNC: 20 MMOL/L (ref 23–29)
CREAT SERPL-MCNC: 1.1 MG/DL (ref 0.5–1.4)
DIFFERENTIAL METHOD: ABNORMAL
EOSINOPHIL # BLD AUTO: 0 K/UL (ref 0–0.5)
EOSINOPHIL NFR BLD: 0 % (ref 0–8)
ERYTHROCYTE [DISTWIDTH] IN BLOOD BY AUTOMATED COUNT: 13.1 % (ref 11.5–14.5)
EST. GFR  (NO RACE VARIABLE): >60 ML/MIN/1.73 M^2
GLUCOSE SERPL-MCNC: 142 MG/DL (ref 70–110)
HCT VFR BLD AUTO: 40.1 % (ref 37–48.5)
HGB BLD-MCNC: 13.3 G/DL (ref 12–16)
IMM GRANULOCYTES # BLD AUTO: 0.21 K/UL (ref 0–0.04)
IMM GRANULOCYTES NFR BLD AUTO: 1.4 % (ref 0–0.5)
LYMPHOCYTES # BLD AUTO: 0.5 K/UL (ref 1–4.8)
LYMPHOCYTES NFR BLD: 3.4 % (ref 18–48)
MAGNESIUM SERPL-MCNC: 2.6 MG/DL (ref 1.6–2.6)
MCH RBC QN AUTO: 29.4 PG (ref 27–31)
MCHC RBC AUTO-ENTMCNC: 33.2 G/DL (ref 32–36)
MCV RBC AUTO: 89 FL (ref 82–98)
MONOCYTES # BLD AUTO: 0.2 K/UL (ref 0.3–1)
MONOCYTES NFR BLD: 1.5 % (ref 4–15)
NEUTROPHILS # BLD AUTO: 14.5 K/UL (ref 1.8–7.7)
NEUTROPHILS NFR BLD: 93.6 % (ref 38–73)
NRBC BLD-RTO: 0 /100 WBC
PLATELET # BLD AUTO: 301 K/UL (ref 150–450)
PMV BLD AUTO: 9.3 FL (ref 9.2–12.9)
POTASSIUM SERPL-SCNC: 3.9 MMOL/L (ref 3.5–5.1)
PROT SERPL-MCNC: 7.1 G/DL (ref 6–8.4)
RBC # BLD AUTO: 4.53 M/UL (ref 4–5.4)
SODIUM SERPL-SCNC: 141 MMOL/L (ref 136–145)
WBC # BLD AUTO: 15.45 K/UL (ref 3.9–12.7)

## 2023-07-13 PROCEDURE — 83735 ASSAY OF MAGNESIUM: CPT

## 2023-07-13 PROCEDURE — 25000003 PHARM REV CODE 250: Performed by: STUDENT IN AN ORGANIZED HEALTH CARE EDUCATION/TRAINING PROGRAM

## 2023-07-13 PROCEDURE — 63600175 PHARM REV CODE 636 W HCPCS

## 2023-07-13 PROCEDURE — 99232 SBSQ HOSP IP/OBS MODERATE 35: CPT | Mod: ,,, | Performed by: STUDENT IN AN ORGANIZED HEALTH CARE EDUCATION/TRAINING PROGRAM

## 2023-07-13 PROCEDURE — 85025 COMPLETE CBC W/AUTO DIFF WBC: CPT

## 2023-07-13 PROCEDURE — 80053 COMPREHEN METABOLIC PANEL: CPT

## 2023-07-13 PROCEDURE — 36415 COLL VENOUS BLD VENIPUNCTURE: CPT

## 2023-07-13 PROCEDURE — 63600175 PHARM REV CODE 636 W HCPCS: Performed by: STUDENT IN AN ORGANIZED HEALTH CARE EDUCATION/TRAINING PROGRAM

## 2023-07-13 PROCEDURE — 81025 URINE PREGNANCY TEST: CPT | Performed by: STUDENT IN AN ORGANIZED HEALTH CARE EDUCATION/TRAINING PROGRAM

## 2023-07-13 PROCEDURE — 99232 PR SUBSEQUENT HOSPITAL CARE,LEVL II: ICD-10-PCS | Mod: ,,, | Performed by: STUDENT IN AN ORGANIZED HEALTH CARE EDUCATION/TRAINING PROGRAM

## 2023-07-13 PROCEDURE — 25000003 PHARM REV CODE 250

## 2023-07-13 PROCEDURE — 11000001 HC ACUTE MED/SURG PRIVATE ROOM

## 2023-07-13 RX ORDER — HYDROCODONE BITARTRATE AND ACETAMINOPHEN 10; 325 MG/1; MG/1
1 TABLET ORAL EVERY 6 HOURS PRN
Status: DISCONTINUED | OUTPATIENT
Start: 2023-07-13 | End: 2023-07-14 | Stop reason: HOSPADM

## 2023-07-13 RX ORDER — HYDROXYZINE HYDROCHLORIDE 25 MG/1
25 TABLET, FILM COATED ORAL 3 TIMES DAILY PRN
Status: DISCONTINUED | OUTPATIENT
Start: 2023-07-13 | End: 2023-07-14

## 2023-07-13 RX ORDER — GABAPENTIN 300 MG/1
300 CAPSULE ORAL EVERY 4 HOURS PRN
Status: DISCONTINUED | OUTPATIENT
Start: 2023-07-13 | End: 2023-07-14

## 2023-07-13 RX ADMIN — POLYETHYLENE GLYCOL 3350 17 G: 17 POWDER, FOR SOLUTION ORAL at 09:07

## 2023-07-13 RX ADMIN — ONDANSETRON 8 MG: 8 TABLET, ORALLY DISINTEGRATING ORAL at 07:07

## 2023-07-13 RX ADMIN — HYDROCODONE BITARTRATE AND ACETAMINOPHEN 1 TABLET: 7.5; 325 TABLET ORAL at 07:07

## 2023-07-13 RX ADMIN — BUSPIRONE HYDROCHLORIDE 15 MG: 10 TABLET ORAL at 08:07

## 2023-07-13 RX ADMIN — GABAPENTIN 300 MG: 300 CAPSULE ORAL at 10:07

## 2023-07-13 RX ADMIN — SERTRALINE HYDROCHLORIDE 25 MG: 25 TABLET ORAL at 08:07

## 2023-07-13 RX ADMIN — HYDROCODONE BITARTRATE AND ACETAMINOPHEN 1 TABLET: 7.5; 325 TABLET ORAL at 12:07

## 2023-07-13 RX ADMIN — METHYLPREDNISOLONE SODIUM SUCCINATE 250 MG: 125 INJECTION, POWDER, FOR SOLUTION INTRAMUSCULAR; INTRAVENOUS at 12:07

## 2023-07-13 RX ADMIN — ENOXAPARIN SODIUM 40 MG: 40 INJECTION SUBCUTANEOUS at 09:07

## 2023-07-13 RX ADMIN — BUSPIRONE HYDROCHLORIDE 15 MG: 10 TABLET ORAL at 09:07

## 2023-07-13 RX ADMIN — METHYLPREDNISOLONE SODIUM SUCCINATE 250 MG: 125 INJECTION, POWDER, FOR SOLUTION INTRAMUSCULAR; INTRAVENOUS at 01:07

## 2023-07-13 RX ADMIN — FAMOTIDINE 20 MG: 20 TABLET, FILM COATED ORAL at 09:07

## 2023-07-13 RX ADMIN — HYDROCODONE BITARTRATE AND ACETAMINOPHEN 1 TABLET: 10; 325 TABLET ORAL at 01:07

## 2023-07-13 RX ADMIN — HYDROXYZINE HYDROCHLORIDE 25 MG: 25 TABLET, FILM COATED ORAL at 08:07

## 2023-07-13 RX ADMIN — TICAGRELOR 90 MG: 90 TABLET ORAL at 09:07

## 2023-07-13 RX ADMIN — HYDROXYZINE HYDROCHLORIDE 25 MG: 25 TABLET, FILM COATED ORAL at 11:07

## 2023-07-13 RX ADMIN — METHYLPREDNISOLONE SODIUM SUCCINATE 250 MG: 125 INJECTION, POWDER, FOR SOLUTION INTRAMUSCULAR; INTRAVENOUS at 06:07

## 2023-07-13 RX ADMIN — ENOXAPARIN SODIUM 40 MG: 40 INJECTION SUBCUTANEOUS at 08:07

## 2023-07-13 RX ADMIN — DIVALPROEX SODIUM 1000 MG: 250 TABLET, DELAYED RELEASE ORAL at 09:07

## 2023-07-13 RX ADMIN — GABAPENTIN 300 MG: 300 CAPSULE ORAL at 05:07

## 2023-07-13 RX ADMIN — ONDANSETRON 8 MG: 8 TABLET, ORALLY DISINTEGRATING ORAL at 05:07

## 2023-07-13 RX ADMIN — TICAGRELOR 90 MG: 90 TABLET ORAL at 08:07

## 2023-07-13 RX ADMIN — MAGNESIUM SULFATE 2 G: 2 INJECTION INTRAVENOUS at 10:07

## 2023-07-13 RX ADMIN — FAMOTIDINE 20 MG: 20 TABLET, FILM COATED ORAL at 08:07

## 2023-07-13 RX ADMIN — TOPIRAMATE 100 MG: 25 TABLET, FILM COATED ORAL at 08:07

## 2023-07-13 RX ADMIN — ASPIRIN 81 MG: 81 TABLET, COATED ORAL at 09:07

## 2023-07-13 RX ADMIN — HYDROCODONE BITARTRATE AND ACETAMINOPHEN 1 TABLET: 10; 325 TABLET ORAL at 08:07

## 2023-07-13 NOTE — ANESTHESIA POSTPROCEDURE EVALUATION
Anesthesia Post Evaluation    Patient: Eugenie Laguerre    Procedure(s) Performed: * No procedures listed *    Final Anesthesia Type: general      Patient location during evaluation: PACU  Patient participation: Yes- Able to Participate  Level of consciousness: awake and alert and oriented  Post-procedure vital signs: reviewed and stable  Pain management: adequate  Airway patency: patent  MAURILIO mitigation strategies: Intraoperative administration of CPAP, nasopharyngeal airway, or oral appliance during sedation and Multimodal analgesia  PONV status at discharge: No PONV  Anesthetic complications: no      Cardiovascular status: hemodynamically stable  Respiratory status: unassisted  Hydration status: euvolemic  Follow-up not needed.          Vitals Value Taken Time   /62 07/13/23 1518   Temp 36.9 °C (98.5 °F) 07/13/23 1518   Pulse 83 07/13/23 1518   Resp 18 07/13/23 1518   SpO2 92 % 07/13/23 1518         No case tracking events are documented in the log.      Pain/Marc Score: Pain Rating Prior to Med Admin: 9 (7/13/2023  1:45 PM)  Pain Rating Post Med Admin: 3 (7/13/2023  2:01 AM)

## 2023-07-13 NOTE — PLAN OF CARE
Pt continues to experience pain to the right eye and pain medication was increased but remains at every 6 hours. Otherwise pt has no other major events, will give Gabapentin every 4 hours PRN and interchange with pain medication to see if this is an adequate pain regimen for pt. Otherwise, no other major events during day shift.   Problem: Adult Inpatient Plan of Care  Goal: Plan of Care Review  Outcome: Ongoing, Progressing  Goal: Patient-Specific Goal (Individualized)  Outcome: Ongoing, Progressing  Goal: Absence of Hospital-Acquired Illness or Injury  Outcome: Ongoing, Progressing  Goal: Optimal Comfort and Wellbeing  Outcome: Ongoing, Progressing  Goal: Readiness for Transition of Care  Outcome: Ongoing, Progressing     Problem: Pain Acute  Goal: Acceptable Pain Control and Functional Ability  Outcome: Ongoing, Progressing

## 2023-07-13 NOTE — TELEPHONE ENCOUNTER
----- Message from Pascual Workman MD sent at 7/12/2023  3:23 PM CDT -----  Regarding: ED follow up  Dear triage staff,    Can we please schedule this patient in triage clinic for follow up of Optic Neuritis in one month? Will likely need Va, visual fields, and repeat color testing.    Patient's best contact number: [phone] (Paramjit, pt's )    Thank you!  Dr. Workman

## 2023-07-13 NOTE — ASSESSMENT & PLAN NOTE
Headaches improved with new regimen    -- depakote 1 g daily  -- 2 g mag IV daily   -- hold triptans  -- Norco q6 PRN

## 2023-07-13 NOTE — TELEPHONE ENCOUNTER
----- Message from Pascual Workman MD sent at 7/12/2023  3:23 PM CDT -----  Regarding: ED follow up  Dear triage staff,    Can we please schedule this patient in triage clinic for follow up of Optic Neuritis in one month? Will likely need Va, visual fields, and repeat color testing.    Patient's best contact number: [phone] (Paramjit, pt's )    Thank you!  Dr. Workman

## 2023-07-13 NOTE — PROGRESS NOTES
"[facility] Medicine  Progress Note    Patient Name: Eugenie Laguerre  MRN: [id number]  Patient Class: IP- Inpatient   Admission Date: 7/7/2023  Length of Stay: 3 days  Attending Physician: Salty Bedolla MD  Primary Care Provider: Danny Gerardo MD        Subjective:     Principal Problem:Optic neuritis        HPI:  Eugenie Laguerre is a 42 y.o. 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 She presented to ED as a transfer from [facility] for vision loss in the R eye. Patient reports acute vision loss in the R eye this morning, stating that upon waking up, she could only see motion and shadows with her right eye. In addition, she reports eye pain around the orbit that, during here hospital course, has progressed to severe pain of the globe itself. Patient' reports constant pressure in her eye "like its about to explode" and that this pain was first noticed right after waking up from her MJ procedure. She reports that she has never experienced pain or visual symptoms like this before.      Overview/Hospital Course:  Presented to ED as a transfer from [facility] for acute vision loss of the R eye, optho consulted and at bedside, MRI head evaluated by consulting physicians. Concern for optic neuritis vs ophthalmic artery occlusion. 7/9 - neurology and NSGY consulted today, CTA head with contrast ordered, without any new occlusion of ophthalmic artery. 7/10 - MRI orbits w contrast demonstrating continued subtle enhancement of R optic nerve. Patient started on pulse-dose steroids per Neurology and will assess response.       Interval History: Pt reports feeling "horrible" this morning, headaches are worse than yesterday. VSS no changes in vision of the R eye, pt can still only see shapes and " shadows. Ambulation and eating without issues, pt is using the bathroom without issue. Resting uncomfortably in bed this morning 2/2 headache      Review of Systems   HENT:  Negative for hearing loss.    Eyes:  Positive for pain. Negative for discharge.   Cardiovascular:  Negative for chest pain and leg swelling.   Neurological:  Positive for headaches.        Headaches worse from yesterday   Objective:     Vital Signs (Most Recent):  Temp: 98.5 °F (36.9 °C) (07/13/23 1518)  Pulse: 83 (07/13/23 1518)  Resp: 18 (07/13/23 1518)  BP: 118/62 (07/13/23 1518)  SpO2: (!) 92 % (07/13/23 1518) Vital Signs (24h Range):  Temp:  [97.9 °F (36.6 °C)-99.2 °F (37.3 °C)] 98.5 °F (36.9 °C)  Pulse:  [63-83] 83  Resp:  [18-20] 18  SpO2:  [90 %-94 %] 92 %  BP: (106-135)/(54-68) 118/62     Weight: 104.8 kg (231 lb)  Body mass index is 42.25 kg/m².    Intake/Output Summary (Last 24 hours) at 7/13/2023 1612  Last data filed at 7/12/2023 2040  Gross per 24 hour   Intake 200 ml   Output --   Net 200 ml           Physical Exam  Constitutional:       Appearance: Normal appearance.   HENT:      Head: Normocephalic and atraumatic.   Eyes:      Extraocular Movements: Extraocular movements intact.      Pupils: Pupils are equal, round, and reactive to light.      Comments: R eye movement very painful this morning, increased from admission   Neurological:      Mental Status: She is alert.      Cranial Nerves: Cranial nerve deficit present.      Comments: E eye loss of vision and pain, only able to see shadows and shapes   Psychiatric:         Mood and Affect: Mood normal.         Behavior: Behavior normal.             Significant Labs: All pertinent labs within the past 24 hours have been reviewed.    Significant Imaging: I have reviewed all pertinent imaging results/findings within the past 24 hours.      Assessment/Plan:      * Optic neuritis  42 y.o. female with Hx of migraines, factor V leiden mutation, prior DVTs, and a family hx of ruptured  cerebral aneurysms, right ICA artery aneurysm s/p MJ stent on 07/05/23 and DAPT who presented to [facility] as a transfer from [facility] for Monocular vision loss of the R eye    -- s/p MRI non con, mild increased signal within the bilateral optic nerve sheaths, minimal symmetric increased FLAIR signal along the lateral ventricles not seen on the prior exam and potentially artifactual.   -- MRI optics with contrast redemonstrating enhancement of R optic nerve  -- s/p Solumedrol 250mg q6h x3 days  -- NSGY signed off, no evidence of arterial occlusion                  Abnormal brain MRI  -- enhancement of R optic nerve       Monocular vision loss  See optic neuritis      Cerebral aneurysm, nonruptured  -- S/p MJ stent 7/5/23  -- continue ASA, brillinta   -- CTA head without evidence of new thrombus but will f/u NSGY recs       Anxiety disorder  -- buspirone 15 mg  -- sertraline 25 mg    Tobacco abuse        Obesity (BMI >= 40)  Body mass index is 42.25 kg/m². Morbid obesity complicates all aspects of disease management from diagnostic modalities to treatment. Weight loss encouraged and health benefits explained to patient.         Intractable migraine with aura without status migrainosus  Headaches improved with new regimen    -- Gabapentin 300 mg Q4Hr PRN and wean opioids  -- depakote 1 g daily  -- 2 g mag IV daily   -- hold triptans  -- Norco q6 PRN      VTE Risk Mitigation (From admission, onward)           Ordered     enoxaparin injection 40 mg  Every 12 hours         07/08/23 0834     IP VTE HIGH RISK PATIENT  Once         07/08/23 0822     Place sequential compression device  Until discontinued         07/08/23 0822                    Discharge Planning   KELLEY: 7/13/2023     Code Status: Full Code   Is the patient medically ready for discharge?: No    Reason for patient still in hospital (select all that apply): Treatment  Discharge Plan A: Home with family   Discharge Delays: None known at this  time              Biju Snowden MD  Department of Hospital Medicine   [facility] S[facility]

## 2023-07-13 NOTE — PROGRESS NOTES
[facility]  Neurology    No acute or concerning events noted by overnight staff. Vital signs are stable and within normal limits. Patient is conscious and comfortable. Headache and right visual disturbance persisting.    Neurological exam unchanged.    Labs/micro/imaging reviewed.     No pending diagnostics to follow up on.    Add Gabapentin 300 mg Q4Hr PRN and wean opioids.   For other recommendations please see our previous note completed on: 07/12/2023.  Discussed patient with staff.   Neurology will follow-up with patient. Please contact Neurology for any questions or concerns.       Rayshawn Moraes MD  Neurology  [facility] [place], [place]

## 2023-07-13 NOTE — ASSESSMENT & PLAN NOTE
42 y.o. 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   -- MRI optics with contrast redemonstrating enhancement of R optic nerve  -- s/p Solumedrol 250mg q6h x3 days  -- NSGY signed off, no evidence of arterial occlusion

## 2023-07-13 NOTE — SUBJECTIVE & OBJECTIVE
"Interval History: Pt reports feeling "horrible" this morning, headaches are worse than yesterday. VSS no changes in vision of the R eye, pt can still only see shapes and shadows. Ambulation and eating without issues, pt is using the bathroom without issue. Resting uncomfortably in bed this morning 2/2 headache      Review of Systems   HENT:  Negative for hearing loss.    Eyes:  Positive for pain. Negative for discharge.   Cardiovascular:  Negative for chest pain and leg swelling.   Neurological:  Positive for headaches.        Headaches worse from yesterday   Objective:     Vital Signs (Most Recent):  Temp: 98.5 °F (36.9 °C) (07/13/23 1518)  Pulse: 83 (07/13/23 1518)  Resp: 18 (07/13/23 1518)  BP: 118/62 (07/13/23 1518)  SpO2: (!) 92 % (07/13/23 1518) Vital Signs (24h Range):  Temp:  [97.9 °F (36.6 °C)-99.2 °F (37.3 °C)] 98.5 °F (36.9 °C)  Pulse:  [63-83] 83  Resp:  [18-20] 18  SpO2:  [90 %-94 %] 92 %  BP: (106-135)/(54-68) 118/62     Weight: 104.8 kg (231 lb)  Body mass index is 42.25 kg/m².    Intake/Output Summary (Last 24 hours) at 7/13/2023 1612  Last data filed at 7/12/2023 2040  Gross per 24 hour   Intake 200 ml   Output --   Net 200 ml           Physical Exam  Constitutional:       Appearance: Normal appearance.   HENT:      Head: Normocephalic and atraumatic.   Eyes:      Extraocular Movements: Extraocular movements intact.      Pupils: Pupils are equal, round, and reactive to light.      Comments: R eye movement very painful this morning, increased from admission   Neurological:      Mental Status: She is alert.      Cranial Nerves: Cranial nerve deficit present.      Comments: E eye loss of vision and pain, only able to see shadows and shapes   Psychiatric:         Mood and Affect: Mood normal.         Behavior: Behavior normal.             Significant Labs: All pertinent labs within the past 24 hours have been reviewed.    Significant Imaging: I have reviewed all pertinent imaging results/findings within " the past 24 hours.

## 2023-07-14 VITALS
HEART RATE: 81 BPM | HEIGHT: 62 IN | TEMPERATURE: 98 F | BODY MASS INDEX: 42.51 KG/M2 | RESPIRATION RATE: 18 BRPM | SYSTOLIC BLOOD PRESSURE: 131 MMHG | OXYGEN SATURATION: 95 % | DIASTOLIC BLOOD PRESSURE: 66 MMHG | WEIGHT: 231 LBS

## 2023-07-14 LAB
ALBUMIN SERPL BCP-MCNC: 3.2 G/DL (ref 3.5–5.2)
ALP SERPL-CCNC: 63 U/L (ref 55–135)
ALT SERPL W/O P-5'-P-CCNC: 25 U/L (ref 10–44)
ANION GAP SERPL CALC-SCNC: 7 MMOL/L (ref 8–16)
AST SERPL-CCNC: 11 U/L (ref 10–40)
BASOPHILS # BLD AUTO: 0.03 K/UL (ref 0–0.2)
BASOPHILS NFR BLD: 0.3 % (ref 0–1.9)
BILIRUB SERPL-MCNC: 0.2 MG/DL (ref 0.1–1)
BUN SERPL-MCNC: 18 MG/DL (ref 6–20)
CALCIUM SERPL-MCNC: 8.2 MG/DL (ref 8.7–10.5)
CHLORIDE SERPL-SCNC: 108 MMOL/L (ref 95–110)
CO2 SERPL-SCNC: 23 MMOL/L (ref 23–29)
CREAT SERPL-MCNC: 1 MG/DL (ref 0.5–1.4)
DIFFERENTIAL METHOD: ABNORMAL
EOSINOPHIL # BLD AUTO: 0 K/UL (ref 0–0.5)
EOSINOPHIL NFR BLD: 0 % (ref 0–8)
ERYTHROCYTE [DISTWIDTH] IN BLOOD BY AUTOMATED COUNT: 13.2 % (ref 11.5–14.5)
EST. GFR  (NO RACE VARIABLE): >60 ML/MIN/1.73 M^2
GLUCOSE SERPL-MCNC: 100 MG/DL (ref 70–110)
HCT VFR BLD AUTO: 39 % (ref 37–48.5)
HGB BLD-MCNC: 12.9 G/DL (ref 12–16)
IMM GRANULOCYTES # BLD AUTO: 0.21 K/UL (ref 0–0.04)
IMM GRANULOCYTES NFR BLD AUTO: 1.8 % (ref 0–0.5)
LYMPHOCYTES # BLD AUTO: 1.4 K/UL (ref 1–4.8)
LYMPHOCYTES NFR BLD: 11.8 % (ref 18–48)
MAGNESIUM SERPL-MCNC: 2.7 MG/DL (ref 1.6–2.6)
MCH RBC QN AUTO: 29.3 PG (ref 27–31)
MCHC RBC AUTO-ENTMCNC: 33.1 G/DL (ref 32–36)
MCV RBC AUTO: 89 FL (ref 82–98)
MONOCYTES # BLD AUTO: 0.7 K/UL (ref 0.3–1)
MONOCYTES NFR BLD: 6.1 % (ref 4–15)
NEUTROPHILS # BLD AUTO: 9.3 K/UL (ref 1.8–7.7)
NEUTROPHILS NFR BLD: 80 % (ref 38–73)
NRBC BLD-RTO: 0 /100 WBC
PLATELET # BLD AUTO: 266 K/UL (ref 150–450)
PMV BLD AUTO: 9.3 FL (ref 9.2–12.9)
POTASSIUM SERPL-SCNC: 3.7 MMOL/L (ref 3.5–5.1)
PROT SERPL-MCNC: 6.2 G/DL (ref 6–8.4)
RBC # BLD AUTO: 4.4 M/UL (ref 4–5.4)
SODIUM SERPL-SCNC: 138 MMOL/L (ref 136–145)
WBC # BLD AUTO: 11.57 K/UL (ref 3.9–12.7)

## 2023-07-14 PROCEDURE — 99239 PR HOSPITAL DISCHARGE DAY,>30 MIN: ICD-10-PCS | Mod: ,,, | Performed by: HOSPITALIST

## 2023-07-14 PROCEDURE — 63600175 PHARM REV CODE 636 W HCPCS

## 2023-07-14 PROCEDURE — 25000003 PHARM REV CODE 250

## 2023-07-14 PROCEDURE — 63600175 PHARM REV CODE 636 W HCPCS: Performed by: STUDENT IN AN ORGANIZED HEALTH CARE EDUCATION/TRAINING PROGRAM

## 2023-07-14 PROCEDURE — 85025 COMPLETE CBC W/AUTO DIFF WBC: CPT

## 2023-07-14 PROCEDURE — 99239 HOSP IP/OBS DSCHRG MGMT >30: CPT | Mod: ,,, | Performed by: HOSPITALIST

## 2023-07-14 PROCEDURE — 99232 PR SUBSEQUENT HOSPITAL CARE,LEVL II: ICD-10-PCS | Mod: ,,, | Performed by: PSYCHIATRY & NEUROLOGY

## 2023-07-14 PROCEDURE — 83735 ASSAY OF MAGNESIUM: CPT

## 2023-07-14 PROCEDURE — 80053 COMPREHEN METABOLIC PANEL: CPT

## 2023-07-14 PROCEDURE — 25000003 PHARM REV CODE 250: Performed by: HOSPITALIST

## 2023-07-14 PROCEDURE — 99232 SBSQ HOSP IP/OBS MODERATE 35: CPT | Mod: ,,, | Performed by: PSYCHIATRY & NEUROLOGY

## 2023-07-14 PROCEDURE — 36415 COLL VENOUS BLD VENIPUNCTURE: CPT

## 2023-07-14 RX ORDER — DIVALPROEX SODIUM 500 MG/1
1000 TABLET, DELAYED RELEASE ORAL DAILY
Qty: 60 TABLET | Refills: 11 | Status: SHIPPED | OUTPATIENT
Start: 2023-07-15 | End: 2023-08-02

## 2023-07-14 RX ORDER — HYDROCODONE BITARTRATE AND ACETAMINOPHEN 10; 325 MG/1; MG/1
1 TABLET ORAL EVERY 6 HOURS PRN
Qty: 28 TABLET | Refills: 0 | Status: SHIPPED | OUTPATIENT
Start: 2023-07-14 | End: 2023-07-14 | Stop reason: SDUPTHER

## 2023-07-14 RX ORDER — TOPIRAMATE 100 MG/1
100 TABLET, FILM COATED ORAL NIGHTLY
Qty: 30 TABLET | Refills: 11 | Status: SHIPPED | OUTPATIENT
Start: 2023-07-14 | End: 2023-08-11 | Stop reason: SDUPTHER

## 2023-07-14 RX ORDER — ONDANSETRON 4 MG/1
4 TABLET, FILM COATED ORAL EVERY 8 HOURS PRN
Qty: 60 TABLET | Refills: 2 | Status: SHIPPED | OUTPATIENT
Start: 2023-07-14

## 2023-07-14 RX ORDER — GABAPENTIN 300 MG/1
300 CAPSULE ORAL 3 TIMES DAILY PRN
Status: DISCONTINUED | OUTPATIENT
Start: 2023-07-14 | End: 2023-07-14

## 2023-07-14 RX ORDER — HYDROCODONE BITARTRATE AND ACETAMINOPHEN 10; 325 MG/1; MG/1
1 TABLET ORAL EVERY 6 HOURS PRN
Qty: 28 TABLET | Refills: 0 | Status: SHIPPED | OUTPATIENT
Start: 2023-07-14 | End: 2023-07-21

## 2023-07-14 RX ORDER — GABAPENTIN 300 MG/1
300 CAPSULE ORAL EVERY 4 HOURS PRN
Qty: 120 CAPSULE | Refills: 3 | Status: SHIPPED | OUTPATIENT
Start: 2023-07-14 | End: 2023-08-02

## 2023-07-14 RX ORDER — GABAPENTIN 300 MG/1
300 CAPSULE ORAL EVERY 4 HOURS PRN
Status: DISCONTINUED | OUTPATIENT
Start: 2023-07-14 | End: 2023-07-14 | Stop reason: HOSPADM

## 2023-07-14 RX ORDER — HYDROXYZINE HYDROCHLORIDE 25 MG/1
50 TABLET, FILM COATED ORAL 3 TIMES DAILY PRN
Status: DISCONTINUED | OUTPATIENT
Start: 2023-07-14 | End: 2023-07-14

## 2023-07-14 RX ORDER — HYDROXYZINE HYDROCHLORIDE 25 MG/1
50 TABLET, FILM COATED ORAL 4 TIMES DAILY PRN
Status: DISCONTINUED | OUTPATIENT
Start: 2023-07-14 | End: 2023-07-14 | Stop reason: HOSPADM

## 2023-07-14 RX ORDER — HYDROXYZINE HYDROCHLORIDE 50 MG/1
50 TABLET, FILM COATED ORAL EVERY 6 HOURS PRN
Qty: 120 TABLET | Refills: 3 | Status: SHIPPED | OUTPATIENT
Start: 2023-07-14 | End: 2023-11-11

## 2023-07-14 RX ADMIN — ONDANSETRON 8 MG: 8 TABLET, ORALLY DISINTEGRATING ORAL at 08:07

## 2023-07-14 RX ADMIN — GABAPENTIN 300 MG: 300 CAPSULE ORAL at 06:07

## 2023-07-14 RX ADMIN — HYDROXYZINE HYDROCHLORIDE 25 MG: 25 TABLET, FILM COATED ORAL at 07:07

## 2023-07-14 RX ADMIN — HYDROCODONE BITARTRATE AND ACETAMINOPHEN 1 TABLET: 10; 325 TABLET ORAL at 08:07

## 2023-07-14 RX ADMIN — HYDROCODONE BITARTRATE AND ACETAMINOPHEN 1 TABLET: 10; 325 TABLET ORAL at 03:07

## 2023-07-14 RX ADMIN — ASPIRIN 81 MG: 81 TABLET, COATED ORAL at 08:07

## 2023-07-14 RX ADMIN — BUSPIRONE HYDROCHLORIDE 15 MG: 10 TABLET ORAL at 08:07

## 2023-07-14 RX ADMIN — FAMOTIDINE 20 MG: 20 TABLET, FILM COATED ORAL at 08:07

## 2023-07-14 RX ADMIN — ENOXAPARIN SODIUM 40 MG: 40 INJECTION SUBCUTANEOUS at 08:07

## 2023-07-14 RX ADMIN — GABAPENTIN 300 MG: 300 CAPSULE ORAL at 04:07

## 2023-07-14 RX ADMIN — TICAGRELOR 90 MG: 90 TABLET ORAL at 08:07

## 2023-07-14 RX ADMIN — DIVALPROEX SODIUM 1000 MG: 250 TABLET, DELAYED RELEASE ORAL at 08:07

## 2023-07-14 RX ADMIN — HYDROCODONE BITARTRATE AND ACETAMINOPHEN 1 TABLET: 10; 325 TABLET ORAL at 02:07

## 2023-07-14 RX ADMIN — GABAPENTIN 300 MG: 300 CAPSULE ORAL at 10:07

## 2023-07-14 RX ADMIN — MAGNESIUM SULFATE 2 G: 2 INJECTION INTRAVENOUS at 08:07

## 2023-07-14 RX ADMIN — HYDROXYZINE HYDROCHLORIDE 50 MG: 25 TABLET, FILM COATED ORAL at 01:07

## 2023-07-14 NOTE — PLAN OF CARE
[facility] Med Surg  Discharge Final Note    Primary Care Provider: Danny Gerardo MD    Expected Discharge Date: 7/14/2023    Final Discharge Note (most recent)       Final Note - 07/14/23 1125          Final Note    Assessment Type Final Discharge Note     Anticipated Discharge Disposition Home or Self Care     Hospital Resources/Appts/Education Provided Appointments scheduled and added to AVS        Post-Acute Status    Post-Acute Authorization Other     Other Status No Post-Acute Service Needs     Discharge Delays None known at this time                     Important Message from Medicare             Contact Info       [facility] - [address] [place] [address]   Specialty: Ophthalmology    [address]  [facility] [id number]   Phone: [phone]       Next Steps: Schedule an appointment as soon as possible for a visit    Danny Gerardo MD   Specialty: Internal Medicine   Relationship: PCP - General    [address] [place] [address]   Phone: [phone]       Next Steps: Follow up

## 2023-07-14 NOTE — ASSESSMENT & PLAN NOTE
Headaches improved with new regimen    --   -- depakote 1 g daily  -- 2 g mag IV daily   -- hold triptans  -- Norco q6 PRN

## 2023-07-14 NOTE — ASSESSMENT & PLAN NOTE
42 y.o. 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 for Monocular vision loss of the R eye    -- Gabapentin 300 q4 PRN  -- s/p MRI non con, mild increased signal within the bilateral optic nerve sheaths, minimal symmetric increased FLAIR signal along the lateral ventricles not seen on the prior exam and potentially artifactual.   -- MRI optics with contrast redemonstrating enhancement of R optic nerve  -- s/p Solumedrol 250mg q6h x3 days  -- NSGY signed off, no evidence of arterial occlusion

## 2023-07-14 NOTE — PROGRESS NOTES
[facility]  Neurology  Progress Note    Patient Name: Eugenie Laguerre  MRN: [id number]  Admission Date: 7/7/2023  Hospital Length of Stay: 4 days  Code Status: Full Code   Attending Provider: Danita Rangel MD  Primary Care Physician: Danny Gerardo MD   Principal Problem:Optic neuritis    HPI:   Neurology consulted for optic neuritis evaluation in Eugenie Laguerre, a 42 y.o. 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 History obtained from patient, spouse, and EMR.     In brief, patient reports new, acute onset painful right eye blurry vision after awakening 07/07/2023 with progression to vision loss characterized as only seeing shadows/movement. As symptoms did not improve, patient presented to [facility] ED for further evaluation. Of note, she missed doses of Ticagrelor following her recent stent procedure. [facility] Neurology evaluated and recommended further imaging as well as Ophthalmology evaluation. Ophthalmology evaluation notable for decreased visual acuity and slightly enlarged episcleral veins with flat macular, slightly hyperemic disc, and two hyperpigmented lesions noted near inferior arcade on fundoscopy. Imaging thus far (NCCTH, MRI Brain WO Contrast, MRA Brain WO Contrast, MRI Orbits WO Contrast) notable for a possible filling defect/absent flow related enhancement in the right terminal ICA (intraluminal thrombus vs artifact), mild increased signal within the bilateral optic nerve sheaths (optic neuritis vs artifact), and minimal symmetric increased FLAIR signal along the medial aspect of the bilateral occipital horns of the lateral ventricles/splenium of the corpus callosum (artifact). Due to mild increased signal in patients bilateral optic nerve  sheaths Neurology was consulted.         Subjective:     Interval History: No acute or concerning events noted by overnight staff. Mild hypotension and decrease in O2 saturations. No improvement in visual acuity. Pain relieved with PRNs.     Current Facility-Administered Medications   Medication Dose Route Frequency Provider Last Rate Last Admin    aspirin EC tablet 81 mg  81 mg Oral Daily Tj Zepeda MD   81 mg at 07/14/23 0841    busPIRone tablet 15 mg  15 mg Oral BID Tj Zepeda MD   15 mg at 07/14/23 0840    dextrose 10% bolus 125 mL 125 mL  12.5 g Intravenous PRN Tj Zepeda MD        dextrose 10% bolus 250 mL 250 mL  25 g Intravenous PRN Tj Zepeda MD        diphenhydrAMINE capsule 25 mg  25 mg Oral Q6H PRN Maxine Landaverde MD   25 mg at 07/12/23 2039    divalproex EC tablet 1,000 mg  1,000 mg Oral Daily Biju Snowden MD   1,000 mg at 07/14/23 0840    enoxaparin injection 40 mg  40 mg Subcutaneous Q12H (prophylaxis, 0900/2100) Salty Bedolla MD   40 mg at 07/14/23 0841    famotidine tablet 20 mg  20 mg Oral BID Tj Zepeda MD   20 mg at 07/14/23 0840    gabapentin capsule 300 mg  300 mg Oral TID PRN Danita Rangel MD   300 mg at 07/14/23 1059    glucagon (human recombinant) injection 1 mg  1 mg Intramuscular PRN Tj Zepeda MD        glucose chewable tablet 16 g  16 g Oral PRN Tj Zepeda MD        glucose chewable tablet 24 g  24 g Oral PRN Tj Zepeda MD        HYDROcodone-acetaminophen  mg per tablet 1 tablet  1 tablet Oral Q6H PRN Tj Zepeda MD   1 tablet at 07/14/23 0839    hydrOXYzine HCL tablet 50 mg  50 mg Oral QID PRN Tj Zepeda MD   50 mg at 07/14/23 1342    magnesium sulfate 2g in water 50mL IVPB (premix)  2 g Intravenous Daily Biju Snowden MD   Stopped at 07/14/23 1056    meclizine tablet 25 mg  25 mg Oral TID PRN Tj Zepeda MD   25 mg at 07/12/23 0917    naloxone 0.4 mg/mL injection 0.02 mg  0.02 mg Intravenous PRN Tj Zepeda MD         "ondansetron disintegrating tablet 8 mg  8 mg Oral Q8H PRN Tj Zepeda MD   8 mg at 07/14/23 0840    polyethylene glycol packet 17 g  17 g Oral Daily Tia Granado DO   17 g at 07/13/23 0906    sertraline tablet 25 mg  25 mg Oral QHS Tj Zepeda MD   25 mg at 07/13/23 2002    sodium chloride 0.9% flush 10 mL  10 mL Intravenous Q12H PRN Tj Zepeda MD        ticagrelor tablet 90 mg  90 mg Oral BID Tj Zepeda MD   90 mg at 07/14/23 0840    topiramate tablet 100 mg  100 mg Oral QHS Salty Bedolla MD   100 mg at 07/13/23 2003       Review of Systems   Constitutional:  Negative for chills, fatigue and fever.   HENT:  Negative for ear pain, facial swelling, hearing loss, trouble swallowing and voice change.    Eyes:  Positive for pain and visual disturbance.        Severe eye pain and pressure in R eye improved  Only able to see "shadows" from R eye, L eye vision is not decreased   Respiratory:  Negative for cough and chest tightness.    Cardiovascular:  Negative for chest pain.   Gastrointestinal:  Negative for abdominal distention and abdominal pain.   Endocrine: Negative for cold intolerance, polydipsia and polyuria.   Genitourinary:  Negative for difficulty urinating and dysuria.   Musculoskeletal:  Negative for arthralgias, back pain and joint swelling.   Neurological:  Positive for weakness (right upper extremity; resolved) and headaches (improved). Negative for facial asymmetry, speech difficulty and numbness.   Psychiatric/Behavioral:  Negative for agitation, behavioral problems and confusion.      Objective:     Vital Signs (Most Recent):  Temp: 97.6 °F (36.4 °C) (07/14/23 1103)  Pulse: 75 (07/14/23 1103)  Resp: 18 (07/14/23 1103)  BP: (!) 102/55 (07/14/23 1103)  SpO2: (!) 94 % (07/14/23 1103) Vital Signs (24h Range):  Temp:  [97.6 °F (36.4 °C)-98.5 °F (36.9 °C)] 97.6 °F (36.4 °C)  Pulse:  [59-83] 75  Resp:  [16-20] 18  SpO2:  [92 %-94 %] 94 %  BP: ()/(50-62) 102/55     Weight: 104.8 kg " (231 lb)  Body mass index is 42.25 kg/m².     Physical Exam  Vitals and nursing note reviewed.   Constitutional:       General: She is not in acute distress.     Appearance: She is not toxic-appearing or diaphoretic.   Cardiovascular:      Rate and Rhythm: Normal rate and regular rhythm.   Pulmonary:      Effort: Pulmonary effort is normal. No respiratory distress.   Neurological:      Mental Status: She is alert and oriented to person, place, and time.      Cranial Nerves: Cranial nerves 2-12 are intact.      Coordination: Finger-Nose-Finger Test normal.   Psychiatric:         Speech: Speech normal.        NEUROLOGICAL EXAMINATION:     MENTAL STATUS   Oriented to person, place, and time.   Follows 1 step commands.   Attention: normal. Concentration: normal.   Speech: speech is normal   Level of consciousness: alert    CRANIAL NERVES   Cranial nerves II through XII intact.     CN II   Visual acuity: decreased (OD decreased, OS normal)    MOTOR EXAM        4 extremities anti-gravity without drift     SENSORY EXAM   Light touch normal.     GAIT AND COORDINATION      Coordination   Finger to nose coordination: normal    Significant Labs: All pertinent lab results from the past 24 hours have been reviewed.    Significant Imaging: I have reviewed all pertinent imaging results/findings within the past 24 hours.    Assessment and Plan:     Abnormal brain MRI  Clinical presentation and imaging incongruent (unilateral change with bilateral changes on imaging). Likely artifactual on review, however, repeat imaging with subtle optic nerve enhancement. No improvement following pulse dose steroids. Etiology unclear at this time; optic neuritis lower on differential as MRI findings are un-impressive. Tolerating gabapentin Q4hrs; recommend continuing outpatient until follow up with Neurology, Ophthalomology, and Interventional Neurology. Patient in agreement with plan.    Recommendations  Diagnostic:  - N/A  Therapeutic  -  Gabapentin 300 mg Q4hrs PRN   - Speciality Clinic Follow Up      Thank you for your consult. We will sign off. Please contact us if you have any additional questions.      VTE Risk Mitigation (From admission, onward)           Ordered     enoxaparin injection 40 mg  Every 12 hours         07/08/23 0834     IP VTE HIGH RISK PATIENT  Once         07/08/23 0822     Place sequential compression device  Until discontinued         07/08/23 0822                    Rayshawn Moraes MD  Neurology  [facility] - Med Surg

## 2023-07-14 NOTE — ASSESSMENT & PLAN NOTE
Clinical presentation and imaging incongruent (unilateral change with bilateral changes on imaging). Likely artifactual on review, however, repeat imaging with subtle optic nerve enhancement. No improvement following pulse dose steroids. Etiology unclear at this time; optic neuritis lower on differential as MRI findings are un-impressive. Tolerating gabapentin Q4hrs; recommend continuing outpatient until follow up with Neurology, Ophthalomology, and Interventional Neurology. Patient in agreement with plan.    Recommendations  Diagnostic:  - N/A  Therapeutic  - Gabapentin 300 mg Q4hrs PRN   - Speciality Clinic Follow Up

## 2023-07-14 NOTE — SUBJECTIVE & OBJECTIVE
Subjective:     Interval History: No acute or concerning events noted by overnight staff. Mild hypotension and decrease in O2 saturations. No improvement in visual acuity. Pain relieved with PRNs.     Current Facility-Administered Medications   Medication Dose Route Frequency Provider Last Rate Last Admin    aspirin EC tablet 81 mg  81 mg Oral Daily Tj Zepeda MD   81 mg at 07/14/23 0841    busPIRone tablet 15 mg  15 mg Oral BID Tj Zepeda MD   15 mg at 07/14/23 0840    dextrose 10% bolus 125 mL 125 mL  12.5 g Intravenous PRN Tj Zepeda MD        dextrose 10% bolus 250 mL 250 mL  25 g Intravenous PRN Tj Zepeda MD        diphenhydrAMINE capsule 25 mg  25 mg Oral Q6H PRN Maxine Landaverde MD   25 mg at 07/12/23 2039    divalproex EC tablet 1,000 mg  1,000 mg Oral Daily Biju Snowden MD   1,000 mg at 07/14/23 0840    enoxaparin injection 40 mg  40 mg Subcutaneous Q12H (prophylaxis, 0900/2100) Salty Bedolla MD   40 mg at 07/14/23 0841    famotidine tablet 20 mg  20 mg Oral BID Tj Zpeeda MD   20 mg at 07/14/23 0840    gabapentin capsule 300 mg  300 mg Oral TID PRN Danita Rangel MD   300 mg at 07/14/23 1059    glucagon (human recombinant) injection 1 mg  1 mg Intramuscular PRN Tj Zepeda MD        glucose chewable tablet 16 g  16 g Oral PRN Tj Zepeda MD        glucose chewable tablet 24 g  24 g Oral PRN Tj Zepeda MD        HYDROcodone-acetaminophen  mg per tablet 1 tablet  1 tablet Oral Q6H PRN Tj Zepeda MD   1 tablet at 07/14/23 0839    hydrOXYzine HCL tablet 50 mg  50 mg Oral QID PRN Tj Zepeda MD   50 mg at 07/14/23 1342    magnesium sulfate 2g in water 50mL IVPB (premix)  2 g Intravenous Daily Biju Snowden MD   Stopped at 07/14/23 1056    meclizine tablet 25 mg  25 mg Oral TID PRN Tj Zepeda MD   25 mg at 07/12/23 0917    naloxone 0.4 mg/mL injection 0.02 mg  0.02 mg Intravenous PRN Tj Zepeda MD        ondansetron disintegrating tablet 8 mg  8 mg  "Oral Q8H PRN Tj Zepeda MD   8 mg at 07/14/23 0840    polyethylene glycol packet 17 g  17 g Oral Daily Tia GranadoDO   17 g at 07/13/23 0906    sertraline tablet 25 mg  25 mg Oral QHS Tj Zepeda MD   25 mg at 07/13/23 2002    sodium chloride 0.9% flush 10 mL  10 mL Intravenous Q12H PRN Tj Zepeda MD        ticagrelor tablet 90 mg  90 mg Oral BID Tj Zepeda MD   90 mg at 07/14/23 0840    topiramate tablet 100 mg  100 mg Oral QHS Salty Bedolla MD   100 mg at 07/13/23 2003       Review of Systems   Constitutional:  Negative for chills, fatigue and fever.   HENT:  Negative for ear pain, facial swelling, hearing loss, trouble swallowing and voice change.    Eyes:  Positive for pain and visual disturbance.        Severe eye pain and pressure in R eye improved  Only able to see "shadows" from R eye, L eye vision is not decreased   Respiratory:  Negative for cough and chest tightness.    Cardiovascular:  Negative for chest pain.   Gastrointestinal:  Negative for abdominal distention and abdominal pain.   Endocrine: Negative for cold intolerance, polydipsia and polyuria.   Genitourinary:  Negative for difficulty urinating and dysuria.   Musculoskeletal:  Negative for arthralgias, back pain and joint swelling.   Neurological:  Positive for weakness (right upper extremity; resolved) and headaches (improved). Negative for facial asymmetry, speech difficulty and numbness.   Psychiatric/Behavioral:  Negative for agitation, behavioral problems and confusion.      Objective:     Vital Signs (Most Recent):  Temp: 97.6 °F (36.4 °C) (07/14/23 1103)  Pulse: 75 (07/14/23 1103)  Resp: 18 (07/14/23 1103)  BP: (!) 102/55 (07/14/23 1103)  SpO2: (!) 94 % (07/14/23 1103) Vital Signs (24h Range):  Temp:  [97.6 °F (36.4 °C)-98.5 °F (36.9 °C)] 97.6 °F (36.4 °C)  Pulse:  [59-83] 75  Resp:  [16-20] 18  SpO2:  [92 %-94 %] 94 %  BP: ()/(50-62) 102/55     Weight: 104.8 kg (231 lb)  Body mass index is 42.25 kg/m².   "   Physical Exam  Vitals and nursing note reviewed.   Constitutional:       General: She is not in acute distress.     Appearance: She is not toxic-appearing or diaphoretic.   Cardiovascular:      Rate and Rhythm: Normal rate and regular rhythm.   Pulmonary:      Effort: Pulmonary effort is normal. No respiratory distress.   Neurological:      Mental Status: She is alert and oriented to person, place, and time.      Cranial Nerves: Cranial nerves 2-12 are intact.      Coordination: Finger-Nose-Finger Test normal.   Psychiatric:         Speech: Speech normal.        NEUROLOGICAL EXAMINATION:     MENTAL STATUS   Oriented to person, place, and time.   Follows 1 step commands.   Attention: normal. Concentration: normal.   Speech: speech is normal   Level of consciousness: alert    CRANIAL NERVES   Cranial nerves II through XII intact.     CN II   Visual acuity: decreased (OD decreased, OS normal)    MOTOR EXAM        4 extremities anti-gravity without drift     SENSORY EXAM   Light touch normal.     GAIT AND COORDINATION      Coordination   Finger to nose coordination: normal    Significant Labs: All pertinent lab results from the past 24 hours have been reviewed.    Significant Imaging: I have reviewed all pertinent imaging results/findings within the past 24 hours.

## 2023-07-14 NOTE — DISCHARGE SUMMARY
"[facility] Medicine  Discharge Summary      Patient Name: Eugenie Laguerre  MRN: [id number]  ARA: [id number]  Patient Class: IP- Inpatient  Admission Date: 7/7/2023  Hospital Length of Stay: 4 days  Discharge Date and Time:  07/14/2023 4:35 PM  Attending Physician: Danita Rangel MD   Discharging Provider: Biju Snowden MD  Primary Care Provider: Danny Gerardo MD  [facility] Medicine Team: [facility] 5 Biju Snowden MD  Primary Care Team: [facility] 5    HPI:   Eugenie Laguerre is a 42 y.o. 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 She presented to ED as a transfer from [facility] for vision loss in the R eye. Patient reports acute vision loss in the R eye this morning, stating that upon waking up, she could only see motion and shadows with her right eye. In addition, she reports eye pain around the orbit that, during here hospital course, has progressed to severe pain of the globe itself. Patient' reports constant pressure in her eye "like its about to explode" and that this pain was first noticed right after waking up from her MJ procedure. She reports that she has never experienced pain or visual symptoms like this before.      Procedure(s) (LRB):  MRI (Magnetic Resonance Imagine) (Bilateral)      Hospital Course:   Presented to ED as a transfer from [facility] for acute vision loss of the R eye, optho consulted and at bedside, MRI head evaluated by consulting physicians. Concern for optic neuritis vs ophthalmic artery occlusion. 7/9 - neurology and NSGY consulted today, CTA head with contrast ordered, without any new occlusion of ophthalmic artery. 7/10 - MRI orbits w contrast demonstrating continued subtle enhancement of R optic nerve. Patient completed pulse-dose steroids per " Neurology reassessed and adjusted pain regimen with good results. Pt is medically able and willing to return home today.       Goals of Care Treatment Preferences:  Code Status: Full Code      Consults:   Consults (From admission, onward)        Status Ordering Provider     Inpatient consult to Midline team  Once        Provider:  (Not yet assigned)    Completed CONCHA BRANCH     Inpatient consult to Psychology  Once        Provider:  (Not yet assigned)    Completed TU NAVAS     Inpatient consult to Neurosurgery  Once        Provider:  (Not yet assigned)    Completed LUIZA GAUTHIER     Inpatient consult to Neurology  Once        Provider:  (Not yet assigned)    Completed TU NAVAS     Inpatient consult to Vascular (Stroke) Neurology  Once        Provider:  (Not yet assigned)    Completed RENEE ARREAGA     Inpatient consult to Ophthalmology  Once        Provider:  (Not yet assigned)    Completed CARA FINK          Neuro  Abnormal brain MRI  -- enhancement of R optic nerve       Cerebral aneurysm, nonruptured  -- S/p MJ stent 7/5/23  -- continue ASA, brillinta   -- CTA head without evidence of new thrombus but will f/u NSGY recs       Intractable migraine with aura without status migrainosus  Headaches improved with new regimen    --   -- depakote 1 g daily  -- 2 g mag IV daily   -- hold triptans  -- [place] q6 PRN    Psychiatric  Major depressive disorder, recurrent, moderate  Patient has persistent depression which is moderate and is currently controlled. Will Continue anti-depressant medications. We will consult psychiatry at this time. Patient does not display psychosis at this time. Continue to monitor closely and adjust plan of care as needed.        Anxiety disorder  -- buspirone 15 mg  -- sertraline 25 mg    Ophtho  * Optic neuritis  42 y.o. female with Hx of migraines, factor V leiden mutation, prior DVTs, and a family hx of ruptured cerebral aneurysms, right ICA artery aneurysm  s/p MJ stent on 07/05/23 and DAPT who presented to [facility] as a transfer from [facility] for Monocular vision loss of the R eye    -- Gabapentin 300 q4 PRN  -- s/p MRI non con, mild increased signal within the bilateral optic nerve sheaths, minimal symmetric increased FLAIR signal along the lateral ventricles not seen on the prior exam and potentially artifactual.   -- MRI optics with contrast redemonstrating enhancement of R optic nerve  -- s/p Solumedrol 250mg q6h x3 days  -- NSGY signed off, no evidence of arterial occlusion                  Endocrine  Steroid-induced hyperglycemia    -- Monitor glucose    Obesity (BMI >= 40)  Body mass index is 42.25 kg/m². Morbid obesity complicates all aspects of disease management from diagnostic modalities to treatment. Weight loss encouraged and health benefits explained to patient.           Final Active Diagnoses:    Diagnosis Date Noted POA    PRINCIPAL PROBLEM:  Optic neuritis [H46.9] 07/08/2023 Yes    Steroid-induced hyperglycemia [R73.9, T38.0X5A] 07/12/2023 No    Major depressive disorder, recurrent, moderate [F33.1] 07/12/2023 Yes    Abnormal brain MRI [R90.89] 07/09/2023 Yes    Monocular vision loss [H54.60] 07/07/2023 Yes    Cerebral aneurysm, nonruptured [I67.1] 04/11/2023 Yes     Chronic    Anxiety disorder [F41.9] 01/24/2023 Yes    Obesity (BMI >= 40) [E66.01] 08/30/2022 Yes    Intractable migraine with aura without status migrainosus [G43.119] 08/11/2020 Yes      Problems Resolved During this Admission:    Diagnosis Date Noted Date Resolved POA    Personal history of DVT (deep vein thrombosis) [Z86.718] 05/27/2013 07/08/2023 Not Applicable       Discharged Condition: stable    Disposition: Home or Self Care    Follow Up:   Follow-up Information     [facility] - [facility]. Schedule an appointment as soon as possible for a visit .    Specialty: Ophthalmology  Contact information:  [address]  [facility]  [id number] [phone]  Additional information:  Please arrive on the 10th floor for check-in.           Danny Gerarod MD Follow up.    Specialty: Internal Medicine  Contact information:  [address] [facility] [address] [phone]                       Patient Instructions:      Ambulatory referral/consult to Vascular Neurology   Standing Status: Future   Referral Priority: Routine Referral Type: Consultation   Referral Reason: Specialty Services Required   Referred to Provider: SALTY CHATMAN Specialty: Vascular Neurology   Number of Visits Requested: 1     Diet Adult Regular     Notify your health care provider if you experience any of the following:  temperature >100.4     Notify your health care provider if you experience any of the following:  persistent nausea and vomiting or diarrhea     Notify your health care provider if you experience any of the following:  severe uncontrolled pain     Notify your health care provider if you experience any of the following:  severe persistent headache     No dressing needed     Activity as tolerated       Significant Diagnostic Studies: N/A    Pending Diagnostic Studies:     None         Medications:  Reconciled Home Medications:      Medication List      START taking these medications    divalproex 500 MG Tbec  Commonly known as: DEPAKOTE  Take 2 tablets (1,000 mg total) by mouth once daily.  Start taking on: July 15, 2023     gabapentin 300 MG capsule  Commonly known as: NEURONTIN  Take 1 capsule (300 mg total) by mouth every 4 (four) hours as needed (Pain).     HYDROcodone-acetaminophen  mg per tablet  Commonly known as: NORCO  Take 1 tablet by mouth every 6 (six) hours as needed for Pain.     hydrOXYzine 50 MG tablet  Commonly known as: ATARAX  Take 1 tablet (50 mg total) by mouth every 6 (six) hours as needed for Itching.     ondansetron 4 MG tablet  Commonly known as: ZOFRAN  Take 1 tablet (4 mg total) by mouth every 8 (eight) hours  as needed for Nausea.        CHANGE how you take these medications    sertraline 25 MG tablet  Commonly known as: ZOLOFT  Take 1 tablet (25 mg total) by mouth once daily.  What changed: when to take this     topiramate 100 MG tablet  Commonly known as: TOPAMAX  Take 1 tablet (100 mg total) by mouth every evening.  What changed:   · medication strength  · how much to take        CONTINUE taking these medications    aspirin 81 MG EC tablet  Commonly known as: ECOTRIN  Take 81 mg by mouth once daily.     busPIRone 15 MG tablet  Commonly known as: BUSPAR  Take 1 tablet (15 mg total) by mouth 2 (two) times daily.     rizatriptan 10 MG tablet  Commonly known as: MAXALT  1 tab PO PRN migraine. May repeat every 2 hours for max 3 tabs in 24 hours. Use no more than 10 days per month.     ticagrelor 90 mg tablet  Commonly known as: BRILINTA  Take 1 tablet (90 mg total) by mouth 2 (two) times daily.     tiZANidine 4 MG tablet  Commonly known as: ZANAFLEX  Take 4 mg by mouth daily as needed (MUSCLE SPASMS).        STOP taking these medications    prochlorperazine 10 MG tablet  Commonly known as: COMPAZINE            Indwelling Lines/Drains at time of discharge:   Lines/Drains/Airways     None                 Time spent on the discharge of patient: 45 minutes         Biju Snowden MD  Department of Hospital Medicine  [facility]

## 2023-07-17 ENCOUNTER — PATIENT OUTREACH (OUTPATIENT)
Dept: ADMINISTRATIVE | Facility: CLINIC | Age: 43
End: 2023-07-17

## 2023-07-17 NOTE — PROGRESS NOTES
C3 nurse spoke with Eugenie Laguerre  for a TCC post hospital discharge follow up call. The patient has a scheduled HOSFU appointment with GONZALES Gomez MD on 07/21/2023 @ 8:15 AM

## 2023-07-18 ENCOUNTER — OFFICE VISIT (OUTPATIENT)
Dept: NEUROLOGY | Facility: CLINIC | Age: 43
End: 2023-07-18
Payer: COMMERCIAL

## 2023-07-18 VITALS
SYSTOLIC BLOOD PRESSURE: 139 MMHG | BODY MASS INDEX: 42.52 KG/M2 | HEIGHT: 62 IN | HEART RATE: 98 BPM | WEIGHT: 231.06 LBS | DIASTOLIC BLOOD PRESSURE: 88 MMHG

## 2023-07-18 DIAGNOSIS — H46.9 OPTIC NEURITIS: ICD-10-CM

## 2023-07-18 DIAGNOSIS — Z72.0 TOBACCO ABUSE: ICD-10-CM

## 2023-07-18 DIAGNOSIS — I67.1 CEREBRAL ANEURYSM, NONRUPTURED: Primary | Chronic | ICD-10-CM

## 2023-07-18 PROCEDURE — 99215 PR OFFICE/OUTPT VISIT, EST, LEVL V, 40-54 MIN: ICD-10-PCS | Mod: S$GLB,,, | Performed by: NURSE PRACTITIONER

## 2023-07-18 PROCEDURE — 1160F RVW MEDS BY RX/DR IN RCRD: CPT | Mod: CPTII,S$GLB,, | Performed by: NURSE PRACTITIONER

## 2023-07-18 PROCEDURE — 3044F HG A1C LEVEL LT 7.0%: CPT | Mod: CPTII,S$GLB,, | Performed by: NURSE PRACTITIONER

## 2023-07-18 PROCEDURE — 1111F DSCHRG MED/CURRENT MED MERGE: CPT | Mod: CPTII,S$GLB,, | Performed by: NURSE PRACTITIONER

## 2023-07-18 PROCEDURE — 3008F BODY MASS INDEX DOCD: CPT | Mod: CPTII,S$GLB,, | Performed by: NURSE PRACTITIONER

## 2023-07-18 PROCEDURE — 99999 PR PBB SHADOW E&M-EST. PATIENT-LVL III: ICD-10-PCS | Mod: PBBFAC,,, | Performed by: NURSE PRACTITIONER

## 2023-07-18 PROCEDURE — 99999 PR PBB SHADOW E&M-EST. PATIENT-LVL III: CPT | Mod: PBBFAC,,, | Performed by: NURSE PRACTITIONER

## 2023-07-18 PROCEDURE — 3044F PR MOST RECENT HEMOGLOBIN A1C LEVEL <7.0%: ICD-10-PCS | Mod: CPTII,S$GLB,, | Performed by: NURSE PRACTITIONER

## 2023-07-18 PROCEDURE — 3008F PR BODY MASS INDEX (BMI) DOCUMENTED: ICD-10-PCS | Mod: CPTII,S$GLB,, | Performed by: NURSE PRACTITIONER

## 2023-07-18 PROCEDURE — 3075F PR MOST RECENT SYSTOLIC BLOOD PRESS GE 130-139MM HG: ICD-10-PCS | Mod: CPTII,S$GLB,, | Performed by: NURSE PRACTITIONER

## 2023-07-18 PROCEDURE — 1159F PR MEDICATION LIST DOCUMENTED IN MEDICAL RECORD: ICD-10-PCS | Mod: CPTII,S$GLB,, | Performed by: NURSE PRACTITIONER

## 2023-07-18 PROCEDURE — 1159F MED LIST DOCD IN RCRD: CPT | Mod: CPTII,S$GLB,, | Performed by: NURSE PRACTITIONER

## 2023-07-18 PROCEDURE — 1160F PR REVIEW ALL MEDS BY PRESCRIBER/CLIN PHARMACIST DOCUMENTED: ICD-10-PCS | Mod: CPTII,S$GLB,, | Performed by: NURSE PRACTITIONER

## 2023-07-18 PROCEDURE — 1111F PR DISCHARGE MEDS RECONCILED W/ CURRENT OUTPATIENT MED LIST: ICD-10-PCS | Mod: CPTII,S$GLB,, | Performed by: NURSE PRACTITIONER

## 2023-07-18 PROCEDURE — 3079F DIAST BP 80-89 MM HG: CPT | Mod: CPTII,S$GLB,, | Performed by: NURSE PRACTITIONER

## 2023-07-18 PROCEDURE — 3079F PR MOST RECENT DIASTOLIC BLOOD PRESSURE 80-89 MM HG: ICD-10-PCS | Mod: CPTII,S$GLB,, | Performed by: NURSE PRACTITIONER

## 2023-07-18 PROCEDURE — 3075F SYST BP GE 130 - 139MM HG: CPT | Mod: CPTII,S$GLB,, | Performed by: NURSE PRACTITIONER

## 2023-07-18 PROCEDURE — 99215 OFFICE O/P EST HI 40 MIN: CPT | Mod: S$GLB,,, | Performed by: NURSE PRACTITIONER

## 2023-07-19 DIAGNOSIS — Z12.31 OTHER SCREENING MAMMOGRAM: ICD-10-CM

## 2023-07-20 ENCOUNTER — OFFICE VISIT (OUTPATIENT)
Dept: OPHTHALMOLOGY | Facility: CLINIC | Age: 43
End: 2023-07-20
Payer: COMMERCIAL

## 2023-07-20 DIAGNOSIS — H54.7 UNEXPLAINED VISUAL LOSS: Primary | ICD-10-CM

## 2023-07-20 DIAGNOSIS — H54.60 MONOCULAR VISION LOSS: ICD-10-CM

## 2023-07-20 PROCEDURE — 3044F HG A1C LEVEL LT 7.0%: CPT | Mod: CPTII,S$GLB,, | Performed by: OPHTHALMOLOGY

## 2023-07-20 PROCEDURE — 92235 FLUORESCEIN ANGIOGRAPHY - OU - BOTH EYES: ICD-10-PCS | Mod: S$GLB,,, | Performed by: OPHTHALMOLOGY

## 2023-07-20 PROCEDURE — 3044F PR MOST RECENT HEMOGLOBIN A1C LEVEL <7.0%: ICD-10-PCS | Mod: CPTII,S$GLB,, | Performed by: OPHTHALMOLOGY

## 2023-07-20 PROCEDURE — 1160F RVW MEDS BY RX/DR IN RCRD: CPT | Mod: CPTII,S$GLB,, | Performed by: OPHTHALMOLOGY

## 2023-07-20 PROCEDURE — 1111F DSCHRG MED/CURRENT MED MERGE: CPT | Mod: CPTII,S$GLB,, | Performed by: OPHTHALMOLOGY

## 2023-07-20 PROCEDURE — 1111F PR DISCHARGE MEDS RECONCILED W/ CURRENT OUTPATIENT MED LIST: ICD-10-PCS | Mod: CPTII,S$GLB,, | Performed by: OPHTHALMOLOGY

## 2023-07-20 PROCEDURE — 99999 PR PBB SHADOW E&M-EST. PATIENT-LVL III: ICD-10-PCS | Mod: PBBFAC,,, | Performed by: OPHTHALMOLOGY

## 2023-07-20 PROCEDURE — 99999 PR PBB SHADOW E&M-EST. PATIENT-LVL III: CPT | Mod: PBBFAC,,, | Performed by: OPHTHALMOLOGY

## 2023-07-20 PROCEDURE — 1159F MED LIST DOCD IN RCRD: CPT | Mod: CPTII,S$GLB,, | Performed by: OPHTHALMOLOGY

## 2023-07-20 PROCEDURE — 1160F PR REVIEW ALL MEDS BY PRESCRIBER/CLIN PHARMACIST DOCUMENTED: ICD-10-PCS | Mod: CPTII,S$GLB,, | Performed by: OPHTHALMOLOGY

## 2023-07-20 PROCEDURE — 92235 FLUORESCEIN ANGRPH MLTIFRAME: CPT | Mod: S$GLB,,, | Performed by: OPHTHALMOLOGY

## 2023-07-20 PROCEDURE — 1159F PR MEDICATION LIST DOCUMENTED IN MEDICAL RECORD: ICD-10-PCS | Mod: CPTII,S$GLB,, | Performed by: OPHTHALMOLOGY

## 2023-07-20 PROCEDURE — 92133 CPTRZD OPH DX IMG PST SGM ON: CPT | Mod: S$GLB,,, | Performed by: OPHTHALMOLOGY

## 2023-07-20 PROCEDURE — 99204 PR OFFICE/OUTPT VISIT, NEW, LEVL IV, 45-59 MIN: ICD-10-PCS | Mod: S$GLB,,, | Performed by: OPHTHALMOLOGY

## 2023-07-20 PROCEDURE — 99204 OFFICE O/P NEW MOD 45 MIN: CPT | Mod: S$GLB,,, | Performed by: OPHTHALMOLOGY

## 2023-07-20 PROCEDURE — 92133 POSTERIOR SEGMENT OCT OPTIC NERVE(OCULAR COHERENCE TOMOGRAPHY) - OU - BOTH EYES: ICD-10-PCS | Mod: S$GLB,,, | Performed by: OPHTHALMOLOGY

## 2023-07-20 RX ORDER — FLUORESCEIN 500 MG/ML
5 INJECTION INTRAVENOUS
Status: COMPLETED | OUTPATIENT
Start: 2023-07-20 | End: 2023-07-20

## 2023-07-20 RX ADMIN — FLUORESCEIN 500 MG: 500 INJECTION INTRAVENOUS at 11:07

## 2023-07-20 NOTE — PROGRESS NOTES
HPI    42 year old female     ED follow up    Patient had recent 1 day hospitalization at this facility for DSA+MJ   placement for a right hypophyseal aneurysm on 07/05.  There were no   perioperative complications and she recovered in the neuro ICU.  She was   maintained on dual antiplatelet therapy.     07/06/2023-She presented to outside hospital earlier today with complaint   of right-sided weakness.  She also had vision loss in the right eye.  She   complained of a frontal headache as well.    Today pt present with her sister and her daughter. Pt is wearing a patch   on her right eye and states it is because of the pain and pressure that   she has on that side.   States that her eye is painful to touch and eye movement.   Pt is not on any drops       Last edited by Arabella Dubon MA on 7/20/2023 10:25 AM.            Assessment /Plan     For exam results, see Encounter Report.    Unexplained visual loss  Comments:  Right eye 07/06/2023  Orders:  -     Flourescein Angiography - OU - Both Eyes  -     fluorescein 500 mg/5 mL (10 %) injection 500 mg  -     Posterior Segment OCT Optic Nerve- Both eyes    Monocular vision loss        Patient with daughter and Sister    Presents with persistent Vision Loss OD since @ 07/06/2023  + Right orbital pain    Eugenie Laguerre is a 42 y.o. female with  Hx of migraines (typically R sided not associated with flashes of light)    family hx of ruptured cerebral aneurysms    SP right ICA artery aneurysm s/p MJ stent on 07/05/23 and DAPT    She presented to ED @ 07/07 as a transfer from [facility] for vision loss in the R eye upon awaking 07/06/2023  Vision loss OD is stable since onset    PMHx  Negative for factor V leiden mutation, prior DVTs p [address]      07/10/2023 MRI orbits  There is the suggestion of subtle enhancement of the right optic nerve within the orbit and optic canal on axial imaging although this is not confirmed on coronal imaging.      No  stephany ocular findings for HM vision OD x + APD and constricted CVF OD  No ONH pallor / edema at this time  Not suggestive retinal vascular occlusive   Not time course for optic neuritis and not suspicious on MRI for ovoid bodies etc  ? Posterior ON ischemia ??      07/20/2023 07/20/2023 Right eye                        Plan  RTC 1 months MRx, IOP, DFE and OCT RNFL  Keep fu with Neurologist Dr Gerardo on NS  RTC sooner prn with good understanding

## 2023-07-20 NOTE — PROGRESS NOTES
"[facility] CLINIC VISIT      SUBJECTIVE:    History for Present Illness: Eugenie Laguerre is a 42 y.o.  female with a past medical history of anxiety, hypothyroidism,  migraines, RLS and clotting disorder who presents to me in clinic today following successful endovascular emobolization (MJ X) of unruptured right ICA aneurysm on 07/05/2023. She is accompanied to today's visit by her  family.      Relevant HPI:  Patient was initially referred to me by Tricia Gerardo for abnormal imaging finding.  MRA of the brain indicated a 2 mm distal right ICA superior hypophyseal segment saccular aneurysm.  She was last seen in clinic by me on 06/07/2023 following successful diagnostic cerebral angiogram for better characterization of MRA findings.  At the time of her last visit, we discussed intracranial aneurysm pathology, risk factors and imaging findings as well as available treatment options including endovascular embolization.  Patient verbalized understanding and opted to move forward with endovascular embolization of the unruptured right ICA aneurysm.    Interval History: The patient is s/p successful embolization of the unruptured right ICA aneurysm.  She was discharged home on 07/05/2023 with directions to continue dual anti-platelet therapy with aspirin and Brilinta.  Per patient she could not get Brilinta filled.  She reported to the ED on 07/07/2023 with acute vision loss and pain in right eye.  Per ED note patient was able to see light and movement in the right eye but could not count fingers.  Per Ophthalmology note eye exam does not suggest retina vein occlusion".  CTA head and neck on 07/09/2023 indicated right ICA stent in place with no vessel occlusion or high-grade stenosis.  Follow-up MRI of the orbits 7-10-23 suggest subtle enhancement of the right optic nerve.  At the time of today's visit, patient reports vision deficit in the right eye.  She is able to see motions and shadows.  In " addition, she complains of pain to right eye around the orbit and with extraocular motion.  Patient currently has a patch over the right eye which she feels helps with the pain.  She is currently adherent with medication regimen including aspirin and Brilinta.  She was discharged home with gabapentin for headache and pain patient verbalized some relief with gabapentin however states pain returns approximately 2 hours prior to next scheduled dose. She denies associated  focal weakness or numbness or language or language disturbances.       Past Medical History:   Diagnosis Date    Anxiety 2023    Clotting disorder     DVT (deep venous thrombosis) 2007    patient does not have factor V leiden mutation - she had a negative test on 2013 and an office visit with Dr Canela (Heme/Onc) stating that her test was negative. Dr Canela ruled out antiphospholipid syndrome as well.  I see no evidence of any other hypercoagulable disorders being diagnosed. Pt had what sounds like a provoked LE DVT after a 16 hour car trip to [place], [place]    High risk pregnancy due to history of  labor 2015    Hyperthyroidism 2013    reports repeat testing was WNL and no treatment needed.    Iron deficiency anemia due to chronic blood loss 2015    Major depressive disorder, recurrent, moderate 2023    Migraines     Restless legs syndrome (RLS)     Short cervix affecting pregnancy 2015     Past Surgical History:   Procedure Laterality Date    2 R knee sx; 1 L knee      arthroscopies    BRONCHOSCOPY       SECTION  2015    LAPAROSCOPIC CHOLECYSTECTOMY N/A 2021    Procedure: CHOLECYSTECTOMY, LAPAROSCOPIC;  Surgeon: Pascual Lu MD;  Location: [facility];  Service: General;  Laterality: N/A;    TUBAL LIGATION  2015     Family History   Problem Relation Age of Onset    Fibromyalgia Mother     Depression Mother     Anuerysm Father     Cancer Father         skin    Deep vein  thrombosis Father     Hypertension Father     Bipolar disorder Sister     Ovarian cancer Maternal Aunt     Bladder Cancer Maternal Aunt     Anuerysm Paternal Aunt         AAA    Breast cancer Maternal Grandmother     Diabetes Paternal Grandmother     Anuerysm Paternal Grandfather         AAA    Heart disease Brother     Colon cancer Neg Hx     Stomach cancer Neg Hx     Esophageal cancer Neg Hx         Current Outpatient Medications:     aspirin (ECOTRIN) 81 MG EC tablet, Take 81 mg by mouth once daily., Disp: , Rfl:     busPIRone (BUSPAR) 15 MG tablet, Take 1 tablet (15 mg total) by mouth 2 (two) times daily., Disp: 60 tablet, Rfl: 5    divalproex (DEPAKOTE) 500 MG TbEC, Take 2 tablets (1,000 mg total) by mouth once daily., Disp: 60 tablet, Rfl: 11    gabapentin (NEURONTIN) 300 MG capsule, Take 1 capsule (300 mg total) by mouth every 4 (four) hours as needed (Pain)., Disp: 120 capsule, Rfl: 3    HYDROcodone-acetaminophen (NORCO)  mg per tablet, Take 1 tablet by mouth every 6 (six) hours as needed for Pain., Disp: 28 tablet, Rfl: 0    hydrOXYzine (ATARAX) 50 MG tablet, Take 1 tablet (50 mg total) by mouth every 6 (six) hours as needed for Itching., Disp: 120 tablet, Rfl: 3    ondansetron (ZOFRAN) 4 MG tablet, Take 1 tablet (4 mg total) by mouth every 8 (eight) hours as needed for Nausea., Disp: 60 tablet, Rfl: 2    rizatriptan (MAXALT) 10 MG tablet, 1 tab PO PRN migraine. May repeat every 2 hours for max 3 tabs in 24 hours. Use no more than 10 days per month., Disp: 9 tablet, Rfl: 11    sertraline (ZOLOFT) 25 MG tablet, Take 1 tablet (25 mg total) by mouth once daily. (Patient taking differently: Take 25 mg by mouth every evening.), Disp: 30 tablet, Rfl: 5    ticagrelor (BRILINTA) 90 mg tablet, Take 1 tablet (90 mg total) by mouth 2 (two) times daily., Disp: 60 tablet, Rfl: 3    tiZANidine (ZANAFLEX) 4 MG tablet, Take 4 mg by mouth daily as needed (MUSCLE SPASMS)., Disp: , Rfl:     topiramate (TOPAMAX) 100 MG  "tablet, Take 1 tablet (100 mg total) by mouth every evening., Disp: 30 tablet, Rfl: 11     Review of Systems:   Constitutional:  Negative for chills and fever.   HENT:  Negative for sore throat.    Eyes:  Positive visual disturbance.   Respiratory:  Negative for shortness of breath.    Cardiovascular:  Negative for chest pain.   Gastrointestinal:  Negative for changes in bowels.   Genitourinary:  Negative for dysuria.   Musculoskeletal:  Negative for back pain.   Skin:  Negative for rash.   Neurological:  Negative for weakness.     OBJECTIVE:    /88   Pulse 98   Ht 5' 2" (1.575 m)   Wt 104.8 kg (231 lb 0.7 oz)   LMP 07/04/2023   BMI 42.26 kg/m²     Physical Exam   Constitution: She appears well nourished. No distress   LOC: Alert and follows request  Head: Normocephalic and atraumatic.   Cardiovascular: Normal rate. Intact distal pulses  Pulmonary/Chest: Effort normal. No respiratory distress  Psychiatric: no pressured speech; normal affect; no evidence of impaired cognition    Neurologic Exam:  Orientation: person, place and time  Language: No aphasia  Speech: No dysarthria  Memory: Recent memory intact; Remote memory intact; age correct; month correct  Visual Fields (CN II):  Full.  Patient able to differentiate motion and shadow in all quadrants.  Unable to differentiate number of fingers on right.She also had difficulty with color vision ([place]shiahara plates).  EOM (CN III, IV, VI): Full intact.Pain with EOM on right  Pupils (CN II, III): PERRL  Facial Sensation (CN V): symmetric  Facial Movement (CN VII): Symmetrical facial expressions   Hearing (CN VIII):  Intact bilaterally   Shoulder/Neck (CN XI): SCM-Left: Normal; SCM-Right: Normal; Left Shoulder Shrug: Normal/Symmetric ; Right Shoulder Shrug: Normal/Symmetric  Tongue (CN XII): to midline  Motor examination of all extremities :demonstrates normal bulk and tone in all four limbs. There are no atrophy or fasciculations. BUE 5/5 ;BLE 5/5  Sensory " examination: is normal light touch in BUE and BLE.   Gait: Gait steady                                                                                                                                                                                         Relevant Labwork:  Recent Labs   Lab 08/07/20  0424 08/14/20  1003 07/11/23  0657   Hemoglobin A1C  --    < > 5.2   LDL Calculated 119 H  --   --    HDL 46  --   --    Triglycerides 36  --   --    Cholesterol 172  --   --     < > = values in this interval not displayed.       Diagnostic Results:  I have personally reviewed the pertinent imaging made available to me today    Brain Imaging   MRI orbits 07/10/2023:  Suggestive of subtle enhancement of the right optic nerve that could be seen with optic neuritis but is nonspecific in light of the history    Vessel Imaging   CTA head and neck 07/09/2023:  Right ICA stent in place.    No vessel occlusion or high-grade stenosis    IR Angiogram 07/05/2023:  Successful MJ X flow diversion of the unruptured, wide gait, right clinoid segment internal carotid artery aneurysm    Cardiac Imaging     Assessment:  Eugenie Laguerre  is a 42 y.o.  female  seen today in clinic for follow-up assessment and recommendations. The following recommendations and plan were discussed in depth with the patient who voiced understanding and was in agreement.  Plan:  Cerebral aneurysm without rupture  Successful MJ X flow diversion of the unruptured, wide gait, right clinoid segment internal carotid artery aneurysm  Continue dual antiplatelet therapy with aspirin 81 mg daily and Brilinta 90 mg b.i.d. for six-month postprocedure  Plan for diagnostic cerebral angiogram 6 months post procedure to reassess previously embolized unruptured right ICA aneurysm    Possible optic neuritis  -Spoke with neuro opthalmology - patient scheduled to follow up with Dr Rene 07/20/23 for further assessment and recommendations.      Patient/Family teaching  provided during this visit  -Identifying the signs and symptoms of stroke; emergency action and ER attention  -Risk factor control  -Optimization for secondary stroke prevention including compliance with current medications       I will plan on having Eugenie return to clinic in 6 months. The patient can contact my office with any questions or concerns they may have as they arise in the interim.     45 minutes of total time spent on the encounter, which includes face to face time and non-face to face time preparing to see the patient (eg, review of tests), Obtaining and/or reviewing separately obtained history, Documenting clinical information in the electronic or other health record, Independently interpreting results (not separately reported) and communicating results to the patient/family/caregiver, patient/family education and Care coordination (not separately reported).     Gabriela Naik, NP-C  Department of Vascular Neurology  [facility]  [phone]    This note is dictated on M*Modal Fluency Direct word recognition program. There are word recognition mistakes that are occasionally missed on review

## 2023-07-20 NOTE — Clinical Note
I place images in chart, OD vision loss is low suspect for optic neuritis following R ICA aneurysmal closure, ?? Possible posterior optic nerve ischemia ?? Will recheck @ 1 month for visual improvement and optic nerve pallor.

## 2023-07-21 ENCOUNTER — OFFICE VISIT (OUTPATIENT)
Dept: FAMILY MEDICINE | Facility: CLINIC | Age: 43
End: 2023-07-21
Payer: COMMERCIAL

## 2023-07-21 VITALS
HEIGHT: 62 IN | WEIGHT: 228.63 LBS | OXYGEN SATURATION: 96 % | BODY MASS INDEX: 42.07 KG/M2 | SYSTOLIC BLOOD PRESSURE: 128 MMHG | DIASTOLIC BLOOD PRESSURE: 80 MMHG | HEART RATE: 91 BPM | TEMPERATURE: 98 F

## 2023-07-21 DIAGNOSIS — I67.1 CEREBRAL ANEURYSM WITHOUT RUPTURE: ICD-10-CM

## 2023-07-21 DIAGNOSIS — Z86.718 HISTORY OF DVT OF LOWER EXTREMITY: ICD-10-CM

## 2023-07-21 DIAGNOSIS — H54.61 VISION LOSS OF RIGHT EYE: Primary | ICD-10-CM

## 2023-07-21 PROCEDURE — 3008F PR BODY MASS INDEX (BMI) DOCUMENTED: ICD-10-PCS | Mod: CPTII,S$GLB,, | Performed by: NURSE PRACTITIONER

## 2023-07-21 PROCEDURE — 99999 PR PBB SHADOW E&M-EST. PATIENT-LVL IV: ICD-10-PCS | Mod: PBBFAC,,, | Performed by: NURSE PRACTITIONER

## 2023-07-21 PROCEDURE — 3044F HG A1C LEVEL LT 7.0%: CPT | Mod: CPTII,S$GLB,, | Performed by: NURSE PRACTITIONER

## 2023-07-21 PROCEDURE — 3079F PR MOST RECENT DIASTOLIC BLOOD PRESSURE 80-89 MM HG: ICD-10-PCS | Mod: CPTII,S$GLB,, | Performed by: NURSE PRACTITIONER

## 2023-07-21 PROCEDURE — 99214 OFFICE O/P EST MOD 30 MIN: CPT | Mod: S$GLB,,, | Performed by: NURSE PRACTITIONER

## 2023-07-21 PROCEDURE — 99214 PR OFFICE/OUTPT VISIT, EST, LEVL IV, 30-39 MIN: ICD-10-PCS | Mod: S$GLB,,, | Performed by: NURSE PRACTITIONER

## 2023-07-21 PROCEDURE — 1111F DSCHRG MED/CURRENT MED MERGE: CPT | Mod: CPTII,S$GLB,, | Performed by: NURSE PRACTITIONER

## 2023-07-21 PROCEDURE — 3074F PR MOST RECENT SYSTOLIC BLOOD PRESSURE < 130 MM HG: ICD-10-PCS | Mod: CPTII,S$GLB,, | Performed by: NURSE PRACTITIONER

## 2023-07-21 PROCEDURE — 1111F PR DISCHARGE MEDS RECONCILED W/ CURRENT OUTPATIENT MED LIST: ICD-10-PCS | Mod: CPTII,S$GLB,, | Performed by: NURSE PRACTITIONER

## 2023-07-21 PROCEDURE — 3044F PR MOST RECENT HEMOGLOBIN A1C LEVEL <7.0%: ICD-10-PCS | Mod: CPTII,S$GLB,, | Performed by: NURSE PRACTITIONER

## 2023-07-21 PROCEDURE — 3079F DIAST BP 80-89 MM HG: CPT | Mod: CPTII,S$GLB,, | Performed by: NURSE PRACTITIONER

## 2023-07-21 PROCEDURE — 3008F BODY MASS INDEX DOCD: CPT | Mod: CPTII,S$GLB,, | Performed by: NURSE PRACTITIONER

## 2023-07-21 PROCEDURE — 3074F SYST BP LT 130 MM HG: CPT | Mod: CPTII,S$GLB,, | Performed by: NURSE PRACTITIONER

## 2023-07-21 PROCEDURE — 99999 PR PBB SHADOW E&M-EST. PATIENT-LVL IV: CPT | Mod: PBBFAC,,, | Performed by: NURSE PRACTITIONER

## 2023-07-21 RX ORDER — GABAPENTIN 100 MG/1
100 CAPSULE ORAL EVERY 6 HOURS PRN
Qty: 90 CAPSULE | Refills: 0 | Status: SHIPPED | OUTPATIENT
Start: 2023-07-21 | End: 2023-08-02

## 2023-07-21 NOTE — PROGRESS NOTES
"Subjective:       Patient ID: Eugenie Laguerre is a 42 y.o. female.    Chief Complaint: Hospital Follow Up    HPI  Patient presents for hospital follow up:    "42F w migraines (topamax), depression/PTSD, family hx of ruptured cerebral aneurysms, personal h/o right ICA artery aneurysm s/p recent MJ stent on 7/05/23 (by IR/neuro), dc/-ed from [facility] to home on 7/5 and Rx-ed DAPT however could not fill the Brilinta Rx despite trying to fill it, who presented as transfer from [facility] for acute vision loss in the right eye associated with severe pain.   Able to see light and movements in the right eye but could not count fingers.  [place] [facility] [place] V[place] Neuro, General Neuro, Neurosurgery, and Psychology consulted. DDx arterial thrombosis 2/2 no DAPT after recent stent, retinal vein occlusion, optic neuritis, other.      Imaging included CT head non-contrast and MRA brain/neck without contrast on 7/7, then MRI orbits without contrast and MRI brain without contrast on 7/8, then CTA head/neck on 7/9, then MRI orbits w/ and w/o on 7/10.       CTA did not suggest thrombosis however it was done on 7/9, two days after the MRA on 7/7. The initial MRI orbits was a nondiagnostic study with "Mild increased signal within the bilateral optic nerve sheaths.  Underlying optic neuritis not excluded on limited noncontrast sequences, as the exam was prematurely ended at the patient's request."  The follow up MRI orbits with and without contrast was equally equivocal, with "suggestion of subtle enhancement of the right optic nerve within the orbit and optic canal on axial imaging although this is not confirmed on coronal imaging."  Per Ophthalmology, eye exam does not suggest retina vein occlusion. Trial of pulse dose steroids s/p 3 days with no improvement in vision or pain, which does not fit with optic neuritis.  Continue DAPT.  She reports she is ambulating back and forth to the bathroom, does not wish to pursue SNF.    " "  Yesterday, transitioned to oral pain regimen (from IV) with challenging pain control so stayed overnight.  Gabapentin up-titrated from 100 to 300mg.      Today, gabapentin 300mg is very effective for her pain and wears off after 4 hours, so we are giving it q4 PRN. She is awake and not somnolent on this dose.      Please note that patient does not have factor V leiden mutation - she had a negative test on 6/5/2013 and an office visit with Dr Canela (Heme/Onc) stating that her test was negative. Dr Canela ruled out antiphospholipid syndrome as well.  I see no evidence of any other hypercoagulable disorders being diagnosed. Pt had what sounds like a provoked LE DVT after a 16 hour car trip to [place], [place] (with just a brief nap at some point)."      DCd home 7/14/23  Saw ophthalmology yesterday--FU 1 month   No stephany ocular findings for HM vision OD x + APD and constricted CVF OD  No ONH pallor / edema at this time  Not suggestive retinal vascular occlusive   Not time course for optic neuritis and not suspicious on MRI for ovoid bodies etc  ? Posterior ON ischemia ??    Concerned gabapentin dose may be too high causing side effects--taking 300mg q4h. imbalance, coordination issues, hand twitches    Patient is concerned about hypercoagulation. Requesting hematology eval     Patient has FU scheduled with headache department but not vascular neuro     Vitals:    07/21/23 0806   BP: 128/80   Pulse: 91   Temp: 97.8 °F (36.6 °C)     Review of Systems   Eyes:  Positive for visual disturbance.   Musculoskeletal:  Positive for gait problem.   Neurological:  Positive for tremors, weakness and headaches.     Past Medical History:   Diagnosis Date    Anxiety 01/24/2023    Clotting disorder     DVT (deep venous thrombosis) 03/2007    patient does not have factor V leiden mutation - she had a negative test on 6/5/2013 and an office visit with Dr Canela (Heme/Onc) stating that her test was negative. Dr Canela ruled out " antiphospholipid syndrome as well.  I see no evidence of any other hypercoagulable disorders being diagnosed. Pt had what sounds like a provoked LE DVT after a 16 hour car trip to [place], [place]    High risk pregnancy due to history of  labor 2015    Hyperthyroidism 2013    reports repeat testing was WNL and no treatment needed.    Iron deficiency anemia due to chronic blood loss 2015    Major depressive disorder, recurrent, moderate 2023    Migraines     Restless legs syndrome (RLS)     Short cervix affecting pregnancy 2015     Objective:      Physical Exam  Constitutional:       General: She is not in acute distress.     Appearance: She is well-developed. She is not ill-appearing, toxic-appearing or diaphoretic.   HENT:      Right Ear: Hearing normal.      Left Ear: Hearing normal.   Cardiovascular:      Rate and Rhythm: Normal rate.   Pulmonary:      Effort: No tachypnea or respiratory distress.   Skin:     Coloration: Skin is not pale.   Neurological:      Mental Status: She is alert and oriented to person, place, and time.   Psychiatric:         Mood and Affect: Mood and affect normal.         Speech: Speech normal.         Behavior: Behavior normal.         Thought Content: Thought content normal.         Judgment: Judgment normal.       Assessment:       1. Vision loss of right eye    2. Cerebral aneurysm without rupture    3. History of DVT of lower extremity        Plan:       Vision loss of right eye  -     Ambulatory referral/consult to Home Health; Future; Expected date: 2023    Cerebral aneurysm without rupture  -     Ambulatory referral/consult to Home Health; Future; Expected date: 2023    History of DVT of lower extremity  -     Ambulatory referral/consult to Hematology / Oncology; Future; Expected date: 2023    Other orders  -     gabapentin (NEURONTIN) 100 MG capsule; Take 1 capsule (100 mg total) by mouth every 6 (six) hours as needed.  Dispense:  90 capsule; Refill: 0          Discussed reducing gabapentin while maintaining pain control--continue 300mg qhs, trial reduction to 200mg during day, space out as tolerated, wean as tolerated     FU 2 weeks as scheduled with PCP   Will message vascular neuro for assistance with follow up       Medication List with Changes/Refills   New Medications    GABAPENTIN (NEURONTIN) 100 MG CAPSULE    Take 1 capsule (100 mg total) by mouth every 6 (six) hours as needed.   Current Medications    ASPIRIN (ECOTRIN) 81 MG EC TABLET    Take 81 mg by mouth once daily.    BUSPIRONE (BUSPAR) 15 MG TABLET    Take 1 tablet (15 mg total) by mouth 2 (two) times daily.    DIVALPROEX (DEPAKOTE) 500 MG TBEC    Take 2 tablets (1,000 mg total) by mouth once daily.    GABAPENTIN (NEURONTIN) 300 MG CAPSULE    Take 1 capsule (300 mg total) by mouth every 4 (four) hours as needed (Pain).    HYDROCODONE-ACETAMINOPHEN (NORCO)  MG PER TABLET    Take 1 tablet by mouth every 6 (six) hours as needed for Pain.    HYDROXYZINE (ATARAX) 50 MG TABLET    Take 1 tablet (50 mg total) by mouth every 6 (six) hours as needed for Itching.    ONDANSETRON (ZOFRAN) 4 MG TABLET    Take 1 tablet (4 mg total) by mouth every 8 (eight) hours as needed for Nausea.    RIZATRIPTAN (MAXALT) 10 MG TABLET    1 tab PO PRN migraine. May repeat every 2 hours for max 3 tabs in 24 hours. Use no more than 10 days per month.    SERTRALINE (ZOLOFT) 25 MG TABLET    Take 1 tablet (25 mg total) by mouth once daily.    TICAGRELOR (BRILINTA) 90 MG TABLET    Take 1 tablet (90 mg total) by mouth 2 (two) times daily.    TIZANIDINE (ZANAFLEX) 4 MG TABLET    Take 4 mg by mouth daily as needed (MUSCLE SPASMS).    TOPIRAMATE (TOPAMAX) 100 MG TABLET    Take 1 tablet (100 mg total) by mouth every evening.

## 2023-07-21 NOTE — Clinical Note
Jody Ochoa! I saw Eugenie today. Her ophthalmology eval yesterday was unrevealing. She remains with right vision loss and pressure/pain. She has a follow up of our neuro headache department but none with y'all--didn't know timeframe on when you would want to see her.

## 2023-07-24 ENCOUNTER — PATIENT MESSAGE (OUTPATIENT)
Dept: FAMILY MEDICINE | Facility: CLINIC | Age: 43
End: 2023-07-24

## 2023-07-24 ENCOUNTER — PATIENT MESSAGE (OUTPATIENT)
Dept: ADMINISTRATIVE | Facility: HOSPITAL | Age: 43
End: 2023-07-24

## 2023-07-24 PROCEDURE — G0180 PR HOME HEALTH MD CERTIFICATION: ICD-10-PCS | Mod: ,,, | Performed by: NURSE PRACTITIONER

## 2023-07-24 PROCEDURE — G0180 MD CERTIFICATION HHA PATIENT: HCPCS | Mod: ,,, | Performed by: NURSE PRACTITIONER

## 2023-08-02 ENCOUNTER — OFFICE VISIT (OUTPATIENT)
Dept: NEUROLOGY | Facility: CLINIC | Age: 43
End: 2023-08-02
Payer: COMMERCIAL

## 2023-08-02 ENCOUNTER — OFFICE VISIT (OUTPATIENT)
Dept: FAMILY MEDICINE | Facility: CLINIC | Age: 43
End: 2023-08-02
Payer: COMMERCIAL

## 2023-08-02 VITALS
BODY MASS INDEX: 41.06 KG/M2 | SYSTOLIC BLOOD PRESSURE: 124 MMHG | WEIGHT: 223.13 LBS | DIASTOLIC BLOOD PRESSURE: 85 MMHG | HEART RATE: 78 BPM | HEIGHT: 62 IN | RESPIRATION RATE: 18 BRPM

## 2023-08-02 VITALS
BODY MASS INDEX: 41.02 KG/M2 | TEMPERATURE: 98 F | HEART RATE: 67 BPM | SYSTOLIC BLOOD PRESSURE: 126 MMHG | DIASTOLIC BLOOD PRESSURE: 82 MMHG | OXYGEN SATURATION: 97 % | WEIGHT: 222.88 LBS | HEIGHT: 62 IN

## 2023-08-02 DIAGNOSIS — F41.1 GAD (GENERALIZED ANXIETY DISORDER): ICD-10-CM

## 2023-08-02 DIAGNOSIS — Z00.00 ROUTINE PHYSICAL EXAMINATION: Primary | ICD-10-CM

## 2023-08-02 DIAGNOSIS — Z51.81 MEDICATION MONITORING ENCOUNTER: ICD-10-CM

## 2023-08-02 DIAGNOSIS — I67.1 CEREBRAL ANEURYSM, NONRUPTURED: Chronic | ICD-10-CM

## 2023-08-02 DIAGNOSIS — F43.9 SITUATIONAL STRESS: ICD-10-CM

## 2023-08-02 DIAGNOSIS — G43.119 INTRACTABLE MIGRAINE WITH AURA WITHOUT STATUS MIGRAINOSUS: ICD-10-CM

## 2023-08-02 DIAGNOSIS — G43.719 INTRACTABLE CHRONIC MIGRAINE WITHOUT AURA AND WITHOUT STATUS MIGRAINOSUS: Primary | ICD-10-CM

## 2023-08-02 PROCEDURE — 99999 PR PBB SHADOW E&M-EST. PATIENT-LVL III: ICD-10-PCS | Mod: PBBFAC,,, | Performed by: NURSE PRACTITIONER

## 2023-08-02 PROCEDURE — 99396 PR PREVENTIVE VISIT,EST,40-64: ICD-10-PCS | Mod: ,,, | Performed by: INTERNAL MEDICINE

## 2023-08-02 PROCEDURE — 99999 PR PBB SHADOW E&M-EST. PATIENT-LVL III: ICD-10-PCS | Mod: PBBFAC,,, | Performed by: INTERNAL MEDICINE

## 2023-08-02 PROCEDURE — 99999 PR PBB SHADOW E&M-EST. PATIENT-LVL III: CPT | Mod: PBBFAC,,, | Performed by: INTERNAL MEDICINE

## 2023-08-02 PROCEDURE — 99396 PREV VISIT EST AGE 40-64: CPT | Mod: ,,, | Performed by: INTERNAL MEDICINE

## 2023-08-02 PROCEDURE — 99214 PR OFFICE/OUTPT VISIT, EST, LEVL IV, 30-39 MIN: ICD-10-PCS | Mod: S$PBB,,, | Performed by: NURSE PRACTITIONER

## 2023-08-02 PROCEDURE — 99214 OFFICE O/P EST MOD 30 MIN: CPT | Mod: S$PBB,,, | Performed by: NURSE PRACTITIONER

## 2023-08-02 PROCEDURE — 99999 PR PBB SHADOW E&M-EST. PATIENT-LVL III: CPT | Mod: PBBFAC,,, | Performed by: NURSE PRACTITIONER

## 2023-08-02 RX ORDER — UBROGEPANT 100 MG/1
TABLET ORAL
Qty: 16 TABLET | Refills: 11 | Status: SHIPPED | OUTPATIENT
Start: 2023-08-02

## 2023-08-02 RX ORDER — ATOGEPANT 60 MG/1
60 TABLET ORAL DAILY
Qty: 30 TABLET | Refills: 11 | Status: SHIPPED | OUTPATIENT
Start: 2023-08-02

## 2023-08-02 RX ORDER — DIVALPROEX SODIUM 500 MG/1
1000 TABLET, DELAYED RELEASE ORAL DAILY
Qty: 60 TABLET | Refills: 11
Start: 2023-08-02 | End: 2023-08-11 | Stop reason: SDUPTHER

## 2023-08-02 NOTE — PATIENT INSTRUCTIONS
Please call our clinic at [phone] or send a message on the [facility] portal if there are any changes to the plan described below, for example,if you are not contacted for the requested tests, referral(s) within one week, if you are unable to receive the medications prescribed, or if you feel you need to change the treatment course for any reason.     TESTING:  -- CMP in 3 months     REFERRALS:  -- none    PREVENTION (use daily regardless of headache):  -- START Qulipta once daily   -- continue magnesium in ONE of the following preparations -               1. Magnesium oxide 800mg daily (the most common over the counter kind, may causes loose stools)              2. Magnesium citrate 400-500mg daily (harder to find, but more neutral on the bowels)              3. Magnesium glycinate 400mg daily (hardest to find, look online, but most bowel-neutral, best absorbed)     AS-NEEDED TREATMENT (use total no more than 10 days per month unless otherwise stated):  -- stop rizatriptan   -- continue tizanidine at night. This is a muscle relaxer and it will also make you potentially sleepy. Start with half a tablet to see how you respond but can take up to a whole tablet if needed  -- continue compazine. This is a nausea medication that also has anti-migraine properties. Can take daily and will not contribute to rebound headaches  -- START Ubrelvy with next migraine. You can repeat two hours later if needed. With this medication do not drink grapefruit juice or eat grapefruit or some medications like ketoconazole, itraconazole, or antibiotics clarithromycin

## 2023-08-02 NOTE — PROGRESS NOTES
"  Subjective:       Patient ID: Eugenie Laguerre is a 43 y.o. female.  Chief Complaint: Medication Refill     HPI    Here for routine health maintenance.      Patient had a stent placed in a cerebral aneurysm.  The day after DC, she developed acute right eye vision loss.  It was a black "blob," but now she can see light, but no details.  She had also developed right sided weakness at that time, but this resolved.  She did not miss an anticoagulation dose.  Extensive testing did not reveal an etiology.  MRIs, etc.  Seeing ophthalmology.      Situational stress - 2nd to above.  Would like therapy.  PAUL - medically stable    History of DVT 2008.  Likely provoked.      RLS - controlled.       Assessment:       1. Routine physical examination    2. PAUL (generalized anxiety disorder)    3. Situational stress        Plan:       Routine physical examination    PAUL (generalized anxiety disorder)  -     Ambulatory referral/consult to Psychology; Future; Expected date: 08/09/2023    Situational stress  -     Ambulatory referral/consult to Psychology; Future; Expected date: 08/09/2023    Other orders  -     divalproex (DEPAKOTE) 500 MG TbEC; Take 2 tablets (1,000 mg total) by mouth once daily.  Dispense: 60 tablet; Refill: 11              Wellness reviewed  Wants to wait on mammogram.   Continue current management and monitor.  Other diagnoses were reviewed and found stable and will continue to monitor.  Counseled on regular exercise, maintenance of a healthy weight, balanced diet rich in fruits/vegetables and lean protein, and avoidance of unhealthy habits like smoking and excessive alcohol intake.   Also, counseled on importance of being compliant with medication, health appointments, diet and exercise.     Follow up in about 23 days (around 8/25/2023).  Mood, vision, inc zoloft?        Medication List with Changes/Refills   Current Medications    ASPIRIN (ECOTRIN) 81 MG EC TABLET    Take 81 mg by mouth once daily.    BUSPIRONE " (BUSPAR) 15 MG TABLET    Take 1 tablet (15 mg total) by mouth 2 (two) times daily.    HYDROXYZINE (ATARAX) 50 MG TABLET    Take 1 tablet (50 mg total) by mouth every 6 (six) hours as needed for Itching.    ONDANSETRON (ZOFRAN) 4 MG TABLET    Take 1 tablet (4 mg total) by mouth every 8 (eight) hours as needed for Nausea.    RIZATRIPTAN (MAXALT) 10 MG TABLET    1 tab PO PRN migraine. May repeat every 2 hours for max 3 tabs in 24 hours. Use no more than 10 days per month.    SERTRALINE (ZOLOFT) 25 MG TABLET    Take 1 tablet (25 mg total) by mouth once daily.    TICAGRELOR (BRILINTA) 90 MG TABLET    Take 1 tablet (90 mg total) by mouth 2 (two) times daily.    TIZANIDINE (ZANAFLEX) 4 MG TABLET    Take 4 mg by mouth daily as needed (MUSCLE SPASMS).    TOPIRAMATE (TOPAMAX) 100 MG TABLET    Take 1 tablet (100 mg total) by mouth every evening.   Changed and/or Refilled Medications    Modified Medication Previous Medication    DIVALPROEX (DEPAKOTE) 500 MG TBEC divalproex (DEPAKOTE) 500 MG TbEC       Take 2 tablets (1,000 mg total) by mouth once daily.    Take 2 tablets (1,000 mg total) by mouth once daily.   Discontinued Medications    GABAPENTIN (NEURONTIN) 100 MG CAPSULE    Take 1 capsule (100 mg total) by mouth every 6 (six) hours as needed.    GABAPENTIN (NEURONTIN) 300 MG CAPSULE    Take 1 capsule (300 mg total) by mouth every 4 (four) hours as needed (Pain).       BP Readings from Last 3 Encounters:   08/02/23 126/82   07/21/23 128/80   07/18/23 139/88     Hemoglobin A1C   Date Value Ref Range Status   07/11/2023 5.2 4.0 - 5.6 % Final     Comment:     ADA Screening Guidelines:  5.7-6.4%  Consistent with prediabetes  >or=6.5%  Consistent with diabetes    High levels of fetal hemoglobin interfere with the HbA1C  assay. Heterozygous hemoglobin variants (HbS, HgC, etc)do  not significantly interfere with this assay.   However, presence of multiple variants may affect accuracy.     08/14/2020 5.2 4.0 - 5.6 % Final      Comment:     ADA Screening Guidelines:  5.7-6.4%  Consistent with prediabetes  >or=6.5%  Consistent with diabetes  High levels of fetal hemoglobin interfere with the HbA1C  assay. Heterozygous hemoglobin variants (HbS, HgC, etc)do  not significantly interfere with this assay.   However, presence of multiple variants may affect accuracy.     01/14/2014 5.5 4.5 - 6.2 % Final     Lab Results   Component Value Date    TSH 1.154 08/01/2022     Lab Results   Component Value Date    LDLCALC 119 (H) 08/07/2020    LDLCALC 87.2 11/24/2018    LDLCALC 100.8 05/24/2013     Lab Results   Component Value Date    TRIG 36 08/07/2020    TRIG 79 11/24/2018    TRIG 91 05/24/2013     Wt Readings from Last 3 Encounters:   08/02/23 101.1 kg (222 lb 14.2 oz)   07/21/23 103.7 kg (228 lb 9.9 oz)   07/18/23 104.8 kg (231 lb 0.7 oz)     Lab Results   Component Value Date    HGB 12.9 07/14/2023    HCT 39.0 07/14/2023    WBC 11.57 07/14/2023    ALT 25 07/14/2023    AST 11 07/14/2023     07/14/2023    K 3.7 07/14/2023    CREATININE 1.0 07/14/2023           Review of Systems   Constitutional:  Negative for diaphoresis and fever.   HENT:  Negative for drooling and nosebleeds.    Eyes:  Positive for visual disturbance. Negative for discharge and redness.   Respiratory:  Negative for apnea and choking.    Cardiovascular:  Negative for chest pain and palpitations.   Gastrointestinal:  Negative for abdominal pain and nausea.   Skin:  Negative for color change.   Neurological:  Negative for seizures and syncope.   Psychiatric/Behavioral:  Negative for behavioral problems.            Objective:      Vitals:    08/02/23 1027   BP: 126/82   Pulse: 67   Temp: 97.7 °F (36.5 °C)     Physical Exam  Vitals reviewed.   Eyes:      Conjunctiva/sclera: Conjunctivae normal.      Comments: Loss of vision in right eye.  Can see some finger movement in medial lower and upper vision fields, but can not see any movement in lateral vision fields.    Neck:       Thyroid: No thyromegaly.      Trachea: Trachea normal.   Cardiovascular:      Rate and Rhythm: Normal rate and regular rhythm.      Comments: Edema negative  Pulmonary:      Effort: Pulmonary effort is normal.      Breath sounds: Normal breath sounds.   Abdominal:      General: Bowel sounds are normal.      Palpations: Abdomen is soft. There is no hepatomegaly.   Musculoskeletal:      Cervical back: Normal range of motion.      Comments: ROM normal bilateral  Strength normal bilateral   Skin:     General: Skin is warm and dry.   Neurological:      Deep Tendon Reflexes: Reflexes are normal and symmetric.   Psychiatric:      Comments: Alert and Oriented

## 2023-08-02 NOTE — PROGRESS NOTES
"Date of service: 8/2/2023  Referring provider: No ref. provider found    Subjective:      Chief complaint: No chief complaint on file.       Patient ID: Eugenie Laguerre is a 43 y.o. female with anxiety, hyperthyroidism, VERENICE, migraine, RLS, (?)clotting disorder who presents for follow up of headache     History of Present Illness    INTERVAL HISTORY 8/2/23    Last visit was almost five months ago. At that time we added Emgality and rizatriptan. I ordered an MRA which indicated a small aneurysm for which she saw vascular neuro. She only did the loading dose of Emgality.    Today she reports she is worse. She states she had a procedure done and headaches are worse. She had a coil for aneurysm in July and two days later she lost vision in right eye. She saw ophthalmology who said her eyes were "healthy". Headaches are on the right side. Current pain 3 with range 2-9. She has a daily headache. She is taking rizatriptan and tylenol.  Otherwise information below is reviewed and verified with no changes made     ORIGINAL HEADACHE HISTORY - 3/15/23  Age at onset and course over time: in her 20's had an episode similar to a stroke. She went to the ER and diagnosed with migraines. She was hypotensive due to medication.     She takes Topamax but causes her to be overly sedated. She takes more as needed due to this.     She works as a .      Aura - ring of light in vision, lasts an hour if not treated, always associated with severe headache     Family history of migraine - mom, maternal grandmother  Family history of aneurysm - dad, paternal sister  Last eye exam - august 2022    Location: mainly behind her eyes   Quality:  [] pressure [] tight [x] throbbing [] sharp [] stabbing   Severity: current 6 with range 3-8  Duration: days  Frequency: 20 days per month   Headaches awaken at night?:  yes  Worst time of day: upon waking   Associated with: [x] photophobia [x]  phonophobia [] osmophobia [x] blurred vision  [] double " vision [x] loss of appetite [x] nausea [x] vomiting [x] dizziness [] vertigo  [] tinnitus [x] irritability [] sinus pressure [] problems with concentration   [] neck tightness   Alleviated by:  [x] sleep [x] darkness [] massage [] heat [] ice [] medication  Exacerbated by:  [x] fatigue [x] light [x] noise [] smells [] coughing [] sneezing  [] bending over [] ovulation [] menses [] alcohol [] change in weather [x]  stress  Ipsilateral autonomic: [] nasal congestion [] lacrimation [] ptosis [] injection [] edema [] foreign body sensation [] ear fullness   ICP:  [] transient visual obscurations  [] tinnitus   [x] positional headache  [] non-positional   Sleep habits: trouble falling asleep  Caffeine intake: 44 oz of tea  Gyn status (if female): having periods  HIT 6 - 65    Current acute treatment:  Zofran  Phenergan  Ibuprofen - daily  Rizatriptan    Current prevention:  Buspar  Sertraline  Topamax - 100 mg QHS  Emgality - first March 2023 - loading dose  Depakote    Previously tried/failed acute treatment:  Mobic  Robaxin  Fioricet  Naproxen  Sumatriptan  Diclofenac    Previously tried/failed preventative treatment:  Nifedipine  Amitriptyline - for headaches, somewhat helpful but sedating   Celexa  Zonisamide  Depakote - severe hypotension   Requip    Review of patient's allergies indicates:   Allergen Reactions    Amoxicillin     Augmentin [amoxicillin-pot clavulanate] Swelling    Bactrim [sulfamethoxazole-trimethoprim] Itching and Nausea Only    Ropinirole Rash     Current Outpatient Medications   Medication Sig Dispense Refill    aspirin (ECOTRIN) 81 MG EC tablet Take 81 mg by mouth once daily.      busPIRone (BUSPAR) 15 MG tablet Take 1 tablet (15 mg total) by mouth 2 (two) times daily. 60 tablet 5    divalproex (DEPAKOTE) 500 MG TbEC Take 2 tablets (1,000 mg total) by mouth once daily. 60 tablet 11    hydrOXYzine (ATARAX) 50 MG tablet Take 1 tablet (50 mg total) by mouth every 6 (six) hours as needed for  Itching. 120 tablet 3    ondansetron (ZOFRAN) 4 MG tablet Take 1 tablet (4 mg total) by mouth every 8 (eight) hours as needed for Nausea. 60 tablet 2    sertraline (ZOLOFT) 25 MG tablet Take 1 tablet (25 mg total) by mouth once daily. (Patient taking differently: Take 25 mg by mouth every evening.) 30 tablet 5    ticagrelor (BRILINTA) 90 mg tablet Take 1 tablet (90 mg total) by mouth 2 (two) times daily. 60 tablet 3    tiZANidine (ZANAFLEX) 4 MG tablet Take 4 mg by mouth daily as needed (MUSCLE SPASMS).      topiramate (TOPAMAX) 100 MG tablet Take 1 tablet (100 mg total) by mouth every evening. 30 tablet 11    atogepant (QULIPTA) 60 mg Tab Take 1 tablet (60 mg) by mouth once daily. 30 tablet 11    ubrogepant (UBRELVY) 100 mg tablet Take 1 tablet by mouth as needed for migraine. May repeat in 2 hours if needed. Max 2 tab per day 16 tablet 11     No current facility-administered medications for this visit.       Past Medical History  Past Medical History:   Diagnosis Date    Anxiety 2023    Clotting disorder     DVT (deep venous thrombosis) 2007    patient does not have factor V leiden mutation - she had a negative test on 2013 and an office visit with Dr Canela (Heme/Onc) stating that her test was negative. Dr Canela ruled out antiphospholipid syndrome as well.  I see no evidence of any other hypercoagulable disorders being diagnosed. Pt had what sounds like a provoked LE DVT after a 16 hour car trip to [place], [place]    High risk pregnancy due to history of  labor 2015    Hyperthyroidism 2013    reports repeat testing was WNL and no treatment needed.    Iron deficiency anemia due to chronic blood loss 2015    Major depressive disorder, recurrent, moderate 2023    Migraines     Restless legs syndrome (RLS)     Short cervix affecting pregnancy 2015       Past Surgical History  Past Surgical History:   Procedure Laterality Date    2 R knee sx; 1 L knee      arthroscopies     BRONCHOSCOPY       SECTION  2015    LAPAROSCOPIC CHOLECYSTECTOMY N/A 2021    Procedure: CHOLECYSTECTOMY, LAPAROSCOPIC;  Surgeon: Pascual Lu MD;  [facility];  Service: General;  Laterality: N/A;    TUBAL LIGATION  2015       Family History  Family History   Problem Relation Age of Onset    Fibromyalgia Mother     Depression Mother     Anuerysm Father     Cancer Father         skin    Deep vein thrombosis Father     Hypertension Father     Bipolar disorder Sister     Ovarian cancer Maternal Aunt     Bladder Cancer Maternal Aunt     Anuerysm Paternal Aunt         AAA    Breast cancer Maternal Grandmother     Diabetes Paternal Grandmother     Anuerysm Paternal Grandfather         AAA    Heart disease Brother     Colon cancer Neg Hx     Stomach cancer Neg Hx     Esophageal cancer Neg Hx        Social History  Social History     Socioeconomic History    Marital status:     Number of children: 2   Tobacco Use    Smoking status: Former     Current packs/day: 0.50     Types: Cigarettes    Smokeless tobacco: Former     Quit date: 2021   Substance and Sexual Activity    Alcohol use: Yes     Alcohol/week: 0.0 standard drinks of alcohol     Comment: rare    Drug use: No    Sexual activity: Yes     Partners: Male     Birth control/protection: None     Social Determinants of Health     Financial Resource Strain: Low Risk  (2023)    Overall Financial Resource Strain (CARDIA)     Difficulty of Paying Living Expenses: Not hard at all   Food Insecurity: No Food Insecurity (2023)    Hunger Vital Sign     Worried About Running Out of Food in the Last Year: Never true     Ran Out of Food in the Last Year: Never true   Transportation Needs: No Transportation Needs (2023)    PRAPARE - Transportation     Lack of Transportation (Medical): No     Lack of Transportation (Non-Medical): No   Physical Activity: Inactive (2023)    Exercise Vital Sign     Days of Exercise per  Week: 0 days     Minutes of Exercise per Session: 0 min   Stress: No Stress Concern Present (7/9/2023)    [place] [employer] - Occupational Stress Questionnaire     Feeling of Stress : Not at all   Social Connections: Moderately Isolated (7/9/2023)    Social Connection and Isolation Panel [NHANES]     Frequency of Communication with Friends and Family: More than three times a week     Frequency of Social Gatherings with Friends and Family: More than three times a week     Attends Zoroastrianism Services: Never     Active Member of Clubs or Organizations: No     Attends Club or Organization Meetings: Never     Marital Status:    Housing Stability: Low Risk  (7/9/2023)    Housing Stability Vital Sign     Unable to Pay for Housing in the Last Year: No     Number of Places Lived in the Last Year: 1     Unstable Housing in the Last Year: No   Recent Concern: Housing Stability - High Risk (7/6/2023)    Housing Stability Vital Sign     Unable to Pay for Housing in the Last Year: Yes     Number of Places Lived in the Last Year: 2     Unstable Housing in the Last Year: No        Review of Systems  14-point review of systems as follows:   No check cat indicates NEGATIVE response   Constitutional: [] weight loss, [] change to appetite   Eyes: [x] change in vision, [] double vision   Ears, nose, mouth, throat: [] frequent nose bleeds, [] ringing in the ears   Respiratory: [] cough, [] wheezing   Cardiovascular: [] chest pain, [] palpitations   Gastrointestinal: [] jaundice, [] nausea/vomiting   Genitourinary: [] incontinence, [] burning with urination   Hematologic/lymphatic: [] easy bruising/bleeding, [] night sweats   Neurological: [] numbness, [] weakness   Endocrine: [] fatigue, [] heat/cold intolerance   Allergy/Immunologic: [] fevers, [] chills   Musculoskeletal: [] muscle pain, [] joint pain   Psychiatric: [] thoughts of harming self/others, [] depression   Integumentary: [] rashes, [] sores that  do not heal     Objective:        Vitals:    08/02/23 1137   BP: 124/85   Pulse: 78   Resp: 18       Body mass index is 40.81 kg/m².    8/2/23  Constitutional:   She appears well-developed and well-nourished. She is well groomed     Neurological Exam:  General: well-developed, well-nourished, no distress  Mental status: Awake and alert  Speech language: No dysarthria or aphasia on conversation  Cranial nerves: Face symmetric  Motor: Moves all extremities well  Coordination: No ataxia. No tremor.    3/15/23  Constitutional: appears in no acute distress, well-developed, well-nourished     Eyes: normal conjunctiva, PERRLA    Ears, nose, mouth, throat: external appearance of ears and nose normal, hearing intact     Cardiovascular: regular rate and rhythm, no murmurs appreciated    Respiratory: unlabored respirations, breath sounds normal bilaterally    Gastrointestinal: no visible abdominal masses, no guarding, no visible hernia    Musculoskeletal: normal tone in all four extremities. No abnormal movements. No pronator drift. No orbit. Symmetric finger tapping. Normal station. Normal regular gait. Normal tandem gait.      Spine:   CERVICAL SPINE:  ROM: normal   MUSCLE SPASM: bilateral    FACET LOADING: no   SPURLING: no  ANNA / FATIMAH tender: no     Psychiatric: normal judgment and insight. Oriented to person, place, and time.     Neurologic:   Cortical functions: recent and remote memory intact, normal attention span and concentration, speech fluent, adequate fund of knowledge   Cranial nerves: visual fields full, PERRLA, EOMI, symmetric facial strength, hearing intact, palate elevates symmetrically, shoulder shrug 5/5, tongue protrudes midline   Reflexes: 2+ in the upper and lower extremities, no Briones  Sensation: intact to temperature throughout   Coordination: normal finger to nose, heel to shin    Data Review:     I have personally reviewed the referring provider's notes, labs, & imaging made available to me today.       RADIOLOGY STUDIES:  I have personally reviewed the pertinent images performed.       Results for orders placed or performed during the hospital encounter of 06/25/13   MRA Brain without contrast    Narrative    Routine  imaging through this 32-year-old females brain was obtained per the MRA protocol.  A survey flair weighted sequence and T2 weighted sequence were also available.    The anterior cerebral arteries, anterior communicating artery, middle cerebral arteries, and posterior cerebral arteries proximally demonstrate no evidence of aneurysm or stenosis.      The left vertebral artery appears dominant in comparison with the right.      The P1 segment on the left appears hypoplastic with the majority of the blood flow coming from the posterior communicating artery on the left.  The posterior communicating artery on the right appears hypoplastic with a majority of the blood flow coming   from the P1 segment.      The A1 segment on the right appears hypoplastic with the majority of the blood flow to the anterior cerebral arteries likely coming from the left A1 segment.    The ventricles and sulci appear age appropriate.  Mild mucous membrane thickening is noted within the maxillary sinuses bilaterally.    Impression     1.  No obvious evidence of aneurysm or stenosis is noted on this MRI of the brain.      2.  Mild mucous membrane thickening is noted within the maxillary sinuses bilaterally.      Electronically signed by: Philippe Hayden MD  Date:     06/25/13  Time:    16:50        Lab Results   Component Value Date    BNP 21 11/26/2018     07/14/2023    K 3.7 07/14/2023    MG 2.7 (H) 07/14/2023     07/14/2023    CO2 23 07/14/2023    BUN 18 07/14/2023    CREATININE 1.0 07/14/2023     07/14/2023    HGBA1C 5.2 07/11/2023    AST 11 07/14/2023    ALT 25 07/14/2023    ALBUMIN 3.2 (L) 07/14/2023    PROT 6.2 07/14/2023    BILITOT 0.2 07/14/2023    CHOL 172 08/07/2020    CHOL 144 11/24/2018    HDL 46  08/07/2020    HDL 41 11/24/2018    LDLCALC 119 (H) 08/07/2020    LDLCALC 87.2 11/24/2018    TRIG 36 08/07/2020    TRIG 79 11/24/2018       Lab Results   Component Value Date    WBC 11.57 07/14/2023    HGB 12.9 07/14/2023    HCT 39.0 07/14/2023    MCV 89 07/14/2023     07/14/2023       Lab Results   Component Value Date    TSH 1.154 08/01/2022           Assessment & Plan:       Problem List Items Addressed This Visit          Neuro    Cerebral aneurysm, nonruptured (Chronic)    Current Assessment & Plan     Followed by vascular neuro s/p coil.          Intractable migraine with aura without status migrainosus    Overview     Add magnesium.          Current Assessment & Plan     Continue magnesium          Intractable chronic migraine without aura and without status migrainosus - Primary    Overview     Migraine headaches since her 20's. Headaches are typically unilateral, moderate to severe in intensity, worsen with activity, pounding in quality and associated with sensitivity to light and sound. Gradual progression pattern, lack of red flag features on history, and normal neurological exam are reassuring for primary as opposed to secondary etiology of headaches thus imaging will not be pursued for this history and this exam at this time.    She was only able to get the loading dose of Emgality. Will start Qulipta for prevention.     Next step would be to add Botox.    Stop triptan. Start Ubrelvy for acute attacks.          Relevant Medications    atogepant (QULIPTA) 60 mg Tab    ubrogepant (UBRELVY) 100 mg tablet     Other Visit Diagnoses       Medication monitoring encounter        Relevant Orders    Comprehensive metabolic panel                  Please call our clinic at [phone] or send a message on the [employer] portal if there are any changes to the plan described below, for example,if you are not contacted for the requested tests, referral(s) within one week, if you are unable to receive the  medications prescribed, or if you feel you need to change the treatment course for any reason.     TESTING:  -- CMP in 3 months     REFERRALS:  -- none    PREVENTION (use daily regardless of headache):  -- START Qulipta once daily   -- continue magnesium in ONE of the following preparations -               1. Magnesium oxide 800mg daily (the most common over the counter kind, may causes loose stools)              2. Magnesium citrate 400-500mg daily (harder to find, but more neutral on the bowels)              3. Magnesium glycinate 400mg daily (hardest to find, look online, but most bowel-neutral, best absorbed)     AS-NEEDED TREATMENT (use total no more than 10 days per month unless otherwise stated):  -- stop rizatriptan   -- continue tizanidine at night. This is a muscle relaxer and it will also make you potentially sleepy. Start with half a tablet to see how you respond but can take up to a whole tablet if needed  -- continue compazine. This is a nausea medication that also has anti-migraine properties. Can take daily and will not contribute to rebound headaches  -- START Ubrelvy with next migraine. You can repeat two hours later if needed. With this medication do not drink grapefruit juice or eat grapefruit or some medications like ketoconazole, itraconazole, or antibiotics clarithromycin      Follow up in about 3 months (around 11/2/2023).    Tricia Gerardo NP

## 2023-08-10 ENCOUNTER — PATIENT MESSAGE (OUTPATIENT)
Dept: NEUROLOGY | Facility: CLINIC | Age: 43
End: 2023-08-10

## 2023-08-10 DIAGNOSIS — G43.719 INTRACTABLE CHRONIC MIGRAINE WITHOUT AURA AND WITHOUT STATUS MIGRAINOSUS: Primary | ICD-10-CM

## 2023-08-11 ENCOUNTER — EXTERNAL HOME HEALTH (OUTPATIENT)
Dept: HOME HEALTH SERVICES | Facility: HOSPITAL | Age: 43
End: 2023-08-11

## 2023-08-14 RX ORDER — TOPIRAMATE 100 MG/1
100 TABLET, FILM COATED ORAL NIGHTLY
Qty: 30 TABLET | Refills: 11 | Status: SHIPPED | OUTPATIENT
Start: 2023-08-14 | End: 2024-08-13

## 2023-08-14 RX ORDER — DIVALPROEX SODIUM 500 MG/1
1000 TABLET, DELAYED RELEASE ORAL DAILY
Qty: 60 TABLET | Refills: 11
Start: 2023-08-14 | End: 2024-08-13

## 2023-08-16 ENCOUNTER — TELEPHONE (OUTPATIENT)
Dept: HEMATOLOGY/ONCOLOGY | Facility: CLINIC | Age: 43
End: 2023-08-16

## 2023-08-16 NOTE — TELEPHONE ENCOUNTER
Called pt and scheduled hem appt from referral.    Pt sched first avail [facility] hem appt, Virtual on Sept 12 w/ Dr Wong.  Pt confirmed she knows how to start the Vv appt.

## 2023-08-17 ENCOUNTER — PATIENT MESSAGE (OUTPATIENT)
Dept: OPHTHALMOLOGY | Facility: CLINIC | Age: 43
End: 2023-08-17

## 2023-08-23 ENCOUNTER — PATIENT MESSAGE (OUTPATIENT)
Dept: PSYCHIATRY | Facility: CLINIC | Age: 43
End: 2023-08-23

## 2023-08-25 ENCOUNTER — DOCUMENT SCAN (OUTPATIENT)
Dept: HOME HEALTH SERVICES | Facility: HOSPITAL | Age: 43
End: 2023-08-25

## 2023-08-31 ENCOUNTER — PATIENT MESSAGE (OUTPATIENT)
Dept: PSYCHIATRY | Facility: CLINIC | Age: 43
End: 2023-08-31

## 2023-09-06 DIAGNOSIS — F41.9 ANXIETY: ICD-10-CM

## 2023-09-06 DIAGNOSIS — F32.A DEPRESSION, UNSPECIFIED DEPRESSION TYPE: ICD-10-CM

## 2023-09-06 NOTE — TELEPHONE ENCOUNTER
Refill Routing Note   Medication(s) are not appropriate for processing by [facility] for the following reason(s):      No active prescription written by PCP    [facility] action(s):  Defer Care Due:  None identified          Appointments  past 12m or future 3m with PCP    Date Provider   Last Visit   7/21/2023 Arlene Gomez NP   Next Visit   Visit date not found Arlene Gomez NP   ED visits in past 90 days: 1        Note composed:4:23 PM 09/06/2023

## 2023-09-07 RX ORDER — BUSPIRONE HYDROCHLORIDE 15 MG/1
15 TABLET ORAL 2 TIMES DAILY
Qty: 180 TABLET | Refills: 1 | Status: SHIPPED | OUTPATIENT
Start: 2023-09-07

## 2023-09-07 RX ORDER — SERTRALINE HYDROCHLORIDE 25 MG/1
25 TABLET, FILM COATED ORAL
Qty: 90 TABLET | Refills: 1 | Status: SHIPPED | OUTPATIENT
Start: 2023-09-07

## 2024-03-08 ENCOUNTER — PATIENT MESSAGE (OUTPATIENT)
Dept: NEUROLOGY | Facility: CLINIC | Age: 44
End: 2024-03-08

## 2024-05-07 ENCOUNTER — PATIENT MESSAGE (OUTPATIENT)
Dept: NEUROLOGY | Facility: CLINIC | Age: 44
End: 2024-05-07

## 2024-07-01 ENCOUNTER — OFFICE VISIT (OUTPATIENT)
Dept: NEUROLOGY | Facility: CLINIC | Age: 44
End: 2024-07-01
Payer: COMMERCIAL

## 2024-07-01 ENCOUNTER — PATIENT MESSAGE (OUTPATIENT)
Dept: NEUROLOGY | Facility: CLINIC | Age: 44
End: 2024-07-01

## 2024-07-01 ENCOUNTER — PATIENT MESSAGE (OUTPATIENT)
Dept: NEUROLOGY | Facility: CLINIC | Age: 44
End: 2024-07-01
Payer: COMMERCIAL

## 2024-07-01 VITALS
DIASTOLIC BLOOD PRESSURE: 82 MMHG | SYSTOLIC BLOOD PRESSURE: 132 MMHG | WEIGHT: 223.13 LBS | HEIGHT: 62 IN | HEART RATE: 101 BPM | BODY MASS INDEX: 41.06 KG/M2

## 2024-07-01 DIAGNOSIS — G43.909 MIGRAINE WITHOUT STATUS MIGRAINOSUS, NOT INTRACTABLE, UNSPECIFIED MIGRAINE TYPE: ICD-10-CM

## 2024-07-01 DIAGNOSIS — I67.1 UNRUPTURED CEREBRAL ANEURYSM: Primary | ICD-10-CM

## 2024-07-01 PROCEDURE — 1159F MED LIST DOCD IN RCRD: CPT | Mod: CPTII,S$GLB,, | Performed by: NURSE PRACTITIONER

## 2024-07-01 PROCEDURE — 3075F SYST BP GE 130 - 139MM HG: CPT | Mod: CPTII,S$GLB,, | Performed by: NURSE PRACTITIONER

## 2024-07-01 PROCEDURE — 99215 OFFICE O/P EST HI 40 MIN: CPT | Mod: S$GLB,,, | Performed by: NURSE PRACTITIONER

## 2024-07-01 PROCEDURE — 3008F BODY MASS INDEX DOCD: CPT | Mod: CPTII,S$GLB,, | Performed by: NURSE PRACTITIONER

## 2024-07-01 PROCEDURE — 1160F RVW MEDS BY RX/DR IN RCRD: CPT | Mod: CPTII,S$GLB,, | Performed by: NURSE PRACTITIONER

## 2024-07-01 PROCEDURE — 3079F DIAST BP 80-89 MM HG: CPT | Mod: CPTII,S$GLB,, | Performed by: NURSE PRACTITIONER

## 2024-07-01 PROCEDURE — 99999 PR PBB SHADOW E&M-EST. PATIENT-LVL III: CPT | Mod: PBBFAC,,, | Performed by: NURSE PRACTITIONER

## 2024-07-02 PROBLEM — G43.909 MIGRAINE WITHOUT STATUS MIGRAINOSUS, NOT INTRACTABLE: Status: ACTIVE | Noted: 2024-07-02

## 2024-07-03 NOTE — PROGRESS NOTES
[facility] VISIT      SUBJECTIVE:    History for Present Illness: Eugenie Laguerre is a 43 y.o.  female with a past medical history of anxiety, hypothyroidism,  migraines, RLS , unruptured right ICA aneurysm (s/p  (YANNICK X) of unruptured right ICA aneurysm on 07/05/2023) and clotting disorder who presents to me in clinic today following .  She was last seen in clinic by me on 07/18/2023 (lost to follow-up 2/2 no insurance). She is accompanied to today's visit by her  family.      Relevant HPI:  Patient was initially referred to me by Tricia Gerardo for abnormal imaging finding.  MRA of the brain indicated a 2 mm distal right ICA superior hypophyseal segment saccular aneurysm.  She was last seen in clinic by me on 06/07/2023 following successful diagnostic cerebral angiogram for better characterization of MRA findings.  At the time of her last visit, we discussed intracranial aneurysm pathology, risk factors and imaging findings as well as available treatment options including endovascular embolization.  Patient verbalized understanding and opted to move forward with endovascular embolization of the unruptured right ICA aneurysm.    Interval History: At the time of today's visit, the patient denies new or worsening focal neurologic symptoms concerning for new stroke or TIA. She is doing well overall with gradual improvements . Patient reports Chronic headaches (previously followed by Tricia Gerardo NP) and vision issues  (followed by ophthalmology). She was not currently participating in outpatient therapy Patient was lost follow-up due to insurance.  She reports stopping aspirin and Brilinta for 1 month post Yannick X. She denies associated vertigo, double vision, focal weakness or numbness, gait imbalance,  or language difficulty.      Past Medical History:   Diagnosis Date    Anxiety 01/24/2023    Clotting disorder     DVT (deep venous thrombosis) 03/2007    patient does not have factor V  leiden mutation - she had a negative test on 2013 and an office visit with Dr Canela (Heme/Onc) stating that her test was negative. Dr Canela ruled out antiphospholipid syndrome as well.  I see no evidence of any other hypercoagulable disorders being diagnosed. Pt had what sounds like a provoked LE DVT after a 16 hour car trip to [place], [place]    High risk pregnancy due to history of  labor 2015    Hyperthyroidism 2013    reports repeat testing was WNL and no treatment needed.    Iron deficiency anemia due to chronic blood loss 2015    Major depressive disorder, recurrent, moderate 2023    Migraines     Restless legs syndrome (RLS)     Short cervix affecting pregnancy 2015     Past Surgical History:   Procedure Laterality Date    2 R knee sx; 1 L knee      arthroscopies    BRONCHOSCOPY       SECTION  2015    LAPAROSCOPIC CHOLECYSTECTOMY N/A 2021    Procedure: CHOLECYSTECTOMY, LAPAROSCOPIC;  Surgeon: Pascual Lu MD;  Location: [facility];  Service: General;  Laterality: N/A;    TUBAL LIGATION  2015     Family History   Problem Relation Name Age of Onset    Fibromyalgia Mother      Depression Mother      Anuerysm Father      Cancer Father          skin    Deep vein thrombosis Father      Hypertension Father      Bipolar disorder Sister      Ovarian cancer Maternal Aunt      Bladder Cancer Maternal Aunt      Anuerysm Paternal Aunt          AAA    Breast cancer Maternal Grandmother      Diabetes Paternal Grandmother      Anuerysm Paternal Grandfather          AAA    Heart disease Brother      Colon cancer Neg Hx      Stomach cancer Neg Hx      Esophageal cancer Neg Hx          Current Outpatient Medications:     aspirin (ECOTRIN) 81 MG EC tablet, Take 81 mg by mouth once daily. (Patient not taking: Reported on 2024), Disp: , Rfl:     atogepant (QULIPTA) 60 mg Tab, Take 1 tablet (60 mg) by mouth once daily. (Patient not taking: Reported on 2024),  Disp: 30 tablet, Rfl: 11    busPIRone (BUSPAR) 15 MG tablet, Take 1 tablet (15 mg total) by mouth 2 (two) times daily. (Patient not taking: Reported on 7/1/2024), Disp: 180 tablet, Rfl: 1    divalproex (DEPAKOTE) 500 MG TbEC, Take 2 tablets (1,000 mg total) by mouth once daily. (Patient not taking: Reported on 7/1/2024), Disp: 60 tablet, Rfl: 11    ondansetron (ZOFRAN) 4 MG tablet, Take 1 tablet (4 mg total) by mouth every 8 (eight) hours as needed for Nausea. (Patient not taking: Reported on 7/1/2024), Disp: 60 tablet, Rfl: 2    sertraline (ZOLOFT) 25 MG tablet, TAKE 1 TABLET BY MOUTH EVERY DAY (Patient not taking: Reported on 7/1/2024), Disp: 90 tablet, Rfl: 1    ticagrelor (BRILINTA) 90 mg tablet, Take 1 tablet (90 mg total) by mouth 2 (two) times daily. (Patient not taking: Reported on 7/1/2024), Disp: 60 tablet, Rfl: 3    tiZANidine (ZANAFLEX) 4 MG tablet, Take 4 mg by mouth daily as needed (MUSCLE SPASMS). (Patient not taking: Reported on 7/1/2024), Disp: , Rfl:     topiramate (TOPAMAX) 100 MG tablet, Take 1 tablet (100 mg total) by mouth every evening. (Patient not taking: Reported on 7/1/2024), Disp: 30 tablet, Rfl: 11    ubrogepant (UBRELVY) 100 mg tablet, Take 1 tablet by mouth as needed for migraine. May repeat in 2 hours if needed. Max 2 tab per day (Patient not taking: Reported on 7/1/2024), Disp: 16 tablet, Rfl: 11     Review of Systems:  Review of systems is negative except for the symptoms mentioned in HPI    Physical exam:    @VS@    General: Well-developed, well-groomed. No apparent distress  HENT: Normocephalic, atraumatic.    Cardiovascular: Regular rate and rhythm  Chest: No audible wheezes, stridor, ronchi appreciated.  Musculoskeletal: No peripheral edema     Neurologic Exam: The patient is awake, alert and oriented to person, place, time and situation. Attentive, vigilant during exam. Language is fluent. Naming & repetition intact, +2-step commands.  Fund of knowledge is appropriate. Well  organized thoughts.     Cranial nerves:   CN II: Visual fields are full to confrontation. Pupils are 4 mm and briskly reactive to light.  CN III, IV, VI: EOMI, no nystagmus, no ptosis  CN V: Facial sensation is intact in all 3 divisions bilaterally.  CN VII: Face is symmetric with normal eye closure and smile.  CN VIII: Hearing is normal bilaterally  CN IX, X: Palate elevates symmetrically. Phonation is normal.  CN XI: Head turning and shoulder shrug are intact  CN XII: Tongue is midline with normal movements and no atrophy.     Motor examination of all extremities demonstrates normal bulk and tone in all four limbs. There are no atrophy or fasciculations.        Left Right     Left Right   Deltoid 5/5 5/5   Hip Flexion 5/5 5/5   Biceps 5/5 5/5   Hip Extension 5/5 5/5   Triceps 5/5 5/5   Knee Flexion 5/5 5/5   Wrist Ext 5/5 5/5   Knee Extension 5/5 5/5   Finger Abd 5/5 5/5   Ankle dorsiflex 5/5 5/5           Ankle plantar flex 5/5 5/5     Sensory examination is normal light touch in BUE and BLE.    Deep tendon reflexes No clonus. Negative Briones's     Gait: Normal tandem, and casual gait.     Coordination: No dysmetria with finger-to-nose. Rapid alternating movements and fine finger movements are intact.              Relevant Labwork:  Recent Labs   Lab 07/11/23  0657   Hemoglobin A1C 5.2       Diagnostic Results:  I have personally reviewed the pertinent imaging made available to me today    Brain Imaging   MRI orbits 07/10/2023:  Suggestive of subtle enhancement of the right optic nerve that could be seen with optic neuritis but is nonspecific in light of the history    Vessel Imaging   CTA head and neck 07/09/2023:  Right ICA stent in place.    No vessel occlusion or high-grade stenosis    IR Angiogram 07/05/2023:  Successful MJ X flow diversion of the unruptured, wide gait, right clinoid segment internal carotid artery aneurysm    Cardiac Imaging     Assessment:  Eugenie Laguerre  is a 43 y.o.  female  seen  today in clinic for follow-up assessment and recommendations. The following recommendations and plan were discussed in depth with the patient who voiced understanding and was in agreement.  Plan:  Cerebral aneurysm without rupture  Successful MJ X flow diversion of the unruptured, wide gait, right clinoid segment internal carotid artery aneurysm on 07/05/23  Plan for diagnostic cerebral angiogram reassess previously embolized unruptured right ICA aneurysm  LABS: BMP    Visual disturbance  -Follow up with Ophthalmology for surveillance and chronic management      Patient/Family teaching provided during this visit  -Identifying the signs and symptoms of stroke; emergency action and ER attention  -Risk factor control  -Optimization for secondary stroke prevention including compliance with current medications   -Discussed that medication/lifestyle modifications are preventative,and nothing is absolute.     Questions and concerns were answered to the patient's stated verbal satisfaction. The patient verbalizes understanding and agreement with the above stated treatment plan.      I will plan on having Eugenie Laguerre  return to clinic following Diagnostic cerebral angiogram . The patient can contact my office with any questions or concerns they may have as they arise in the interim.       Collaborating Physician, Dr Burnett, was available during today's encounter. Any change to plan along with cosign to appear in the EMR    BRIT Torrez  Department of Vascular Neurology  [facility]  [phone]    This note is dictated on M*Modal Fluency Direct word recognition program. There are word recognition mistakes that are occasionally missed on review

## 2024-07-09 ENCOUNTER — OFFICE VISIT (OUTPATIENT)
Dept: FAMILY MEDICINE | Facility: CLINIC | Age: 44
End: 2024-07-09
Payer: COMMERCIAL

## 2024-07-09 ENCOUNTER — HOSPITAL ENCOUNTER (OUTPATIENT)
Dept: RADIOLOGY | Facility: HOSPITAL | Age: 44
Discharge: HOME | End: 2024-07-09
Attending: INTERNAL MEDICINE
Payer: COMMERCIAL

## 2024-07-09 ENCOUNTER — OFFICE VISIT (OUTPATIENT)
Dept: OBSTETRICS AND GYNECOLOGY | Facility: CLINIC | Age: 44
End: 2024-07-09
Payer: COMMERCIAL

## 2024-07-09 VITALS
SYSTOLIC BLOOD PRESSURE: 126 MMHG | HEART RATE: 84 BPM | BODY MASS INDEX: 43.86 KG/M2 | WEIGHT: 238.31 LBS | DIASTOLIC BLOOD PRESSURE: 86 MMHG | OXYGEN SATURATION: 95 % | HEIGHT: 62 IN

## 2024-07-09 VITALS
BODY MASS INDEX: 44.22 KG/M2 | HEIGHT: 62 IN | DIASTOLIC BLOOD PRESSURE: 84 MMHG | WEIGHT: 240.31 LBS | SYSTOLIC BLOOD PRESSURE: 124 MMHG

## 2024-07-09 DIAGNOSIS — I67.1 UNRUPTURED CEREBRAL ANEURYSM: ICD-10-CM

## 2024-07-09 DIAGNOSIS — Z01.419 GYNECOLOGIC EXAM NORMAL: Primary | ICD-10-CM

## 2024-07-09 DIAGNOSIS — N91.2 ABSENT MENSES: ICD-10-CM

## 2024-07-09 DIAGNOSIS — Z12.31 OTHER SCREENING MAMMOGRAM: ICD-10-CM

## 2024-07-09 DIAGNOSIS — E66.01 MORBID OBESITY: ICD-10-CM

## 2024-07-09 DIAGNOSIS — G43.909 MIGRAINE WITHOUT STATUS MIGRAINOSUS, NOT INTRACTABLE, UNSPECIFIED MIGRAINE TYPE: ICD-10-CM

## 2024-07-09 DIAGNOSIS — F41.1 GAD (GENERALIZED ANXIETY DISORDER): Primary | ICD-10-CM

## 2024-07-09 PROCEDURE — 3079F DIAST BP 80-89 MM HG: CPT | Mod: CPTII,S$GLB,, | Performed by: OBSTETRICS & GYNECOLOGY

## 2024-07-09 PROCEDURE — 3079F DIAST BP 80-89 MM HG: CPT | Mod: CPTII,S$GLB,, | Performed by: NURSE PRACTITIONER

## 2024-07-09 PROCEDURE — 99999 PR PBB SHADOW E&M-EST. PATIENT-LVL III: CPT | Mod: PBBFAC,,, | Performed by: NURSE PRACTITIONER

## 2024-07-09 PROCEDURE — 88175 CYTOPATH C/V AUTO FLUID REDO: CPT | Performed by: OBSTETRICS & GYNECOLOGY

## 2024-07-09 PROCEDURE — 3074F SYST BP LT 130 MM HG: CPT | Mod: CPTII,S$GLB,, | Performed by: OBSTETRICS & GYNECOLOGY

## 2024-07-09 PROCEDURE — 77067 SCR MAMMO BI INCL CAD: CPT | Mod: TC,PN

## 2024-07-09 PROCEDURE — 1159F MED LIST DOCD IN RCRD: CPT | Mod: CPTII,S$GLB,, | Performed by: NURSE PRACTITIONER

## 2024-07-09 PROCEDURE — 3008F BODY MASS INDEX DOCD: CPT | Mod: CPTII,S$GLB,, | Performed by: NURSE PRACTITIONER

## 2024-07-09 PROCEDURE — 77063 BREAST TOMOSYNTHESIS BI: CPT | Mod: 26,,, | Performed by: RADIOLOGY

## 2024-07-09 PROCEDURE — 77067 SCR MAMMO BI INCL CAD: CPT | Mod: 26,,, | Performed by: RADIOLOGY

## 2024-07-09 PROCEDURE — 1159F MED LIST DOCD IN RCRD: CPT | Mod: CPTII,S$GLB,, | Performed by: OBSTETRICS & GYNECOLOGY

## 2024-07-09 PROCEDURE — 3008F BODY MASS INDEX DOCD: CPT | Mod: CPTII,S$GLB,, | Performed by: OBSTETRICS & GYNECOLOGY

## 2024-07-09 PROCEDURE — 99396 PREV VISIT EST AGE 40-64: CPT | Mod: S$GLB,,, | Performed by: OBSTETRICS & GYNECOLOGY

## 2024-07-09 PROCEDURE — 99999 PR PBB SHADOW E&M-EST. PATIENT-LVL III: CPT | Mod: PBBFAC,,, | Performed by: OBSTETRICS & GYNECOLOGY

## 2024-07-09 PROCEDURE — 3074F SYST BP LT 130 MM HG: CPT | Mod: CPTII,S$GLB,, | Performed by: NURSE PRACTITIONER

## 2024-07-09 PROCEDURE — 99214 OFFICE O/P EST MOD 30 MIN: CPT | Mod: S$GLB,,, | Performed by: NURSE PRACTITIONER

## 2024-07-09 RX ORDER — HYDROXYZINE HYDROCHLORIDE 50 MG/1
50 TABLET, FILM COATED ORAL 4 TIMES DAILY
COMMUNITY
End: 2024-07-09 | Stop reason: SDUPTHER

## 2024-07-09 RX ORDER — HYDROXYZINE HYDROCHLORIDE 50 MG/1
50 TABLET, FILM COATED ORAL NIGHTLY PRN
Qty: 30 TABLET | Refills: 3 | Status: SHIPPED | OUTPATIENT
Start: 2024-07-09

## 2024-07-09 RX ORDER — SERTRALINE HYDROCHLORIDE 25 MG/1
25 TABLET, FILM COATED ORAL DAILY
Qty: 30 TABLET | Refills: 3 | Status: SHIPPED | OUTPATIENT
Start: 2024-07-09

## 2024-07-09 NOTE — PROGRESS NOTES
Chief Complaint   Patient presents with    Well Woman       History of Present Illness: Eugenie Laguerre is a 43 y.o. female that presents today 2024 with Patient's last menstrual period was 2023.  for well gyn visit.    Past Medical History:   Diagnosis Date    Anxiety 2023    Clotting disorder     DVT (deep venous thrombosis) 2007    patient does not have factor V leiden mutation - she had a negative test on 2013 and an office visit with Dr Canela (Heme/Onc) stating that her test was negative. Dr Canela ruled out antiphospholipid syndrome as well.  I see no evidence of any other hypercoagulable disorders being diagnosed. Pt had what sounds like a provoked LE DVT after a 16 hour car trip to [place], [place]    High risk pregnancy due to history of  labor 2015    Hyperthyroidism 2013    reports repeat testing was WNL and no treatment needed.    Iron deficiency anemia due to chronic blood loss 2015    Major depressive disorder, recurrent, moderate 2023    Migraines     Restless legs syndrome (RLS)     Short cervix affecting pregnancy 2015       Past Surgical History:   Procedure Laterality Date    2 R knee sx; 1 L knee      arthroscopies    BRONCHOSCOPY       SECTION  2015    LAPAROSCOPIC CHOLECYSTECTOMY N/A 2021    Procedure: CHOLECYSTECTOMY, LAPAROSCOPIC;  Surgeon: Pascual Lu MD;  Location: [facility];  Service: General;  Laterality: N/A;    TUBAL LIGATION  2015       Outpatient Medications Prior to Visit   Medication Sig Dispense Refill    hydrOXYzine (ATARAX) 50 MG tablet Take 50 mg by mouth 4 (four) times daily.      aspirin (ECOTRIN) 81 MG EC tablet Take 81 mg by mouth once daily.      atogepant (QULIPTA) 60 mg Tab Take 1 tablet (60 mg) by mouth once daily. 30 tablet 11    busPIRone (BUSPAR) 15 MG tablet Take 1 tablet (15 mg total) by mouth 2 (two) times daily. 180 tablet 1    divalproex (DEPAKOTE) 500 MG TbEC Take 2 tablets  (1,000 mg total) by mouth once daily. 60 tablet 11    ondansetron (ZOFRAN) 4 MG tablet Take 1 tablet (4 mg total) by mouth every 8 (eight) hours as needed for Nausea. (Patient not taking: Reported on 7/1/2024) 60 tablet 2    sertraline (ZOLOFT) 25 MG tablet TAKE 1 TABLET BY MOUTH EVERY DAY 90 tablet 1    ticagrelor (BRILINTA) 90 mg tablet Take 1 tablet (90 mg total) by mouth 2 (two) times daily. 60 tablet 3    tiZANidine (ZANAFLEX) 4 MG tablet Take 4 mg by mouth daily as needed (MUSCLE SPASMS).      topiramate (TOPAMAX) 100 MG tablet Take 1 tablet (100 mg total) by mouth every evening. 30 tablet 11    ubrogepant (UBRELVY) 100 mg tablet Take 1 tablet by mouth as needed for migraine. May repeat in 2 hours if needed. Max 2 tab per day 16 tablet 11     No facility-administered medications prior to visit.       Review of patient's allergies indicates:   Allergen Reactions    Amoxicillin     Augmentin [amoxicillin-pot clavulanate] Swelling    Bactrim [sulfamethoxazole-trimethoprim] Itching and Nausea Only    Ropinirole Rash       Family History   Problem Relation Name Age of Onset    Fibromyalgia Mother      Depression Mother      Anuerysm Father      Cancer Father          skin    Deep vein thrombosis Father      Hypertension Father      Bipolar disorder Sister      Ovarian cancer Maternal Aunt      Bladder Cancer Maternal Aunt      Anuerysm Paternal Aunt          AAA    Breast cancer Maternal Grandmother      Diabetes Paternal Grandmother      Anuerysm Paternal Grandfather          AAA    Heart disease Brother      Colon cancer Neg Hx      Stomach cancer Neg Hx      Esophageal cancer Neg Hx         Social History     Socioeconomic History    Marital status:     Number of children: 2   Tobacco Use    Smoking status: Former     Current packs/day: 0.50     Types: Cigarettes    Smokeless tobacco: Former     Quit date: 8/1/2021   Substance and Sexual Activity    Alcohol use: Yes     Alcohol/week: 0.0 standard drinks  of alcohol     Comment: rare    Drug use: No    Sexual activity: Yes     Partners: Male     Birth control/protection: None     Social Determinants of Health     Financial Resource Strain: Low Risk  (2023)    Overall Financial Resource Strain (CARDIA)     Difficulty of Paying Living Expenses: Not hard at all   Food Insecurity: No Food Insecurity (2023)    Hunger Vital Sign     Worried About Running Out of Food in the Last Year: Never true     Ran Out of Food in the Last Year: Never true   Transportation Needs: No Transportation Needs (2023)    PRAPARE - Transportation     Lack of Transportation (Medical): No     Lack of Transportation (Non-Medical): No   Physical Activity: Inactive (2023)    Exercise Vital Sign     Days of Exercise per Week: 0 days     Minutes of Exercise per Session: 0 min   Stress: No Stress Concern Present (2023)    [employer] - Occupational Stress Questionnaire     Feeling of Stress : Not at all   Housing Stability: Low Risk  (2023)    Housing Stability Vital Sign     Unable to Pay for Housing in the Last Year: No     Number of Places Lived in the Last Year: 1     Unstable Housing in the Last Year: No   Recent Concern: Housing Stability - High Risk (2023)    Housing Stability Vital Sign     Unable to Pay for Housing in the Last Year: Yes     Number of Places Lived in the Last Year: 2     Unstable Housing in the Last Year: No       OB History    Para Term  AB Living   4 4 3 1   3   SAB IAB Ectopic Multiple Live Births           3      # Outcome Date GA Lbr [place]/2nd Weight Sex Type Anes PTL Lv   4 Term 07/30/15 39w0d  3.728 kg (8 lb 3.5 oz) F CS-LTranv EPI N JANNET   3 Term 12/15/12 37w0d  3.204 kg (7 lb 1 oz) F Vag-Spont EPI N JANNET      Birth Comments: System Generated. Please review and update pregnancy details.   2  10/06/08 25w0d  0.822 kg (1 lb 13 oz) F Vag-Spont None Y JANNET      Birth Comments: delivered at 25 IUP, PPROM  "on a friday with labor on monday.   1 Term                Review of Symptoms:  GENERAL: Denies weight gain or weight loss. Feeling well overall.   SKIN: Denies rash or lesions.   HEAD: Denies head injury or headache.   NODES: Denies enlarged lymph nodes.   CHEST: Denies chest pain or shortness of breath.   CARDIOVASCULAR: Denies palpitations or left sided chest pain.   ABDOMEN: No abdominal pain, constipation, diarrhea, nausea, vomiting or rectal bleeding.   URINARY: No frequency, dysuria, hematuria, or burning on urination.  HEMATOLOGIC: No easy bruisability or excessive bleeding.   MUSCULOSKELETAL: Denies joint pain or swelling.     /84   Ht 5' 2" (1.575 m)   Wt 109 kg (240 lb 4.8 oz)   LMP 01/01/2023   Physical Exam:  APPEARANCE: Well nourished, well developed, in no acute distress.  SKIN: Normal skin turgor, no lesions.  NECK: Neck symmetric without masses   RESPIRATORY: Normal respiratory effort with no retractions or use of accessory muscles  CARDIOVASCULAR: Peripheral vascular system with no swelling no varicosities and palpation of pulses normal  LYMPHATIC: No enlargements of the lymph nodes noted in the neck, axillae, or groin  ABDOMEN: Soft. No tenderness or masses. No hepatosplenomegaly. No hernias.  BREASTS: Symmetrical, no skin changes or visible lesions. No palpable masses, nipple discharge or adenopathy bilaterally.  PELVIC: Normal external female genitalia without lesions. Normal hair distribution. Adequate perineal body, normal urethral meatus. Urethra with no masses.  Bladder nontender. Vagina moist and well rugated without lesions or discharge. Cervix pink and without lesions. No significant cystocele or rectocele. Bimanual exam showed uterus normal size, shape, position, mobile and nontender. Adnexa without masses or tenderness. Urethra and bladder normal.   EXTREMITIES: No clubbing cyanosis or edema.    ASSESSMENT/PLAN:  Gynecologic exam normal  -     Liquid-Based Pap Smear, " Screening    Absent menses  -     FOLLICLE STIMULATING HORMONE; Future; Expected date: 07/09/2024  -     Estradiol; Future; Expected date: 07/09/2024          Patient was counseled today on Pelvic exams and Pap Smear guidelines.   We discussed STD screening if at high risk for a STD.  We discussed recommendation for breast cancer screening with mammogram every other year after the age of 40 and annually after the age of 50.    We discussed colon cancer screening when indicated.   Osteoporosis screening discussed when indicated.   She was advised to see her primary care physician for all other health maintenance.     FOLLOW-UP with me for next routine visit.

## 2024-07-09 NOTE — PROGRESS NOTES
Subjective:       Patient ID: Eugenie Laguerre is a 43 y.o. female.    Chief Complaint: Weight Gain (Pt states she has a major concern with her weight ) and focusing (Pt states she is unable to focus /Mind always all over the place)    HPI  No insurance for time period to stopped all meds    History of PAUL, MDD: previously controlled on Zoloft and buspar. Denies depression currently; +anxiety. Mind racing, insomnia. Taking hydroxyzine prn    Followed by neurology at [facility] [place] for unruptured right ICA aneurysm (s/p (MJ X) and our neuro for migraines.     No cycle for a few months. FSH and estradiol ordered today by GYN  Weight gain ~20lb in 1 year. Cut out soda, not snacking, eating fairly healthy. Not sedentary      Vitals:    24 1043   BP: 126/86   Pulse: 84     Review of Systems   Constitutional:  Negative for fever.   Respiratory:  Negative for cough and shortness of breath.    Cardiovascular:  Negative for chest pain.   Psychiatric/Behavioral:  Positive for sleep disturbance. The patient is nervous/anxious.        Past Medical History:   Diagnosis Date    Anxiety 2023    Clotting disorder     DVT (deep venous thrombosis) 2007    patient does not have factor V leiden mutation - she had a negative test on 2013 and an office visit with Dr Canela (Heme/Onc) stating that her test was negative. Dr Canela ruled out antiphospholipid syndrome as well.  I see no evidence of any other hypercoagulable disorders being diagnosed. Pt had what sounds like a provoked LE DVT after a 16 hour car trip to [place], [place]    High risk pregnancy due to history of  labor 2015    Hyperthyroidism 2013    reports repeat testing was WNL and no treatment needed.    Iron deficiency anemia due to chronic blood loss 2015    Major depressive disorder, recurrent, moderate 2023    Migraines     Restless legs syndrome (RLS)     Short cervix affecting pregnancy 2015     Objective:      Physical  Exam  Constitutional:       General: She is not in acute distress.     Appearance: She is well-developed. She is not ill-appearing, toxic-appearing or diaphoretic.   HENT:      Right Ear: Hearing normal.      Left Ear: Hearing normal.   Cardiovascular:      Rate and Rhythm: Normal rate.   Pulmonary:      Effort: No tachypnea or respiratory distress.   Skin:     Coloration: Skin is not pale.   Neurological:      Mental Status: She is alert and oriented to person, place, and time.   Psychiatric:         Speech: Speech normal.         Behavior: Behavior normal.         Thought Content: Thought content normal.         Judgment: Judgment normal.         Assessment:       1. PAUL (generalized anxiety disorder)    2. Morbid obesity    3. Unruptured cerebral aneurysm    4. Migraine without status migrainosus, not intractable, unspecified migraine type        Plan:       PAUL (generalized anxiety disorder)  -     hydrOXYzine (ATARAX) 50 MG tablet; Take 1 tablet (50 mg total) by mouth nightly as needed for Itching.  Dispense: 30 tablet; Refill: 3  -     sertraline (ZOLOFT) 25 MG tablet; Take 1 tablet (25 mg total) by mouth once daily.  Dispense: 30 tablet; Refill: 3  -     CBC Auto Differential; Future; Expected date: 07/09/2024  -     Comprehensive Metabolic Panel; Future; Expected date: 07/09/2024    Morbid obesity  -     TSH; Future; Expected date: 07/09/2024  -     Hemoglobin A1C; Future; Expected date: 07/09/2024    Unruptured cerebral aneurysm    Migraine without status migrainosus, not intractable, unspecified migraine type            Follow up in about 1 month (around 8/9/2024).    Medication List with Changes/Refills   Current Medications    BUSPIRONE (BUSPAR) 15 MG TABLET    Take 1 tablet (15 mg total) by mouth 2 (two) times daily.    ONDANSETRON (ZOFRAN) 4 MG TABLET    Take 1 tablet (4 mg total) by mouth every 8 (eight) hours as needed for Nausea.   Changed and/or Refilled Medications    Modified Medication Previous  Medication    HYDROXYZINE (ATARAX) 50 MG TABLET hydrOXYzine (ATARAX) 50 MG tablet       Take 1 tablet (50 mg total) by mouth nightly as needed for Itching.    Take 50 mg by mouth 4 (four) times daily.    SERTRALINE (ZOLOFT) 25 MG TABLET sertraline (ZOLOFT) 25 MG tablet       Take 1 tablet (25 mg total) by mouth once daily.    TAKE 1 TABLET BY MOUTH EVERY DAY   Discontinued Medications    ASPIRIN (ECOTRIN) 81 MG EC TABLET    Take 81 mg by mouth once daily.    ATOGEPANT (QULIPTA) 60 MG TAB    Take 1 tablet (60 mg) by mouth once daily.    DIVALPROEX (DEPAKOTE) 500 MG TBEC    Take 2 tablets (1,000 mg total) by mouth once daily.    TICAGRELOR (BRILINTA) 90 MG TABLET    Take 1 tablet (90 mg total) by mouth 2 (two) times daily.    TIZANIDINE (ZANAFLEX) 4 MG TABLET    Take 4 mg by mouth daily as needed (MUSCLE SPASMS).    TOPIRAMATE (TOPAMAX) 100 MG TABLET    Take 1 tablet (100 mg total) by mouth every evening.    UBROGEPANT (UBRELVY) 100 MG TABLET    Take 1 tablet by mouth as needed for migraine. May repeat in 2 hours if needed. Max 2 tab per day

## 2024-07-10 ENCOUNTER — PATIENT MESSAGE (OUTPATIENT)
Dept: FAMILY MEDICINE | Facility: CLINIC | Age: 44
End: 2024-07-10
Payer: COMMERCIAL

## 2024-07-10 ENCOUNTER — PATIENT MESSAGE (OUTPATIENT)
Dept: OBSTETRICS AND GYNECOLOGY | Facility: CLINIC | Age: 44
End: 2024-07-10
Payer: COMMERCIAL

## 2024-07-11 ENCOUNTER — PATIENT MESSAGE (OUTPATIENT)
Dept: NEUROLOGY | Facility: CLINIC | Age: 44
End: 2024-07-11
Payer: COMMERCIAL

## 2024-07-12 ENCOUNTER — PATIENT MESSAGE (OUTPATIENT)
Dept: FAMILY MEDICINE | Facility: CLINIC | Age: 44
End: 2024-07-12
Payer: COMMERCIAL

## 2024-07-12 ENCOUNTER — OFFICE VISIT (OUTPATIENT)
Dept: FAMILY MEDICINE | Facility: CLINIC | Age: 44
End: 2024-07-12
Payer: COMMERCIAL

## 2024-07-12 VITALS
HEART RATE: 111 BPM | WEIGHT: 218.38 LBS | HEIGHT: 62 IN | TEMPERATURE: 100 F | DIASTOLIC BLOOD PRESSURE: 88 MMHG | SYSTOLIC BLOOD PRESSURE: 146 MMHG | BODY MASS INDEX: 40.19 KG/M2 | OXYGEN SATURATION: 98 %

## 2024-07-12 DIAGNOSIS — J02.9 PHARYNGITIS, UNSPECIFIED ETIOLOGY: ICD-10-CM

## 2024-07-12 DIAGNOSIS — H66.002 NON-RECURRENT ACUTE SUPPURATIVE OTITIS MEDIA OF LEFT EAR WITHOUT SPONTANEOUS RUPTURE OF TYMPANIC MEMBRANE: ICD-10-CM

## 2024-07-12 DIAGNOSIS — J06.9 VIRAL URI: Primary | ICD-10-CM

## 2024-07-12 LAB
CLINICAL INFO: NORMAL
CTP QC/QA: YES
CTP QC/QA: YES
CYTO CVX: NORMAL
CYTOLOGIST CVX/VAG CYTO: NORMAL
CYTOLOGIST CVX/VAG CYTO: NORMAL
CYTOLOGY CMNT CVX/VAG CYTO-IMP: NORMAL
CYTOLOGY PAP THIN PREP EXPLANATION: NORMAL
DATE OF PREVIOUS PAP: NORMAL
DATE PREVIOUS BX: NO
GEN CATEG CVX/VAG CYTO-IMP: NORMAL
LMP START DATE: NORMAL
MICROORGANISM CVX/VAG CYTO: NORMAL
PATHOLOGIST CVX/VAG CYTO: NORMAL
POC MOLECULAR INFLUENZA A AGN: NEGATIVE
POC MOLECULAR INFLUENZA B AGN: NEGATIVE
SARS-COV-2 RDRP RESP QL NAA+PROBE: NEGATIVE
SERVICE CMNT-IMP: NORMAL
SPECIMEN SOURCE CVX/VAG CYTO: NORMAL
STAT OF ADQ CVX/VAG CYTO-IMP: NORMAL

## 2024-07-12 PROCEDURE — 99999 PR PBB SHADOW E&M-EST. PATIENT-LVL IV: CPT | Mod: PBBFAC,,, | Performed by: NURSE PRACTITIONER

## 2024-07-12 RX ORDER — FLUTICASONE PROPIONATE 50 MCG
2 SPRAY, SUSPENSION (ML) NASAL DAILY PRN
Qty: 16 G | Refills: 0 | Status: SHIPPED | OUTPATIENT
Start: 2024-07-12

## 2024-07-12 RX ORDER — CEFDINIR 300 MG/1
300 CAPSULE ORAL EVERY 12 HOURS
Qty: 14 CAPSULE | Refills: 0 | Status: SHIPPED | OUTPATIENT
Start: 2024-07-12 | End: 2024-07-19

## 2024-07-12 NOTE — PATIENT INSTRUCTIONS
A few reminders from today:    Drink plenty fluids  IBU/ Tylenol prn for discomfort  Warm salt water gargles as needed  Warm tea/honey as needed  Notify provider if symptoms fail to improve/worsen  Change toothbrush once well.  No drinking after anyone.  Wash hands.     Do not hesitate to get in touch with me should you have any further questions or concerns. Notify provider if symptoms worsen or do not improve.    Thank you for trusting me with your care!  I wish you health and happiness.      -RANDY KumariC

## 2024-07-12 NOTE — PROGRESS NOTES
Subjective     Patient ID: Eugenie Laguerre is a 43 y.o. female.    Chief Complaint: Sore Throat (Pt states she been having throat pain since yesterday.), Ear Drainage (Pt states when she was lying down her left ear felt like it was draining.), Chills (Pt states she's been having chills all day today.), and Headache      Sore Throat   Associated symptoms include congestion, ear pain and headaches. Pertinent negatives include no coughing, diarrhea, shortness of breath or vomiting.   Ear Drainage   Associated symptoms include headaches and a sore throat. Pertinent negatives include no coughing, diarrhea or vomiting.   Headache   Associated symptoms include ear pain, a fever, nausea, sinus pressure and a sore throat. Pertinent negatives include no coughing, dizziness or vomiting.         Patient is new to me. PCP Dr. Gerardo    42 y/o F with RLS< migraines, Anxiety, MDD presents with sudden onset sore throat, congestion, Left ear pain x 1 day, she does endorse subjective fever/chills. Took Tylenol last night. No other otc meds.     Review of Systems   Constitutional:  Positive for fatigue and fever.   HENT:  Positive for nasal congestion, ear pain, postnasal drip, sinus pressure/congestion and sore throat. Negative for sneezing.    Eyes:  Negative for discharge and itching.   Respiratory:  Negative for cough and shortness of breath.    Cardiovascular:  Negative for chest pain.   Gastrointestinal:  Positive for nausea. Negative for diarrhea and vomiting.   Musculoskeletal:  Negative for arthralgias and joint swelling.   Neurological:  Positive for headaches. Negative for dizziness.          Objective     Physical Exam  Vitals and nursing note reviewed.   Constitutional:       General: She is not in acute distress.     Appearance: Normal appearance. She is obese. She is ill-appearing. She is not toxic-appearing or diaphoretic.   HENT:      Head: Normocephalic and atraumatic.      Right Ear: Tympanic membrane, ear canal and  external ear normal. There is no impacted cerumen.      Left Ear: Ear canal and external ear normal. A middle ear effusion is present. There is no impacted cerumen. Tympanic membrane is injected and bulging.      Nose: Congestion present. No rhinorrhea.      Mouth/Throat:      Pharynx: Posterior oropharyngeal erythema present. No pharyngeal swelling or oropharyngeal exudate.   Eyes:      General: Allergic shiner present. No scleral icterus.        Right eye: No discharge.         Left eye: No discharge.      Conjunctiva/sclera:      Right eye: Right conjunctiva is injected.      Left eye: Left conjunctiva is injected.      Pupils: Pupils are equal, round, and reactive to light.   Cardiovascular:      Rate and Rhythm: Normal rate and regular rhythm.      Pulses: Normal pulses.      Heart sounds: Normal heart sounds. No murmur heard.     No friction rub. No gallop.   Pulmonary:      Effort: Pulmonary effort is normal. No respiratory distress.      Breath sounds: Normal breath sounds. No stridor. No wheezing, rhonchi or rales.   Abdominal:      General: Abdomen is flat. Bowel sounds are normal.      Palpations: Abdomen is soft.   Musculoskeletal:         General: No deformity or signs of injury.      Cervical back: Normal range of motion and neck supple.      Right lower leg: No edema.      Left lower leg: No edema.   Lymphadenopathy:      Cervical: No cervical adenopathy.   Skin:     General: Skin is warm.      Coloration: Skin is not jaundiced or pale.      Findings: No lesion or rash.   Neurological:      General: No focal deficit present.      Mental Status: She is alert and oriented to person, place, and time. Mental status is at baseline.   Psychiatric:         Mood and Affect: Mood normal.         Behavior: Behavior normal.         Thought Content: Thought content normal.         Judgment: Judgment normal.         1. Viral URI  Start Zyrtec D   Flonase  Ibu/Tylenol as needed for fever/pain  2. Non-recurrent  acute suppurative otitis media of left ear without spontaneous rupture of tympanic membrane  - cefdinir (OMNICEF) 300 MG capsule; Take 1 capsule (300 mg total) by mouth every 12 (twelve) hours. for 7 days  Dispense: 14 capsule; Refill: 0  - fluticasone propionate (FLONASE) 50 mcg/actuation nasal spray; 2 sprays (100 mcg total) by Each Nostril route daily as needed for Rhinitis.  Dispense: 16 each; Refill: 0    3. Pharyngitis, unspecified etiology  - POCT COVID-19 Rapid Screening  - POCT Influenza A/B Molecular  Drink plenty fluids  IBU/ Tylenol prn for discomfort  Warm salt water gargles as needed  Warm tea/honey as needed  Notify provider if symptoms fail to improve/worsen  Change toothbrush once well.    RTC/ER precautions given. F/U if no improvement or worsening of symptoms.    Counseled on regular exercise, maintenance of a healthy weight, balanced diet rich in fruits/vegetables and lean protein, and avoidance of unhealthy habits like smoking and excessive alcohol intake.    RANDY KumariC

## 2024-07-22 ENCOUNTER — PATIENT MESSAGE (OUTPATIENT)
Dept: NEUROLOGY | Facility: CLINIC | Age: 44
End: 2024-07-22
Payer: COMMERCIAL

## 2024-08-02 ENCOUNTER — PATIENT MESSAGE (OUTPATIENT)
Dept: INTERVENTIONAL RADIOLOGY/VASCULAR | Facility: HOSPITAL | Age: 44
End: 2024-08-02
Payer: COMMERCIAL

## 2024-08-06 ENCOUNTER — PATIENT MESSAGE (OUTPATIENT)
Dept: NEUROLOGY | Facility: CLINIC | Age: 44
End: 2024-08-06
Payer: COMMERCIAL

## 2024-08-06 ENCOUNTER — PATIENT MESSAGE (OUTPATIENT)
Dept: FAMILY MEDICINE | Facility: CLINIC | Age: 44
End: 2024-08-06
Payer: COMMERCIAL

## 2024-08-20 ENCOUNTER — TELEPHONE (OUTPATIENT)
Dept: NEUROLOGY | Facility: CLINIC | Age: 44
End: 2024-08-20
Payer: COMMERCIAL

## 2024-08-20 ENCOUNTER — OFFICE VISIT (OUTPATIENT)
Dept: FAMILY MEDICINE | Facility: CLINIC | Age: 44
End: 2024-08-20
Payer: COMMERCIAL

## 2024-08-20 ENCOUNTER — LAB VISIT (OUTPATIENT)
Dept: LAB | Facility: HOSPITAL | Age: 44
End: 2024-08-20
Attending: NURSE PRACTITIONER
Payer: COMMERCIAL

## 2024-08-20 ENCOUNTER — OFFICE VISIT (OUTPATIENT)
Dept: NEUROLOGY | Facility: CLINIC | Age: 44
End: 2024-08-20
Payer: COMMERCIAL

## 2024-08-20 VITALS
SYSTOLIC BLOOD PRESSURE: 115 MMHG | TEMPERATURE: 97 F | HEIGHT: 62 IN | RESPIRATION RATE: 17 BRPM | BODY MASS INDEX: 42.82 KG/M2 | HEART RATE: 54 BPM | DIASTOLIC BLOOD PRESSURE: 81 MMHG | WEIGHT: 232.69 LBS

## 2024-08-20 VITALS
WEIGHT: 232.38 LBS | OXYGEN SATURATION: 97 % | BODY MASS INDEX: 42.5 KG/M2 | HEART RATE: 70 BPM | SYSTOLIC BLOOD PRESSURE: 112 MMHG | DIASTOLIC BLOOD PRESSURE: 78 MMHG | TEMPERATURE: 98 F

## 2024-08-20 DIAGNOSIS — M79.18 CERVICAL MYOFASCIAL PAIN SYNDROME: ICD-10-CM

## 2024-08-20 DIAGNOSIS — R10.13 EPIGASTRIC ABDOMINAL PAIN: Primary | ICD-10-CM

## 2024-08-20 DIAGNOSIS — R10.13 EPIGASTRIC ABDOMINAL PAIN: ICD-10-CM

## 2024-08-20 DIAGNOSIS — F41.1 GAD (GENERALIZED ANXIETY DISORDER): ICD-10-CM

## 2024-08-20 DIAGNOSIS — G43.719 INTRACTABLE CHRONIC MIGRAINE WITHOUT AURA AND WITHOUT STATUS MIGRAINOSUS: Primary | ICD-10-CM

## 2024-08-20 PROCEDURE — 3008F BODY MASS INDEX DOCD: CPT | Mod: CPTII,S$GLB,, | Performed by: NURSE PRACTITIONER

## 2024-08-20 PROCEDURE — 3044F HG A1C LEVEL LT 7.0%: CPT | Mod: CPTII,S$GLB,, | Performed by: NURSE PRACTITIONER

## 2024-08-20 PROCEDURE — 99214 OFFICE O/P EST MOD 30 MIN: CPT | Mod: S$GLB,,, | Performed by: NURSE PRACTITIONER

## 2024-08-20 PROCEDURE — 99213 OFFICE O/P EST LOW 20 MIN: CPT | Mod: S$GLB,,, | Performed by: NURSE PRACTITIONER

## 2024-08-20 PROCEDURE — 80053 COMPREHEN METABOLIC PANEL: CPT | Performed by: NURSE PRACTITIONER

## 2024-08-20 PROCEDURE — 1159F MED LIST DOCD IN RCRD: CPT | Mod: CPTII,S$GLB,, | Performed by: NURSE PRACTITIONER

## 2024-08-20 PROCEDURE — 36415 COLL VENOUS BLD VENIPUNCTURE: CPT | Mod: PO | Performed by: NURSE PRACTITIONER

## 2024-08-20 PROCEDURE — 99999 PR PBB SHADOW E&M-EST. PATIENT-LVL IV: CPT | Mod: PBBFAC,,, | Performed by: NURSE PRACTITIONER

## 2024-08-20 PROCEDURE — 85025 COMPLETE CBC W/AUTO DIFF WBC: CPT | Performed by: NURSE PRACTITIONER

## 2024-08-20 PROCEDURE — 99999 PR PBB SHADOW E&M-EST. PATIENT-LVL III: CPT | Mod: PBBFAC,,, | Performed by: NURSE PRACTITIONER

## 2024-08-20 PROCEDURE — 1160F RVW MEDS BY RX/DR IN RCRD: CPT | Mod: CPTII,S$GLB,, | Performed by: NURSE PRACTITIONER

## 2024-08-20 PROCEDURE — 3079F DIAST BP 80-89 MM HG: CPT | Mod: CPTII,S$GLB,, | Performed by: NURSE PRACTITIONER

## 2024-08-20 PROCEDURE — 3074F SYST BP LT 130 MM HG: CPT | Mod: CPTII,S$GLB,, | Performed by: NURSE PRACTITIONER

## 2024-08-20 PROCEDURE — 83690 ASSAY OF LIPASE: CPT | Performed by: NURSE PRACTITIONER

## 2024-08-20 PROCEDURE — 3078F DIAST BP <80 MM HG: CPT | Mod: CPTII,S$GLB,, | Performed by: NURSE PRACTITIONER

## 2024-08-20 RX ORDER — UBROGEPANT 100 MG/1
TABLET ORAL
Qty: 8 TABLET | Refills: 11 | Status: SHIPPED | OUTPATIENT
Start: 2024-08-20

## 2024-08-20 RX ORDER — ONDANSETRON 4 MG/1
4 TABLET, FILM COATED ORAL EVERY 8 HOURS PRN
Qty: 60 TABLET | Refills: 2 | Status: SHIPPED | OUTPATIENT
Start: 2024-08-20

## 2024-08-20 RX ORDER — PANTOPRAZOLE SODIUM 20 MG/1
20 TABLET, DELAYED RELEASE ORAL DAILY
Qty: 14 TABLET | Refills: 0 | Status: SHIPPED | OUTPATIENT
Start: 2024-08-20 | End: 2024-09-05

## 2024-08-20 RX ORDER — TIZANIDINE 4 MG/1
TABLET ORAL
Qty: 30 TABLET | Refills: 11 | Status: SHIPPED | OUTPATIENT
Start: 2024-08-20

## 2024-08-20 RX ORDER — SERTRALINE HYDROCHLORIDE 50 MG/1
50 TABLET, FILM COATED ORAL DAILY
Qty: 30 TABLET | Refills: 5 | Status: SHIPPED | OUTPATIENT
Start: 2024-08-20

## 2024-08-20 RX ORDER — ATOGEPANT 60 MG/1
60 TABLET ORAL DAILY
Qty: 30 TABLET | Refills: 11 | Status: SHIPPED | OUTPATIENT
Start: 2024-08-20

## 2024-08-20 NOTE — PROGRESS NOTES
"Date of service: 8/20/2024  Referring provider: No ref. provider found    Subjective:      Chief complaint: Headache       Patient ID: Eugenie Laguerre is a 44 y.o. female with anxiety, hyperthyroidism, VERENICE, migraine, RLS, (?)clotting disorder who presents for follow up of headache     History of Present Illness    INTERVAL HISTORY 8/20/24    Last visit was one year ago and at that time we started Qulipta. Since last visit she had stent for brain aneurysm. After that she had no insurance for the past year and stopped all medications.     Today she reports she is better. Current pain 3 with range 0-6. She has a headache 4 days per week. She takes tylenol 4 days per week. Otherwise information below is reviewed and verified with no changes made     INTERVAL HISTORY 8/2/23    Last visit was almost five months ago. At that time we added Emgality and rizatriptan. I ordered an MRA which indicated a small aneurysm for which she saw vascular neuro. She only did the loading dose of Emgality.    Today she reports she is worse. She states she had a procedure done and headaches are worse. She had a coil for aneurysm in July and two days later she lost vision in right eye. She saw ophthalmology who said her eyes were "healthy". Headaches are on the right side. Current pain 3 with range 2-9. She has a daily headache. She is taking rizatriptan and tylenol.  Otherwise information below is reviewed and verified with no changes made     ORIGINAL HEADACHE HISTORY - 3/15/23  Age at onset and course over time: in her 20's had an episode similar to a stroke. She went to the ER and diagnosed with migraines. She was hypotensive due to medication.     She takes Topamax but causes her to be overly sedated. She takes more as needed due to this.     She works as a .      Aura - ring of light in vision, lasts an hour if not treated, always associated with severe headache     Family history of migraine - mom, maternal grandmother  Family " history of aneurysm - dad, paternal sister  Last eye exam - august 2022    Location: mainly behind her eyes   Quality:  [] pressure [] tight [x] throbbing [] sharp [] stabbing   Severity: current 6 with range 3-8  Duration: days  Frequency: 20 days per month   Headaches awaken at night?:  yes  Worst time of day: upon waking   Associated with: [x] photophobia [x]  phonophobia [] osmophobia [x] blurred vision  [] double vision [x] loss of appetite [x] nausea [x] vomiting [x] dizziness [] vertigo  [] tinnitus [x] irritability [] sinus pressure [] problems with concentration   [] neck tightness   Alleviated by:  [x] sleep [x] darkness [] massage [] heat [] ice [] medication  Exacerbated by:  [x] fatigue [x] light [x] noise [] smells [] coughing [] sneezing  [] bending over [] ovulation [] menses [] alcohol [] change in weather [x]  stress  Ipsilateral autonomic: [] nasal congestion [] lacrimation [] ptosis [] injection [] edema [] foreign body sensation [] ear fullness   ICP:  [] transient visual obscurations  [] tinnitus   [x] positional headache  [] non-positional   Sleep habits: trouble falling asleep  Caffeine intake: 44 oz of tea  Gyn status (if female): having periods  HIT 6 - 65    Current acute treatment:  Tylenol     Current prevention:  Sertraline    Previously tried/failed acute treatment:  Mobic  Robaxin  Fioricet  Naproxen  Sumatriptan  Diclofenac  Zofran  Phenergan  Ibuprofen - daily  Rizatriptan    Previously tried/failed preventative treatment:  Nifedipine  Amitriptyline - for headaches, somewhat helpful but sedating   Celexa  Zonisamide  Depakote - severe hypotension   Requip  Qulipta - took about 2 months before losing insurance, was effective   Buspar  Topamax - 100 mg QHS  Emgality - first March 2023 - loading dose    Review of patient's allergies indicates:   Allergen Reactions    Amoxicillin     Augmentin [amoxicillin-pot clavulanate] Swelling    Bactrim [sulfamethoxazole-trimethoprim] Itching and  Nausea Only    Ropinirole Rash     Current Outpatient Medications   Medication Sig Dispense Refill    fluticasone propionate (FLONASE) 50 mcg/actuation nasal spray Spray 2 sprays (100 mcg total) by Each Nostril route daily as needed for Rhinitis. 16 g 0    hydrOXYzine (ATARAX) 50 MG tablet Take 1 tablet (50 mg total) by mouth nightly as needed for Itching. 30 tablet 3    ondansetron (ZOFRAN) 4 MG tablet Take 1 tablet (4 mg total) by mouth every 8 (eight) hours as needed for Nausea. 60 tablet 2    pantoprazole (PROTONIX) 20 MG tablet Take 1 tablet (20 mg total) by mouth once daily. for 14 days 14 tablet 0    sertraline (ZOLOFT) 50 MG tablet Take 1 tablet (50 mg total) by mouth once daily. 30 tablet 5    atogepant (QULIPTA) 60 mg Tab Take 1 tablet (60 mg total) by mouth once daily. 30 tablet 11    tiZANidine (ZANAFLEX) 4 MG tablet Take 1/2 or 1 tablet by mouth at night as needed for muscle spasm 30 tablet 11    ubrogepant (UBRELVY) 100 mg tablet Take 1 tablet by mouth as needed for migraine. May repeat in 2 hours if needed. Max 2 tablet per day 8 tablet 11     No current facility-administered medications for this visit.       Past Medical History  Past Medical History:   Diagnosis Date    Anxiety 2023    Clotting disorder     DVT (deep venous thrombosis) 2007    patient does not have factor V leiden mutation - she had a negative test on 2013 and an office visit with Dr Canela (Heme/Onc) stating that her test was negative. Dr Canela ruled out antiphospholipid syndrome as well.  I see no evidence of any other hypercoagulable disorders being diagnosed. Pt had what sounds like a provoked LE DVT after a 16 hour car trip to [place], [place]    High risk pregnancy due to history of  labor 2015    Hyperthyroidism 2013    reports repeat testing was WNL and no treatment needed.    Iron deficiency anemia due to chronic blood loss 2015    Major depressive disorder, recurrent, moderate 2023     Migraines     Restless legs syndrome (RLS)     Short cervix affecting pregnancy 2015       Past Surgical History  Past Surgical History:   Procedure Laterality Date    2 R knee sx; 1 L knee      arthroscopies    BRONCHOSCOPY       SECTION  2015    LAPAROSCOPIC CHOLECYSTECTOMY N/A 2021    Procedure: CHOLECYSTECTOMY, LAPAROSCOPIC;  Surgeon: Pascual Lu MD;  [facility];  Service: General;  Laterality: N/A;    TUBAL LIGATION  2015       Family History  Family History   Problem Relation Name Age of Onset    Fibromyalgia Mother      Depression Mother      Anuerysm Father      Cancer Father          skin    Deep vein thrombosis Father      Hypertension Father      Bipolar disorder Sister      Ovarian cancer Maternal Aunt      Bladder Cancer Maternal Aunt      Anuerysm Paternal Aunt          AAA    Breast cancer Maternal Grandmother      Diabetes Paternal Grandmother      Anuerysm Paternal Grandfather          AAA    Heart disease Brother      Colon cancer Neg Hx      Stomach cancer Neg Hx      Esophageal cancer Neg Hx         Social History  Social History     Socioeconomic History    Marital status:     Number of children: 2   Tobacco Use    Smoking status: Former     Current packs/day: 0.50     Types: Cigarettes    Smokeless tobacco: Former     Quit date: 2021   Substance and Sexual Activity    Alcohol use: Yes     Alcohol/week: 0.0 standard drinks of alcohol     Comment: rare    Drug use: No    Sexual activity: Yes     Partners: Male     Birth control/protection: None     Social Determinants of Health     Financial Resource Strain: Low Risk  (2023)    Overall Financial Resource Strain (CARDIA)     Difficulty of Paying Living Expenses: Not hard at all   Food Insecurity: No Food Insecurity (2023)    Hunger Vital Sign     Worried About Running Out of Food in the Last Year: Never true     Ran Out of Food in the Last Year: Never true   Transportation Needs: No  Transportation Needs (7/9/2023)    PRAPARE - Transportation     Lack of Transportation (Medical): No     Lack of Transportation (Non-Medical): No   Physical Activity: Inactive (7/9/2023)    Exercise Vital Sign     Days of Exercise per Week: 0 days     Minutes of Exercise per Session: 0 min   Stress: No Stress Concern Present (7/9/2023)    [employer] [place] [employer] - Occupational Stress Questionnaire     Feeling of Stress : Not at all   Housing Stability: Low Risk  (7/9/2023)    Housing Stability Vital Sign     Unable to Pay for Housing in the Last Year: No     Number of Places Lived in the Last Year: 1     Unstable Housing in the Last Year: No   Recent Concern: Housing Stability - High Risk (7/6/2023)    Housing Stability Vital Sign     Unable to Pay for Housing in the Last Year: Yes     Number of Places Lived in the Last Year: 2     Unstable Housing in the Last Year: No        Objective:        Vitals:    08/20/24 1127   BP: 115/81   Pulse: (!) 54   Resp: 17   Temp: 97.3 °F (36.3 °C)         Body mass index is 42.56 kg/m².    8/20/24  Constitutional:   She appears well-developed and well-nourished. She is well groomed     Neurological Exam:  General: well-developed, well-nourished, no distress  Mental status: Awake and alert  Speech language: No dysarthria or aphasia on conversation  Cranial nerves: Face symmetric  Motor: Moves all extremities well  Coordination: No ataxia. No tremor.      Data Review:     I have personally reviewed the referring provider's notes, labs, & imaging made available to me today.      RADIOLOGY STUDIES:  I have personally reviewed the pertinent images performed.       Results for orders placed or performed during the hospital encounter of 06/25/13   MRA Brain without contrast    Narrative    Routine  imaging through this 32-year-old females brain was obtained per the MRA protocol.  A survey flair weighted sequence and T2 weighted sequence were also available.    The  anterior cerebral arteries, anterior communicating artery, middle cerebral arteries, and posterior cerebral arteries proximally demonstrate no evidence of aneurysm or stenosis.      The left vertebral artery appears dominant in comparison with the right.      The P1 segment on the left appears hypoplastic with the majority of the blood flow coming from the posterior communicating artery on the left.  The posterior communicating artery on the right appears hypoplastic with a majority of the blood flow coming   from the P1 segment.      The A1 segment on the right appears hypoplastic with the majority of the blood flow to the anterior cerebral arteries likely coming from the left A1 segment.    The ventricles and sulci appear age appropriate.  Mild mucous membrane thickening is noted within the maxillary sinuses bilaterally.    Impression     1.  No obvious evidence of aneurysm or stenosis is noted on this MRI of the brain.      2.  Mild mucous membrane thickening is noted within the maxillary sinuses bilaterally.      Electronically signed by: Philippe Hayden MD  Date:     06/25/13  Time:    16:50        Lab Results   Component Value Date    BNP 21 11/26/2018     07/09/2024     07/09/2024    K 4.1 07/09/2024    K 4.1 07/09/2024    MG 2.7 (H) 07/14/2023     07/09/2024     07/09/2024    CO2 22 (L) 07/09/2024    CO2 22 (L) 07/09/2024    BUN 12 07/09/2024    BUN 12 07/09/2024    CREATININE 0.8 07/09/2024    CREATININE 0.8 07/09/2024    GLU 90 07/09/2024    GLU 90 07/09/2024    HGBA1C 5.4 07/09/2024    AST 21 07/09/2024    ALT 20 07/09/2024    ALBUMIN 4.1 07/09/2024    PROT 7.8 07/09/2024    BILITOT 0.5 07/09/2024    CHOL 172 08/07/2020    CHOL 144 11/24/2018    HDL 46 08/07/2020    HDL 41 11/24/2018    LDLCALC 119 (H) 08/07/2020    LDLCALC 87.2 11/24/2018    TRIG 36 08/07/2020    TRIG 79 11/24/2018       Lab Results   Component Value Date    WBC 5.03 07/09/2024    HGB 14.2 07/09/2024    HCT 44.1  07/09/2024    MCV 89 07/09/2024     07/09/2024       Lab Results   Component Value Date    TSH 0.931 07/09/2024           Assessment & Plan:       Problem List Items Addressed This Visit          Neuro    Intractable chronic migraine without aura and without status migrainosus - Primary    Overview     Migraine headaches since her 20's. Headaches are typically unilateral, moderate to severe in intensity, worsen with activity, pounding in quality and associated with sensitivity to light and sound. Gradual progression pattern, lack of red flag features on history, and normal neurological exam are reassuring for primary as opposed to secondary etiology of headaches thus imaging will not be pursued for this history and this exam at this time.    She was only able to get the loading dose of Emgality. Will start Qulipta for prevention.     Next step would be to add Botox.    Stop triptan. Start Ubrelvy for acute attacks.          Current Assessment & Plan     Restart Qulipta and Ubrelvy.         Relevant Medications    atogepant (QULIPTA) 60 mg Tab    ubrogepant (UBRELVY) 100 mg tablet     Other Visit Diagnoses       Cervical myofascial pain syndrome        Relevant Medications    tiZANidine (ZANAFLEX) 4 MG tablet                    Please call our clinic at [phone] or send a message on the [employer] portal if there are any changes to the plan described below, for example,if you are not contacted for the requested tests, referral(s) within one week, if you are unable to receive the medications prescribed, or if you feel you need to change the treatment course for any reason.     TESTING:  -- CMP in 3 months     REFERRALS:  -- none    PREVENTION (use daily regardless of headache):  -- START Qulipta once daily   -- continue magnesium in ONE of the following preparations -               1. Magnesium oxide 800mg daily (the most common over the counter kind, may causes loose stools)              2. Magnesium citrate  400-500mg daily (harder to find, but more neutral on the bowels)              3. Magnesium glycinate 400mg daily (hardest to find, look online, but most bowel-neutral, best absorbed)     AS-NEEDED TREATMENT (use total no more than 10 days per month unless otherwise stated):  -- continue tizanidine at night. This is a muscle relaxer and it will also make you potentially sleepy. Start with half a tablet to see how you respond but can take up to a whole tablet if needed  -- START Ubrelvy with next migraine. You can repeat two hours later if needed. With this medication do not drink grapefruit juice or eat grapefruit or some medications like ketoconazole, itraconazole, or antibiotics clarithromycin      Follow up in about 3 months (around 11/20/2024).    Tricia Gerardo NP

## 2024-08-20 NOTE — PATIENT INSTRUCTIONS
Please call our clinic at [phone] or send a message on the [facility] portal if there are any changes to the plan described below, for example,if you are not contacted for the requested tests, referral(s) within one week, if you are unable to receive the medications prescribed, or if you feel you need to change the treatment course for any reason.     TESTING:  -- CMP in 3 months     REFERRALS:  -- none    PREVENTION (use daily regardless of headache):  -- START Qulipta once daily   -- continue magnesium in ONE of the following preparations -               1. Magnesium oxide 800mg daily (the most common over the counter kind, may causes loose stools)              2. Magnesium citrate 400-500mg daily (harder to find, but more neutral on the bowels)              3. Magnesium glycinate 400mg daily (hardest to find, look online, but most bowel-neutral, best absorbed)     AS-NEEDED TREATMENT (use total no more than 10 days per month unless otherwise stated):  -- continue tizanidine at night. This is a muscle relaxer and it will also make you potentially sleepy. Start with half a tablet to see how you respond but can take up to a whole tablet if needed  -- continue compazine. This is a nausea medication that also has anti-migraine properties. Can take daily and will not contribute to rebound headaches  -- START Ubrelvy with next migraine. You can repeat two hours later if needed. With this medication do not drink grapefruit juice or eat grapefruit or some medications like ketoconazole, itraconazole, or antibiotics clarithromycin

## 2024-08-20 NOTE — PROGRESS NOTES
"Subjective:       Patient ID: Eugenie Laguerre is a 44 y.o. female.    Chief Complaint: Follow-up (Medication)    HPI  PAUL: resumed Zoloft 25mg at our visit 24. She reports improvement but not at goal. Tolerating without adverse effect    Reports epigastric fullness/bloating x 2 weeks. Does not associate with starting Zoloft. Denies heartburn, regurgitation, or severe pain. Pain is describes as severe "fullness"/bloating, occurs after eating. Denies vomiting, fever, overeating, change in bowel pattern, constipation, diarrhea, dysphagia, or globus sensation. History includes cholecystectomy.     Vitals:    24 0939   BP: 112/78   Pulse: 70   Temp: 97.7 °F (36.5 °C)     Review of Systems   Constitutional:  Negative for fever.   Respiratory:  Negative for cough and shortness of breath.    Cardiovascular:  Negative for chest pain.   Gastrointestinal:  Positive for nausea. Negative for anal bleeding, blood in stool, constipation, diarrhea and vomiting.        Epigastric fullness/discomfort        Past Medical History:   Diagnosis Date    Anxiety 2023    Clotting disorder     DVT (deep venous thrombosis) 2007    patient does not have factor V leiden mutation - she had a negative test on 2013 and an office visit with Dr Canela (Heme/Onc) stating that her test was negative. Dr Canela ruled out antiphospholipid syndrome as well.  I see no evidence of any other hypercoagulable disorders being diagnosed. Pt had what sounds like a provoked LE DVT after a 16 hour car trip to [place], [place]    High risk pregnancy due to history of  labor 2015    Hyperthyroidism 2013    reports repeat testing was WNL and no treatment needed.    Iron deficiency anemia due to chronic blood loss 2015    Major depressive disorder, recurrent, moderate 2023    Migraines     Restless legs syndrome (RLS)     Short cervix affecting pregnancy 2015     Objective:      Physical Exam  Constitutional:       " General: She is not in acute distress.     Appearance: She is well-developed. She is obese. She is not ill-appearing, toxic-appearing or diaphoretic.   HENT:      Right Ear: Hearing normal.      Left Ear: Hearing normal.   Pulmonary:      Effort: No tachypnea or respiratory distress.   Abdominal:      General: There is no distension.      Palpations: Abdomen is soft.      Tenderness: There is no abdominal tenderness.   Skin:     Coloration: Skin is not pale.   Neurological:      Mental Status: She is alert and oriented to person, place, and time.   Psychiatric:         Speech: Speech normal.         Behavior: Behavior normal.         Thought Content: Thought content normal.         Judgment: Judgment normal.         Assessment:       1. Epigastric abdominal pain    2. PAUL (generalized anxiety disorder)        Plan:       Epigastric abdominal pain  -     US Abdomen Complete; Future; Expected date: 08/20/2024  -     CBC Auto Differential; Future; Expected date: 08/20/2024  -     Comprehensive Metabolic Panel; Future; Expected date: 08/20/2024  -     LIPASE; Future; Expected date: 08/20/2024  -     ondansetron (ZOFRAN) 4 MG tablet; Take 1 tablet (4 mg total) by mouth every 8 (eight) hours as needed for Nausea.  Dispense: 60 tablet; Refill: 2  -     pantoprazole (PROTONIX) 20 MG tablet; Take 1 tablet (20 mg total) by mouth once daily. for 14 days  Dispense: 14 tablet; Refill: 0    PAUL (generalized anxiety disorder)  -     sertraline (ZOLOFT) 50 MG tablet; Take 1 tablet (50 mg total) by mouth once daily.  Dispense: 30 tablet; Refill: 5          Increase Zoloft--message me with response    Labs today. Trail PPI x 2 weeks. Persistent/worsening pain proceed with US        Medication List with Changes/Refills   New Medications    PANTOPRAZOLE (PROTONIX) 20 MG TABLET    Take 1 tablet (20 mg total) by mouth once daily. for 14 days   Current Medications    FLUTICASONE PROPIONATE (FLONASE) 50 MCG/ACTUATION NASAL SPRAY    [place] 2  sprays (100 mcg total) by Each Nostril route daily as needed for Rhinitis.    HYDROXYZINE (ATARAX) 50 MG TABLET    Take 1 tablet (50 mg total) by mouth nightly as needed for Itching.   Changed and/or Refilled Medications    Modified Medication Previous Medication    ONDANSETRON (ZOFRAN) 4 MG TABLET ondansetron (ZOFRAN) 4 MG tablet       Take 1 tablet (4 mg total) by mouth every 8 (eight) hours as needed for Nausea.    Take 1 tablet (4 mg total) by mouth every 8 (eight) hours as needed for Nausea.    SERTRALINE (ZOLOFT) 50 MG TABLET sertraline (ZOLOFT) 25 MG tablet       Take 1 tablet (50 mg total) by mouth once daily.    Take 1 tablet (25 mg total) by mouth once daily.   Discontinued Medications    BUSPIRONE (BUSPAR) 15 MG TABLET    Take 1 tablet (15 mg total) by mouth 2 (two) times daily.

## 2024-08-21 LAB
ALBUMIN SERPL BCP-MCNC: 4.5 G/DL (ref 3.5–5.2)
ALP SERPL-CCNC: 73 U/L (ref 55–135)
ALT SERPL W/O P-5'-P-CCNC: 22 U/L (ref 10–44)
ANION GAP SERPL CALC-SCNC: 10 MMOL/L (ref 8–16)
ANISOCYTOSIS BLD QL SMEAR: SLIGHT
AST SERPL-CCNC: 24 U/L (ref 10–40)
BASOPHILS # BLD AUTO: 0.05 K/UL (ref 0–0.2)
BASOPHILS NFR BLD: 1.1 % (ref 0–1.9)
BILIRUB SERPL-MCNC: 0.5 MG/DL (ref 0.1–1)
BUN SERPL-MCNC: 12 MG/DL (ref 6–20)
CALCIUM SERPL-MCNC: 9.6 MG/DL (ref 8.7–10.5)
CHLORIDE SERPL-SCNC: 106 MMOL/L (ref 95–110)
CO2 SERPL-SCNC: 25 MMOL/L (ref 23–29)
CREAT SERPL-MCNC: 1 MG/DL (ref 0.5–1.4)
DIFFERENTIAL METHOD BLD: ABNORMAL
EOSINOPHIL # BLD AUTO: 0.1 K/UL (ref 0–0.5)
EOSINOPHIL NFR BLD: 1.8 % (ref 0–8)
ERYTHROCYTE [DISTWIDTH] IN BLOOD BY AUTOMATED COUNT: 13.4 % (ref 11.5–14.5)
EST. GFR  (NO RACE VARIABLE): >60 ML/MIN/1.73 M^2
GLUCOSE SERPL-MCNC: 88 MG/DL (ref 70–110)
HCT VFR BLD AUTO: 46.7 % (ref 37–48.5)
HGB BLD-MCNC: 15.6 G/DL (ref 12–16)
HYPOCHROMIA BLD QL SMEAR: ABNORMAL
IMM GRANULOCYTES # BLD AUTO: 0.03 K/UL (ref 0–0.04)
IMM GRANULOCYTES NFR BLD AUTO: 0.7 % (ref 0–0.5)
LIPASE SERPL-CCNC: 24 U/L (ref 4–60)
LYMPHOCYTES # BLD AUTO: 1.5 K/UL (ref 1–4.8)
LYMPHOCYTES NFR BLD: 34.1 % (ref 18–48)
MCH RBC QN AUTO: 29.3 PG (ref 27–31)
MCHC RBC AUTO-ENTMCNC: 33.4 G/DL (ref 32–36)
MCV RBC AUTO: 88 FL (ref 82–98)
MONOCYTES # BLD AUTO: 0.3 K/UL (ref 0.3–1)
MONOCYTES NFR BLD: 6.9 % (ref 4–15)
NEUTROPHILS # BLD AUTO: 2.4 K/UL (ref 1.8–7.7)
NEUTROPHILS NFR BLD: 55.4 % (ref 38–73)
NRBC BLD-RTO: 0 /100 WBC
OVALOCYTES BLD QL SMEAR: ABNORMAL
PLATELET # BLD AUTO: 219 K/UL (ref 150–450)
PMV BLD AUTO: ABNORMAL FL (ref 9.2–12.9)
POIKILOCYTOSIS BLD QL SMEAR: SLIGHT
POLYCHROMASIA BLD QL SMEAR: ABNORMAL
POTASSIUM SERPL-SCNC: 4 MMOL/L (ref 3.5–5.1)
PROT SERPL-MCNC: 8.1 G/DL (ref 6–8.4)
RBC # BLD AUTO: 5.32 M/UL (ref 4–5.4)
SODIUM SERPL-SCNC: 141 MMOL/L (ref 136–145)
SPHEROCYTES BLD QL SMEAR: ABNORMAL
WBC # BLD AUTO: 4.37 K/UL (ref 3.9–12.7)

## 2024-08-27 ENCOUNTER — PATIENT MESSAGE (OUTPATIENT)
Dept: NEUROLOGY | Facility: CLINIC | Age: 44
End: 2024-08-27
Payer: COMMERCIAL

## 2024-09-06 ENCOUNTER — PATIENT MESSAGE (OUTPATIENT)
Dept: INTERVENTIONAL RADIOLOGY/VASCULAR | Facility: HOSPITAL | Age: 44
End: 2024-09-06
Payer: COMMERCIAL

## 2024-09-09 ENCOUNTER — TELEPHONE (OUTPATIENT)
Dept: INTERVENTIONAL RADIOLOGY/VASCULAR | Facility: HOSPITAL | Age: 44
End: 2024-09-09
Payer: COMMERCIAL

## 2024-09-09 NOTE — NURSING
Pre-Op call completed patient requested to reschedule procedure due to weather.  IR schedulers and Dr. Burnett office notified and aware.

## 2024-09-14 DIAGNOSIS — R10.13 EPIGASTRIC ABDOMINAL PAIN: ICD-10-CM

## 2024-09-15 RX ORDER — PANTOPRAZOLE SODIUM 20 MG/1
20 TABLET, DELAYED RELEASE ORAL DAILY
Qty: 90 TABLET | Refills: 3 | Status: CANCELLED | OUTPATIENT
Start: 2024-09-15 | End: 2025-09-10

## 2024-09-16 DIAGNOSIS — R10.13 EPIGASTRIC ABDOMINAL PAIN: ICD-10-CM

## 2024-09-16 RX ORDER — PANTOPRAZOLE SODIUM 20 MG/1
20 TABLET, DELAYED RELEASE ORAL DAILY
Qty: 90 TABLET | Refills: 3 | Status: SHIPPED | OUTPATIENT
Start: 2024-09-16 | End: 2025-09-11

## 2024-09-20 ENCOUNTER — PATIENT MESSAGE (OUTPATIENT)
Dept: INTERVENTIONAL RADIOLOGY/VASCULAR | Facility: HOSPITAL | Age: 44
End: 2024-09-20
Payer: COMMERCIAL

## 2024-09-24 ENCOUNTER — TELEPHONE (OUTPATIENT)
Dept: INTERVENTIONAL RADIOLOGY/VASCULAR | Facility: HOSPITAL | Age: 44
End: 2024-09-24
Payer: COMMERCIAL

## 2024-09-24 NOTE — NURSING
Pre-procedure call complete.  2 patient identifier used (name and ).  Pt instructed not to eat or drink anything after midnight the night before procedure.  Pt aware will need someone to provide transport home and monitor pt 8 hours post procedure.  No driving for at least 24 hours after procedure.    Patient reports she does not take blood pressure, heart medications, seizure and anti-rejection medications.   Do not take sleep medication (including OTC) the night before procedure.  Arrival time and location given.  Expected length of stay reviewed.  Covid screening completed.  Pt verbalized understanding of all pre-procedure instructions.  Written instructions and directions sent to patient in Mychart/portal.

## 2024-09-26 ENCOUNTER — HOSPITAL ENCOUNTER (OUTPATIENT)
Facility: HOSPITAL | Age: 44
Discharge: HOME | End: 2024-09-27
Attending: INTERNAL MEDICINE | Admitting: INTERNAL MEDICINE
Payer: COMMERCIAL

## 2024-09-26 DIAGNOSIS — G43.909 MIGRAINES: ICD-10-CM

## 2024-09-26 PROCEDURE — G0379 DIRECT REFER HOSPITAL OBSERV: HCPCS

## 2024-09-26 PROCEDURE — G0378 HOSPITAL OBSERVATION PER HR: HCPCS

## 2024-09-26 RX ORDER — TALC
6 POWDER (GRAM) TOPICAL NIGHTLY PRN
Status: DISCONTINUED | OUTPATIENT
Start: 2024-09-27 | End: 2024-09-27 | Stop reason: HOSPADM

## 2024-09-26 RX ORDER — GLUCAGON 1 MG
1 KIT INJECTION
Status: DISCONTINUED | OUTPATIENT
Start: 2024-09-27 | End: 2024-09-27 | Stop reason: HOSPADM

## 2024-09-26 RX ORDER — ACETAMINOPHEN 325 MG/1
650 TABLET ORAL EVERY 4 HOURS PRN
Status: DISCONTINUED | OUTPATIENT
Start: 2024-09-27 | End: 2024-09-27 | Stop reason: HOSPADM

## 2024-09-26 RX ORDER — PROCHLORPERAZINE EDISYLATE 5 MG/ML
5 INJECTION INTRAMUSCULAR; INTRAVENOUS EVERY 6 HOURS PRN
Status: DISCONTINUED | OUTPATIENT
Start: 2024-09-27 | End: 2024-09-27 | Stop reason: HOSPADM

## 2024-09-26 RX ORDER — IBUPROFEN 200 MG
24 TABLET ORAL
Status: DISCONTINUED | OUTPATIENT
Start: 2024-09-27 | End: 2024-09-27 | Stop reason: HOSPADM

## 2024-09-26 RX ORDER — NALOXONE HCL 0.4 MG/ML
0.02 VIAL (ML) INJECTION
Status: DISCONTINUED | OUTPATIENT
Start: 2024-09-27 | End: 2024-09-27 | Stop reason: HOSPADM

## 2024-09-26 RX ORDER — IBUPROFEN 200 MG
16 TABLET ORAL
Status: DISCONTINUED | OUTPATIENT
Start: 2024-09-27 | End: 2024-09-27 | Stop reason: HOSPADM

## 2024-09-26 RX ORDER — ONDANSETRON HYDROCHLORIDE 2 MG/ML
4 INJECTION, SOLUTION INTRAVENOUS EVERY 8 HOURS PRN
Status: DISCONTINUED | OUTPATIENT
Start: 2024-09-27 | End: 2024-09-27 | Stop reason: HOSPADM

## 2024-09-26 RX ORDER — SODIUM CHLORIDE 0.9 % (FLUSH) 0.9 %
10 SYRINGE (ML) INJECTION
Status: DISCONTINUED | OUTPATIENT
Start: 2024-09-27 | End: 2024-09-27 | Stop reason: HOSPADM

## 2024-09-26 RX ORDER — ENOXAPARIN SODIUM 100 MG/ML
40 INJECTION SUBCUTANEOUS EVERY 24 HOURS
Status: DISCONTINUED | OUTPATIENT
Start: 2024-09-27 | End: 2024-09-27 | Stop reason: HOSPADM

## 2024-09-26 NOTE — LETTER
"     September 28, 2024             [place] [id number] [facility] [address]  Phone: [phone]  September 28, 2024     Patient: Carole Laguerre (TAMATHA)     YOB: 1980        To Whom It May Concern:    CAROLE Laguerre was hospitalized from 9/25/24 through 9/27/24. She can return to work 9/30/24.     Sincerely,        Gonzalo Felix MD  Department of Hospital Medicine     "

## 2024-09-26 NOTE — PROVIDER TRANSFER
Outside Transfer Acceptance Note / Regional Referral Center    Referring facility: [facility]   Referring provider: LALI NAVAS JR, DAVID I.  Accepting facility: [facility]  Accepting provider: LAWTON, ANDREW W. SUNKARA, PALLAVI  Admitting provider:   Reason for transfer: Higher Level of Care   Transfer diagnosis: Complex Migraine  Transfer specialty requested: Ophthalmology  Neurology  Transfer specialty notified: Yes  Transfer level: NUMBER 1-5: 2  Bed type requested: Med/tele  Isolation status: No active isolations   Admission class or status: Emergency  OP- Observation      Narrative     44 y.o. female with anxiety, hyperthyroidism, VERENICE, migraine since her 20's, RLS, (?) DVT, right ICA aneurysm- s/p stent, current smoker, was off all her meds for a year sec to insurance issues and restarted them a month ago that presented to [facility] E.R early this a.m. with c/o chest pain and headache. She started having chest pain when she was driving- intermittent, no radiation. Headache was like her Migraine headache, and she missed a dose of her medication yesterday. On arrival to E.R, she started c/o b/l jaw pain, right facial tingling, and shaking of her right hand involuntarily. Never had chest pain or handshake in the past. Supposed to have CTA brain on 9/27 for f/u of her aneurysm.                 Mild blood pressure elevation in E.R(150/92) and otherwise vitally stable. Troponin, CBC, CMP and BNP wnl. Mild ESR and CRP elevation. CXR showed no acute findings. EKG showed NSR with no acute ST-T changes. As per E.R physician, her handshake stopped when she was distracted. No other Neuro deficits on exam. CT head and CTA head and neck showed no acute findings. No improvement with Benadryl, Compazine or Fioricet. She received a dose of Ativan with improvement in her handshake, facial tingling, and headache. No chest pain  currently too. Transfer requested for Neurology evaluation as there is still a concern for possible focal seizures. [place] [facility] [place]raine headaches and they are aware of her coming as a transfer.    Objective     Vitals:    Recent Labs: All pertinent labs within the past 24 hours have been reviewed.  Recent imaging:    Airway:     Vent settings:         IV access:    Infusions:   Allergies:   Review of patient's allergies indicates:   Allergen Reactions    Amoxicillin     Augmentin [amoxicillin-pot clavulanate] Swelling    Bactrim [sulfamethoxazole-trimethoprim] Itching and Nausea Only    Ropinirole Rash      NPO: No    Anticoagulation:   Anticoagulants       None             Instructions      Barrett Mike-  Admit to Hospital Medicine  Upon patient arrival to floor, please send SecureChat to [facility] or call extension [phone] (if no answer, do NOT leave a callback number after the beep, rather please send a SecureChat to [facility]), for Hospital Medicine admit team assignment and for additional admit orders for the patient.  Do not page the attending physician associated with the patient on arrival (this physician may not be on duty at the time of arrival).  Rather, always send a SecureChat to [facility] or call [phone] to reach the triage physician for orders and team assignment.

## 2024-09-27 ENCOUNTER — PATIENT MESSAGE (OUTPATIENT)
Dept: FAMILY MEDICINE | Facility: CLINIC | Age: 44
End: 2024-09-27
Payer: COMMERCIAL

## 2024-09-27 ENCOUNTER — PATIENT MESSAGE (OUTPATIENT)
Dept: NEUROLOGY | Facility: CLINIC | Age: 44
End: 2024-09-27
Payer: COMMERCIAL

## 2024-09-27 VITALS
RESPIRATION RATE: 18 BRPM | TEMPERATURE: 98 F | HEIGHT: 62 IN | HEART RATE: 111 BPM | DIASTOLIC BLOOD PRESSURE: 64 MMHG | SYSTOLIC BLOOD PRESSURE: 129 MMHG | WEIGHT: 233.69 LBS | OXYGEN SATURATION: 93 % | BODY MASS INDEX: 43 KG/M2

## 2024-09-27 LAB
ALBUMIN SERPL BCP-MCNC: 3.5 G/DL (ref 3.5–5.2)
ALP SERPL-CCNC: 80 U/L (ref 55–135)
ALT SERPL W/O P-5'-P-CCNC: 16 U/L (ref 10–44)
ANION GAP SERPL CALC-SCNC: 12 MMOL/L (ref 8–16)
AST SERPL-CCNC: 22 U/L (ref 10–40)
BASOPHILS # BLD AUTO: 0.05 K/UL (ref 0–0.2)
BASOPHILS NFR BLD: 0.8 % (ref 0–1.9)
BILIRUB SERPL-MCNC: 0.4 MG/DL (ref 0.1–1)
BUN SERPL-MCNC: 8 MG/DL (ref 6–20)
CALCIUM SERPL-MCNC: 8.9 MG/DL (ref 8.7–10.5)
CHLORIDE SERPL-SCNC: 109 MMOL/L (ref 95–110)
CO2 SERPL-SCNC: 17 MMOL/L (ref 23–29)
CREAT SERPL-MCNC: 0.9 MG/DL (ref 0.5–1.4)
DIFFERENTIAL METHOD BLD: NORMAL
EOSINOPHIL # BLD AUTO: 0.2 K/UL (ref 0–0.5)
EOSINOPHIL NFR BLD: 3.1 % (ref 0–8)
ERYTHROCYTE [DISTWIDTH] IN BLOOD BY AUTOMATED COUNT: 13.2 % (ref 11.5–14.5)
EST. GFR  (NO RACE VARIABLE): >60 ML/MIN/1.73 M^2
GLUCOSE SERPL-MCNC: 97 MG/DL (ref 70–110)
HCT VFR BLD AUTO: 42.4 % (ref 37–48.5)
HGB BLD-MCNC: 13.9 G/DL (ref 12–16)
IMM GRANULOCYTES # BLD AUTO: 0.01 K/UL (ref 0–0.04)
IMM GRANULOCYTES NFR BLD AUTO: 0.2 % (ref 0–0.5)
LYMPHOCYTES # BLD AUTO: 1.6 K/UL (ref 1–4.8)
LYMPHOCYTES NFR BLD: 24.6 % (ref 18–48)
MAGNESIUM SERPL-MCNC: 2.3 MG/DL (ref 1.6–2.6)
MCH RBC QN AUTO: 28.9 PG (ref 27–31)
MCHC RBC AUTO-ENTMCNC: 32.8 G/DL (ref 32–36)
MCV RBC AUTO: 88 FL (ref 82–98)
MONOCYTES # BLD AUTO: 0.5 K/UL (ref 0.3–1)
MONOCYTES NFR BLD: 8.1 % (ref 4–15)
NEUTROPHILS # BLD AUTO: 4 K/UL (ref 1.8–7.7)
NEUTROPHILS NFR BLD: 63.2 % (ref 38–73)
NRBC BLD-RTO: 0 /100 WBC
PLATELET # BLD AUTO: 268 K/UL (ref 150–450)
PMV BLD AUTO: 9.3 FL (ref 9.2–12.9)
POCT GLUCOSE: 127 MG/DL (ref 70–110)
POCT GLUCOSE: 132 MG/DL (ref 70–110)
POTASSIUM SERPL-SCNC: 4 MMOL/L (ref 3.5–5.1)
PROT SERPL-MCNC: 7.1 G/DL (ref 6–8.4)
RBC # BLD AUTO: 4.81 M/UL (ref 4–5.4)
SODIUM SERPL-SCNC: 138 MMOL/L (ref 136–145)
WBC # BLD AUTO: 6.39 K/UL (ref 3.9–12.7)

## 2024-09-27 PROCEDURE — 96376 TX/PRO/DX INJ SAME DRUG ADON: CPT

## 2024-09-27 PROCEDURE — 25000003 PHARM REV CODE 250: Performed by: HOSPITALIST

## 2024-09-27 PROCEDURE — 85025 COMPLETE CBC W/AUTO DIFF WBC: CPT | Performed by: HOSPITALIST

## 2024-09-27 PROCEDURE — 96375 TX/PRO/DX INJ NEW DRUG ADDON: CPT

## 2024-09-27 PROCEDURE — G0378 HOSPITAL OBSERVATION PER HR: HCPCS

## 2024-09-27 PROCEDURE — 96361 HYDRATE IV INFUSION ADD-ON: CPT

## 2024-09-27 PROCEDURE — 96368 THER/DIAG CONCURRENT INF: CPT

## 2024-09-27 PROCEDURE — 63600175 PHARM REV CODE 636 W HCPCS: Performed by: HOSPITALIST

## 2024-09-27 PROCEDURE — 96366 THER/PROPH/DIAG IV INF ADDON: CPT

## 2024-09-27 PROCEDURE — 96365 THER/PROPH/DIAG IV INF INIT: CPT

## 2024-09-27 PROCEDURE — 36415 COLL VENOUS BLD VENIPUNCTURE: CPT | Performed by: HOSPITALIST

## 2024-09-27 PROCEDURE — 83735 ASSAY OF MAGNESIUM: CPT | Performed by: HOSPITALIST

## 2024-09-27 PROCEDURE — 80053 COMPREHEN METABOLIC PANEL: CPT | Performed by: HOSPITALIST

## 2024-09-27 RX ORDER — MAGNESIUM SULFATE HEPTAHYDRATE 40 MG/ML
2 INJECTION, SOLUTION INTRAVENOUS ONCE
Status: COMPLETED | OUTPATIENT
Start: 2024-09-27 | End: 2024-09-27

## 2024-09-27 RX ORDER — PROCHLORPERAZINE EDISYLATE 5 MG/ML
5 INJECTION INTRAMUSCULAR; INTRAVENOUS ONCE
Status: COMPLETED | OUTPATIENT
Start: 2024-09-27 | End: 2024-09-27

## 2024-09-27 RX ORDER — DIPHENHYDRAMINE HYDROCHLORIDE 50 MG/ML
25 INJECTION INTRAMUSCULAR; INTRAVENOUS ONCE
Status: COMPLETED | OUTPATIENT
Start: 2024-09-27 | End: 2024-09-27

## 2024-09-27 RX ORDER — KETOROLAC TROMETHAMINE 30 MG/ML
30 INJECTION, SOLUTION INTRAMUSCULAR; INTRAVENOUS ONCE
Status: COMPLETED | OUTPATIENT
Start: 2024-09-27 | End: 2024-09-27

## 2024-09-27 RX ADMIN — ONDANSETRON 4 MG: 2 INJECTION INTRAMUSCULAR; INTRAVENOUS at 04:09

## 2024-09-27 RX ADMIN — PROCHLORPERAZINE EDISYLATE 5 MG: 5 INJECTION INTRAMUSCULAR; INTRAVENOUS at 09:09

## 2024-09-27 RX ADMIN — PROCHLORPERAZINE EDISYLATE 5 MG: 5 INJECTION INTRAMUSCULAR; INTRAVENOUS at 01:09

## 2024-09-27 RX ADMIN — SODIUM CHLORIDE 1000 ML: 0.9 INJECTION, SOLUTION INTRAVENOUS at 09:09

## 2024-09-27 RX ADMIN — VALPROATE SODIUM 1000 MG: 100 INJECTION, SOLUTION INTRAVENOUS at 09:09

## 2024-09-27 RX ADMIN — SODIUM CHLORIDE 1000 ML: 9 INJECTION, SOLUTION INTRAVENOUS at 01:09

## 2024-09-27 RX ADMIN — MAGNESIUM SULFATE 2 G: 2 INJECTION INTRAVENOUS at 09:09

## 2024-09-27 RX ADMIN — METHYLPREDNISOLONE SODIUM SUCCINATE 500 MG: 500 INJECTION INTRAMUSCULAR; INTRAVENOUS at 09:09

## 2024-09-27 RX ADMIN — DIPHENHYDRAMINE HYDROCHLORIDE 25 MG: 50 INJECTION, SOLUTION INTRAMUSCULAR; INTRAVENOUS at 01:09

## 2024-09-27 RX ADMIN — ONDANSETRON 4 MG: 2 INJECTION INTRAMUSCULAR; INTRAVENOUS at 01:09

## 2024-09-27 RX ADMIN — ACETAMINOPHEN 650 MG: 325 TABLET ORAL at 05:09

## 2024-09-27 RX ADMIN — KETOROLAC TROMETHAMINE 30 MG: 30 INJECTION, SOLUTION INTRAMUSCULAR; INTRAVENOUS at 09:09

## 2024-09-27 NOTE — NURSING
Patient Transferred to NPU Room 924       Upon arrival to the floor, patient greeted and oriented to room. Complete head to toe assessment completed per protocol. VSS, see flowsheet for details. Neuro assessment completed. Primary team notified of patient's transfer to floor. All current and transfer orders reviewed per protocol. All emergency equipment set up in patient's room. Fall precautions initiated per providers orders. 4 Eyes skin assessment performed, see below for details. Reviewed assessment and rounding frequency with patient and family. Questions were encouraged and addressed. Repositioned patient for comfort with bed locked in lowest position, side rails up x 2, bed alarm set, and call light within reach. Instructed patient to call staff for mobility/assistance, verbalized understanding. No acute signs of distress noted at this time.           Nurses Note -- 4 Eyes      9/26/2024   10:30 PM      Skin assessed during: Transfer      [x] No Altered Skin Integrity Present    [x]Prevention Measures Documented      [] Yes- Altered Skin Integrity Present or Discovered   [] LDA Added if Not in Epic (Describe Wound)   [] New Altered Skin Integrity was Present on Admit and Documented in LDA   [] Wound Image Taken    Wound Care Consulted? No    Attending Nurse:  Trudy Gusman RN/Staff Member:   CHARMAINE Hampton            Pt belongings at bedside with spouse.

## 2024-09-27 NOTE — PLAN OF CARE
[facility] (Hospital)  Initial Discharge Assessment       Primary Care Provider: Danny Gerardo MD    Admission Diagnosis: Complex Migraine    Admission Date: 9/26/2024  Expected Discharge Date: 9/27/2024    Transition of Care Barriers: (P) None    Payor: BLUE CROSS BLUE SHIELD / Plan: [place]BS [place] PPO / Product Type: PPO /     Extended Emergency Contact Information  Primary Emergency Contact: Paramjit Laguerre   [place]  Mobile Phone: [phone]  Relation: Spouse    Discharge Plan A: (P) Home with family  Discharge Plan B: (P) Home      [facility]  for [place], [place] - [address]  [address] [facility] [address] [facility] [address]  Phone: [phone] Fax: [phone]    [facility] [place]  [address] [place] [address]  Phone: [phone] Fax: [phone]    [id number] - [place], [place] - [address]  [address] [facility] [address]  Phone: [phone] Fax: [phone]      Initial Assessment (most recent)       Adult Discharge Assessment - 09/27/24 1231          Discharge Assessment    Assessment Type Discharge Planning Assessment     Confirmed/corrected address, phone number and insurance Yes     Confirmed Demographics Correct on Facesheet     Source of Information family     When was your last doctors appointment? --   May/June 2024    Does patient/caregiver understand observation status Yes     Communicated KELLEY with patient/caregiver Yes     Reason For Admission Chronic Migraine     People in Home child(digna), dependent;spouse     Do you expect to return to your current living situation? Yes     Do you have help at home or someone to help you manage your care at home? Yes     Who are your caregiver(s) and their phone number(s)? Spouse, Paramjit Laguerre (c) [phone]     Prior to hospitilization cognitive status: Alert/Oriented     Current cognitive status: Alert/Oriented     Walking or Climbing Stairs  Difficulty no     Dressing/Bathing Difficulty no     Equipment Currently Used at Home none     Readmission within 30 days? No     Patient currently being followed by outpatient case management? No     Do you currently have service(s) that help you manage your care at home? No     Do you take prescription medications? Yes     Do you have prescription coverage? Yes     Coverage BCBS     Do you have any problems affording any of your prescribed medications? No (P)      Is the patient taking medications as prescribed? yes (P)      Who is going to help you get home at discharge? Spouse (P)      How do you get to doctors appointments? car, drives self (P)      Are you on dialysis? No (P)      Do you take coumadin? No (P)      Discharge Plan A Home with family (P)      Discharge Plan B Home (P)      DME Needed Upon Discharge  none (P)      Discharge Plan discussed with: Spouse/sig other (P)      Name(s) and Number(s) Spouse, Paramjit Laguerre (c) [phone] (P)      Transition of Care Barriers None (P)         Physical Activity    On average, how many days per week do you engage in moderate to strenuous exercise (like a brisk walk)? 0 days (P)      On average, how many minutes do you engage in exercise at this level? 0 min (P)         Financial Resource Strain    How hard is it for you to pay for the very basics like food, housing, medical care, and heating? Not hard at all (P)         Housing Stability    In the last 12 months, was there a time when you were not able to pay the mortgage or rent on time? No (P)      At any time in the past 12 months, were you homeless or living in a shelter (including now)? No (P)         Transportation Needs    Has the lack of transportation kept you from medical appointments, meetings, work or from getting things needed for daily living? No (P)         Food Insecurity    Within the past 12 months, you worried that your food would run out before you got the money to buy more. Never true (P)       Within the past 12 months, the food you bought just didn't last and you didn't have money to get more. Never true (P)         Stress    Do you feel stress - tense, restless, nervous, or anxious, or unable to sleep at night because your mind is troubled all the time - these days? Not at all (P)         Social Isolation    How often do you feel lonely or isolated from those around you?  Never (P)         Alcohol Use    Q1: How often do you have a drink containing alcohol? Monthly or less (P)      Q2: How many drinks containing alcohol do you have on a typical day when you are drinking? 1 or 2 (P)      Q3: How often do you have six or more drinks on one occasion? Never (P)         Utilities    In the past 12 months has the electric, gas, oil, or water company threatened to shut off services in your home? No (P)         Health Literacy    How often do you need to have someone help you when you read instructions, pamphlets, or other written material from your doctor or pharmacy? Never (P)         OTHER    Name(s) of People in Home Spouse & 2 Dependent Children (P)                    SW met with pt and spouse at bedside to complete Initial Discharge Planning Assessment. Pt. Is asleep. Pt, spouse & dependent daughters live in their home located at: [address] [place] [address]. No DME, Home Health, Dialysis or Coumadin. Pt. drives, but spouse will provide transportation home upon discharge.     Discharge Plan A and Plan B have been determined by review of patient's clinical status, future medical and therapeutic needs, and coverage/benefits for post-acute care in coordination with multidisciplinary team members.     Callum Ochoa LMSW

## 2024-09-27 NOTE — PLAN OF CARE
[facility] ([facility])  Discharge Final Note    Primary Care Provider: Danny Gerardo MD    Expected Discharge Date: 9/27/2024    Final Discharge Note (most recent)       Final Note - 09/27/24 1409          Final Note    Assessment Type Final Discharge Note (P)      Anticipated Discharge Disposition Home or Self Care (P)      What phone number can be called within the next 1-3 days to see how you are doing after discharge? [phone] (P)      Hospital Resources/Appts/Education Provided Provided patient/caregiver with written discharge plan information;Provided education on problems/symptoms using teachback;Appointments scheduled and added to AVS (P)         Post-Acute Status    Post-Acute Authorization Other (P)      Coverage BCBS (P)      Other Status No Post-Acute Service Needs (P)      Discharge Delays None known at this time (P)                      Important Message from Medicare             Contact Info       Danny Gerardo MD   Specialty: Internal Medicine   Relationship: PCP - General    [address] [place] [address]   Phone: [phone]       Next Steps: Follow up on 10/1/2024    Instructions: Follow-up appointment at 11:00 am.          Pt. discharged home with Self-Care. Spouse at bedside to provide transportation home. No discharge needs identified at this time.     Callum Ochoa LMSW

## 2024-09-27 NOTE — HPI
Eugenie Laguerre is a 44 y.o. female w/ PMH of anxiety, hyperthyroidism, anemia, migraine (sees Tricia Nelson on NS- restarted on qulipta/ubrelvy on 8/20/24), RLS, and R ICA aneurysm s/p stent (7/2023) who presents to OSH w/ chst pain and HA. Neurology is consulted for migraine.   Patient nancy

## 2024-09-27 NOTE — TREATMENT PLAN
Eugenie Laguerre is a 44 y.o. female w/ PMH of anxiety, hyperthyroidism, anemia, migraine (sees Tricia Nelson on NS- restarted on qulipta/ubrelvy on 8/20/24), RLS, and R ICA aneurysm s/p stent (7/2023) who presents to OSH w/ chst pain and HA. Neurology is consulted for migraine.     Per chart review appears patient missed a dose of her daily preventative (qulipta) prior to her migraines starting.   She has been treated with compazine, benadryl, and fioricet without relief.  She did get relief from the headache with ativan.    Would recommend an initial trial of headache cocktail as below:    While inpatient can utilize a combination of the following migraine cocktail:   --continuous IV fluids x 24 hours  --IV toradol 30mg x1  --IV magnesium 2mg x1  --IV valproate 1g BID  --IV solumedrol 500mg BID

## 2024-09-27 NOTE — H&P
Barrett Mckeon - Neurosurgery ([facility])  [facility] Medicine  History & Physical    Patient Name: Eugenie Laguerre  MRN: [id number]  Patient Class: OP- Observation  Admission Date: 9/26/2024  Attending Physician: Gonzalo Felix MD   Primary Care Provider: Danny Gerardo MD         Patient information was obtained from patient, past medical records, and ER records.     Subjective:     Principal Problem:Intractable chronic migraine without aura and without status migrainosus    Chief Complaint: No chief complaint on file.       HPI: 44 y.o. female with anxiety, migraines, right ICA aneurysm- s/p stent who presented to [facility] E. early this a.m. with c/o chest pain and headache. She started having chest pain when she was driving- intermittent, no radiation. Headache was like her Migraine headache, and she missed a dose of her medication (pt. On qulipta) yesterday. On arrival to E.R, she started c/o b/l jaw pain, right facial tingling, and shaking of her right hand involuntarily. Concerned for complex migraine vs. Seizure. Never had chest pain or handshake in the past. Supposed to have CTA brain on 9/27 for f/u of her aneurysm.                     Mild blood pressure elevation in E.R(150/92) and otherwise vitally stable. Troponin, CBC, CMP and BNP wnl. Mild ESR and CRP elevation. CXR showed no acute findings. EKG showed NSR with no acute ST-T changes. As per E.R physician, her handshake stopped when she was distracted. No other Neuro deficits on exam. CT head and CTA head and neck showed no acute findings. No improvement with Benadryl, Compazine or Fioricet. She received a dose of Ativan with improvement in her handshake, facial tingling, and headache. No chest pain currently too. Transfer requested for Neurology evaluation as there is still a concern for possible focal seizures. Follows Neurology at [facility] for her Migraine headaches and they are aware of her coming as a transfer.    Past Medical History:   Diagnosis Date     Anxiety 2023    Clotting disorder     DVT (deep venous thrombosis) 2007    patient does not have factor V leiden mutation - she had a negative test on 2013 and an office visit with Dr Canela (Heme/Onc) stating that her test was negative. Dr Canela ruled out antiphospholipid syndrome as well.  I see no evidence of any other hypercoagulable disorders being diagnosed. Pt had what sounds like a provoked LE DVT after a 16 hour car trip to [place], [place]    High risk pregnancy due to history of  labor 2015    Hyperthyroidism 2013    reports repeat testing was WNL and no treatment needed.    Iron deficiency anemia due to chronic blood loss 2015    Major depressive disorder, recurrent, moderate 2023    Migraines     Restless legs syndrome (RLS)     Short cervix affecting pregnancy 2015       Past Surgical History:   Procedure Laterality Date    2 R knee sx; 1 L knee      arthroscopies    BRONCHOSCOPY       SECTION  2015    LAPAROSCOPIC CHOLECYSTECTOMY N/A 2021    Procedure: CHOLECYSTECTOMY, LAPAROSCOPIC;  Surgeon: Pascual Lu MD;  Location: [facility];  Service: General;  Laterality: N/A;    TUBAL LIGATION  2015       Review of patient's allergies indicates:   Allergen Reactions    Amoxicillin     Augmentin [amoxicillin-pot clavulanate] Swelling    Bactrim [sulfamethoxazole-trimethoprim] Itching and Nausea Only    Ropinirole Rash       No current facility-administered medications on file prior to encounter.     Current Outpatient Medications on File Prior to Encounter   Medication Sig    atogepant (QULIPTA) 60 mg Tab Take 1 tablet (60 mg total) by mouth once daily.    fluticasone propionate (FLONASE) 50 mcg/actuation nasal spray Spray 2 sprays (100 mcg total) by Each Nostril route daily as needed for Rhinitis.    hydrOXYzine (ATARAX) 50 MG tablet Take 1 tablet (50 mg total) by mouth nightly as needed for Itching.    ondansetron (ZOFRAN) 4 MG  tablet Take 1 tablet (4 mg total) by mouth every 8 (eight) hours as needed for Nausea.    pantoprazole (PROTONIX) 20 MG tablet Take 1 tablet (20 mg total) by mouth once daily.    sertraline (ZOLOFT) 50 MG tablet Take 1 tablet (50 mg total) by mouth once daily.    tiZANidine (ZANAFLEX) 4 MG tablet Take 1/2 or 1 tablet by mouth at night as needed for muscle spasm    ubrogepant (UBRELVY) 100 mg tablet Take 1 tablet by mouth as needed for migraine. May repeat in 2 hours if needed. Max 2 tablet per day     Family History       Problem Relation (Age of Onset)    Anuerysm Father, Paternal Aunt, Paternal Grandfather    Bipolar disorder Sister    Bladder Cancer Maternal Aunt    Breast cancer Maternal Grandmother    Cancer Father    Deep vein thrombosis Father    Depression Mother    Diabetes Paternal Grandmother    Fibromyalgia Mother    Heart disease Brother    Hypertension Father    Ovarian cancer Maternal Aunt          Tobacco Use    Smoking status: Former     Current packs/day: 0.50     Types: Cigarettes    Smokeless tobacco: Former     Quit date: 8/1/2021   Substance and Sexual Activity    Alcohol use: Yes     Alcohol/week: 0.0 standard drinks of alcohol     Comment: rare    Drug use: No    Sexual activity: Yes     Partners: Male     Birth control/protection: None     Review of Systems   Constitutional:  Negative for activity change, appetite change, chills, fever and unexpected weight change.   HENT:  Negative for congestion and sore throat.    Respiratory:  Negative for cough and shortness of breath.    Cardiovascular:  Negative for chest pain, palpitations and leg swelling.   Gastrointestinal:  Negative for abdominal distention, abdominal pain, blood in stool, constipation, diarrhea, nausea and vomiting.   Genitourinary:  Negative for difficulty urinating, dysuria and hematuria.   Musculoskeletal:  Positive for arthralgias. Negative for myalgias.   Skin:  Negative for color change and rash.   Neurological:  Positive  for tremors and headaches. Negative for dizziness and seizures.     Objective:     Vital Signs (Most Recent):  Temp: 98 °F (36.7 °C) (09/26/24 2304)  Pulse: (!) 57 (09/26/24 2304)  Resp: 20 (09/26/24 2304)  BP: 135/65 (09/26/24 2304)  SpO2: 98 % (09/26/24 2304) Vital Signs (24h Range):  Temp:  [98 °F (36.7 °C)-98.3 °F (36.8 °C)] 98 °F (36.7 °C)  Pulse:  [57-78] 57  Resp:  [18-20] 20  SpO2:  [98 %-99 %] 98 %  BP: (125-135)/(65-88) 135/65     Weight: 106 kg (233 lb 11 oz)  Body mass index is 42.74 kg/m².     Physical Exam  Vitals reviewed.   Constitutional:       General: She is not in acute distress.     Appearance: She is well-developed.   HENT:      Head: Normocephalic and atraumatic.   Eyes:      Extraocular Movements: Extraocular movements intact.      Pupils: Pupils are equal, round, and reactive to light.   Neck:      Vascular: No JVD.      Trachea: No tracheal deviation.   Cardiovascular:      Rate and Rhythm: Normal rate and regular rhythm.      Heart sounds: No murmur heard.     No friction rub. No gallop.   Pulmonary:      Effort: No respiratory distress.      Breath sounds: Normal breath sounds. No wheezing or rales.   Abdominal:      General: Bowel sounds are normal. There is no distension.      Palpations: Abdomen is soft. There is no mass.      Tenderness: There is no abdominal tenderness.   Musculoskeletal:         General: No deformity.      Cervical back: Neck supple.   Lymphadenopathy:      Cervical: No cervical adenopathy.   Skin:     General: Skin is warm and dry.      Findings: No rash.   Neurological:      Mental Status: She is alert and oriented to person, place, and time.              CRANIAL NERVES     CN III, IV, VI   Pupils are equal, round, and reactive to light.       Significant Labs: All pertinent labs within the past 24 hours have been reviewed.    Significant Imaging: I have reviewed all pertinent imaging results/findings within the past 24 hours.  Assessment/Plan:     * Intractable  chronic migraine without aura and without status migrainosus  -Chronic migraine headaches, pt. Reports acute worsening with R arm/hand tremors. IV fluids, prochlorperazine & benadryl ordered x1 for abortive therapy  -Neurology consult for assistance       Major depressive disorder, recurrent, moderate  -Continue home zoloft        Unruptured cerebral aneurysm  -Pt. Was to undergo cerbreal angiogram 9/28, will need to be rescheduled        VTE Risk Mitigation (From admission, onward)           Ordered     enoxaparin injection 40 mg  Daily         09/26/24 2331     IP VTE HIGH RISK PATIENT  Once         09/26/24 2331     Place sequential compression device  Until discontinued         09/26/24 2331                       On 09/27/2024, patient should be placed in hospital observation services under my care.             Dave Yun MD  Department of Hospital Medicine  Barrett kameron - Neurosurgery ([facility])

## 2024-09-27 NOTE — ASSESSMENT & PLAN NOTE
-Chronic migraine headaches, pt. Reports acute worsening with R arm/hand tremors. IV fluids, prochlorperazine & benadryl ordered x1 for abortive therapy  -Neurology consult for assistance

## 2024-09-27 NOTE — SUBJECTIVE & OBJECTIVE
Past Medical History:   Diagnosis Date    Anxiety 2023    Clotting disorder     DVT (deep venous thrombosis) 2007    patient does not have factor V leiden mutation - she had a negative test on 2013 and an office visit with Dr Canela (Heme/Onc) stating that her test was negative. Dr Canela ruled out antiphospholipid syndrome as well.  I see no evidence of any other hypercoagulable disorders being diagnosed. Pt had what sounds like a provoked LE DVT after a 16 hour car trip to [place], [place]    High risk pregnancy due to history of  labor 2015    Hyperthyroidism 2013    reports repeat testing was WNL and no treatment needed.    Iron deficiency anemia due to chronic blood loss 2015    Major depressive disorder, recurrent, moderate 2023    Migraines     Restless legs syndrome (RLS)     Short cervix affecting pregnancy 2015       Past Surgical History:   Procedure Laterality Date    2 R knee sx; 1 L knee      arthroscopies    BRONCHOSCOPY       SECTION  2015    LAPAROSCOPIC CHOLECYSTECTOMY N/A 2021    Procedure: CHOLECYSTECTOMY, LAPAROSCOPIC;  Surgeon: Pascual Lu MD;  [facility];  Service: General;  Laterality: N/A;    TUBAL LIGATION  2015       Review of patient's allergies indicates:   Allergen Reactions    Amoxicillin     Augmentin [amoxicillin-pot clavulanate] Swelling    Bactrim [sulfamethoxazole-trimethoprim] Itching and Nausea Only    Ropinirole Rash       No current facility-administered medications on file prior to encounter.     Current Outpatient Medications on File Prior to Encounter   Medication Sig    atogepant (QULIPTA) 60 mg Tab Take 1 tablet (60 mg total) by mouth once daily.    fluticasone propionate (FLONASE) 50 mcg/actuation nasal spray Spray 2 sprays (100 mcg total) by Each Nostril route daily as needed for Rhinitis.    hydrOXYzine (ATARAX) 50 MG tablet Take 1 tablet (50 mg total) by mouth nightly as needed for  Itching.    ondansetron (ZOFRAN) 4 MG tablet Take 1 tablet (4 mg total) by mouth every 8 (eight) hours as needed for Nausea.    pantoprazole (PROTONIX) 20 MG tablet Take 1 tablet (20 mg total) by mouth once daily.    sertraline (ZOLOFT) 50 MG tablet Take 1 tablet (50 mg total) by mouth once daily.    tiZANidine (ZANAFLEX) 4 MG tablet Take 1/2 or 1 tablet by mouth at night as needed for muscle spasm    ubrogepant (UBRELVY) 100 mg tablet Take 1 tablet by mouth as needed for migraine. May repeat in 2 hours if needed. Max 2 tablet per day     Family History       Problem Relation (Age of Onset)    Anuerysm Father, Paternal Aunt, Paternal Grandfather    Bipolar disorder Sister    Bladder Cancer Maternal Aunt    Breast cancer Maternal Grandmother    Cancer Father    Deep vein thrombosis Father    Depression Mother    Diabetes Paternal Grandmother    Fibromyalgia Mother    Heart disease Brother    Hypertension Father    Ovarian cancer Maternal Aunt          Tobacco Use    Smoking status: Former     Current packs/day: 0.50     Types: Cigarettes    Smokeless tobacco: Former     Quit date: 8/1/2021   Substance and Sexual Activity    Alcohol use: Yes     Alcohol/week: 0.0 standard drinks of alcohol     Comment: rare    Drug use: No    Sexual activity: Yes     Partners: Male     Birth control/protection: None     Review of Systems   Constitutional:  Negative for activity change, appetite change, chills, fever and unexpected weight change.   HENT:  Negative for congestion and sore throat.    Respiratory:  Negative for cough and shortness of breath.    Cardiovascular:  Negative for chest pain, palpitations and leg swelling.   Gastrointestinal:  Negative for abdominal distention, abdominal pain, blood in stool, constipation, diarrhea, nausea and vomiting.   Genitourinary:  Negative for difficulty urinating, dysuria and hematuria.   Musculoskeletal:  Positive for arthralgias. Negative for myalgias.   Skin:  Negative for color  change and rash.   Neurological:  Positive for tremors and headaches. Negative for dizziness and seizures.     Objective:     Vital Signs (Most Recent):  Temp: 98 °F (36.7 °C) (09/26/24 2304)  Pulse: (!) 57 (09/26/24 2304)  Resp: 20 (09/26/24 2304)  BP: 135/65 (09/26/24 2304)  SpO2: 98 % (09/26/24 2304) Vital Signs (24h Range):  Temp:  [98 °F (36.7 °C)-98.3 °F (36.8 °C)] 98 °F (36.7 °C)  Pulse:  [57-78] 57  Resp:  [18-20] 20  SpO2:  [98 %-99 %] 98 %  BP: (125-135)/(65-88) 135/65     Weight: 106 kg (233 lb 11 oz)  Body mass index is 42.74 kg/m².     Physical Exam  Vitals reviewed.   Constitutional:       General: She is not in acute distress.     Appearance: She is well-developed.   HENT:      Head: Normocephalic and atraumatic.   Eyes:      Extraocular Movements: Extraocular movements intact.      Pupils: Pupils are equal, round, and reactive to light.   Neck:      Vascular: No JVD.      Trachea: No tracheal deviation.   Cardiovascular:      Rate and Rhythm: Normal rate and regular rhythm.      Heart sounds: No murmur heard.     No friction rub. No gallop.   Pulmonary:      Effort: No respiratory distress.      Breath sounds: Normal breath sounds. No wheezing or rales.   Abdominal:      General: Bowel sounds are normal. There is no distension.      Palpations: Abdomen is soft. There is no mass.      Tenderness: There is no abdominal tenderness.   Musculoskeletal:         General: No deformity.      Cervical back: Neck supple.   Lymphadenopathy:      Cervical: No cervical adenopathy.   Skin:     General: Skin is warm and dry.      Findings: No rash.   Neurological:      Mental Status: She is alert and oriented to person, place, and time.              CRANIAL NERVES     CN III, IV, VI   Pupils are equal, round, and reactive to light.       Significant Labs: All pertinent labs within the past 24 hours have been reviewed.    Significant Imaging: I have reviewed all pertinent imaging results/findings within the past 24  hours.

## 2024-09-27 NOTE — CARE UPDATE
I have reviewed the chart of Eugenie Laguerre who is hospitalized for the following:    Active Hospital Problems    Diagnosis    *Intractable chronic migraine without aura and without status migrainosus     Migraine headaches since her 20's. Headaches are typically unilateral, moderate to severe in intensity, worsen with activity, pounding in quality and associated with sensitivity to light and sound. Gradual progression pattern, lack of red flag features on history, and normal neurological exam are reassuring for primary as opposed to secondary etiology of headaches thus imaging will not be pursued for this history and this exam at this time.    She was only able to get the loading dose of Emgality. Will start Qulipta for prevention.     Next step would be to add Botox.    Stop triptan. Start Ubrelvy for acute attacks.       Major depressive disorder, recurrent, moderate    Unruptured cerebral aneurysm    Anxiety disorder    Obesity (BMI >= 40)        Roxanna Glover NP  [facility]

## 2024-09-27 NOTE — HPI
44 y.o. female with anxiety, migraines, right ICA aneurysm- s/p stent who presented to [facility] early this a.m. with c/o chest pain and headache. She started having chest pain when she was driving- intermittent, no radiation. Headache was like her Migraine headache, and she missed a dose of her medication (pt. On qulipta) yesterday. On arrival to E.R, she started c/o b/l jaw pain, right facial tingling, and shaking of her right hand involuntarily. Concerned for complex migraine vs. Seizure. Never had chest pain or handshake in the past. Supposed to have CTA brain on 9/27 for f/u of her aneurysm.                     Mild blood pressure elevation in E.R(150/92) and otherwise vitally stable. Troponin, CBC, CMP and BNP wnl. Mild ESR and CRP elevation. CXR showed no acute findings. EKG showed NSR with no acute ST-T changes. As per E.R physician, her handshake stopped when she was distracted. No other Neuro deficits on exam. CT head and CTA head and neck showed no acute findings. No improvement with Benadryl, Compazine or Fioricet. She received a dose of Ativan with improvement in her handshake, facial tingling, and headache. No chest pain currently too. Transfer requested for Neurology evaluation as there is still a concern for possible focal seizures. Follows Neurology at [facility] for her Migraine headaches and they are aware of her coming as a transfer.

## 2024-09-27 NOTE — CARE UPDATE
Patient seen.      Reports headache is slightly better this morning.  HA Cocktail being prepared     Clear lungs bilaterally, unlabored breathing, on room air, no cyanosis   Heart sounds indicate a regular rate and rhythm   Awake alert, no acute distress   No facial droop, no slurred speech   5/5 proximal upper and lower extremity strength bilaterally including fist  and hip flexor strength   Intermittent twitching noted of her right arm which periodically self resolves at times at rest and at times with motion   Does not seem to have a sustained resting tremor nor intention tremor   No obvious upper nor lower extremity edema   Insight intact

## 2024-09-27 NOTE — PLAN OF CARE
Spoke to  Bonnie at [facility] to schedule patient follow-up appointment, and the patient follow-up appointment was scheduled for 10/1/24 at 11:00 am.      Andrew ALVARADO, RSW  Case Management  [phone]

## 2024-09-28 ENCOUNTER — TELEPHONE (OUTPATIENT)
Dept: HEPATOLOGY | Facility: HOSPITAL | Age: 44
End: 2024-09-28
Payer: COMMERCIAL

## 2024-09-28 DIAGNOSIS — R25.9 INVOLUNTARY MOVEMENTS: Primary | ICD-10-CM

## 2024-09-28 RX ORDER — PROMETHAZINE HYDROCHLORIDE 25 MG/1
25 TABLET ORAL EVERY 8 HOURS PRN
Qty: 21 TABLET | Refills: 0 | Status: SHIPPED | OUTPATIENT
Start: 2024-09-28 | End: 2024-10-09

## 2024-09-28 NOTE — HOSPITAL COURSE
After discussion with Neurology, they did not feel patient's presentation was related to her aneurysms. Per their recommendations patient was given headache cocktail including Toradol magnesium Solu-Medrol and valproate with partial reduction in severity.  No nausea or vomiting/ no po intolerance.  No acute neurological changes.  Patient was deemed clinically stable for discharge.  Patient was advised that her right arm twitch/tremor was something that could be followed up as an outpatient neurology; may have a neurological and/or functional component to it. Was relayed to the patient to proceed    Clear lungs bilaterally, unlabored breathing, on room air, no cyanosis   Heart sounds indicate a regular rate and rhythm   Awake alert, no acute distress   No facial droop, no slurred speech   5/5 proximal upper and lower extremity strength bilaterally including fist  and hip flexor strength   Intermittent right arm twitch that would occur with rest, seemed to disappear with motion   No stephany tremor noted at rest nor motion   Intact finger-to-nose bilaterally with no tremoring   No obvious upper nor lower extremity edema  Insight intact  No pupillary asymmetry noted

## 2024-09-28 NOTE — TELEPHONE ENCOUNTER
Spoke to patient this morning 9/28/24 by phone, she reported that yesterday evening she still had a headache post discharge with mild improvement.  However by this morning she woke up with no headache and later on there was a slight return of the headache.    Instructed her to make an appointment with her headache neurology provider as soon as possible; Informed her that I could additionally prescribed Phenergan to take with her headache cocktail at home.  I will place an order for her tic/twitch movement of her right arm.  Patient verbalized understanding.

## 2024-09-30 NOTE — DISCHARGE SUMMARY
Barrett Mckeon - Neurosurgery ([facility])  [facility] Medicine  Discharge Summary      Patient Name: Eugenie Laguerre  MRN: [id number]  ARA: [id number]  Patient Class: OP- Observation  Admission Date: 9/26/2024  Hospital Length of Stay: 0 days  Discharge Date and Time: 9/27/2024  6:35 PM  Attending Physician: No att. providers found   Discharging Provider: Gonzalo Felix MD  Primary Care Provider: Danny Gerardo MD  [facility] Medicine Team: [facility] MED N Gonzalo Felix MD  Primary Care Team: [facility] N    HPI:   44 y.o. female with anxiety, migraines, right ICA aneurysm- s/p stent who presented to [place] [facility]. early this a.m. with c/o chest pain and headache. She started having chest pain when she was driving- intermittent, no radiation. Headache was like her Migraine headache, and she missed a dose of her medication (pt. On qulipta) yesterday. On arrival to E.R, she started c/o b/l jaw pain, right facial tingling, and shaking of her right hand involuntarily. Concerned for complex migraine vs. Seizure. Never had chest pain or handshake in the past. Supposed to have CTA brain on 9/27 for f/u of her aneurysm.                     Mild blood pressure elevation in E.R(150/92) and otherwise vitally stable. Troponin, CBC, CMP and BNP wnl. Mild ESR and CRP elevation. CXR showed no acute findings. EKG showed NSR with no acute ST-T changes. As per E.R physician, her handshake stopped when she was distracted. No other Neuro deficits on exam. CT head and CTA head and neck showed no acute findings. No improvement with Benadryl, Compazine or Fioricet. She received a dose of Ativan with improvement in her handshake, facial tingling, and headache. No chest pain currently too. Transfer requested for Neurology evaluation as there is still a concern for possible focal seizures. [place]rology at [facility] [place]raine headaches and they are aware of her coming as a transfer.    * No surgery found *      Hospital Course:   After  discussion with Neurology, they did not feel patient's presentation was related to her aneurysms. Per their recommendations patient was given headache cocktail including Toradol magnesium Solu-Medrol and valproate with partial reduction in severity.  No nausea or vomiting/ no po intolerance.  No acute neurological changes.  Patient was deemed clinically stable for discharge.  Patient was advised that her right arm twitch/tremor was something that could be followed up as an outpatient neurology; may have a neurological and/or functional component to it. Was relayed to the patient to reschedule her angiogram.    Clear lungs bilaterally, unlabored breathing, on room air, no cyanosis   Heart sounds indicate a regular rate and rhythm   Awake alert, no acute distress   No facial droop, no slurred speech   5/5 proximal upper and lower extremity strength bilaterally including fist  and hip flexor strength   Intermittent twitching noted of her right arm which periodically self resolves at times at rest and at times with motion   Does not seem to have a sustained resting tremor nor intention tremor   Intact finger-to-nose bilaterally with no tremoring   No obvious upper nor lower extremity edema  Insight intact  No pupillary asymmetry noted     Goals of Care Treatment Preferences:  Code Status: Full Code         Consults:     Neuro  Unruptured cerebral aneurysm  -Pt. Was to undergo cerbreal angiogram 9/28, will need to be rescheduled        Final Active Diagnoses:    Diagnosis Date Noted POA    PRINCIPAL PROBLEM:  Intractable chronic migraine without aura and without status migrainosus [G43.719] 03/15/2023 Yes    Major depressive disorder, recurrent, moderate [F33.1] 07/12/2023 Yes    Unruptured cerebral aneurysm [I67.1] 04/11/2023 Yes    Anxiety disorder [F41.9] 01/24/2023 Yes    Obesity (BMI >= 40) [E66.01] 08/30/2022 Yes      Problems Resolved During this Admission:       Discharged Condition: stable    Disposition:  Home or Self Care    Follow Up:   Follow-up Information       Danny Gerardo MD Follow up on 10/1/2024.    Specialty: Internal Medicine  Why: Follow-up appointment at 11:00 am.  Contact information:  [address] [place] [address] [phone]                                Pending Diagnostic Studies:       None           Medications:  Reconciled Home Medications:      Medication List        ASK your doctor about these medications      fluticasone propionate 50 mcg/actuation nasal spray  Commonly known as: FLONASE  Spray 2 sprays (100 mcg total) by Each Nostril route daily as needed for Rhinitis.     hydrOXYzine 50 MG tablet  Commonly known as: ATARAX  Take 1 tablet (50 mg total) by mouth nightly as needed for Itching.     ondansetron 4 MG tablet  Commonly known as: ZOFRAN  Take 1 tablet (4 mg total) by mouth every 8 (eight) hours as needed for Nausea.     pantoprazole 20 MG tablet  Commonly known as: PROTONIX  Take 1 tablet (20 mg total) by mouth once daily.     QULIPTA 60 mg Tab  Generic drug: atogepant  Take 1 tablet (60 mg total) by mouth once daily.     sertraline 50 MG tablet  Commonly known as: ZOLOFT  Take 1 tablet (50 mg total) by mouth once daily.     tiZANidine 4 MG tablet  Commonly known as: ZANAFLEX  Take 1/2 or 1 tablet by mouth at night as needed for muscle spasm     UBRELVY 100 mg tablet  Generic drug: ubrogepant  Take 1 tablet by mouth as needed for migraine. May repeat in 2 hours if needed. Max 2 tablet per day              Indwelling Lines/Drains at time of discharge:   Lines/Drains/Airways       None                   Time spent on the discharge of patient: 25 minutes         Gonzalo Felix MD  Department of Hospital Medicine  [facility] Neurosurgery [facility])

## 2024-10-01 ENCOUNTER — HOSPITAL ENCOUNTER (OUTPATIENT)
Dept: RADIOLOGY | Facility: HOSPITAL | Age: 44
Discharge: HOME | End: 2024-10-01
Attending: NURSE PRACTITIONER
Payer: COMMERCIAL

## 2024-10-01 ENCOUNTER — OFFICE VISIT (OUTPATIENT)
Dept: FAMILY MEDICINE | Facility: CLINIC | Age: 44
End: 2024-10-01
Payer: COMMERCIAL

## 2024-10-01 VITALS
WEIGHT: 231.06 LBS | OXYGEN SATURATION: 97 % | DIASTOLIC BLOOD PRESSURE: 74 MMHG | HEART RATE: 86 BPM | SYSTOLIC BLOOD PRESSURE: 118 MMHG | BODY MASS INDEX: 42.52 KG/M2 | TEMPERATURE: 98 F | HEIGHT: 62 IN

## 2024-10-01 DIAGNOSIS — Z09 HOSPITAL DISCHARGE FOLLOW-UP: Primary | ICD-10-CM

## 2024-10-01 DIAGNOSIS — S60.222D CONTUSION OF LEFT HAND, SUBSEQUENT ENCOUNTER: ICD-10-CM

## 2024-10-01 DIAGNOSIS — G43.909 MIGRAINE WITHOUT STATUS MIGRAINOSUS, NOT INTRACTABLE, UNSPECIFIED MIGRAINE TYPE: ICD-10-CM

## 2024-10-01 DIAGNOSIS — R10.13 EPIGASTRIC ABDOMINAL PAIN: ICD-10-CM

## 2024-10-01 DIAGNOSIS — I67.1 CEREBRAL ANEURYSM WITHOUT RUPTURE: ICD-10-CM

## 2024-10-01 PROCEDURE — 99999 PR PBB SHADOW E&M-EST. PATIENT-LVL III: CPT | Mod: PBBFAC,,, | Performed by: NURSE PRACTITIONER

## 2024-10-01 PROCEDURE — 3074F SYST BP LT 130 MM HG: CPT | Mod: CPTII,S$GLB,, | Performed by: NURSE PRACTITIONER

## 2024-10-01 PROCEDURE — 76700 US EXAM ABDOM COMPLETE: CPT | Mod: TC,PO

## 2024-10-01 PROCEDURE — 99214 OFFICE O/P EST MOD 30 MIN: CPT | Mod: S$GLB,,, | Performed by: NURSE PRACTITIONER

## 2024-10-01 PROCEDURE — 3078F DIAST BP <80 MM HG: CPT | Mod: CPTII,S$GLB,, | Performed by: NURSE PRACTITIONER

## 2024-10-01 PROCEDURE — 76700 US EXAM ABDOM COMPLETE: CPT | Mod: 26,,, | Performed by: RADIOLOGY

## 2024-10-01 PROCEDURE — 3044F HG A1C LEVEL LT 7.0%: CPT | Mod: CPTII,S$GLB,, | Performed by: NURSE PRACTITIONER

## 2024-10-01 PROCEDURE — 3008F BODY MASS INDEX DOCD: CPT | Mod: CPTII,S$GLB,, | Performed by: NURSE PRACTITIONER

## 2024-10-01 PROCEDURE — 1159F MED LIST DOCD IN RCRD: CPT | Mod: CPTII,S$GLB,, | Performed by: NURSE PRACTITIONER

## 2024-10-01 NOTE — LETTER
October 1, 2024      [facility]  [address]  [place] [address]  Phone: [phone]  Fax: [phone]       Patient: Eugenie Laguerre   YOB: 1980  Date of Visit: 10/01/2024    To Whom It May Concern:    SHAJI Laguerre  was at [facility] on 10/01/2024. Please excuse the patient from work 9/26/24-10/1/24. She may return to work on 10/2/24.  If you have any questions or concerns, or if I can be of further assistance, please do not hesitate to contact me.    Sincerely,        Arlene Gomez NP

## 2024-10-01 NOTE — PROGRESS NOTES
Subjective:       Patient ID: Eugenie Laguerre is a 44 y.o. female.    Chief Complaint: Hospital Follow Up    HPI  Admitted to observation 9/26/24-9/27/24, see discharge summary:  HPI: 44 y.o. female with anxiety, migraines, right ICA aneurysm- s/p stent who presented to [facility] E. early this a.m. with c/o chest pain and headache. She started having chest pain when she was driving- intermittent, no radiation. Headache was like her Migraine headache, and she missed a dose of her medication (pt. On qulipta) yesterday. On arrival to E.R, she started c/o b/l jaw pain, right facial tingling, and shaking of her right hand involuntarily. Concerned for complex migraine vs. Seizure. Never had chest pain or handshake in the past. Supposed to have CTA brain on 9/27 for f/u of her aneurysm.                      Mild blood pressure elevation in E.R(150/92) and otherwise vitally stable. Troponin, CBC, CMP and BNP wnl. Mild ESR and CRP elevation. CXR showed no acute findings. EKG showed NSR with no acute ST-T changes. As per E.R physician, her handshake stopped when she was distracted. No other Neuro deficits on exam. CT head and CTA head and neck showed no acute findings. No improvement with Benadryl, Compazine or Fioricet. She received a dose of Ativan with improvement in her handshake, facial tingling, and headache. No chest pain currently too. Transfer requested for Neurology evaluation as there is still a concern for possible focal seizures. [place] [facility] [place] and they are aware of her coming as a transfer.     * No surgery found *       Hospital Course:   After discussion with Neurology, they did not feel patient's presentation was related to her aneurysms. Per their recommendations patient was given headache cocktail including Toradol magnesium Solu-Medrol and valproate with partial reduction in severity.  No nausea or vomiting/ no po intolerance.  No acute neurological changes.  Patient  was deemed clinically stable for discharge.  Patient was advised that her right arm twitch/tremor was something that could be followed up as an outpatient neurology; may have a neurological and/or functional component to it. Was relayed to the patient to proceed      Presented back to ED 24 for left arm pain in area of IV placement with swelling, tingling in left hand and tenderness. US negative DVT. DCd home with Rx naproxen.     Message into neuro--in process for rescheduling cerebral angiogram    She reports resolution of tremor. Left hand remains swollen and bruised but slowly improving     Scheduled for abdominal US today for evaluation of postprandial nausea/abdominal pain. Pantoprazole not helping     Vitals:    10/01/24 0904   BP: 118/74   Pulse: 86   Temp: 98 °F (36.7 °C)     Review of Systems   Constitutional:  Negative for fever.   Respiratory:  Negative for cough.    Skin:  Positive for color change (bruising).   Neurological:  Negative for tremors and syncope.       Past Medical History:   Diagnosis Date    Anxiety 2023    Clotting disorder     DVT (deep venous thrombosis) 2007    patient does not have factor V leiden mutation - she had a negative test on 2013 and an office visit with Dr Canela (Heme/Onc) stating that her test was negative. Dr Canela ruled out antiphospholipid syndrome as well.  I see no evidence of any other hypercoagulable disorders being diagnosed. Pt had what sounds like a provoked LE DVT after a 16 hour car trip to [place], [place]    High risk pregnancy due to history of  labor 2015    Hyperthyroidism 2013    reports repeat testing was WNL and no treatment needed.    Iron deficiency anemia due to chronic blood loss 2015    Major depressive disorder, recurrent, moderate 2023    Migraines     Restless legs syndrome (RLS)     Short cervix affecting pregnancy 2015     Objective:      Physical Exam  Constitutional:       General: She is  not in acute distress.     Appearance: She is well-developed. She is not ill-appearing, toxic-appearing or diaphoretic.   HENT:      Right Ear: Hearing normal.      Left Ear: Hearing normal.   Cardiovascular:      Rate and Rhythm: Normal rate.   Pulmonary:      Effort: No tachypnea or respiratory distress.   Skin:     Coloration: Skin is not pale.   Neurological:      Mental Status: She is alert and oriented to person, place, and time.      Motor: No tremor.   Psychiatric:         Speech: Speech normal.         Behavior: Behavior normal.         Thought Content: Thought content normal.         Judgment: Judgment normal.         Assessment:       1. Hospital discharge follow-up    2. Contusion of left hand, subsequent encounter    3. Migraine without status migrainosus, not intractable, unspecified migraine type    4. Cerebral aneurysm without rupture    5. Epigastric abdominal pain        Plan:       Hospital discharge follow-up    Contusion of left hand, subsequent encounter   Improving     Migraine without status migrainosus, not intractable, unspecified migraine type   Continue management with neuro     Cerebral aneurysm without rupture   In process of scheduling with [facility] IR     Epigastric abdominal pain   US today as scheduled          FU PCP 6 months     Medication List with Changes/Refills   Current Medications    ATOGEPANT (QULIPTA) 60 MG TAB    Take 1 tablet (60 mg total) by mouth once daily.    FLUTICASONE PROPIONATE (FLONASE) 50 MCG/ACTUATION NASAL SPRAY    Spray 2 sprays (100 mcg total) by Each Nostril route daily as needed for Rhinitis.    HYDROXYZINE (ATARAX) 50 MG TABLET    Take 1 tablet (50 mg total) by mouth nightly as needed for Itching.    NAPROXEN (NAPROSYN) 500 MG TABLET    Take 1 tablet (500 mg total) by mouth 2 (two) times daily with meals. for 7 days    ONDANSETRON (ZOFRAN) 4 MG TABLET    Take 1 tablet (4 mg total) by mouth every 8 (eight) hours as needed for Nausea.    PANTOPRAZOLE (PROTONIX)  20 MG TABLET    Take 1 tablet (20 mg total) by mouth once daily.    PROMETHAZINE (PHENERGAN) 25 MG TABLET    Take 1 tablet (25 mg total) by mouth every 8 (eight) hours as needed (take as combination with other meds for headache).    SERTRALINE (ZOLOFT) 50 MG TABLET    Take 1 tablet (50 mg total) by mouth once daily.    TIZANIDINE (ZANAFLEX) 4 MG TABLET    Take 1/2 or 1 tablet by mouth at night as needed for muscle spasm    UBROGEPANT (UBRELVY) 100 MG TABLET    Take 1 tablet by mouth as needed for migraine. May repeat in 2 hours if needed. Max 2 tablet per day

## 2024-10-02 DIAGNOSIS — R11.0 NAUSEA: ICD-10-CM

## 2024-10-02 DIAGNOSIS — R10.13 EPIGASTRIC ABDOMINAL PAIN: Primary | ICD-10-CM

## 2024-10-04 ENCOUNTER — OFFICE VISIT (OUTPATIENT)
Dept: GASTROENTEROLOGY | Facility: CLINIC | Age: 44
End: 2024-10-04
Payer: COMMERCIAL

## 2024-10-04 VITALS — BODY MASS INDEX: 42.63 KG/M2 | HEIGHT: 62 IN | WEIGHT: 231.69 LBS

## 2024-10-04 DIAGNOSIS — R10.11 RUQ ABDOMINAL PAIN: ICD-10-CM

## 2024-10-04 DIAGNOSIS — Z87.19 HISTORY OF GASTROESOPHAGEAL REFLUX (GERD): ICD-10-CM

## 2024-10-04 DIAGNOSIS — Z90.49 S/P CHOLECYSTECTOMY: ICD-10-CM

## 2024-10-04 DIAGNOSIS — R11.0 NAUSEA: Primary | ICD-10-CM

## 2024-10-04 PROCEDURE — 99999 PR PBB SHADOW E&M-EST. PATIENT-LVL IV: CPT | Mod: PBBFAC,,, | Performed by: NURSE PRACTITIONER

## 2024-10-04 NOTE — PROGRESS NOTES
Subjective:       Patient ID: Eugenie Laguerre is a 44 y.o. female, Body mass index is 42.38 kg/m².    Chief Complaint: Nausea      Established patient of myself. Referred by Arlene Gomez NP for epigastric pain and nausea.    GI Problem  The primary symptoms include abdominal pain and nausea. Primary symptoms do not include fever, weight loss (weight gain), fatigue, vomiting, diarrhea, melena, hematemesis, jaundice, hematochezia, dysuria, myalgias, arthralgias or rash.   The abdominal pain began more than 2 days ago (Started couple months ago; intermittent; describes as tightness). The abdominal pain has been unchanged since its onset. The abdominal pain is located in the RUQ. The abdominal pain does not radiate. The abdominal pain is relieved by nothing.   Nausea began more than 1 week ago (Started couple months ago; occurs daily). Associated with: None. The nausea is exacerbated by food (taking Phenergan or Zofran helps).   The illness does not include chills, anorexia, dysphagia, odynophagia, bloating, constipation (bowel movements are two times per day), tenesmus, back pain or itching. Significant associated medical issues include GERD (Hx of GERD- taking Protonix 20 mg once daily), gallstones (Hx of cholecystectomy) and liver disease (Hx of fatty liver). Associated medical issues do not include inflammatory bowel disease, alcohol abuse, PUD, gastric bypass, bowel resection, irritable bowel syndrome, hemorrhoids or diverticulitis.     Review of Systems   Constitutional:  Negative for appetite change, chills, fatigue, fever, unexpected weight change and weight loss (weight gain).   HENT:  Negative for trouble swallowing.    Respiratory:  Negative for cough and shortness of breath.    Cardiovascular:  Negative for chest pain.   Gastrointestinal:  Positive for abdominal pain and nausea. Negative for abdominal distention, anal bleeding, anorexia, bloating, blood in stool, constipation (bowel movements are two  times per day), diarrhea, dysphagia, hematemesis, hematochezia, jaundice, melena, rectal pain and vomiting.   Genitourinary:  Negative for difficulty urinating and dysuria.   Musculoskeletal:  Negative for arthralgias, back pain, gait problem and myalgias.   Skin:  Negative for itching and rash.   Neurological:  Negative for speech difficulty.   Psychiatric/Behavioral:  Negative for confusion.        Past Medical History:   Diagnosis Date    Anxiety 2023    Clotting disorder     DVT (deep venous thrombosis) 2007    patient does not have factor V leiden mutation - she had a negative test on 2013 and an office visit with Dr Canela (Heme/Onc) stating that her test was negative. Dr Canela ruled out antiphospholipid syndrome as well.  I see no evidence of any other hypercoagulable disorders being diagnosed. Pt had what sounds like a provoked LE DVT after a 16 hour car trip to [place], [place]    High risk pregnancy due to history of  labor 2015    Hyperthyroidism 2013    reports repeat testing was WNL and no treatment needed.    Iron deficiency anemia due to chronic blood loss 2015    Major depressive disorder, recurrent, moderate 2023    Migraines     Restless legs syndrome (RLS)     Short cervix affecting pregnancy 2015      Past Surgical History:   Procedure Laterality Date    2 R knee sx; 1 L knee      arthroscopies    BRONCHOSCOPY       SECTION  2015    LAPAROSCOPIC CHOLECYSTECTOMY N/A 2021    Procedure: CHOLECYSTECTOMY, LAPAROSCOPIC;  Surgeon: Pascual Lu MD;  [facility];  Service: General;  Laterality: N/A;    TUBAL LIGATION  2015      Family History   Problem Relation Name Age of Onset    Fibromyalgia Mother      Depression Mother      Anuerysm Father      Cancer Father          skin    Deep vein thrombosis Father      Hypertension Father      Bipolar disorder Sister      Ovarian cancer Maternal Aunt      Bladder Cancer Maternal  Aunt      Anuerysm Paternal Aunt          AAA    Breast cancer Maternal Grandmother      Diabetes Paternal Grandmother      Anuerysm Paternal Grandfather          AAA    Heart disease Brother      Colon cancer Neg Hx      Stomach cancer Neg Hx      Esophageal cancer Neg Hx        Wt Readings from Last 10 Encounters:   10/04/24 105.1 kg (231 lb 11.3 oz)   10/01/24 104.8 kg (231 lb 0.7 oz)   09/29/24 105.2 kg (232 lb)   09/26/24 106 kg (233 lb 11 oz)   09/26/24 106 kg (233 lb 11 oz)   08/20/24 105.6 kg (232 lb 11.1 oz)   08/20/24 105.4 kg (232 lb 5.8 oz)   07/12/24 99 kg (218 lb 5.9 oz)   07/09/24 108.1 kg (238 lb 5.1 oz)   07/09/24 109 kg (240 lb 4.8 oz)     Lab Results   Component Value Date    WBC 6.39 09/27/2024    HGB 13.9 09/27/2024    HCT 42.4 09/27/2024    MCV 88 09/27/2024     09/27/2024     CMP  Sodium   Date Value Ref Range Status   09/27/2024 138 136 - 145 mmol/L Final     Potassium   Date Value Ref Range Status   09/27/2024 4.0 3.5 - 5.1 mmol/L Final     Chloride   Date Value Ref Range Status   09/27/2024 109 95 - 110 mmol/L Final     CO2   Date Value Ref Range Status   09/27/2024 17 (L) 23 - 29 mmol/L Final     Glucose   Date Value Ref Range Status   09/27/2024 97 70 - 110 mg/dL Final     BUN   Date Value Ref Range Status   09/27/2024 8 6 - 20 mg/dL Final     Creatinine   Date Value Ref Range Status   09/27/2024 0.9 0.5 - 1.4 mg/dL Final     Calcium   Date Value Ref Range Status   09/27/2024 8.9 8.7 - 10.5 mg/dL Final     Total Protein   Date Value Ref Range Status   09/27/2024 7.1 6.0 - 8.4 g/dL Final     Albumin   Date Value Ref Range Status   09/27/2024 3.5 3.5 - 5.2 g/dL Final     Total Bilirubin   Date Value Ref Range Status   09/27/2024 0.4 0.1 - 1.0 mg/dL Final     Comment:     For infants and newborns, interpretation of results should be based  on gestational age, weight and in agreement with clinical  observations.    Premature Infant recommended reference ranges:  Up to 24  hours.............<8.0 mg/dL  Up to 48 hours............<12.0 mg/dL  3-5 days..................<15.0 mg/dL  6-29 days.................<15.0 mg/dL       Alkaline Phosphatase   Date Value Ref Range Status   09/27/2024 80 55 - 135 U/L Final     AST   Date Value Ref Range Status   09/27/2024 22 10 - 40 U/L Final     Comment:     *Result may be interfered by visible hemolysis     ALT   Date Value Ref Range Status   09/27/2024 16 10 - 44 U/L Final     Anion Gap   Date Value Ref Range Status   09/27/2024 12 8 - 16 mmol/L Final     eGFR if    Date Value Ref Range Status   11/08/2021 >60 >60 mL/min/1.73 m^2 Final     eGFR if non    Date Value Ref Range Status   11/08/2021 >60 >60 mL/min/1.73 m^2 Final     Comment:     Calculation used to obtain the estimated glomerular filtration  rate (eGFR) is the CKD-EPI equation.          Lab Results   Component Value Date    LIPASE 24 08/20/2024     Lab Results   Component Value Date    LIPASERES 136 11/08/2021             Reviewed prior medical records including radiology report of US of abdomen 10/1/24 and CT Renal Stone Study 1/3/23 & endoscopy history (see surgical history).     Objective:      Physical Exam  Constitutional:       General: She is not in acute distress.     Appearance: She is well-developed.   HENT:      Head: Normocephalic.      Right Ear: Hearing normal.      Left Ear: Hearing normal.      Nose: Nose normal.      Mouth/Throat:      Mouth: No oral lesions.      Pharynx: Uvula midline.   Eyes:      General: Lids are normal.      Conjunctiva/sclera: Conjunctivae normal.      Pupils: Pupils are equal, round, and reactive to light.   Neck:      Trachea: Trachea normal.   Cardiovascular:      Rate and Rhythm: Regular rhythm.      Heart sounds: Normal heart sounds. No murmur heard.  Pulmonary:      Effort: Pulmonary effort is normal. No respiratory distress.      Breath sounds: Normal breath sounds. No stridor. No wheezing.   Abdominal:       General: Bowel sounds are normal. There is no distension.      Palpations: Abdomen is soft. There is no mass.      Tenderness: There is no abdominal tenderness. There is no guarding or rebound.   Musculoskeletal:         General: Normal range of motion.      Cervical back: Normal range of motion.   Skin:     General: Skin is warm and dry.      Findings: No rash.      Comments: Non jaundiced   Neurological:      Mental Status: She is alert and oriented to person, place, and time.   Psychiatric:         Speech: Speech normal.         Behavior: Behavior normal. Behavior is cooperative.           Assessment:       1. Nausea    2. RUQ abdominal pain    3. History of gastroesophageal reflux (GERD)    4. S/P cholecystectomy           Plan:   All diagnoses and orders for this visit:    Nausea, RUQ abdominal pain & History of gastroesophageal reflux (GERD)  - Schedule EGD   - CT Abdomen Pelvis With IV Contrast Routine Oral Contrast; Future; Expected date: 10/04/2024  - Continue Protonix 20 mg once daily  - Continue Zofran 4 mg every eight hours PRN  - Continue Phenergan 25 mg every eight hours PRN  - Take PPI in the morning 30 minutes before breakfast  - Recommend to avoid large meals, avoid eating within 3 hours of bedtime, elevate head of bed if nocturnal symptoms are present, smoking cessation (if current smoker), & weight loss (if overweight).   - Recommend minimize/avoid high-fat foods, chocolate, caffeine, citrus, alcohol, & tomato products.  - Advised to avoid/limit use of NSAID's, since they can cause GI upset, bleeding, and/or ulcers. If needed, take with food.      S/P cholecystectomy    If no improvement in symptoms or symptoms worsen, call/follow-up at clinic or go to ER

## 2024-10-08 ENCOUNTER — PATIENT MESSAGE (OUTPATIENT)
Dept: NEUROLOGY | Facility: CLINIC | Age: 44
End: 2024-10-08
Payer: COMMERCIAL

## 2024-10-08 ENCOUNTER — PATIENT MESSAGE (OUTPATIENT)
Dept: FAMILY MEDICINE | Facility: CLINIC | Age: 44
End: 2024-10-08
Payer: COMMERCIAL

## 2024-10-09 ENCOUNTER — OFFICE VISIT (OUTPATIENT)
Dept: FAMILY MEDICINE | Facility: CLINIC | Age: 44
End: 2024-10-09
Payer: COMMERCIAL

## 2024-10-09 ENCOUNTER — PATIENT MESSAGE (OUTPATIENT)
Dept: NEUROLOGY | Facility: CLINIC | Age: 44
End: 2024-10-09
Payer: COMMERCIAL

## 2024-10-09 VITALS
WEIGHT: 229.06 LBS | HEART RATE: 87 BPM | SYSTOLIC BLOOD PRESSURE: 116 MMHG | HEIGHT: 62 IN | TEMPERATURE: 99 F | OXYGEN SATURATION: 98 % | BODY MASS INDEX: 42.15 KG/M2 | DIASTOLIC BLOOD PRESSURE: 84 MMHG

## 2024-10-09 DIAGNOSIS — J02.9 SORE THROAT: ICD-10-CM

## 2024-10-09 DIAGNOSIS — J06.9 UPPER RESPIRATORY INFECTION WITH COUGH AND CONGESTION: Primary | ICD-10-CM

## 2024-10-09 LAB
CTP QC/QA: YES
MOLECULAR STREP A: NEGATIVE
POC MOLECULAR INFLUENZA A AGN: NEGATIVE
POC MOLECULAR INFLUENZA B AGN: NEGATIVE
SARS-COV-2 RDRP RESP QL NAA+PROBE: NEGATIVE

## 2024-10-09 PROCEDURE — 99999 PR PBB SHADOW E&M-EST. PATIENT-LVL III: CPT | Mod: PBBFAC,,, | Performed by: PHYSICIAN ASSISTANT

## 2024-10-09 PROCEDURE — 3074F SYST BP LT 130 MM HG: CPT | Mod: CPTII,S$GLB,, | Performed by: PHYSICIAN ASSISTANT

## 2024-10-09 PROCEDURE — 3008F BODY MASS INDEX DOCD: CPT | Mod: CPTII,S$GLB,, | Performed by: PHYSICIAN ASSISTANT

## 2024-10-09 PROCEDURE — 1159F MED LIST DOCD IN RCRD: CPT | Mod: CPTII,S$GLB,, | Performed by: PHYSICIAN ASSISTANT

## 2024-10-09 PROCEDURE — 99213 OFFICE O/P EST LOW 20 MIN: CPT | Mod: S$GLB,,, | Performed by: PHYSICIAN ASSISTANT

## 2024-10-09 PROCEDURE — 87651 STREP A DNA AMP PROBE: CPT | Mod: QW,S$GLB,, | Performed by: PHYSICIAN ASSISTANT

## 2024-10-09 PROCEDURE — 1160F RVW MEDS BY RX/DR IN RCRD: CPT | Mod: CPTII,S$GLB,, | Performed by: PHYSICIAN ASSISTANT

## 2024-10-09 PROCEDURE — 3079F DIAST BP 80-89 MM HG: CPT | Mod: CPTII,S$GLB,, | Performed by: PHYSICIAN ASSISTANT

## 2024-10-09 PROCEDURE — 87635 SARS-COV-2 COVID-19 AMP PRB: CPT | Mod: QW,S$GLB,, | Performed by: PHYSICIAN ASSISTANT

## 2024-10-09 PROCEDURE — 3044F HG A1C LEVEL LT 7.0%: CPT | Mod: CPTII,S$GLB,, | Performed by: PHYSICIAN ASSISTANT

## 2024-10-09 PROCEDURE — 87502 INFLUENZA DNA AMP PROBE: CPT | Mod: QW,S$GLB,, | Performed by: PHYSICIAN ASSISTANT

## 2024-10-09 RX ORDER — CEFDINIR 300 MG/1
300 CAPSULE ORAL 2 TIMES DAILY
Qty: 14 CAPSULE | Refills: 0 | Status: SHIPPED | OUTPATIENT
Start: 2024-10-09 | End: 2024-10-16

## 2024-10-09 NOTE — PROGRESS NOTES
"Subjective:      Patient ID: Eugenie Laguerre is a 44 y.o. female.    Chief Complaint: Sore Throat    HPI  Patient has PMH of migraines, unruptured cerebral aneurysm, anxiety/depression, VERENICE, and former tobacco abuse.    Patient reports worsening continued headaches and cough for the last couple days.  Recently she had a hospital stay at [facility] due chest pain and headache.  Taken regular medication for headaches without relief.  Not due to see neurology until November 2024-Dr. Karmen Gerardo.  Has had tremors with headaches lately.    Review of Systems   Constitutional:  Negative for appetite change, chills and fever.   HENT:  Positive for sore throat (burning) and trouble swallowing. Negative for ear pain.    Eyes:  Positive for pain (baseline).   Respiratory:  Positive for cough. Negative for shortness of breath.    Cardiovascular:  Positive for chest pain (hurts to breathe).   Gastrointestinal:  Positive for diarrhea and nausea (baseline). Negative for abdominal pain, constipation and vomiting.   Neurological:  Positive for headaches.   Psychiatric/Behavioral:  The patient is nervous/anxious.        Objective:   /84 (Patient Position: Sitting)   Pulse 87   Temp 98.5 °F (36.9 °C) (Oral)   Ht 5' 2" (1.575 m)   Wt 103.9 kg (229 lb 0.9 oz)   LMP  (LMP Unknown)   SpO2 98%   BMI 41.90 kg/m²     Physical Exam  Vitals reviewed.   Constitutional:       Appearance: Normal appearance. She is well-developed.   HENT:      Head: Normocephalic and atraumatic.      Right Ear: Hearing, tympanic membrane, ear canal and external ear normal.      Left Ear: Hearing, tympanic membrane, ear canal and external ear normal.      Nose: Nose normal.      Right Sinus: No maxillary sinus tenderness or frontal sinus tenderness.      Left Sinus: No maxillary sinus tenderness or frontal sinus tenderness.      Mouth/Throat:      Lips: Pink.      Mouth: Mucous membranes are moist.      Pharynx: Oropharynx is clear.   Eyes:      General: " Lids are normal.      Conjunctiva/sclera: Conjunctivae normal.   Cardiovascular:      Rate and Rhythm: Normal rate and regular rhythm.      Heart sounds: Normal heart sounds. No murmur heard.     No friction rub. No gallop.   Pulmonary:      Effort: Pulmonary effort is normal. No respiratory distress.      Breath sounds: Normal breath sounds. No wheezing, rhonchi or rales.   Musculoskeletal:         General: Normal range of motion.   Lymphadenopathy:      Cervical: No cervical adenopathy.   Skin:     General: Skin is warm and dry.      Findings: No rash.   Neurological:      General: No focal deficit present.      Mental Status: She is alert and oriented to person, place, and time.   Psychiatric:         Mood and Affect: Mood normal.         Behavior: Behavior normal. Behavior is cooperative.         Judgment: Judgment normal.       Assessment:      1. Upper respiratory infection with cough and congestion    2. Sore throat       Plan:   1. Upper respiratory infection with cough and congestion (Primary)  Take antibiotic with food.  Eat yogurt and/or take probiotic while on medication.  - cefdinir (OMNICEF) 300 MG capsule; Take 1 capsule (300 mg total) by mouth 2 (two) times daily. for 7 days  Dispense: 14 capsule; Refill: 0    2. Sore throat  - POCT Strep A, Molecular  - POCT Influenza A/B Molecular  - POCT COVID-19 Rapid Screening    Follow up as needed.  Patient agreed with plan and expressed understanding.    Thank you for allowing me to serve you,

## 2024-10-09 NOTE — LETTER
October 9, 2024    Eugenie Laguerre  [address]  [place] [address]             [facility]  [address] [place] [address]  Phone: [phone]  Fax: [phone]   October 9, 2024     Patient: Eugenie Laguerre   YOB: 1980   Date of Visit: 10/9/2024       To Whom it May Concern:    Eugenie Laguerre was seen in my clinic on 10/9/2024. She may return with no restrictions 10/14/2024.    Please excuse her from any classes or work missed.    If you have any questions or concerns, please don't hesitate to call.    Sincerely,          Luly Anguiano PA-C

## 2024-10-23 ENCOUNTER — OFFICE VISIT (OUTPATIENT)
Dept: NEUROLOGY | Facility: CLINIC | Age: 44
End: 2024-10-23
Payer: COMMERCIAL

## 2024-10-23 VITALS
HEIGHT: 62 IN | SYSTOLIC BLOOD PRESSURE: 138 MMHG | HEART RATE: 105 BPM | BODY MASS INDEX: 42.53 KG/M2 | DIASTOLIC BLOOD PRESSURE: 100 MMHG | RESPIRATION RATE: 18 BRPM | WEIGHT: 231.13 LBS

## 2024-10-23 DIAGNOSIS — G43.119 INTRACTABLE MIGRAINE WITH AURA WITHOUT STATUS MIGRAINOSUS: ICD-10-CM

## 2024-10-23 DIAGNOSIS — G43.719 INTRACTABLE CHRONIC MIGRAINE WITHOUT AURA AND WITHOUT STATUS MIGRAINOSUS: Primary | ICD-10-CM

## 2024-10-23 DIAGNOSIS — R11.0 NAUSEA: ICD-10-CM

## 2024-10-23 PROCEDURE — 99999 PR PBB SHADOW E&M-EST. PATIENT-LVL IV: CPT | Mod: PBBFAC,,, | Performed by: NURSE PRACTITIONER

## 2024-10-23 RX ORDER — KETOROLAC TROMETHAMINE 30 MG/ML
30 INJECTION, SOLUTION INTRAMUSCULAR; INTRAVENOUS
Status: COMPLETED | OUTPATIENT
Start: 2024-10-23 | End: 2024-10-23

## 2024-10-23 RX ORDER — ONDANSETRON HYDROCHLORIDE 2 MG/ML
4 INJECTION, SOLUTION INTRAVENOUS
Status: COMPLETED | OUTPATIENT
Start: 2024-10-23 | End: 2024-10-23

## 2024-10-23 RX ADMIN — KETOROLAC TROMETHAMINE 30 MG: 30 INJECTION, SOLUTION INTRAMUSCULAR; INTRAVENOUS at 09:10

## 2024-10-23 RX ADMIN — ONDANSETRON HYDROCHLORIDE 4 MG: 2 INJECTION, SOLUTION INTRAVENOUS at 09:10

## 2024-10-23 NOTE — PATIENT INSTRUCTIONS
Please call our clinic at [phone] or send a message on the [email or web address] portal if there are any changes to the plan described below, for example,if you are not contacted for the requested tests, referral(s) within one week, if you are unable to receive the medications prescribed, or if you feel you need to change the treatment course for any reason.     TESTING:  -- none     REFERRALS:  -- none    PREVENTION (use daily regardless of headache):  -- continue Qulipta once daily   -- SEEK authorization for Botox   -- continue magnesium in ONE of the following preparations -               1. Magnesium oxide 800mg daily (the most common over the counter kind, may causes loose stools)              2. Magnesium citrate 400-500mg daily (harder to find, but more neutral on the bowels)              3. Magnesium glycinate 400mg daily (hardest to find, look online, but most bowel-neutral, best absorbed)     AS-NEEDED TREATMENT (use total no more than 10 days per month unless otherwise stated):  -- continue tizanidine at night. This is a muscle relaxer and it will also make you potentially sleepy. Start with half a tablet to see how you respond but can take up to a whole tablet if needed  -- continue Ubrelvy with next migraine. You can repeat two hours later if needed. With this medication do not drink grapefruit juice or eat grapefruit or some medications like ketoconazole, itraconazole, or antibiotics clarithromycin

## 2024-10-23 NOTE — PROGRESS NOTES
"Date of service: 10/23/2024  Referring provider: No ref. provider found    Subjective:      Chief complaint: Migraine       Patient ID: Eugenie aLguerre is a 44 y.o. female with anxiety, hyperthyroidism, VERENICE, migraine, RLS, (?)clotting disorder who presents for follow up of headache     History of Present Illness    INTERVAL HISTORY 10/23/24    Last visit was two months ago and at that time we started Qulipta.    Today she reports she is worse. She has been to the ER twice since last visit due to migraines. Current pain 6 with range 1-8. She has four headache days per week. She takes Ubrelvy 4 days per week. Otherwise information below is reviewed and verified with no changes made     INTERVAL HISTORY 8/20/24    Last visit was one year ago and at that time we started Qulipta. Since last visit she had stent for brain aneurysm. After that she had no insurance for the past year and stopped all medications.     Today she reports she is better. Current pain 3 with range 0-6. She has a headache 4 days per week. She takes tylenol 4 days per week. Otherwise information below is reviewed and verified with no changes made     INTERVAL HISTORY 8/2/23    Last visit was almost five months ago. At that time we added Emgality and rizatriptan. I ordered an MRA which indicated a small aneurysm for which she saw vascular neuro. She only did the loading dose of Emgality.    Today she reports she is worse. She states she had a procedure done and headaches are worse. She had a coil for aneurysm in July and two days later she lost vision in right eye. She saw ophthalmology who said her eyes were "healthy". Headaches are on the right side. Current pain 3 with range 2-9. She has a daily headache. She is taking rizatriptan and tylenol.  Otherwise information below is reviewed and verified with no changes made     ORIGINAL HEADACHE HISTORY - 3/15/23  Age at onset and course over time: in her 20's had an episode similar to a stroke. She went to " the ER and diagnosed with migraines. She was hypotensive due to medication.     She takes Topamax but causes her to be overly sedated. She takes more as needed due to this.     She works as a .      Aura - ring of light in vision, lasts an hour if not treated, always associated with severe headache     Family history of migraine - mom, maternal grandmother  Family history of aneurysm - dad, paternal sister  Last eye exam - august 2022    Location: mainly behind her eyes   Quality:  [] pressure [] tight [x] throbbing [] sharp [] stabbing   Severity: current 6 with range 3-8  Duration: days  Frequency: 20 days per month   Headaches awaken at night?:  yes  Worst time of day: upon waking   Associated with: [x] photophobia [x]  phonophobia [] osmophobia [x] blurred vision  [] double vision [x] loss of appetite [x] nausea [x] vomiting [x] dizziness [] vertigo  [] tinnitus [x] irritability [] sinus pressure [] problems with concentration   [] neck tightness   Alleviated by:  [x] sleep [x] darkness [] massage [] heat [] ice [] medication  Exacerbated by:  [x] fatigue [x] light [x] noise [] smells [] coughing [] sneezing  [] bending over [] ovulation [] menses [] alcohol [] change in weather [x]  stress  Ipsilateral autonomic: [] nasal congestion [] lacrimation [] ptosis [] injection [] edema [] foreign body sensation [] ear fullness   ICP:  [] transient visual obscurations  [] tinnitus   [x] positional headache  [] non-positional   Sleep habits: trouble falling asleep  Caffeine intake: 44 oz of tea  Gyn status (if female): having periods  HIT 6 - 65    Current acute treatment:  Tylenol   Ubrelvy    Current prevention:  Sertraline  Qulipta - started August 2024    Previously tried/failed acute treatment:  Mobic  Robaxin  Fioricet  Naproxen  Sumatriptan  Diclofenac  Zofran  Phenergan  Ibuprofen - daily  Rizatriptan    Previously tried/failed preventative treatment:  Nifedipine  Amitriptyline - for headaches, somewhat  helpful but sedating   Celexa  Zonisamide  Depakote - severe hypotension   Requip  Qulipta - took about 2 months before losing insurance, was effective   Buspar  Topamax - 100 mg QHS  Emgality - first March 2023 - loading dose    Review of patient's allergies indicates:   Allergen Reactions    Amoxicillin     Augmentin [amoxicillin-pot clavulanate] Swelling    Bactrim [sulfamethoxazole-trimethoprim] Itching and Nausea Only    Ropinirole Rash     Current Outpatient Medications   Medication Sig Dispense Refill    atogepant (QULIPTA) 60 mg Tab Take 1 tablet (60 mg total) by mouth once daily. 30 tablet 11    fluticasone propionate (FLONASE) 50 mcg/actuation nasal spray Spray 2 sprays (100 mcg total) by Each Nostril route daily as needed for Rhinitis. 16 g 0    hydrOXYzine (ATARAX) 50 MG tablet Take 1 tablet (50 mg total) by mouth nightly as needed for Itching. 30 tablet 3    ondansetron (ZOFRAN) 4 MG tablet Take 1 tablet (4 mg total) by mouth every 8 (eight) hours as needed for Nausea. 60 tablet 2    pantoprazole (PROTONIX) 20 MG tablet Take 1 tablet (20 mg total) by mouth once daily. 90 tablet 3    sertraline (ZOLOFT) 50 MG tablet Take 1 tablet (50 mg total) by mouth once daily. 30 tablet 5    tiZANidine (ZANAFLEX) 4 MG tablet Take 1/2 or 1 tablet by mouth at night as needed for muscle spasm 30 tablet 11    ubrogepant (UBRELVY) 100 mg tablet Take 1 tablet by mouth as needed for migraine. May repeat in 2 hours if needed. Max 2 tablet per day 8 tablet 11     No current facility-administered medications for this visit.       Past Medical History  Past Medical History:   Diagnosis Date    Anxiety 01/24/2023    Clotting disorder     DVT (deep venous thrombosis) 03/2007    patient does not have factor V leiden mutation - she had a negative test on 6/5/2013 and an office visit with Dr Canela (Heme/Onc) stating that her test was negative. Dr Canela ruled out antiphospholipid syndrome as well.  I see no evidence of any other  hypercoagulable disorders being diagnosed. Pt had what sounds like a provoked LE DVT after a 16 hour car trip to [place], [place]    High risk pregnancy due to history of  labor 2015    Hyperthyroidism 2013    reports repeat testing was WNL and no treatment needed.    Iron deficiency anemia due to chronic blood loss 2015    Major depressive disorder, recurrent, moderate 2023    Migraines     Restless legs syndrome (RLS)     Short cervix affecting pregnancy 2015       Past Surgical History  Past Surgical History:   Procedure Laterality Date    2 R knee sx; 1 L knee      arthroscopies    BRONCHOSCOPY       SECTION  2015    LAPAROSCOPIC CHOLECYSTECTOMY N/A 2021    Procedure: CHOLECYSTECTOMY, LAPAROSCOPIC;  Surgeon: Pascual Lu MD;  [place] [facility] [place]  Service: General;  Laterality: N/A;    TUBAL LIGATION  2015       Family History  Family History   Problem Relation Name Age of Onset    Fibromyalgia Mother      Depression Mother      Anuerysm Father      Cancer Father          skin    Deep vein thrombosis Father      Hypertension Father      Bipolar disorder Sister      Ovarian cancer Maternal Aunt      Bladder Cancer Maternal Aunt      Anuerysm Paternal Aunt          AAA    Breast cancer Maternal Grandmother      Diabetes Paternal Grandmother      Anuerysm Paternal Grandfather          AAA    Heart disease Brother      Colon cancer Neg Hx      Stomach cancer Neg Hx      Esophageal cancer Neg Hx         Social History  Social History     Socioeconomic History    Marital status:     Number of children: 2   Tobacco Use    Smoking status: Former     Current packs/day: 0.50     Types: Cigarettes    Smokeless tobacco: Former     Quit date: 2021   Substance and Sexual Activity    Alcohol use: Yes     Alcohol/week: 0.0 standard drinks of alcohol     Comment: rare    Drug use: No    Sexual activity: Yes     Partners: Male     Birth control/protection: None      Social Drivers of Health     Financial Resource Strain: Low Risk  (9/27/2024)    Overall Financial Resource Strain (CARDIA)     Difficulty of Paying Living Expenses: Not hard at all   Food Insecurity: No Food Insecurity (9/27/2024)    Hunger Vital Sign     Worried About Running Out of Food in the Last Year: Never true     Ran Out of Food in the Last Year: Never true   Transportation Needs: No Transportation Needs (9/27/2024)    TRANSPORTATION NEEDS     Transportation : No   Physical Activity: Inactive (9/27/2024)    Exercise Vital Sign     Days of Exercise per Week: 0 days     Minutes of Exercise per Session: 0 min   Stress: No Stress Concern Present (9/27/2024)    [employer] - Occupational Stress Questionnaire     Feeling of Stress : Not at all   Housing Stability: Low Risk  (9/27/2024)    Housing Stability Vital Sign     Unable to Pay for Housing in the Last Year: No     Homeless in the Last Year: No        Objective:        Vitals:    10/23/24 0906   BP: (!) 138/100   Pulse: 105   Resp: 18           Body mass index is 42.28 kg/m².    10/23/24  Constitutional:   She appears well-developed and well-nourished. She is well groomed. Appears in pain     Neurological Exam:  General: well-developed, well-nourished, no distress  Mental status: Awake and alert  Speech language: No dysarthria or aphasia on conversation  Cranial nerves: Face symmetric  Motor: Moves all extremities well  Coordination: No ataxia. No tremor.      Data Review:     I have personally reviewed the referring provider's notes, labs, & imaging made available to me today.      RADIOLOGY STUDIES:  I have personally reviewed the pertinent images performed.       Results for orders placed or performed during the hospital encounter of 06/25/13   MRA Brain without contrast    Narrative    Routine  imaging through this 32-year-old females brain was obtained per the MRA protocol.  A survey flair weighted sequence and T2  weighted sequence were also available.    The anterior cerebral arteries, anterior communicating artery, middle cerebral arteries, and posterior cerebral arteries proximally demonstrate no evidence of aneurysm or stenosis.      The left vertebral artery appears dominant in comparison with the right.      The P1 segment on the left appears hypoplastic with the majority of the blood flow coming from the posterior communicating artery on the left.  The posterior communicating artery on the right appears hypoplastic with a majority of the blood flow coming   from the P1 segment.      The A1 segment on the right appears hypoplastic with the majority of the blood flow to the anterior cerebral arteries likely coming from the left A1 segment.    The ventricles and sulci appear age appropriate.  Mild mucous membrane thickening is noted within the maxillary sinuses bilaterally.    Impression     1.  No obvious evidence of aneurysm or stenosis is noted on this MRI of the brain.      2.  Mild mucous membrane thickening is noted within the maxillary sinuses bilaterally.      Electronically signed by: Philippe Hayden MD  Date:     06/25/13  Time:    16:50        Lab Results   Component Value Date    BNP 21 11/26/2018     09/27/2024    K 4.0 09/27/2024    MG 2.3 09/27/2024     09/27/2024    CO2 17 (L) 09/27/2024    BUN 8 09/27/2024    CREATININE 0.9 09/27/2024    GLU 97 09/27/2024    HGBA1C 5.4 07/09/2024    AST 22 09/27/2024    ALT 16 09/27/2024    ALBUMIN 3.5 09/27/2024    PROT 7.1 09/27/2024    BILITOT 0.4 09/27/2024    CHOL 172 08/07/2020    CHOL 144 11/24/2018    HDL 46 08/07/2020    HDL 41 11/24/2018    LDLCALC 119 (H) 08/07/2020    LDLCALC 87.2 11/24/2018    TRIG 36 08/07/2020    TRIG 79 11/24/2018       Lab Results   Component Value Date    WBC 6.39 09/27/2024    HGB 13.9 09/27/2024    HCT 42.4 09/27/2024    MCV 88 09/27/2024     09/27/2024       Lab Results   Component Value Date    TSH 0.931 07/09/2024            Assessment & Plan:       Problem List Items Addressed This Visit          Neuro    Intractable migraine with aura without status migrainosus    Overview     Add magnesium.          Current Assessment & Plan     Occurs with almost every migraine. Continue magnesium         Intractable chronic migraine without aura and without status migrainosus - Primary    Overview     Migraine headaches since her 20's. Headaches are typically unilateral, moderate to severe in intensity, worsen with activity, pounding in quality and associated with sensitivity to light and sound. Gradual progression pattern, lack of red flag features on history, and normal neurological exam are reassuring for primary as opposed to secondary etiology of headaches thus imaging will not be pursued for this history and this exam at this time.    She was only able to get the loading dose of Emgality. She has been on Qulipta with minimal improvement.    The patient has chronic migraines ( G43.719) and suffers from headaches more than 3 months, more than 15 days of headache days per month lasting more than 4 hours with at least 8 attacks that meet criteria for migraine. She has tried multiple medications including but not limited to sertraline, nifedipine, amitriptyline, Celexa, zonisamide, Depakote, Requip, Buspar, Topamax, Emgality   The patient has been unresponsive and refractory.The patient meets criteria for chronic headaches according to the ICHD-II, the patient has more than 15 headaches a month which last for more than 4 hours a day. The patient is an ideal candidate for Botox. After treatment, I expect 50%  improvement in the patient's symptoms. A reduction of at least 7 days per month and the number of cumulative hours suffering with headaches as well as at least 100 total hours affected with migraine per month.  DESCRIPTION OF PROCEDURE: After obtaining informed consent and under aseptic technique, a total of 155 units of botulinum  toxin type A to be injected in the following muscles:      -- Procerus 5 units  --  5 units bilaterally  -- Frontalis 20 units  -- Temporalis 20 units bilaterally  -- Occipitalis 15 units bilaterally  -- Upper cervical paraspinals 10 units bilaterally  -- Trapezius 15 units bilaterally.       Unavoidable waste 45 units    Continue Ubrelvy for acute attacks.          Current Assessment & Plan     Continue Qulipta and Ubrelvy. Start Botox.         Relevant Medications    ketorolac injection 30 mg (Completed)    Other Relevant Orders    Prior authorization Order     Other Visit Diagnoses       Nausea        Relevant Medications    ondansetron injection 4 mg (Completed)                      Please call our clinic at [phone] or send a message on the [employer] portal if there are any changes to the plan described below, for example,if you are not contacted for the requested tests, referral(s) within one week, if you are unable to receive the medications prescribed, or if you feel you need to change the treatment course for any reason.     TESTING:  -- none     REFERRALS:  -- none    PREVENTION (use daily regardless of headache):  -- continue Qulipta once daily   -- SEEK authorization for Botox   -- continue magnesium in ONE of the following preparations -               1. Magnesium oxide 800mg daily (the most common over the counter kind, may causes loose stools)              2. Magnesium citrate 400-500mg daily (harder to find, but more neutral on the bowels)              3. Magnesium glycinate 400mg daily (hardest to find, look online, but most bowel-neutral, best absorbed)     AS-NEEDED TREATMENT (use total no more than 10 days per month unless otherwise stated):  -- continue tizanidine at night. This is a muscle relaxer and it will also make you potentially sleepy. Start with half a tablet to see how you respond but can take up to a whole tablet if needed  -- continue Ubrelvy with next migraine. You can  repeat two hours later if needed. With this medication do not drink grapefruit juice or eat grapefruit or some medications like ketoconazole, itraconazole, or antibiotics clarithromycin      Follow up in about 3 weeks (around 11/13/2024) for first botox.    Face to Face time with patient: 20  30 minutes of total time spent on the encounter, which includes face to face time and non-face to face time on day of visit preparing to see the patient (eg, review of tests), Obtaining and/or reviewing separately obtained history, Documenting clinical information in the electronic or other health record, Independently interpreting results (not separately reported) and communicating results to the patient/family/caregiver, or Care coordination (not separately reported).     Tricia Gerardo, NP

## 2024-10-24 ENCOUNTER — HOSPITAL ENCOUNTER (OUTPATIENT)
Dept: RADIOLOGY | Facility: HOSPITAL | Age: 44
Discharge: HOME | End: 2024-10-24
Attending: NURSE PRACTITIONER
Payer: COMMERCIAL

## 2024-10-24 DIAGNOSIS — R10.11 RUQ ABDOMINAL PAIN: ICD-10-CM

## 2024-10-24 DIAGNOSIS — R11.0 NAUSEA: ICD-10-CM

## 2024-10-24 PROCEDURE — 74177 CT ABD & PELVIS W/CONTRAST: CPT | Mod: 26,,, | Performed by: STUDENT IN AN ORGANIZED HEALTH CARE EDUCATION/TRAINING PROGRAM

## 2024-10-24 PROCEDURE — 74177 CT ABD & PELVIS W/CONTRAST: CPT | Mod: TC,PO

## 2024-10-24 PROCEDURE — A9698 NON-RAD CONTRAST MATERIALNOC: HCPCS | Mod: PO | Performed by: NURSE PRACTITIONER

## 2024-10-24 PROCEDURE — 25500020 PHARM REV CODE 255: Mod: PO | Performed by: NURSE PRACTITIONER

## 2024-10-24 RX ADMIN — IOHEXOL 1000 ML: 12 SOLUTION ORAL at 02:10

## 2024-10-24 RX ADMIN — IOHEXOL 100 ML: 350 INJECTION, SOLUTION INTRAVENOUS at 02:10

## 2024-10-27 ENCOUNTER — PATIENT MESSAGE (OUTPATIENT)
Dept: NEUROLOGY | Facility: CLINIC | Age: 44
End: 2024-10-27
Payer: COMMERCIAL

## 2024-10-27 ENCOUNTER — PATIENT MESSAGE (OUTPATIENT)
Dept: GASTROENTEROLOGY | Facility: CLINIC | Age: 44
End: 2024-10-27
Payer: COMMERCIAL

## 2024-10-27 DIAGNOSIS — R16.0 HEPATOMEGALY: Primary | ICD-10-CM

## 2024-10-27 DIAGNOSIS — K76.0 FATTY LIVER: ICD-10-CM

## 2024-10-30 ENCOUNTER — PATIENT MESSAGE (OUTPATIENT)
Dept: INTERVENTIONAL RADIOLOGY/VASCULAR | Facility: HOSPITAL | Age: 44
End: 2024-10-30
Payer: COMMERCIAL

## 2024-11-01 ENCOUNTER — HOSPITAL ENCOUNTER (OUTPATIENT)
Dept: INTERVENTIONAL RADIOLOGY/VASCULAR | Facility: HOSPITAL | Age: 44
Discharge: HOME | End: 2024-11-01
Attending: NURSE PRACTITIONER
Payer: COMMERCIAL

## 2024-11-01 VITALS
WEIGHT: 234 LBS | OXYGEN SATURATION: 100 % | DIASTOLIC BLOOD PRESSURE: 70 MMHG | HEART RATE: 68 BPM | HEIGHT: 62 IN | SYSTOLIC BLOOD PRESSURE: 144 MMHG | BODY MASS INDEX: 43.06 KG/M2 | RESPIRATION RATE: 20 BRPM | TEMPERATURE: 98 F

## 2024-11-01 DIAGNOSIS — I67.1 UNRUPTURED CEREBRAL ANEURYSM: ICD-10-CM

## 2024-11-01 LAB
B-HCG UR QL: NEGATIVE
CTP QC/QA: YES

## 2024-11-01 PROCEDURE — 36224 PLACE CATH CAROTD ART: CPT | Mod: RT | Performed by: PSYCHIATRY & NEUROLOGY

## 2024-11-01 PROCEDURE — 99152 MOD SED SAME PHYS/QHP 5/>YRS: CPT | Mod: ,,, | Performed by: PSYCHIATRY & NEUROLOGY

## 2024-11-01 PROCEDURE — 99152 MOD SED SAME PHYS/QHP 5/>YRS: CPT | Performed by: PSYCHIATRY & NEUROLOGY

## 2024-11-01 PROCEDURE — 25500020 PHARM REV CODE 255: Performed by: PSYCHIATRY & NEUROLOGY

## 2024-11-01 PROCEDURE — C1894 INTRO/SHEATH, NON-LASER: HCPCS

## 2024-11-01 PROCEDURE — 25000003 PHARM REV CODE 250: Performed by: PSYCHIATRY & NEUROLOGY

## 2024-11-01 PROCEDURE — 81025 URINE PREGNANCY TEST: CPT | Performed by: PSYCHIATRY & NEUROLOGY

## 2024-11-01 PROCEDURE — 63600175 PHARM REV CODE 636 W HCPCS: Performed by: PSYCHIATRY & NEUROLOGY

## 2024-11-01 PROCEDURE — 27201423 OPTIME MED/SURG SUP & DEVICES STERILE SUPPLY

## 2024-11-01 PROCEDURE — 36224 PLACE CATH CAROTD ART: CPT | Mod: RT,,, | Performed by: PSYCHIATRY & NEUROLOGY

## 2024-11-01 PROCEDURE — 99153 MOD SED SAME PHYS/QHP EA: CPT | Performed by: PSYCHIATRY & NEUROLOGY

## 2024-11-01 PROCEDURE — C1769 GUIDE WIRE: HCPCS

## 2024-11-01 RX ORDER — MIDAZOLAM HYDROCHLORIDE 1 MG/ML
INJECTION, SOLUTION INTRAMUSCULAR; INTRAVENOUS
Status: COMPLETED | OUTPATIENT
Start: 2024-11-01 | End: 2024-11-01

## 2024-11-01 RX ORDER — ACETAMINOPHEN 500 MG
TABLET ORAL
Status: DISCONTINUED
Start: 2024-11-01 | End: 2024-11-02 | Stop reason: HOSPADM

## 2024-11-01 RX ORDER — IODIXANOL 320 MG/ML
100 INJECTION, SOLUTION INTRAVASCULAR
Status: COMPLETED | OUTPATIENT
Start: 2024-11-01 | End: 2024-11-01

## 2024-11-01 RX ORDER — FENTANYL CITRATE 50 UG/ML
INJECTION, SOLUTION INTRAMUSCULAR; INTRAVENOUS
Status: COMPLETED | OUTPATIENT
Start: 2024-11-01 | End: 2024-11-01

## 2024-11-01 RX ORDER — SODIUM CHLORIDE 9 MG/ML
INJECTION, SOLUTION INTRAVENOUS CONTINUOUS
Status: DISCONTINUED | OUTPATIENT
Start: 2024-11-01 | End: 2024-11-02 | Stop reason: HOSPADM

## 2024-11-01 RX ORDER — SODIUM CHLORIDE 0.9 % (FLUSH) 0.9 %
10 SYRINGE (ML) INJECTION
Status: DISCONTINUED | OUTPATIENT
Start: 2024-11-01 | End: 2024-11-02 | Stop reason: HOSPADM

## 2024-11-01 RX ORDER — LIDOCAINE HYDROCHLORIDE 10 MG/ML
1 INJECTION, SOLUTION EPIDURAL; INFILTRATION; INTRACAUDAL; PERINEURAL ONCE
Status: DISCONTINUED | OUTPATIENT
Start: 2024-11-01 | End: 2024-11-02 | Stop reason: HOSPADM

## 2024-11-01 RX ORDER — ACETAMINOPHEN 500 MG
1000 TABLET ORAL ONCE AS NEEDED
Status: COMPLETED | OUTPATIENT
Start: 2024-11-01 | End: 2024-11-01

## 2024-11-01 RX ADMIN — FENTANYL CITRATE 50 MCG: 50 INJECTION, SOLUTION INTRAMUSCULAR; INTRAVENOUS at 12:11

## 2024-11-01 RX ADMIN — FENTANYL CITRATE 25 MCG: 50 INJECTION, SOLUTION INTRAMUSCULAR; INTRAVENOUS at 12:11

## 2024-11-01 RX ADMIN — ACETAMINOPHEN 1000 MG: 500 TABLET ORAL at 02:11

## 2024-11-01 RX ADMIN — MIDAZOLAM HYDROCHLORIDE 1 MG: 2 INJECTION, SOLUTION INTRAMUSCULAR; INTRAVENOUS at 12:11

## 2024-11-01 RX ADMIN — IODIXANOL 30 ML: 320 INJECTION, SOLUTION INTRAVASCULAR at 12:11

## 2024-11-04 ENCOUNTER — OFFICE VISIT (OUTPATIENT)
Dept: HEPATOLOGY | Facility: CLINIC | Age: 44
End: 2024-11-04
Payer: COMMERCIAL

## 2024-11-04 ENCOUNTER — TELEPHONE (OUTPATIENT)
Dept: HEPATOLOGY | Facility: CLINIC | Age: 44
End: 2024-11-04

## 2024-11-04 VITALS — BODY MASS INDEX: 42.76 KG/M2 | WEIGHT: 232.38 LBS | HEIGHT: 62 IN

## 2024-11-04 DIAGNOSIS — K76.0 FATTY LIVER: Primary | ICD-10-CM

## 2024-11-04 DIAGNOSIS — E66.813 CLASS 3 SEVERE OBESITY DUE TO EXCESS CALORIES WITH SERIOUS COMORBIDITY AND BODY MASS INDEX (BMI) OF 40.0 TO 44.9 IN ADULT: ICD-10-CM

## 2024-11-04 DIAGNOSIS — E66.01 CLASS 3 SEVERE OBESITY DUE TO EXCESS CALORIES WITH SERIOUS COMORBIDITY AND BODY MASS INDEX (BMI) OF 40.0 TO 44.9 IN ADULT: ICD-10-CM

## 2024-11-04 PROCEDURE — 99214 OFFICE O/P EST MOD 30 MIN: CPT | Mod: S$GLB,,,

## 2024-11-04 PROCEDURE — 1159F MED LIST DOCD IN RCRD: CPT | Mod: CPTII,S$GLB,,

## 2024-11-04 PROCEDURE — 3044F HG A1C LEVEL LT 7.0%: CPT | Mod: CPTII,S$GLB,,

## 2024-11-04 PROCEDURE — 3008F BODY MASS INDEX DOCD: CPT | Mod: CPTII,S$GLB,,

## 2024-11-04 PROCEDURE — 99999 PR PBB SHADOW E&M-EST. PATIENT-LVL III: CPT | Mod: PBBFAC,,,

## 2024-11-04 PROCEDURE — 1160F RVW MEDS BY RX/DR IN RCRD: CPT | Mod: CPTII,S$GLB,,

## 2024-11-04 NOTE — PATIENT INSTRUCTIONS
1. Fibroscan to look for fat or scar tissue in the liver with return to clinic   2. Follow up in 3 months with fibroscan before      FATTY LIVER:  These things are important to improve fatty liver:  Limit alcohol consumption, no more than 1 serving(s) of alcohol in any day (1 serving is 5 ounces of wine, 12 ounces of beer, or 1.5 ounces of liquor) and do NOT recommend any daily alcohol use    Weight loss goal of 25-35 lbs. [facility] offers benefits to patients so let me know if you want a referral to the [facility] gym. Also, [facility] has dieticians that can create a weight loss plan. If you are interested let me know and I can place a referral. If so, contact the dietician team at [facility] at email [email or web address] or call [phone]  to get scheduled. They do offer video visits   Avoid processed foods. No fried/fast foods. No sugary drinks or drinks with any calories.    Low carb/sugar and high protein diet (120 grams per day of protein).Try to limit your carb intake to LESS than  grams per day total. Use JobHive Pal or Lose It markos to add up your carbs through the day. Do NOT drink any beverages with calories or carbs (this can lead to high blood sugar and weight gain). The main thing to focus on is high protein, low carb)  Exercise, 5 days per week, 30 minutes per day, as tolerated  Recommend good cholesterol, blood pressure, blood sugar levels   There is a new medication called Rezdiffra for the treatment of F2-F3 liver scarring due to fatty liver. It is only indicated for patients with significant scar tissue from fatty liver (will reassess after fibroscan)    In some people, fatty liver can progress to steatohepatitis (inflamatory fatty liver) and possibly to cirrhosis, putting one at increased risk for liver cancer, liver failure, and death. Therefore, the lifestyle changes are very important to decrease this risk.     Helpful website for  ASAD/fatty liver: https://asad.pocn.com/patient-resources/       WEIGHT LOSS INFO:  If interested in looking into medication to help you lose weight    Call your insurance and ask if they cover weight loss or cover the 2 more effective  medications for weight loss: Wegovy or Zepbound. To better figure out if the weight loss medications are covered, Call your insurance company and ask them to run a test claim for the 2 more effective medications FDA approved for weight loss to see if either one is covered. It is Wegovy 0.25 mg weekly or Zepbound 2.5 mg weekly (they just need an example dose to run a test claim).   If your insurance covers these meds for weight loss, your PCP can prescribe it and it is typically a reasonable copay. If your insurance does not cover it for weight loss, it will be too expensive/unaffordable for most and I would not recommend that   I recommend using an [facility] pharmacy if you use your PCP or the [facility] weight loss clinic (through your insurance) because they will do any prior authorization or appeal paperwork that your insurance may require   If your insurance company does not cover weight loss, you can call your work HR department (if it is a work policy) and ask for a weight loss rider to be added to the company's insurance plan (I have had people successful with this)  5. If your insurance does not cover the above/weight loss meds, then you can look into local weight loss clinics who prescribe those 2 mesd from compound pharmacies without using your insurance (cash pay). The price ranges from $175-300 per month depending on the med and dose (some places our patients have gone is [facility] in [place], [facility] in [place], [facility] in [place],  but there are many others if you search online). This would be paying cash and not going through your insurance. If both semaglutide & tirzepatide are offered, I have had patients have more successful weight loss and less side effects with  tirzepatide but both are helpful with weight loss.

## 2024-11-04 NOTE — TELEPHONE ENCOUNTER
Contacted the patient and reschedule it in [place] on 12/9/2024 at 8;30 am.Instructed patient to fast for at least 3 hours.Patient voice understanding.

## 2024-11-04 NOTE — PROGRESS NOTES
[facility] - New Patient    REFERRING PROVIDER:  Kenya Tai  PCP: Danny Gerardo MD     Chief Complaint:  fatty liver     HISTORY       HPI: This is a 44 y.o. patient with PMH noted below, presenting for evaluation of  fatty liver disease     Previous serologic w/u negative for hemochromatosis  and viral hepatitis.     Prior serologic workup:   Lab Results   Component Value Date    ANASCREEN Negative <1:80 08/14/2020    FERRITIN 106 08/30/2022    FESATURATED 25 08/30/2022    HEPBSAG Negative 12/12/2014    HEPCAB Non-reactive 08/30/2022         Interval HPI: Presents today alone. Started with N/V a few months ago and had an US and CT that noted fatty liver. Has followed up with GI and is getting an EGD this month. Current weight 232#. In past year has gained significant amount of weight and unsure why. Does not feel like she eats bad. Use to her weight being around 189-190# most of her life.     Diet: No SSB, fast/fried food occasionally  Exercise: walk over 10,000 steps daily  Supplements: none  Occupation:  office    Risk factors for fatty liver include:  Obesity - current BMI 42.50      LABS & DIAGNOSTIC STUDIES        Labs done 9/2024 show normal transaminase levels   Platelets wnl, alk phos wnl  Synthetic liver functioning wnl    Lab Results   Component Value Date    ALT 16 09/27/2024    AST 22 09/27/2024    ALKPHOS 80 09/27/2024    BILITOT 0.4 09/27/2024    ALBUMIN 3.5 09/27/2024    INR 1.1 07/07/2023     09/27/2024       Imaging:  CT Abd 10/2024 noted  FINDINGS:  Inferior thorax is unremarkable.     Liver is enlarged.  No focal hepatic lesion.  Gallbladder is surgically absent.  No biliary ductal dilatation.     Spleen, adrenal glands, and pancreas are unremarkable.     No hydronephrosis or ureteral dilatation.     Bladder is unremarkable.  Uterus and bilateral adnexa are within normal limits for patient age.     Distal esophagus and stomach are unremarkable.  No evidence of  bowel obstruction or inflammation.     No free intraperitoneal fluid or air.  No lymphadenopathy.     No aneurysm.     Body wall soft tissues are unremarkable.     Degenerative changes of the spine.  No acute or aggressive bony abnormality.     Impression:     1. No acute abdominopelvic abnormality.  2. Hepatomegaly.  3. Cholecystectomy       Abd U/S done 10/2024 noted  FINDINGS:  Pancreas: The visualized portions of pancreas appear normal.     Aorta: No aneurysm.     Liver: 16.5 cm, normal in size. Increased hepatic echogenicity, suggesting steatosis.  The main portal vein is patent with hepatopetal flow.  No focal lesions.     Gallbladder: The gallbladder is surgically absent.     Biliary system: 6 mm common bile duct.  No intrahepatic ductal dilatation.     Inferior vena cava: Normal in appearance.     Right kidney: 10.8 cm. No hydronephrosis.     Left kidney: 11.9 cm. No hydronephrosis.     Spleen: 11 cm.  Normal in size with homogeneous echotexture.     Miscellaneous: No ascites.     Impression:     Increased hepatic echogenicity, suggesting steatosis.       Liver fibrosis staging:  -- fibroscan - will obtain     Rare alcohol consumption, see below  Social History     Substance and Sexual Activity   Alcohol Use Not Currently    Comment: 1-2 times a year       Immunity to Hep A and B - will check with next labs     Denies family history of liver disease.          Allergy and medication list reviewed and updated     PMHX:  has a past medical history of Anxiety (2023), Clotting disorder, DVT (deep venous thrombosis) (2007), High risk pregnancy due to history of  labor (2015), Hyperthyroidism (2013), Iron deficiency anemia due to chronic blood loss (2015), Major depressive disorder, recurrent, moderate (2023), Migraines, Restless legs syndrome (RLS), and Short cervix affecting pregnancy (2015).    PSHX:  has a past surgical history that includes Bronchoscopy; 2 R knee  "sx; 1 L knee;  section (2015); Tubal ligation (2015); and Laparoscopic cholecystectomy (N/A, 2021).    FAMILY HISTORY: Updated and reviewed in EPIC    ROS:   GENERAL: Denies fatigue  CARDIOVASCULAR: Denies edema  GI: Denies abdominal pain  SKIN: Denies rash, itching   NEURO: Denies confusion, memory loss, or mood changes    PHYSICAL EXAM:   In no acute distress; alert and oriented to person, place and time  VITALS: Ht 5' 2" (1.575 m)   Wt 105.4 kg (232 lb 5.8 oz)   BMI 42.50 kg/m²   EYES: Sclerae anicteric  GI: Soft, non-tender, non-distended. No ascites.  EXTREMITIES:  No edema.  SKIN: Warm and dry. No jaundice. No telangectasias noted. No palmar erythema.  NEURO:  No asterixis.  PSYCH:  Thought and speech pattern appropriate. Behavior normal        ASSESSMENT & PLAN        EDUCATION:  See instructions discussed with patient in Instructions section of the After Visit Summary     ASSESSMENT & PLAN:  44 y.o. female with:   1.  Fatty liver, likely related to metabolic risk factors Obesity  - see HPI  -- Immunity to Hep A and B : see HPI  -- Fibroscan with RTC  -- Recommendations discussed with patient:  Limit alcohol consumption, no more than 1 serving(s) of alcohol in any day (1 serving is 5 ounces of wine, 12 ounces of beer, or 1.5 ounces of liquor) and do NOT recommend any daily alcohol use    2 Weight loss goal of 25-35 lbs  3. Low carb/sugar, high fiber and protein diet, limit your carb intake to LESS than 30-45 grams of carbs with a meal or LESS than 5-10 grams with any snack   4. Exercise, 5 days per week, 30 minutes per day, as tolerated  5. Recommend good cholesterol, blood pressure, blood sugar levels   6. There is a new medication called Rezdiffra for the treatment of F2-F3 liver scarring due to fatty liver. It is only indicated for those with stage 2 or 3 scar tissue (will discuss after fibroscan)    2. Obesity  -- Body mass index is 42.5 kg/m².   -- increases risk for fatty " liver          Follow up in about 3 months (around 2/4/2025) for Virtual Visit. with fibroscan before  Orders Placed This Encounter   Procedures    FibroScan Transplant Hepatology(Vibration Controlled Transient Elastography)        Thank you for allowing me to participate in the care of ROMÁN Pool, FNP-C  Nurse Practitioner  [facility]     I spent a total of 30 minutes on the day of the visit.This includes face to face time and non-face to face time preparing to see the patient (eg, review of tests), obtaining and/or reviewing separately obtained history, documenting clinical information in the electronic or other health record, independently interpreting results and communicating results to the patient/family/caregiver, and coordinating care.         CC'ed note to:   Kenya Tai, Danny Luna MD

## 2024-11-04 NOTE — TELEPHONE ENCOUNTER
"----- Message from Sid sent at 11/4/2024 12:10 PM CST -----  Regarding: call back  Consult/Advisory:        Name Of Caller: Self     Contact Preference?:[phone]     What is the nature of the call?: Calling to speak w/ someone in regards to her appt that's on 12/11 pt states the appt should be on the [facility] requesting call back         Additional Notes:  "Thank you for all that you do for our patients"  "

## 2024-11-11 ENCOUNTER — PATIENT MESSAGE (OUTPATIENT)
Dept: FAMILY MEDICINE | Facility: CLINIC | Age: 44
End: 2024-11-11
Payer: COMMERCIAL

## 2024-11-11 DIAGNOSIS — F41.1 GAD (GENERALIZED ANXIETY DISORDER): ICD-10-CM

## 2024-11-13 ENCOUNTER — PROCEDURE VISIT (OUTPATIENT)
Dept: NEUROLOGY | Facility: CLINIC | Age: 44
End: 2024-11-13
Payer: COMMERCIAL

## 2024-11-13 VITALS
SYSTOLIC BLOOD PRESSURE: 122 MMHG | TEMPERATURE: 97 F | RESPIRATION RATE: 17 BRPM | HEIGHT: 62 IN | DIASTOLIC BLOOD PRESSURE: 82 MMHG | WEIGHT: 232.38 LBS | HEART RATE: 84 BPM | BODY MASS INDEX: 42.76 KG/M2

## 2024-11-13 DIAGNOSIS — G43.719 INTRACTABLE CHRONIC MIGRAINE WITHOUT AURA AND WITHOUT STATUS MIGRAINOSUS: Primary | ICD-10-CM

## 2024-11-13 RX ORDER — SERTRALINE HYDROCHLORIDE 100 MG/1
100 TABLET, FILM COATED ORAL DAILY
Qty: 30 TABLET | Refills: 5 | Status: SHIPPED | OUTPATIENT
Start: 2024-11-13

## 2024-11-13 NOTE — PROCEDURES

## 2024-11-18 ENCOUNTER — OFFICE VISIT (OUTPATIENT)
Dept: NEUROLOGY | Facility: CLINIC | Age: 44
End: 2024-11-18
Payer: COMMERCIAL

## 2024-11-18 VITALS
DIASTOLIC BLOOD PRESSURE: 78 MMHG | HEART RATE: 97 BPM | SYSTOLIC BLOOD PRESSURE: 117 MMHG | HEIGHT: 62 IN | BODY MASS INDEX: 42.76 KG/M2 | WEIGHT: 232.38 LBS

## 2024-11-18 DIAGNOSIS — I67.1 UNRUPTURED CEREBRAL ANEURYSM: ICD-10-CM

## 2024-11-18 DIAGNOSIS — G43.909 MIGRAINE WITHOUT STATUS MIGRAINOSUS, NOT INTRACTABLE, UNSPECIFIED MIGRAINE TYPE: Primary | ICD-10-CM

## 2024-11-18 DIAGNOSIS — Z72.0 TOBACCO ABUSE: ICD-10-CM

## 2024-11-18 PROCEDURE — 99999 PR PBB SHADOW E&M-EST. PATIENT-LVL III: CPT | Mod: PBBFAC,,, | Performed by: NURSE PRACTITIONER

## 2024-11-18 NOTE — LETTER
November 18, 2024      [facility]rology 8th Fl  [address]  [place] [place] [address]  Phone: [phone]  Fax: [phone]       Patient: Eugenie Laguerre   YOB: 1980  Date of Visit: 11/18/2024    To Whom It May Concern:    SHAJI Laguerre  was at [facility] on 11/18/2024. The patient may return to work/school on 11/19/24 with no restrictions. If you have any questions or concerns, or if I can be of further assistance, please do not hesitate to contact me.    Sincerely,    Gabriela Naik, NP

## 2024-11-19 NOTE — PROGRESS NOTES
[facility] VISIT      SUBJECTIVE:    History for Present Illness: Eugenie Laguerre is a 44 y.o.  female with a past medical history of anxiety, hypothyroidism,  migraines, RLS , unruptured right ICA aneurysm (s/p  (MJ X) of unruptured right ICA aneurysm on 07/05/2023) and clotting disorder who presents to me in clinic for follow up .  She was last seen in clinic by me on 07/01/24. She is accompanied to today's visit by her  family.      Relevant HPI:  Patient was initially referred to me by Tricia Gerardo for abnormal imaging finding.  MRA of the brain indicated a 2 mm distal right ICA superior hypophyseal segment saccular aneurysm.  She was last seen in clinic by me on 06/07/2023 following successful diagnostic cerebral angiogram for better characterization of MRA findings.  At the time of her last visit, we discussed intracranial aneurysm pathology, risk factors and imaging findings as well as available treatment options including endovascular embolization.  Patient verbalized understanding and opted to move forward with endovascular embolization of the unruptured right ICA aneurysm.    Interval History: At the time of today's visit, the patient denies new or worsening focal neurologic symptoms concerning for new stroke or TIA. She is doing well overall and recovered well postprocedure without complaint. Chronic headaches ( followed by Tricia Gerardo NP) and vision issues  (followed by ophthalmology).  She was independent with ADLs She denies associated vertigo, double vision, focal weakness or numbness, gait imbalance,  or language difficulty.      Past Medical History:   Diagnosis Date    Anxiety 01/24/2023    Clotting disorder     DVT (deep venous thrombosis) 03/2007    patient does not have factor V leiden mutation - she had a negative test on 6/5/2013 and an office visit with Dr Canela (Heme/Onc) stating that her test was negative. Dr Canela ruled out antiphospholipid  syndrome as well.  I see no evidence of any other hypercoagulable disorders being diagnosed. Pt had what sounds like a provoked LE DVT after a 16 hour car trip to [place], [place]    High risk pregnancy due to history of  labor 2015    Hyperthyroidism 2013    reports repeat testing was WNL and no treatment needed.    Iron deficiency anemia due to chronic blood loss 2015    Major depressive disorder, recurrent, moderate 2023    Migraines     Restless legs syndrome (RLS)     Short cervix affecting pregnancy 2015     Past Surgical History:   Procedure Laterality Date    2 R knee sx; 1 L knee      arthroscopies    BRONCHOSCOPY       SECTION  2015    LAPAROSCOPIC CHOLECYSTECTOMY N/A 2021    Procedure: CHOLECYSTECTOMY, LAPAROSCOPIC;  Surgeon: Pascual Lu MD;  Location: [facility];  Service: General;  Laterality: N/A;    TUBAL LIGATION  2015     Family History   Problem Relation Name Age of Onset    Fibromyalgia Mother      Depression Mother      Anuerysm Father      Cancer Father          skin    Deep vein thrombosis Father      Hypertension Father      Bipolar disorder Sister      Ovarian cancer Maternal Aunt      Bladder Cancer Maternal Aunt      Anuerysm Paternal Aunt          AAA    Breast cancer Maternal Grandmother      Diabetes Paternal Grandmother      Anuerysm Paternal Grandfather          AAA    Heart disease Brother      Colon cancer Neg Hx      Stomach cancer Neg Hx      Esophageal cancer Neg Hx          Current Outpatient Medications:     atogepant (QULIPTA) 60 mg Tab, Take 1 tablet (60 mg total) by mouth once daily., Disp: 30 tablet, Rfl: 11    fluticasone propionate (FLONASE) 50 mcg/actuation nasal spray, Spray 2 sprays (100 mcg total) by Each Nostril route daily as needed for Rhinitis., Disp: 16 g, Rfl: 0    hydrOXYzine (ATARAX) 50 MG tablet, Take 1 tablet (50 mg total) by mouth nightly as needed for Itching., Disp: 30 tablet, Rfl: 3    ondansetron  "(ZOFRAN) 4 MG tablet, Take 1 tablet (4 mg total) by mouth every 8 (eight) hours as needed for Nausea., Disp: 60 tablet, Rfl: 2    pantoprazole (PROTONIX) 20 MG tablet, Take 1 tablet (20 mg total) by mouth once daily., Disp: 90 tablet, Rfl: 3    sertraline (ZOLOFT) 100 MG tablet, Take 1 tablet (100 mg total) by mouth once daily., Disp: 30 tablet, Rfl: 5    tiZANidine (ZANAFLEX) 4 MG tablet, Take 1/2 or 1 tablet by mouth at night as needed for muscle spasm, Disp: 30 tablet, Rfl: 11    ubrogepant (UBRELVY) 100 mg tablet, Take 1 tablet by mouth as needed for migraine. May repeat in 2 hours if needed. Max 2 tablet per day, Disp: 8 tablet, Rfl: 11    Current Facility-Administered Medications:     onabotulinumtoxina injection 200 Units, 200 Units, Intramuscular, q12 weeks, , 200 Units at 11/13/24 1525     Review of Systems:  Review of systems is negative except for the symptoms mentioned in HPI    Physical exam:  /78 (BP Location: Right arm, Patient Position: Sitting)   Pulse 97   Ht 5' 2" (1.575 m)   Wt 105.4 kg (232 lb 5.8 oz)   BMI 42.50 kg/m²     General: Well-developed, well-groomed. No apparent distress  HENT: Normocephalic, atraumatic.    Cardiovascular: Regular rate and rhythm  Chest: No audible wheezes, stridor, ronchi appreciated.  Musculoskeletal: No peripheral edema     Neurologic Exam: The patient is awake, alert and oriented to person, place, time and situation. Attentive, vigilant during exam. Language is fluent. Naming & repetition intact, +2-step commands.  Fund of knowledge is appropriate. Well organized thoughts.     Cranial nerves:   CN II: Visual fields are full to confrontation. Pupils are 4 mm and briskly reactive to light.  CN III, IV, VI: EOMI, no nystagmus, no ptosis  CN V: Facial sensation is intact in all 3 divisions bilaterally.  CN VII: Face is symmetric with normal eye closure and smile.  CN VIII: Hearing is normal bilaterally  CN IX, X: Palate elevates symmetrically. Phonation is " normal.  CN XI: Head turning and shoulder shrug are intact  CN XII: Tongue is midline with normal movements and no atrophy.     Motor examination of all extremities demonstrates normal bulk and tone in all four limbs. There are no atrophy or fasciculations.        Left Right     Left Right   Deltoid 5/5 5/5   Hip Flexion 5/5 5/5   Biceps 5/5 5/5   Hip Extension 5/5 5/5   Triceps 5/5 5/5   Knee Flexion 5/5 5/5   Wrist Ext 5/5 5/5   Knee Extension 5/5 5/5   Finger Abd 5/5 5/5   Ankle dorsiflex 5/5 5/5           Ankle plantar flex 5/5 5/5     Sensory examination is normal light touch in BUE and BLE.    Deep tendon reflexes No clonus. Negative Briones's     Gait: Normal tandem, and casual gait.     Coordination: No dysmetria with finger-to-nose. Rapid alternating movements and fine finger movements are intact.              Relevant Labwork:  Recent Labs   Lab 07/09/24  1130   Hemoglobin A1C 5.4       Diagnostic Results:  I have personally reviewed the pertinent imaging made available to me today    Brain Imaging   MRI orbits 07/10/2023:  Suggestive of subtle enhancement of the right optic nerve that could be seen with optic neuritis but is nonspecific in light of the history    Vessel Imaging   IR angiogram 11/1/24:  No residual aneurysm of previously embolized right ICA    Cardiac Imaging     Assessment:  Eugenie Laguerre  is a 44 y.o.  female  seen today in clinic for follow-up assessment and recommendations. The following recommendations and plan were discussed in depth with the patient who voiced understanding and was in agreement.  Plan:  Cerebral aneurysm without rupture  Follow up diagnostic cerebral angiogram indicated no residual aneurysm of the previously embolized unruptured right ICA aneurysm.  Tobacco dependency: Counseled on smoking cessation.  Tobacco dependency will increase your future stroke risk and elevated your BP.  Patient encouraged to work towards cessation    Visual disturbance  -Follow up with  Ophthalmology for surveillance and chronic management    Migraine  -continue to follow up outpatient with Tricia Gerardo for surveillance and chronic management    Patient/Family teaching provided during this visit  -Identifying the signs and symptoms of stroke; emergency action and ER attention  -Risk factor control  -Optimization for secondary stroke prevention including compliance with current medications   -Discussed that medication/lifestyle modifications are preventative,and nothing is absolute.     Questions and concerns were answered to the patient's stated verbal satisfaction. The patient verbalizes understanding and agreement with the above stated treatment plan.      I will plan on having Eugenie Laguerre  return to clinic as needed . The patient can contact my office with any questions or concerns they may have as they arise in the interim.       Collaborating Physician, Dr Burnett, was available during today's encounter. Any change to plan along with cosign to appear in the EMR    BRIT Torrez  Department of Vascular Neurology  [facility]  [phone]    This note is dictated on M*Modal Fluency Direct word recognition program. There are word recognition mistakes that are occasionally missed on review

## 2024-12-01 DIAGNOSIS — F41.1 GAD (GENERALIZED ANXIETY DISORDER): ICD-10-CM

## 2024-12-02 RX ORDER — HYDROXYZINE HYDROCHLORIDE 50 MG/1
50 TABLET, FILM COATED ORAL NIGHTLY PRN
Qty: 30 TABLET | Refills: 3 | Status: SHIPPED | OUTPATIENT
Start: 2024-12-02

## 2024-12-03 ENCOUNTER — PATIENT MESSAGE (OUTPATIENT)
Dept: NEUROLOGY | Facility: CLINIC | Age: 44
End: 2024-12-03
Payer: COMMERCIAL

## 2024-12-03 RX ORDER — METHYLPREDNISOLONE 4 MG/1
TABLET ORAL
Qty: 21 TABLET | Refills: 0 | Status: SHIPPED | OUTPATIENT
Start: 2024-12-03 | End: 2024-12-24

## 2024-12-03 NOTE — LETTER
December 4, 2024      [facility]  [address]  [place] [address]  Phone: [phone]  Fax: [phone]       Patient: Eugenie Laguerre   YOB: 1980  Date of Visit: 12/04/2024    To Whom It May Concern:    SHAJI Laguerre  was at [facility] on 12/03/2024. The patient may return to work/school on 12/5/24 with no restrictions. If you have any questions or concerns, or if I can be of further assistance, please do not hesitate to contact me.    Sincerely,               Tricia Gerardo NP

## 2024-12-05 ENCOUNTER — OFFICE VISIT (OUTPATIENT)
Dept: FAMILY MEDICINE | Facility: CLINIC | Age: 44
End: 2024-12-05
Payer: COMMERCIAL

## 2024-12-05 ENCOUNTER — LAB VISIT (OUTPATIENT)
Dept: LAB | Facility: HOSPITAL | Age: 44
End: 2024-12-05
Attending: NURSE PRACTITIONER
Payer: COMMERCIAL

## 2024-12-05 VITALS
HEART RATE: 92 BPM | WEIGHT: 232 LBS | BODY MASS INDEX: 42.69 KG/M2 | HEIGHT: 62 IN | SYSTOLIC BLOOD PRESSURE: 134 MMHG | OXYGEN SATURATION: 95 % | DIASTOLIC BLOOD PRESSURE: 88 MMHG

## 2024-12-05 DIAGNOSIS — R82.90 FOUL SMELLING URINE: ICD-10-CM

## 2024-12-05 DIAGNOSIS — F41.1 GAD (GENERALIZED ANXIETY DISORDER): Primary | ICD-10-CM

## 2024-12-05 DIAGNOSIS — G43.909 MIGRAINE WITHOUT STATUS MIGRAINOSUS, NOT INTRACTABLE, UNSPECIFIED MIGRAINE TYPE: ICD-10-CM

## 2024-12-05 LAB
BACTERIA #/AREA URNS HPF: ABNORMAL /HPF
BILIRUB UR QL STRIP: NEGATIVE
CLARITY UR: ABNORMAL
COLOR UR: YELLOW
GLUCOSE UR QL STRIP: NEGATIVE
HGB UR QL STRIP: ABNORMAL
KETONES UR QL STRIP: NEGATIVE
LEUKOCYTE ESTERASE UR QL STRIP: ABNORMAL
MICROSCOPIC COMMENT: ABNORMAL
NITRITE UR QL STRIP: POSITIVE
PH UR STRIP: 6 [PH] (ref 5–8)
PROT UR QL STRIP: NEGATIVE
RBC #/AREA URNS HPF: 5 /HPF (ref 0–4)
SP GR UR STRIP: 1.02 (ref 1–1.03)
SQUAMOUS #/AREA URNS HPF: 10 /HPF
URN SPEC COLLECT METH UR: ABNORMAL
WBC #/AREA URNS HPF: 12 /HPF (ref 0–5)

## 2024-12-05 PROCEDURE — 87186 SC STD MICRODIL/AGAR DIL: CPT | Performed by: NURSE PRACTITIONER

## 2024-12-05 PROCEDURE — 99999 PR PBB SHADOW E&M-EST. PATIENT-LVL IV: CPT | Mod: PBBFAC,,, | Performed by: NURSE PRACTITIONER

## 2024-12-05 PROCEDURE — 81000 URINALYSIS NONAUTO W/SCOPE: CPT | Mod: PO | Performed by: NURSE PRACTITIONER

## 2024-12-05 PROCEDURE — 87088 URINE BACTERIA CULTURE: CPT | Performed by: NURSE PRACTITIONER

## 2024-12-05 PROCEDURE — 87086 URINE CULTURE/COLONY COUNT: CPT | Performed by: NURSE PRACTITIONER

## 2024-12-05 RX ORDER — BUSPIRONE HYDROCHLORIDE 5 MG/1
5 TABLET ORAL 2 TIMES DAILY
Qty: 60 TABLET | Refills: 11 | Status: SHIPPED | OUTPATIENT
Start: 2024-12-05 | End: 2025-12-05

## 2024-12-05 RX ORDER — KETOROLAC TROMETHAMINE 30 MG/ML
30 INJECTION, SOLUTION INTRAMUSCULAR; INTRAVENOUS ONCE
Status: COMPLETED | OUTPATIENT
Start: 2024-12-05 | End: 2024-12-05

## 2024-12-05 RX ADMIN — KETOROLAC TROMETHAMINE 30 MG: 30 INJECTION, SOLUTION INTRAMUSCULAR; INTRAVENOUS at 01:12

## 2024-12-05 NOTE — LETTER
December 5, 2024      [facility]  [address] [place] [address]  Phone: [phone]  Fax: [phone]       Patient: Eugenie Laguerre   YOB: 1980  Date of Visit: 12/05/2024    To Whom It May Concern:    SHAJI Laguerre  was at [facility] on 12/05/2024. The patient may return to work/school on 12/09/2024 with no restrictions. If you have any questions or concerns, or if I can be of further assistance, please do not hesitate to contact me.    Sincerely,    Megha Serrano MA

## 2024-12-05 NOTE — PROGRESS NOTES
Subjective:       Patient ID: Eugenie Laguerre is a 44 y.o. female.    Chief Complaint: Medication Refill    HPI  Anxiety: reports improvement in mood in AM but notices anxiety and afternoon. Zoloft increased to 100mg on 24. She has been tolerating med, however, notes persistent migraine x 6 days. Currently on medrol dosepack, taking zanaflex per neuro.     Reports foul smelling, concentrated urine for a few days    Vitals:    24 1319   BP: 134/88   Pulse: 92     Review of Systems   Constitutional:  Negative for fever.   Respiratory:  Negative for cough and shortness of breath.    Neurological:  Positive for headaches.   Psychiatric/Behavioral:  The patient is nervous/anxious.        Past Medical History:   Diagnosis Date    Anxiety 2023    Clotting disorder     DVT (deep venous thrombosis) 2007    patient does not have factor V leiden mutation - she had a negative test on 2013 and an office visit with Dr Canela (Heme/Onc) stating that her test was negative. Dr Canela ruled out antiphospholipid syndrome as well.  I see no evidence of any other hypercoagulable disorders being diagnosed. Pt had what sounds like a provoked LE DVT after a 16 hour car trip to [place], [place]    High risk pregnancy due to history of  labor 2015    Hyperthyroidism 2013    reports repeat testing was WNL and no treatment needed.    Iron deficiency anemia due to chronic blood loss 2015    Major depressive disorder, recurrent, moderate 2023    Migraines     Restless legs syndrome (RLS)     Short cervix affecting pregnancy 2015     Objective:      Physical Exam  Constitutional:       General: She is not in acute distress.     Appearance: She is well-developed. She is not ill-appearing, toxic-appearing or diaphoretic.   HENT:      Right Ear: Hearing normal.      Left Ear: Hearing normal.   Pulmonary:      Effort: No tachypnea or respiratory distress.   Skin:     Coloration: Skin is not  pale.   Neurological:      Mental Status: She is alert and oriented to person, place, and time.   Psychiatric:         Speech: Speech normal.         Behavior: Behavior normal.         Thought Content: Thought content normal.         Judgment: Judgment normal.         Assessment:       1. PAUL (generalized anxiety disorder)    2. Migraine without status migrainosus, not intractable, unspecified migraine type    3. Foul smelling urine        Plan:       PAUL (generalized anxiety disorder)  -     busPIRone (BUSPAR) 5 MG Tab; Take 1 tablet (5 mg total) by mouth 2 (two) times daily.  Dispense: 60 tablet; Refill: 11    Migraine without status migrainosus, not intractable, unspecified migraine type  -     ketorolac injection 30 mg    Foul smelling urine  -     Urinalysis, Reflex to Urine Culture Urine, Clean Catch; Future; Expected date: 12/05/2024      Message me 1 week with update  Consider reducing zoloft back to 50mg if migraines persist        Medication List with Changes/Refills   New Medications    BUSPIRONE (BUSPAR) 5 MG TAB    Take 1 tablet (5 mg total) by mouth 2 (two) times daily.   Current Medications    ATOGEPANT (QULIPTA) 60 MG TAB    Take 1 tablet (60 mg total) by mouth once daily.    FLUTICASONE PROPIONATE (FLONASE) 50 MCG/ACTUATION NASAL SPRAY    Spray 2 sprays (100 mcg total) by Each Nostril route daily as needed for Rhinitis.    HYDROXYZINE (ATARAX) 50 MG TABLET    Take 1 tablet (50 mg total) by mouth nightly as needed for Itching or Anxiety.    METHYLPREDNISOLONE (MEDROL DOSEPACK) 4 MG TABLET    use as directed    ONDANSETRON (ZOFRAN) 4 MG TABLET    Take 1 tablet (4 mg total) by mouth every 8 (eight) hours as needed for Nausea.    PANTOPRAZOLE (PROTONIX) 20 MG TABLET    Take 1 tablet (20 mg total) by mouth once daily.    PANTOPRAZOLE (PROTONIX) 40 MG TABLET    Take 1 tablet (40 mg total) by mouth once daily.    SERTRALINE (ZOLOFT) 100 MG TABLET    Take 1 tablet (100 mg total) by mouth once daily.     TIZANIDINE (ZANAFLEX) 4 MG TABLET    Take 1/2 or 1 tablet by mouth at night as needed for muscle spasm    UBROGEPANT (UBRELVY) 100 MG TABLET    Take 1 tablet by mouth as needed for migraine. May repeat in 2 hours if needed. Max 2 tablet per day

## 2024-12-06 DIAGNOSIS — N30.00 ACUTE CYSTITIS WITHOUT HEMATURIA: Primary | ICD-10-CM

## 2024-12-06 RX ORDER — NITROFURANTOIN 25; 75 MG/1; MG/1
100 CAPSULE ORAL 2 TIMES DAILY
Qty: 14 CAPSULE | Refills: 0 | Status: SHIPPED | OUTPATIENT
Start: 2024-12-06 | End: 2024-12-13

## 2024-12-07 LAB — BACTERIA UR CULT: ABNORMAL

## 2024-12-09 ENCOUNTER — PROCEDURE VISIT (OUTPATIENT)
Facility: CLINIC | Age: 44
End: 2024-12-09
Payer: COMMERCIAL

## 2024-12-09 DIAGNOSIS — K76.0 FATTY LIVER: ICD-10-CM

## 2024-12-09 PROCEDURE — 91200 LIVER ELASTOGRAPHY: CPT | Mod: S$GLB,,,

## 2024-12-11 NOTE — PROCEDURES
FibroScan Transplant Hepatology(Vibration Controlled Transient Elastography)    Date/Time: 12/9/2024 8:30 AM    Performed by: Jud Cuadra NP  Authorized by: Jud Cuadra NP    Diagnosis:  NAFLD    Probe:  XL    Universal Protocol: Patient's identity, procedure and site were verified, confirmatory pause was performed.  Discussed procedure including risks and potential complications.  Questions answered.  Patient verbalizes understanding and wishes to proceed with VCTE.     Procedure: After providing explanations of the procedure, patient was placed in the supine position with right arm in maximum abduction to allow optimal exposure of right lateral abdomen.  Patient was briefly assessed, Testing was performed in the mid-axillary location, 50Hz Shear Wave pulses were applied and the resulting Shear Wave and Propagation Speed detected with a 3.5 MHz ultrasonic signal, using the FibroScan probe, Skin to liver capsule distance and liver parenchyma were accessed during the entire examination with the FibroScan probe, Patient was instructed to breathe normally and to abstain from sudden movements during the procedure, allowing for random measurements of liver stiffness. At least 10 Shear Waves were produced, Individual measurements of each Shear Wave were calculated.  Patient tolerated the procedure well with no complications.  Meets discharge criteria as was dismissed.  Rates pain 0 out of 10.  Patient will follow up with ordering provider to review results.    Findings  Median liver stiffness score:  3.5  CAP Reading: dB/m:  304    IQR/med %:  9  Interpretation  Fibrosis interpretation is based on medial liver stiffness - Kilopascal (kPa).    Fibrosis Stage:  F 0-1  Steatosis interpretation is based on controlled attenuation parameter - (dB/m).    Steatosis Grade:  S3
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
## 2025-03-28 ENCOUNTER — TELEPHONE (OUTPATIENT)
Dept: HEPATOLOGY | Facility: CLINIC | Age: 45
End: 2025-03-28

## 2025-03-28 ENCOUNTER — OFFICE VISIT (OUTPATIENT)
Dept: HEPATOLOGY | Facility: CLINIC | Age: 45
End: 2025-03-28
Payer: COMMERCIAL

## 2025-03-28 VITALS — WEIGHT: 227 LBS | HEIGHT: 62 IN | BODY MASS INDEX: 41.77 KG/M2

## 2025-03-28 DIAGNOSIS — E66.01 CLASS 3 SEVERE OBESITY DUE TO EXCESS CALORIES WITH SERIOUS COMORBIDITY AND BODY MASS INDEX (BMI) OF 40.0 TO 44.9 IN ADULT: ICD-10-CM

## 2025-03-28 DIAGNOSIS — K76.0 METABOLIC DYSFUNCTION-ASSOCIATED STEATOTIC LIVER DISEASE (MASLD): Primary | ICD-10-CM

## 2025-03-28 DIAGNOSIS — E66.813 CLASS 3 SEVERE OBESITY DUE TO EXCESS CALORIES WITH SERIOUS COMORBIDITY AND BODY MASS INDEX (BMI) OF 40.0 TO 44.9 IN ADULT: ICD-10-CM

## 2025-03-28 NOTE — TELEPHONE ENCOUNTER
----- Message from Jud Cuadra NP sent at 3/28/2025  2:48 PM CDT -----  Please enter recall 1. Follow up in 2 years with fibroscan in [place] prior to follow up

## 2025-03-28 NOTE — PATIENT INSTRUCTIONS
1. Fibroscan to look for fat or scar tissue in the liver showed > 67% fat in the liver and no fibrosis  2. Recommend vaccines for Hepatitis  A and B if you have not completed them in the past, see below   3. Follow up in 2 years with labs and fibroscan before      These things are important to improve fatty liver:  Limit alcohol consumption, no more than 1 serving(s) of alcohol in any day (1 serving is 5 ounces of wine, 12 ounces of beer, or 1.5 ounces of liquor) and do NOT recommend any daily alcohol use    2 Weight loss goal of 25-35 lbs. ok to use weight loss medications to help with weight loss from a liver standpoint if you don't have any other contraindications   3. [facility] offers benefits to patients so let me know if you want a referral to the [facility] gym. Also, [facility] has dieticians that can create a weight loss plan. If you are interested let me know and I can place a referral. If so, contact the dietician team at [facility] at email [email or web address] or call [phone].  to get scheduled. They do offer video visits   4. Avoid processed foods. No fried/fast foods. No sugary drinks or drinks with any calories.   5. Low carb/sugar and high protein diet (100 grams per day of protein).Try to limit your carb intake to LESS than  grams per day total. Use MyFitness Pal or Lose It markos to add up your carbs through the day. Do NOT drink any beverages with calories or carbs (this can lead to high blood sugar and weight gain). The main thing to focus on is high protein, low carb)  6. Exercise, 5 days per week, 30 minutes per day, as tolerated  7. Recommend good cholesterol, blood pressure, blood sugar levels   8. There is a new medication called Rezdiffra for the treatment of F2-F3 liver scarring due to fatty liver. It is only indicated for patients with significant scar tissue from fatty liver (not for you currently)    In some people, fatty liver  can progress to steatohepatitis (inflamatory fatty liver) and possibly to cirrhosis, increasing the risk for liver cancer, liver failure, and death. Therefore, the lifestyle changes are very important to decrease this risk.     Helpful website for MASH/fatty liver: https://mash.[employer].com/patient-resources/      HEP A/B VACCINE  The CDC recommends that all adults complete the combination Hepatitis A and B vaccine called TwinRix. This will protect your liver from these viruses, which can make your liver very sick.     Hep A can be transmitted through food and water and can cause significant liver injury. There was previously a significant Hepatitis A outbreak in [place]. Hep B vaccine is transmitted through blood or bodily fluids (there are no symptoms typically) and can develop longstanding (chronic) Hep B and there is no cure, so many people have it for life. Therefore, the vaccines provide immunity against these viruses that can cause harm to the liver.     The vaccine series is 3 vaccines: one now, one at 4 weeks and one 6 months after the 1st one. You can get it from any pharmacy but any [facility] pharmacy typically stocks it and administers it frequently      If the vaccine is not covered at the pharmacy level with your insurance, you may be able to get it with your PCP or in the infectious disease department at [facility] or from your [facility].

## 2025-03-28 NOTE — PROGRESS NOTES
The patient location is: [place], [place]  The chief complaint leading to consultation is: fatty liver    Visit type: audiovisual    Face to Face time with patient: 8 minutes  20 minutes of total time spent on the encounter, which includes face to face time and non-face to face time preparing to see the patient (eg, review of tests), Obtaining and/or reviewing separately obtained history, Documenting clinical information in the electronic or other health record, Independently interpreting results (not separately reported) and communicating results to the patient/family/caregiver, or Care coordination (not separately reported).         Each patient to whom he or she provides medical services by telemedicine is:  (1) informed of the relationship between the physician and patient and the respective role of any other health care provider with respect to management of the patient; and (2) notified that he or she may decline to receive medical services by telemedicine and may withdraw from such care at any time.    Notes:       [facility]    PCP: Danny Gerardo MD     Chief Complaint:  fatty liver     HISTORY       HPI: This is a 44 y.o. patient with PMH noted below, presenting for follow up of fatty liver disease     Previous serologic w/u negative for hemochromatosis  and viral hepatitis.     Prior serologic workup:   Lab Results   Component Value Date    ANASCREEN Negative <1:80 08/14/2020    FERRITIN 106 08/30/2022    FESATURATED 25 08/30/2022    HEPBSAG Negative 12/12/2014    HEPCAB Non-reactive 08/30/2022         Interval HPI: Presents today alone. No new complaints. States that she is still having the N/V. Did see GI and had EGD, protonix was presribed but she has not been able to get it or start it yet. Highly encourage her to do this and take consistently to see if there is improvement in her symptoms. Currently 227#. Has lost some weight and she is trying to be mindful of dietary choices.      Diet: No SSB, fast/fried food occasionally, some snacks  Exercise: walk over 10,000 steps daily  Supplements: none  Occupation:  office    Risk factors for fatty liver include:  Obesity - current BMI 41.52      LABS & DIAGNOSTIC STUDIES     Labs done 9/2024 show normal transaminase levels   Platelets wnl, alk phos wnl  Synthetic liver functioning wnl    Lab Results   Component Value Date    ALT 16 09/27/2024    AST 22 09/27/2024    ALKPHOS 80 09/27/2024    BILITOT 0.4 09/27/2024    ALBUMIN 3.5 09/27/2024    INR 1.1 07/07/2023     09/27/2024       Imaging:  CT Abd 10/2024 noted  FINDINGS:  Inferior thorax is unremarkable.     Liver is enlarged.  No focal hepatic lesion.  Gallbladder is surgically absent.  No biliary ductal dilatation.     Spleen, adrenal glands, and pancreas are unremarkable.     No hydronephrosis or ureteral dilatation.     Bladder is unremarkable.  Uterus and bilateral adnexa are within normal limits for patient age.     Distal esophagus and stomach are unremarkable.  No evidence of bowel obstruction or inflammation.     No free intraperitoneal fluid or air.  No lymphadenopathy.     No aneurysm.     Body wall soft tissues are unremarkable.     Degenerative changes of the spine.  No acute or aggressive bony abnormality.     Impression:     1. No acute abdominopelvic abnormality.  2. Hepatomegaly.  3. Cholecystectomy       Abd U/S done 10/2024 noted  FINDINGS:  Pancreas: The visualized portions of pancreas appear normal.     Aorta: No aneurysm.     Liver: 16.5 cm, normal in size. Increased hepatic echogenicity, suggesting steatosis.  The main portal vein is patent with hepatopetal flow.  No focal lesions.     Gallbladder: The gallbladder is surgically absent.     Biliary system: 6 mm common bile duct.  No intrahepatic ductal dilatation.     Inferior vena cava: Normal in appearance.     Right kidney: 10.8 cm. No hydronephrosis.     Left kidney: 11.9 cm. No hydronephrosis.     Spleen:  "11 cm.  Normal in size with homogeneous echotexture.     Miscellaneous: No ascites.     Impression:     Increased hepatic echogenicity, suggesting steatosis.       Liver fibrosis staging:  -- Fibroscan 2024 noted S3, F0-1 (, kPa 3.5)       Rare alcohol consumption, see below  Social History     Substance and Sexual Activity   Alcohol Use Not Currently    Comment: 1-2 times a year       Immunity to Hep A and B - will check with next labs     Denies family history of liver disease.          Allergy and medication list reviewed and updated     PMHX:  has a past medical history of Anxiety (2023), Clotting disorder, DVT (deep venous thrombosis) (2007), High risk pregnancy due to history of  labor (2015), Hyperthyroidism (2013), Iron deficiency anemia due to chronic blood loss (2015), Major depressive disorder, recurrent, moderate (2023), Migraines, Restless legs syndrome (RLS), and Short cervix affecting pregnancy (2015).    PSHX:  has a past surgical history that includes Bronchoscopy; 2 R knee sx; 1 L knee;  section (2015); Tubal ligation (2015); Laparoscopic cholecystectomy (N/A, 2021); and Esophagogastroduodenoscopy (N/A, 2024).    FAMILY HISTORY: Updated and reviewed in EPIC    ROS:   GENERAL: Denies fatigue  CARDIOVASCULAR: Denies edema  GI: Denies abdominal pain  SKIN: Denies rash, itching   NEURO: Denies confusion, memory loss, or mood changes    PHYSICAL EXAM:   In no acute distress; alert and oriented to person, place and time  VITALS: Ht 5' 2" (1.575 m)   Wt 103 kg (227 lb)   LMP  (LMP Unknown)   BMI 41.52 kg/m²   EYES: Sclerae anicteric  GI: Soft, non-tender, non-distended. No ascites.  EXTREMITIES:  No edema.  SKIN: Warm and dry. No jaundice. No telangectasias noted. No palmar erythema.  NEURO:  No asterixis.  PSYCH:  Thought and speech pattern appropriate. Behavior normal        ASSESSMENT & PLAN    "     EDUCATION:  See instructions discussed with patient in Instructions section of the After Visit Summary     ASSESSMENT & PLAN:  44 y.o. female with:   1.  Fatty liver, likely related to metabolic risk factors Obesity  - see HPI  -- Immunity to Hep A and B : see HPI  -- Fibroscan 12/2024 noted severe amount of steatosis and no fibrosis  -- will repeat fibroscan in 2 years to monitor  -- Recommendations discussed with patient:  Limit alcohol consumption, no more than 1 serving(s) of alcohol in any day (1 serving is 5 ounces of wine, 12 ounces of beer, or 1.5 ounces of liquor) and do NOT recommend any daily alcohol use    2 Weight loss goal of 25-35 lbs  3. Low carb/sugar, high fiber and protein diet, limit your carb intake to LESS than 30-45 grams of carbs with a meal or LESS than 5-10 grams with any snack   4. Exercise, 5 days per week, 30 minutes per day, as tolerated  5. Recommend good cholesterol, blood pressure, blood sugar levels   6. There is a new medication called Rezdiffra for the treatment of F2-F3 liver scarring due to fatty liver. It is only indicated for those with stage 2 or 3 scar tissue (will discuss after fibroscan)    2. Obesity  -- Body mass index is 41.52 kg/m².   -- increases risk for fatty liver          Follow up in about 2 years (around 3/28/2027) for Virtual Visit. with fibroscan before  No orders of the defined types were placed in this encounter.       Thank you for allowing me to participate in the care of ROMÁN Pool, FNP-C  Nurse Practitioner  [facility]  [facility]     I spent a total of 20 minutes on the day of the visit.This includes face to face time and non-face to face time preparing to see the patient (eg, review of tests), obtaining and/or reviewing separately obtained history, documenting clinical information in the electronic or other health record, independently interpreting results and communicating results to the  patient/family/caregiver, and coordinating care.         CC'ed note to:   Danny Gerardo MD

## 2025-04-15 ENCOUNTER — PATIENT MESSAGE (OUTPATIENT)
Dept: FAMILY MEDICINE | Facility: CLINIC | Age: 45
End: 2025-04-15
Payer: COMMERCIAL

## 2025-04-22 ENCOUNTER — OFFICE VISIT (OUTPATIENT)
Dept: FAMILY MEDICINE | Facility: CLINIC | Age: 45
End: 2025-04-22
Payer: COMMERCIAL

## 2025-04-22 VITALS
WEIGHT: 225.06 LBS | HEART RATE: 77 BPM | BODY MASS INDEX: 41.42 KG/M2 | TEMPERATURE: 98 F | OXYGEN SATURATION: 99 % | SYSTOLIC BLOOD PRESSURE: 130 MMHG | DIASTOLIC BLOOD PRESSURE: 76 MMHG | HEIGHT: 62 IN

## 2025-04-22 DIAGNOSIS — R07.89 CHEST TIGHTNESS: ICD-10-CM

## 2025-04-22 DIAGNOSIS — E66.01 MORBID OBESITY WITH BMI OF 40.0-44.9, ADULT: ICD-10-CM

## 2025-04-22 DIAGNOSIS — Z00.00 PREVENTATIVE HEALTH CARE: Primary | ICD-10-CM

## 2025-04-22 DIAGNOSIS — Z12.31 ENCOUNTER FOR SCREENING MAMMOGRAM FOR MALIGNANT NEOPLASM OF BREAST: ICD-10-CM

## 2025-04-22 DIAGNOSIS — F41.1 GAD (GENERALIZED ANXIETY DISORDER): ICD-10-CM

## 2025-04-22 DIAGNOSIS — R06.09 DOE (DYSPNEA ON EXERTION): ICD-10-CM

## 2025-04-22 DIAGNOSIS — R40.0 DAYTIME SOMNOLENCE: ICD-10-CM

## 2025-04-22 DIAGNOSIS — R06.83 SNORING: ICD-10-CM

## 2025-04-22 PROCEDURE — 99396 PREV VISIT EST AGE 40-64: CPT | Mod: S$GLB,,, | Performed by: NURSE PRACTITIONER

## 2025-04-22 PROCEDURE — 99999 PR PBB SHADOW E&M-EST. PATIENT-LVL IV: CPT | Mod: PBBFAC,,, | Performed by: NURSE PRACTITIONER

## 2025-04-22 PROCEDURE — 3075F SYST BP GE 130 - 139MM HG: CPT | Mod: CPTII,S$GLB,, | Performed by: NURSE PRACTITIONER

## 2025-04-22 PROCEDURE — 1159F MED LIST DOCD IN RCRD: CPT | Mod: CPTII,S$GLB,, | Performed by: NURSE PRACTITIONER

## 2025-04-22 PROCEDURE — 3008F BODY MASS INDEX DOCD: CPT | Mod: CPTII,S$GLB,, | Performed by: NURSE PRACTITIONER

## 2025-04-22 PROCEDURE — 3078F DIAST BP <80 MM HG: CPT | Mod: CPTII,S$GLB,, | Performed by: NURSE PRACTITIONER

## 2025-04-22 RX ORDER — BUSPIRONE HYDROCHLORIDE 5 MG/1
5 TABLET ORAL 2 TIMES DAILY
Qty: 60 TABLET | Refills: 11 | Status: SHIPPED | OUTPATIENT
Start: 2025-04-22 | End: 2026-04-22

## 2025-04-22 RX ORDER — SEMAGLUTIDE 0.25 MG/.5ML
0.25 INJECTION, SOLUTION SUBCUTANEOUS
Qty: 2 ML | Refills: 1 | Status: SHIPPED | OUTPATIENT
Start: 2025-04-22

## 2025-04-22 RX ORDER — SERTRALINE HYDROCHLORIDE 100 MG/1
100 TABLET, FILM COATED ORAL DAILY
Qty: 30 TABLET | Refills: 11 | Status: SHIPPED | OUTPATIENT
Start: 2025-04-22

## 2025-04-22 NOTE — PROGRESS NOTES
Subjective:       Patient ID: Eugenie Laguerre is a 44 y.o. female.    Chief Complaint: Hip Pain and chest discomfort    HPI  Patient presents for annual exam  Back on insurance--had been without so out of meds.     PAUL previously controlled on Zoloft and Buspar    Left hip pain--seeing chiropractor, believes to be bursitis    Reports poor endurance, easy fatigability, CAMARENA, chest discomfort       Vitals:    25 1048   BP: 130/76   Pulse: 77   Temp: 98.1 °F (36.7 °C)     Review of Systems   Constitutional:  Negative for fever.   Respiratory:  Positive for shortness of breath.    Cardiovascular:  Positive for chest pain.       Past Medical History:   Diagnosis Date    Anxiety 2023    Clotting disorder     DVT (deep venous thrombosis) 2007    patient does not have factor V leiden mutation - she had a negative test on 2013 and an office visit with Dr Canela (Heme/Onc) stating that her test was negative. Dr Canela ruled out antiphospholipid syndrome as well.  I see no evidence of any other hypercoagulable disorders being diagnosed. Pt had what sounds like a provoked LE DVT after a 16 hour car trip to [place], [place]    High risk pregnancy due to history of  labor 2015    Hyperthyroidism 2013    reports repeat testing was WNL and no treatment needed.    Iron deficiency anemia due to chronic blood loss 2015    Major depressive disorder, recurrent, moderate 2023    Migraines     Restless legs syndrome (RLS)     Short cervix affecting pregnancy 2015     Objective:      Physical Exam  Vitals and nursing note reviewed.   Constitutional:       General: She is not in acute distress.     Appearance: She is not diaphoretic.   HENT:      Head: Normocephalic.   Eyes:      General:         Right eye: No discharge.         Left eye: No discharge.   Neck:      Trachea: No tracheal deviation.   Cardiovascular:      Rate and Rhythm: Normal rate and regular rhythm.      Heart sounds: Murmur  heard.   Pulmonary:      Effort: Pulmonary effort is normal.      Breath sounds: Normal breath sounds.   Skin:     Coloration: Skin is not pale.   Neurological:      Mental Status: She is alert and oriented to person, place, and time.   Psychiatric:         Speech: Speech normal.         Behavior: Behavior normal.         Thought Content: Thought content normal.         Judgment: Judgment normal.         Assessment:       1. Preventative health care    2. Chest tightness    3. CAMARENA (dyspnea on exertion)    4. Daytime somnolence    5. Snoring    6. PAUL (generalized anxiety disorder)    7. Encounter for screening mammogram for malignant neoplasm of breast    8. Morbid obesity with BMI of 40.0-44.9, adult        Plan:       Preventative health care  -     CBC Auto Differential; Future; Expected date: 04/22/2025  -     Comprehensive Metabolic Panel; Future; Expected date: 04/22/2025  -     Lipid Panel; Future; Expected date: 04/22/2025  -     TSH; Future; Expected date: 04/22/2025    Chest tightness  -     Stress Echo Which stress agent will be used? Treadmill Exercise; Color Flow Doppler? No; Future    CAMARENA (dyspnea on exertion)  -     Stress Echo Which stress agent will be used? Treadmill Exercise; Color Flow Doppler? No; Future    Daytime somnolence  -     Home Sleep Study; Future    Snoring  -     Home Sleep Study; Future    PAUL (generalized anxiety disorder)  -     sertraline (ZOLOFT) 100 MG tablet; Take 1 tablet (100 mg total) by mouth once daily.  Dispense: 30 tablet; Refill: 11  -     busPIRone (BUSPAR) 5 MG Tab; Take 1 tablet (5 mg total) by mouth 2 (two) times daily.  Dispense: 60 tablet; Refill: 11    Encounter for screening mammogram for malignant neoplasm of breast  -     Mammo Digital Screening Bilat w/ Justin (XPD); Future; Expected date: 07/22/2025    Morbid obesity with BMI of 40.0-44.9, adult  -     semaglutide, weight loss, (WEGOVY) 0.25 mg/0.5 mL PnIj; Inject 0.25 mg into the skin every 7 days.   Dispense: 2 mL; Refill: 1            Follow up in about 6 months (around 10/22/2025).    Medication List with Changes/Refills   New Medications    SEMAGLUTIDE, WEIGHT LOSS, (WEGOVY) 0.25 MG/0.5 ML PNIJ    Inject 0.25 mg into the skin every 7 days.   Current Medications    ATOGEPANT (QULIPTA) 60 MG TAB    Take 1 tablet (60 mg total) by mouth once daily.    FLUTICASONE PROPIONATE (FLONASE) 50 MCG/ACTUATION NASAL SPRAY    Spray 2 sprays (100 mcg total) by Each Nostril route daily as needed for Rhinitis.    HYDROXYZINE (ATARAX) 50 MG TABLET    Take 1 tablet (50 mg total) by mouth nightly as needed for Itching or Anxiety.    ONDANSETRON (ZOFRAN) 4 MG TABLET    Take 1 tablet (4 mg total) by mouth every 8 (eight) hours as needed for Nausea.    PANTOPRAZOLE (PROTONIX) 20 MG TABLET    Take 1 tablet (20 mg total) by mouth once daily.    TIZANIDINE (ZANAFLEX) 4 MG TABLET    Take 1/2 or 1 tablet by mouth at night as needed for muscle spasm    UBROGEPANT (UBRELVY) 100 MG TABLET    Take 1 tablet by mouth as needed for migraine. May repeat in 2 hours if needed. Max 2 tablet per day   Changed and/or Refilled Medications    Modified Medication Previous Medication    BUSPIRONE (BUSPAR) 5 MG TAB busPIRone (BUSPAR) 5 MG Tab       Take 1 tablet (5 mg total) by mouth 2 (two) times daily.    Take 1 tablet (5 mg total) by mouth 2 (two) times daily.    SERTRALINE (ZOLOFT) 100 MG TABLET sertraline (ZOLOFT) 100 MG tablet       Take 1 tablet (100 mg total) by mouth once daily.    Take 1 tablet (100 mg total) by mouth once daily.   Discontinued Medications    PANTOPRAZOLE (PROTONIX) 40 MG TABLET    Take 1 tablet (40 mg total) by mouth once daily.

## 2025-04-24 ENCOUNTER — OFFICE VISIT (OUTPATIENT)
Dept: NEUROLOGY | Facility: CLINIC | Age: 45
End: 2025-04-24
Payer: COMMERCIAL

## 2025-04-24 ENCOUNTER — PATIENT MESSAGE (OUTPATIENT)
Dept: FAMILY MEDICINE | Facility: CLINIC | Age: 45
End: 2025-04-24
Payer: COMMERCIAL

## 2025-04-24 ENCOUNTER — LAB VISIT (OUTPATIENT)
Dept: LAB | Facility: HOSPITAL | Age: 45
End: 2025-04-24
Attending: NURSE PRACTITIONER
Payer: COMMERCIAL

## 2025-04-24 VITALS
WEIGHT: 225.63 LBS | RESPIRATION RATE: 17 BRPM | DIASTOLIC BLOOD PRESSURE: 83 MMHG | SYSTOLIC BLOOD PRESSURE: 133 MMHG | HEART RATE: 83 BPM | BODY MASS INDEX: 41.27 KG/M2

## 2025-04-24 DIAGNOSIS — I67.1 UNRUPTURED CEREBRAL ANEURYSM: ICD-10-CM

## 2025-04-24 DIAGNOSIS — R93.89 ABNORMAL CXR: Primary | ICD-10-CM

## 2025-04-24 DIAGNOSIS — Z00.00 PREVENTATIVE HEALTH CARE: ICD-10-CM

## 2025-04-24 DIAGNOSIS — G43.719 INTRACTABLE CHRONIC MIGRAINE WITHOUT AURA AND WITHOUT STATUS MIGRAINOSUS: Primary | ICD-10-CM

## 2025-04-24 DIAGNOSIS — M79.18 CERVICAL MYOFASCIAL PAIN SYNDROME: ICD-10-CM

## 2025-04-24 LAB
ABSOLUTE EOSINOPHIL (OHS): 0.07 K/UL
ABSOLUTE MONOCYTE (OHS): 0.36 K/UL (ref 0.3–1)
ABSOLUTE NEUTROPHIL COUNT (OHS): 2.89 K/UL (ref 1.8–7.7)
ALBUMIN SERPL BCP-MCNC: 3.9 G/DL (ref 3.5–5.2)
ALP SERPL-CCNC: 70 UNIT/L (ref 40–150)
ALT SERPL W/O P-5'-P-CCNC: 20 UNIT/L (ref 10–44)
ANION GAP (OHS): 9 MMOL/L (ref 8–16)
AST SERPL-CCNC: 20 UNIT/L (ref 11–45)
BASOPHILS # BLD AUTO: 0.04 K/UL
BASOPHILS NFR BLD AUTO: 0.8 %
BILIRUB SERPL-MCNC: 0.3 MG/DL (ref 0.1–1)
BUN SERPL-MCNC: 10 MG/DL (ref 6–20)
CALCIUM SERPL-MCNC: 9.3 MG/DL (ref 8.7–10.5)
CHLORIDE SERPL-SCNC: 111 MMOL/L (ref 95–110)
CHOLEST SERPL-MCNC: 172 MG/DL (ref 120–199)
CHOLEST/HDLC SERPL: 4.3 {RATIO} (ref 2–5)
CO2 SERPL-SCNC: 25 MMOL/L (ref 23–29)
CREAT SERPL-MCNC: 0.8 MG/DL (ref 0.5–1.4)
ERYTHROCYTE [DISTWIDTH] IN BLOOD BY AUTOMATED COUNT: 13.7 % (ref 11.5–14.5)
GFR SERPLBLD CREATININE-BSD FMLA CKD-EPI: >60 ML/MIN/1.73/M2
GLUCOSE SERPL-MCNC: 102 MG/DL (ref 70–110)
HCT VFR BLD AUTO: 43.2 % (ref 37–48.5)
HDLC SERPL-MCNC: 40 MG/DL (ref 40–75)
HDLC SERPL: 23.3 % (ref 20–50)
HGB BLD-MCNC: 13.7 GM/DL (ref 12–16)
IMM GRANULOCYTES # BLD AUTO: 0.01 K/UL (ref 0–0.04)
IMM GRANULOCYTES NFR BLD AUTO: 0.2 % (ref 0–0.5)
LDLC SERPL CALC-MCNC: 109.6 MG/DL (ref 63–159)
LYMPHOCYTES # BLD AUTO: 1.47 K/UL (ref 1–4.8)
MCH RBC QN AUTO: 28.6 PG (ref 27–31)
MCHC RBC AUTO-ENTMCNC: 31.7 G/DL (ref 32–36)
MCV RBC AUTO: 90 FL (ref 82–98)
NONHDLC SERPL-MCNC: 132 MG/DL
NUCLEATED RBC (/100WBC) (OHS): 0 /100 WBC
PLATELET # BLD AUTO: 298 K/UL (ref 150–450)
PMV BLD AUTO: 9.9 FL (ref 9.2–12.9)
POTASSIUM SERPL-SCNC: 4.3 MMOL/L (ref 3.5–5.1)
PROT SERPL-MCNC: 7.5 GM/DL (ref 6–8.4)
RBC # BLD AUTO: 4.79 M/UL (ref 4–5.4)
RELATIVE EOSINOPHIL (OHS): 1.4 %
RELATIVE LYMPHOCYTE (OHS): 30.4 % (ref 18–48)
RELATIVE MONOCYTE (OHS): 7.4 % (ref 4–15)
RELATIVE NEUTROPHIL (OHS): 59.8 % (ref 38–73)
SODIUM SERPL-SCNC: 145 MMOL/L (ref 136–145)
TRIGL SERPL-MCNC: 112 MG/DL (ref 30–150)
TSH SERPL-ACNC: 0.98 UIU/ML (ref 0.4–4)
WBC # BLD AUTO: 4.84 K/UL (ref 3.9–12.7)

## 2025-04-24 PROCEDURE — 85025 COMPLETE CBC W/AUTO DIFF WBC: CPT

## 2025-04-24 PROCEDURE — 82465 ASSAY BLD/SERUM CHOLESTEROL: CPT

## 2025-04-24 PROCEDURE — 99214 OFFICE O/P EST MOD 30 MIN: CPT | Mod: S$GLB,,, | Performed by: NURSE PRACTITIONER

## 2025-04-24 PROCEDURE — 99999 PR PBB SHADOW E&M-EST. PATIENT-LVL III: CPT | Mod: PBBFAC,,, | Performed by: NURSE PRACTITIONER

## 2025-04-24 PROCEDURE — 80053 COMPREHEN METABOLIC PANEL: CPT

## 2025-04-24 PROCEDURE — 84443 ASSAY THYROID STIM HORMONE: CPT

## 2025-04-24 PROCEDURE — 36415 COLL VENOUS BLD VENIPUNCTURE: CPT | Mod: PO

## 2025-04-24 RX ORDER — TIZANIDINE 4 MG/1
TABLET ORAL
Qty: 30 TABLET | Refills: 11 | Status: SHIPPED | OUTPATIENT
Start: 2025-04-24

## 2025-04-24 NOTE — PATIENT INSTRUCTIONS
Please call our clinic at [phone] or send a message on the [employer] portal if there are any changes to the plan described below, for example,if you are not contacted for the requested tests, referral(s) within one week, if you are unable to receive the medications prescribed, or if you feel you need to change the treatment course for any reason.     TESTING:  -- none     REFERRALS:  -- none    PREVENTION (use daily regardless of headache):  -- continue Qulipta once daily   -- RESUME Botox   -- continue magnesium in ONE of the following preparations -               1. Magnesium oxide 800mg daily (the most common over the counter kind, may causes loose stools)              2. Magnesium citrate 400-500mg daily (harder to find, but more neutral on the bowels)              3. Magnesium glycinate 400mg daily (hardest to find, look online, but most bowel-neutral, best absorbed)     AS-NEEDED TREATMENT (use total no more than 10 days per month unless otherwise stated):  -- continue tizanidine at night. This is a muscle relaxer and it will also make you potentially sleepy. Start with half a tablet to see how you respond but can take up to a whole tablet if needed  -- continue Ubrelvy with next migraine. You can repeat two hours later if needed. With this medication do not drink grapefruit juice or eat grapefruit or some medications like ketoconazole, itraconazole, or antibiotics clarithromycin

## 2025-04-24 NOTE — PROGRESS NOTES
Date of service: 4/24/2025  Referring provider: No ref. provider found    Subjective:      Chief complaint: Headache       Patient ID: Eugenie Laguerre is a 44 y.o. female with anxiety, hyperthyroidism, VERENICE, migraine, RLS, (?)clotting disorder who presents for follow up of headache     History of Present Illness    INTERVAL HISTORY 4/24/25    Last office visit was six months ago and we did one session of Botox in November.    Today she reports she is better. She reports she had relief with one Botox session but lost her insurance after. She now has new insurance and wishes to resume Botox. Current pain 3 with range 2-7. She has 2 headache days per week. She takes Tylenol 3 days per week. Otherwise information below is reviewed and verified with no changes made     INTERVAL HISTORY 10/23/24    Last visit was two months ago and at that time we started Qulipta.    Today she reports she is worse. She has been to the ER twice since last visit due to migraines. Current pain 6 with range 1-8. She has four headache days per week. She takes Ubrelvy 4 days per week. Otherwise information below is reviewed and verified with no changes made     INTERVAL HISTORY 8/20/24    Last visit was one year ago and at that time we started Qulipta. Since last visit she had stent for brain aneurysm. After that she had no insurance for the past year and stopped all medications.     Today she reports she is better. Current pain 3 with range 0-6. She has a headache 4 days per week. She takes tylenol 4 days per week. Otherwise information below is reviewed and verified with no changes made     INTERVAL HISTORY 8/2/23    Last visit was almost five months ago. At that time we added Emgality and rizatriptan. I ordered an MRA which indicated a small aneurysm for which she saw vascular neuro. She only did the loading dose of Emgality.    Today she reports she is worse. She states she had a procedure done and headaches are worse. She had a coil for  "aneurysm in July and two days later she lost vision in right eye. She saw ophthalmology who said her eyes were "healthy". Headaches are on the right side. Current pain 3 with range 2-9. She has a daily headache. She is taking rizatriptan and tylenol.  Otherwise information below is reviewed and verified with no changes made     ORIGINAL HEADACHE HISTORY - 3/15/23  Age at onset and course over time: in her 20's had an episode similar to a stroke. She went to the ER and diagnosed with migraines. She was hypotensive due to medication.     She takes Topamax but causes her to be overly sedated. She takes more as needed due to this.     She works as a .      Aura - ring of light in vision, lasts an hour if not treated, always associated with severe headache     Family history of migraine - mom, maternal grandmother  Family history of aneurysm - dad, paternal sister  Last eye exam - august 2022    Location: mainly behind her eyes   Quality:  [] pressure [] tight [x] throbbing [] sharp [] stabbing   Severity: current 6 with range 3-8  Duration: days  Frequency: 20 days per month   Headaches awaken at night?:  yes  Worst time of day: upon waking   Associated with: [x] photophobia [x]  phonophobia [] osmophobia [x] blurred vision  [] double vision [x] loss of appetite [x] nausea [x] vomiting [x] dizziness [] vertigo  [] tinnitus [x] irritability [] sinus pressure [] problems with concentration   [] neck tightness   Alleviated by:  [x] sleep [x] darkness [] massage [] heat [] ice [] medication  Exacerbated by:  [x] fatigue [x] light [x] noise [] smells [] coughing [] sneezing  [] bending over [] ovulation [] menses [] alcohol [] change in weather [x]  stress  Ipsilateral autonomic: [] nasal congestion [] lacrimation [] ptosis [] injection [] edema [] foreign body sensation [] ear fullness   ICP:  [] transient visual obscurations  [] tinnitus   [x] positional headache  [] non-positional   Sleep habits: trouble falling " asleep  Caffeine intake: 44 oz of tea  Gyn status (if female): having periods  HIT 6 - 65    Current acute treatment:  Tylenol   Ubrelvy  Tizanidine    Current prevention:  Sertraline  Buspar   Qulipta - started August 2024    Previously tried/failed acute treatment:  Mobic  Robaxin  Fioricet  Naproxen  Sumatriptan  Diclofenac  Zofran  Phenergan  Ibuprofen - daily  Rizatriptan    Previously tried/failed preventative treatment:  Nifedipine  Amitriptyline - for headaches, somewhat helpful but sedating   Celexa  Zonisamide  Depakote - severe hypotension   Requip  Qulipta - took about 2 months before losing insurance, was effective   Buspar  Topamax - 100 mg QHS  Emgality - first March 2023 - loading dose  Botox - once    Review of patient's allergies indicates:   Allergen Reactions    Amoxicillin     Augmentin [amoxicillin-pot clavulanate] Swelling    Bactrim [sulfamethoxazole-trimethoprim] Itching and Nausea Only    Ropinirole Rash     Current Outpatient Medications   Medication Sig Dispense Refill    atogepant (QULIPTA) 60 mg Tab Take 1 tablet (60 mg total) by mouth once daily. 30 tablet 11    busPIRone (BUSPAR) 5 MG Tab Take 1 tablet (5 mg total) by mouth 2 (two) times daily. 60 tablet 11    fluticasone propionate (FLONASE) 50 mcg/actuation nasal spray Spray 2 sprays (100 mcg total) by Each Nostril route daily as needed for Rhinitis. 16 g 0    hydrOXYzine (ATARAX) 50 MG tablet Take 1 tablet (50 mg total) by mouth nightly as needed for Itching or Anxiety. 30 tablet 3    ondansetron (ZOFRAN) 4 MG tablet Take 1 tablet (4 mg total) by mouth every 8 (eight) hours as needed for Nausea. 60 tablet 2    semaglutide, weight loss, (WEGOVY) 0.25 mg/0.5 mL PnIj Inject 0.25 mg into the skin every 7 days. 2 mL 1    sertraline (ZOLOFT) 100 MG tablet Take 1 tablet (100 mg total) by mouth once daily. 30 tablet 11    ubrogepant (UBRELVY) 100 mg tablet Take 1 tablet by mouth as needed for migraine. May repeat in 2 hours if needed. Max  2 tablet per day 8 tablet 11    tiZANidine (ZANAFLEX) 4 MG tablet Take 1/2 or 1 tablet by mouth at night as needed for muscle spasm 30 tablet 11     Current Facility-Administered Medications   Medication Dose Route Frequency Provider Last Rate Last Admin    onabotulinumtoxina injection 200 Units  200 Units Intramuscular q12 weeks    200 Units at 24 1525       Past Medical History  Past Medical History:   Diagnosis Date    Anxiety 2023    Clotting disorder     DVT (deep venous thrombosis) 2007    patient does not have factor V leiden mutation - she had a negative test on 2013 and an office visit with Dr Canela (Heme/Onc) stating that her test was negative. Dr Canela ruled out antiphospholipid syndrome as well.  I see no evidence of any other hypercoagulable disorders being diagnosed. Pt had what sounds like a provoked LE DVT after a 16 hour car trip to [place], [place]    High risk pregnancy due to history of  labor 2015    Hyperthyroidism 2013    reports repeat testing was WNL and no treatment needed.    Iron deficiency anemia due to chronic blood loss 2015    Major depressive disorder, recurrent, moderate 2023    Migraines     Restless legs syndrome (RLS)     Short cervix affecting pregnancy 2015       Past Surgical History  Past Surgical History:   Procedure Laterality Date    2 R knee sx; 1 L knee      arthroscopies    BRONCHOSCOPY       SECTION  2015    ESOPHAGOGASTRODUODENOSCOPY N/A 2024    Procedure: EGD (ESOPHAGOGASTRODUODENOSCOPY);  Surgeon: Ritesh Gaytan MD;  Location: [facility];  Service: Gastroenterology;  Laterality: N/A;    LAPAROSCOPIC CHOLECYSTECTOMY N/A 2021    Procedure: CHOLECYSTECTOMY, LAPAROSCOPIC;  Surgeon: Pascual Lu MD;  Location: [facility];  Service: General;  Laterality: N/A;    TUBAL LIGATION  2015       Family History  Family History   Problem Relation Name Age of Onset    Fibromyalgia Mother       Depression Mother      Anuerysm Father      Cancer Father          skin    Deep vein thrombosis Father      Hypertension Father      Bipolar disorder Sister      Ovarian cancer Maternal Aunt      Bladder Cancer Maternal Aunt      Anuerysm Paternal Aunt          AAA    Breast cancer Maternal Grandmother      Diabetes Paternal Grandmother      Anuerysm Paternal Grandfather          AAA    Heart disease Brother      Colon cancer Neg Hx      Stomach cancer Neg Hx      Esophageal cancer Neg Hx         Social History  Social History     Socioeconomic History    Marital status:     Number of children: 2   Tobacco Use    Smoking status: Former     Current packs/day: 0.50     Types: Cigarettes    Smokeless tobacco: Former     Quit date: 8/1/2021   Substance and Sexual Activity    Alcohol use: Not Currently     Comment: 1-2 times a year    Drug use: No    Sexual activity: Yes     Partners: Male     Birth control/protection: None     Social Drivers of Health     Financial Resource Strain: Low Risk  (3/28/2025)    Overall Financial Resource Strain (CARDIA)     Difficulty of Paying Living Expenses: Not hard at all   Food Insecurity: No Food Insecurity (3/28/2025)    Hunger Vital Sign     Worried About Running Out of Food in the Last Year: Never true     Ran Out of Food in the Last Year: Never true   Transportation Needs: No Transportation Needs (3/28/2025)    PRAPARE - Transportation     Lack of Transportation (Medical): No     Lack of Transportation (Non-Medical): No   Physical Activity: Sufficiently Active (3/28/2025)    Exercise Vital Sign     Days of Exercise per Week: 5 days     Minutes of Exercise per Session: 30 min   Stress: No Stress Concern Present (3/28/2025)    [employer] [place] [employer] - Occupational Stress Questionnaire     Feeling of Stress : Not at all   Housing Stability: Low Risk  (3/28/2025)    Housing Stability Vital Sign     Unable to Pay for Housing in the Last Year: No     Number of  Times Moved in the Last Year: 0     Homeless in the Last Year: No        Objective:        Vitals:    04/24/25 0923   BP: 133/83   Pulse: 83   Resp: 17             Body mass index is 41.27 kg/m².    4/24/25  Constitutional:   She appears well-developed and well-nourished. She is well groomed.     Neurological Exam:  General: well-developed, well-nourished, no distress  Mental status: Awake and alert  Speech language: No dysarthria or aphasia on conversation  Cranial nerves: Face symmetric  Motor: Moves all extremities well  Coordination: No ataxia. No tremor.      Data Review:     I have personally reviewed the referring provider's notes, labs, & imaging made available to me today.      RADIOLOGY STUDIES:  I have personally reviewed the pertinent images performed.       Results for orders placed or performed during the hospital encounter of 06/25/13   MRA Brain without contrast    Narrative    Routine  imaging through this 32-year-old females brain was obtained per the MRA protocol.  A survey flair weighted sequence and T2 weighted sequence were also available.    The anterior cerebral arteries, anterior communicating artery, middle cerebral arteries, and posterior cerebral arteries proximally demonstrate no evidence of aneurysm or stenosis.      The left vertebral artery appears dominant in comparison with the right.      The P1 segment on the left appears hypoplastic with the majority of the blood flow coming from the posterior communicating artery on the left.  The posterior communicating artery on the right appears hypoplastic with a majority of the blood flow coming   from the P1 segment.      The A1 segment on the right appears hypoplastic with the majority of the blood flow to the anterior cerebral arteries likely coming from the left A1 segment.    The ventricles and sulci appear age appropriate.  Mild mucous membrane thickening is noted within the maxillary sinuses bilaterally.    Impression     1.  No  obvious evidence of aneurysm or stenosis is noted on this MRI of the brain.      2.  Mild mucous membrane thickening is noted within the maxillary sinuses bilaterally.      Electronically signed by: Philippe Hayden MD  Date:     06/25/13  Time:    16:50        Lab Results   Component Value Date    BNP 21 11/26/2018     09/27/2024    K 4.0 09/27/2024    MG 2.3 09/27/2024     09/27/2024    CO2 17 (L) 09/27/2024    BUN 8 09/27/2024    CREATININE 0.9 09/27/2024    GLU 97 09/27/2024    HGBA1C 5.4 07/09/2024    AST 22 09/27/2024    ALT 16 09/27/2024    ALBUMIN 3.5 09/27/2024    PROT 7.1 09/27/2024    BILITOT 0.4 09/27/2024    CHOL 172 08/07/2020    CHOL 144 11/24/2018    HDL 46 08/07/2020    HDL 41 11/24/2018    LDLCALC 119 (H) 08/07/2020    LDLCALC 87.2 11/24/2018    TRIG 36 08/07/2020    TRIG 79 11/24/2018       Lab Results   Component Value Date    WBC 6.39 09/27/2024    HGB 13.9 09/27/2024    HCT 42.4 09/27/2024    MCV 88 09/27/2024     09/27/2024       Lab Results   Component Value Date    TSH 0.931 07/09/2024           Assessment & Plan:       Problem List Items Addressed This Visit          Neuro    Intractable chronic migraine without aura and without status migrainosus - Primary    Overview   Migraine headaches since her 20's. Headaches are typically unilateral, moderate to severe in intensity, worsen with activity, pounding in quality and associated with sensitivity to light and sound. Gradual progression pattern, lack of red flag features on history, and normal neurological exam are reassuring for primary as opposed to secondary etiology of headaches thus imaging will not be pursued for this history and this exam at this time.    She was only able to get the loading dose of Emgality. She has been on Qulipta with minimal improvement.    The patient has chronic migraines ( G43.719) and suffers from headaches more than 3 months, more than 15 days of headache days per month lasting more than 4 hours  with at least 8 attacks that meet criteria for migraine. She has tried multiple medications including but not limited to sertraline, nifedipine, amitriptyline, Celexa, zonisamide, Depakote, Requip, Buspar, Topamax, Emgality   The patient has been unresponsive and refractory.The patient meets criteria for chronic headaches according to the ICHD-II, the patient has more than 15 headaches a month which last for more than 4 hours a day. The patient is an ideal candidate for Botox. After treatment, I expect 50%  improvement in the patient's symptoms. A reduction of at least 7 days per month and the number of cumulative hours suffering with headaches as well as at least 100 total hours affected with migraine per month.  DESCRIPTION OF PROCEDURE: After obtaining informed consent and under aseptic technique, a total of 155 units of botulinum toxin type A to be injected in the following muscles:      -- Procerus 5 units  --  5 units bilaterally  -- Frontalis 20 units  -- Temporalis 20 units bilaterally  -- Occipitalis 15 units bilaterally  -- Upper cervical paraspinals 10 units bilaterally  -- Trapezius 15 units bilaterally.       Unavoidable waste 45 units    Continue Ubrelvy for acute attacks.          Current Assessment & Plan   Resume Botox. Continue Qulipta and Ubrelvy.         Relevant Orders    Prior authorization Order    Unruptured cerebral aneurysm    Current Assessment & Plan   Had follow up with vascular neurology. No further follow up or intervention needed.           Other Visit Diagnoses         Cervical myofascial pain syndrome        Relevant Medications    tiZANidine (ZANAFLEX) 4 MG tablet                        Please call our clinic at [phone] or send a message on the [employer] portal if there are any changes to the plan described below, for example,if you are not contacted for the requested tests, referral(s) within one week, if you are unable to receive the medications prescribed, or if you  feel you need to change the treatment course for any reason.     TESTING:  -- none     REFERRALS:  -- none    PREVENTION (use daily regardless of headache):  -- continue Qulipta once daily   -- RESUME Botox   -- continue magnesium in ONE of the following preparations -               1. Magnesium oxide 800mg daily (the most common over the counter kind, may causes loose stools)              2. Magnesium citrate 400-500mg daily (harder to find, but more neutral on the bowels)              3. Magnesium glycinate 400mg daily (hardest to find, look online, but most bowel-neutral, best absorbed)     AS-NEEDED TREATMENT (use total no more than 10 days per month unless otherwise stated):  -- continue tizanidine at night. This is a muscle relaxer and it will also make you potentially sleepy. Start with half a tablet to see how you respond but can take up to a whole tablet if needed  -- continue Ubrelvy with next migraine. You can repeat two hours later if needed. With this medication do not drink grapefruit juice or eat grapefruit or some medications like ketoconazole, itraconazole, or antibiotics clarithromycin      Follow up in about 3 weeks (around 5/15/2025) for botox.        Tricia Gerardo NP

## 2025-04-25 ENCOUNTER — HOSPITAL ENCOUNTER (OUTPATIENT)
Dept: CARDIOLOGY | Facility: HOSPITAL | Age: 45
Discharge: HOME | End: 2025-04-25
Attending: NURSE PRACTITIONER
Payer: COMMERCIAL

## 2025-04-25 VITALS — BODY MASS INDEX: 41.41 KG/M2 | WEIGHT: 225 LBS | HEIGHT: 62 IN

## 2025-04-25 DIAGNOSIS — R07.89 CHEST TIGHTNESS: ICD-10-CM

## 2025-04-25 DIAGNOSIS — R06.09 DOE (DYSPNEA ON EXERTION): ICD-10-CM

## 2025-04-25 LAB
ASCENDING AORTA: 2.8 CM
BSA FOR ECHO PROCEDURE: 2.11 M2
CV ECHO LV RWT: 0.35 CM
CV STRESS BASE HR: 85 BPM
DIASTOLIC BLOOD PRESSURE: 77 MMHG
DOP CALC LVOT AREA: 3.1 CM2
DOP CALC LVOT DIAMETER: 2 CM
DOP CALC LVOT PEAK VEL: 1.1 M/S
DOP CALC LVOT STROKE VOLUME: 71.3 CM3
DOP CALCLVOT PEAK VEL VTI: 22.7 CM
E WAVE DECELERATION TIME: 185 MSEC
E/A RATIO: 1.83
E/E' RATIO: 10 M/S
ECHO LV POSTERIOR WALL: 0.9 CM (ref 0.6–1.1)
FRACTIONAL SHORTENING: 34.6 % (ref 28–44)
INTERVENTRICULAR SEPTUM: 1.1 CM (ref 0.6–1.1)
LEFT ATRIUM AREA SYSTOLIC (APICAL 2 CHAMBER): 15.55 CM2
LEFT ATRIUM AREA SYSTOLIC (APICAL 4 CHAMBER): 17.4 CM2
LEFT ATRIUM SIZE: 3.7 CM
LEFT ATRIUM VOLUME INDEX MOD: 22 ML/M2
LEFT ATRIUM VOLUME MOD: 45 ML
LEFT INTERNAL DIMENSION IN SYSTOLE: 3.4 CM (ref 2.1–4)
LEFT VENTRICLE DIASTOLIC VOLUME INDEX: 65.67 ML/M2
LEFT VENTRICLE DIASTOLIC VOLUME: 132 ML
LEFT VENTRICLE END SYSTOLIC VOLUME APICAL 2 CHAMBER: 41.47 ML
LEFT VENTRICLE END SYSTOLIC VOLUME APICAL 4 CHAMBER: 48.75 ML
LEFT VENTRICLE MASS INDEX: 96.6 G/M2
LEFT VENTRICLE SYSTOLIC VOLUME INDEX: 23.4 ML/M2
LEFT VENTRICLE SYSTOLIC VOLUME: 47 ML
LEFT VENTRICULAR INTERNAL DIMENSION IN DIASTOLE: 5.2 CM (ref 3.5–6)
LEFT VENTRICULAR MASS: 194.2 G
LV LATERAL E/E' RATIO: 8.6 M/S
LV SEPTAL E/E' RATIO: 10.6 M/S
LVED V (TEICH): 131.55 ML
LVES V (TEICH): 46.92 ML
LVOT MG: 2.49 MMHG
LVOT MV: 0.73 CM/S
MV PEAK A VEL: 0.52 M/S
MV PEAK E VEL: 0.95 M/S
MV STENOSIS PRESSURE HALF TIME: 53.59 MS
MV VALVE AREA P 1/2 METHOD: 4.11 CM2
OHS CV CPX 1 MINUTE RECOVERY HEART RATE: 118 BPM
OHS CV CPX 85 PERCENT MAX PREDICTED HEART RATE MALE: 150
OHS CV CPX ESTIMATED METS: 10
OHS CV CPX MAX PREDICTED HEART RATE: 177
OHS CV CPX PATIENT IS FEMALE: 1
OHS CV CPX PATIENT IS MALE: 0
OHS CV CPX PEAK DIASTOLIC BLOOD PRESSURE: 74 MMHG
OHS CV CPX PEAK HEAR RATE: 162 BPM
OHS CV CPX PEAK RATE PRESSURE PRODUCT: NORMAL
OHS CV CPX PEAK SYSTOLIC BLOOD PRESSURE: 151 MMHG
OHS CV CPX PERCENT MAX PREDICTED HEART RATE ACHIEVED: 96
OHS CV CPX RATE PRESSURE PRODUCT PRESENTING: NORMAL
PISA TR MAX VEL: 2 M/S
SINUS: 2.68 CM
STJ: 2.4 CM
STRESS ECHO POST EXERCISE DUR MIN: 5 MINUTES
STRESS ECHO POST EXERCISE DUR SEC: 52 SECONDS
SYSTOLIC BLOOD PRESSURE: 121 MMHG
TDI LATERAL: 0.11 M/S
TDI SEPTAL: 0.09 M/S
TDI: 0.1 M/S
TR MAX PG: 17 MMHG
Z-SCORE OF LEFT VENTRICULAR DIMENSION IN END DIASTOLE: -1.25
Z-SCORE OF LEFT VENTRICULAR DIMENSION IN END SYSTOLE: -0.48

## 2025-04-25 PROCEDURE — 93351 STRESS TTE COMPLETE: CPT | Mod: 26,,, | Performed by: INTERNAL MEDICINE

## 2025-04-25 PROCEDURE — 93351 STRESS TTE COMPLETE: CPT | Mod: PO

## 2025-04-27 ENCOUNTER — RESULTS FOLLOW-UP (OUTPATIENT)
Dept: FAMILY MEDICINE | Facility: CLINIC | Age: 45
End: 2025-04-27

## 2025-04-28 ENCOUNTER — HOSPITAL ENCOUNTER (OUTPATIENT)
Dept: RADIOLOGY | Facility: HOSPITAL | Age: 45
Discharge: HOME | End: 2025-04-28
Attending: NURSE PRACTITIONER
Payer: COMMERCIAL

## 2025-04-28 ENCOUNTER — RESULTS FOLLOW-UP (OUTPATIENT)
Dept: FAMILY MEDICINE | Facility: CLINIC | Age: 45
End: 2025-04-28

## 2025-04-28 ENCOUNTER — PATIENT MESSAGE (OUTPATIENT)
Dept: FAMILY MEDICINE | Facility: CLINIC | Age: 45
End: 2025-04-28
Payer: COMMERCIAL

## 2025-04-28 DIAGNOSIS — R93.89 ABNORMAL CXR: ICD-10-CM

## 2025-04-28 LAB
ASCENDING AORTA: 2.8 CM
BSA FOR ECHO PROCEDURE: 2.11 M2
CV ECHO LV RWT: 0.35 CM
CV STRESS BASE HR: 85 BPM
DIASTOLIC BLOOD PRESSURE: 77 MMHG
DOP CALC LVOT AREA: 3.1 CM2
DOP CALC LVOT DIAMETER: 2 CM
DOP CALC LVOT PEAK VEL: 1.1 M/S
DOP CALC LVOT STROKE VOLUME: 71.3 CM3
DOP CALCLVOT PEAK VEL VTI: 22.7 CM
E WAVE DECELERATION TIME: 185 MSEC
E/A RATIO: 1.83
E/E' RATIO: 10 M/S
ECHO LV POSTERIOR WALL: 0.9 CM (ref 0.6–1.1)
FRACTIONAL SHORTENING: 34.6 % (ref 28–44)
INTERVENTRICULAR SEPTUM: 1.1 CM (ref 0.6–1.1)
LEFT ATRIUM AREA SYSTOLIC (APICAL 2 CHAMBER): 15.55 CM2
LEFT ATRIUM AREA SYSTOLIC (APICAL 4 CHAMBER): 17.4 CM2
LEFT ATRIUM SIZE: 3.7 CM
LEFT ATRIUM VOLUME INDEX MOD: 22 ML/M2
LEFT ATRIUM VOLUME MOD: 45 ML
LEFT INTERNAL DIMENSION IN SYSTOLE: 3.4 CM (ref 2.1–4)
LEFT VENTRICLE DIASTOLIC VOLUME INDEX: 65.67 ML/M2
LEFT VENTRICLE DIASTOLIC VOLUME: 132 ML
LEFT VENTRICLE END SYSTOLIC VOLUME APICAL 2 CHAMBER: 41.47 ML
LEFT VENTRICLE END SYSTOLIC VOLUME APICAL 4 CHAMBER: 48.75 ML
LEFT VENTRICLE MASS INDEX: 96.6 G/M2
LEFT VENTRICLE SYSTOLIC VOLUME INDEX: 23.4 ML/M2
LEFT VENTRICLE SYSTOLIC VOLUME: 47 ML
LEFT VENTRICULAR INTERNAL DIMENSION IN DIASTOLE: 5.2 CM (ref 3.5–6)
LEFT VENTRICULAR MASS: 194.2 G
LV LATERAL E/E' RATIO: 8.6 M/S
LV SEPTAL E/E' RATIO: 10.6 M/S
LVED V (TEICH): 131.55 ML
LVES V (TEICH): 46.92 ML
LVOT MG: 2.49 MMHG
LVOT MV: 0.73 CM/S
MV PEAK A VEL: 0.52 M/S
MV PEAK E VEL: 0.95 M/S
MV STENOSIS PRESSURE HALF TIME: 53.59 MS
MV VALVE AREA P 1/2 METHOD: 4.11 CM2
OHS CV CPX 1 MINUTE RECOVERY HEART RATE: 118 BPM
OHS CV CPX 85 PERCENT MAX PREDICTED HEART RATE MALE: 150
OHS CV CPX ESTIMATED METS: 10
OHS CV CPX MAX PREDICTED HEART RATE: 177
OHS CV CPX PATIENT IS FEMALE: 1
OHS CV CPX PATIENT IS MALE: 0
OHS CV CPX PEAK DIASTOLIC BLOOD PRESSURE: 74 MMHG
OHS CV CPX PEAK HEAR RATE: 162 BPM
OHS CV CPX PEAK RATE PRESSURE PRODUCT: NORMAL
OHS CV CPX PEAK SYSTOLIC BLOOD PRESSURE: 151 MMHG
OHS CV CPX PERCENT MAX PREDICTED HEART RATE ACHIEVED: 96
OHS CV CPX RATE PRESSURE PRODUCT PRESENTING: NORMAL
PISA TR MAX VEL: 2 M/S
RA PRESSURE ESTIMATED: 3 MMHG
RV TB RVSP: 5 MMHG
SINUS: 2.68 CM
STJ: 2.4 CM
STRESS ECHO POST EXERCISE DUR MIN: 5 MINUTES
STRESS ECHO POST EXERCISE DUR SEC: 52 SECONDS
SYSTOLIC BLOOD PRESSURE: 121 MMHG
TDI LATERAL: 0.11 M/S
TDI SEPTAL: 0.09 M/S
TDI: 0.1 M/S
TR MAX PG: 17 MMHG
TV REST PULMONARY ARTERY PRESSURE: 19 MMHG
Z-SCORE OF LEFT VENTRICULAR DIMENSION IN END DIASTOLE: -1.25
Z-SCORE OF LEFT VENTRICULAR DIMENSION IN END SYSTOLE: -0.48

## 2025-04-28 PROCEDURE — 71046 X-RAY EXAM CHEST 2 VIEWS: CPT | Mod: 26,,, | Performed by: RADIOLOGY

## 2025-04-28 PROCEDURE — 71046 X-RAY EXAM CHEST 2 VIEWS: CPT | Mod: TC,FY,PO

## 2025-05-12 ENCOUNTER — PROCEDURE VISIT (OUTPATIENT)
Dept: NEUROLOGY | Facility: CLINIC | Age: 45
End: 2025-05-12
Payer: COMMERCIAL

## 2025-05-12 VITALS
WEIGHT: 225.06 LBS | BODY MASS INDEX: 41.42 KG/M2 | DIASTOLIC BLOOD PRESSURE: 98 MMHG | SYSTOLIC BLOOD PRESSURE: 133 MMHG | HEIGHT: 62 IN

## 2025-05-12 DIAGNOSIS — G43.719 INTRACTABLE CHRONIC MIGRAINE WITHOUT AURA AND WITHOUT STATUS MIGRAINOSUS: Primary | ICD-10-CM

## 2025-05-12 PROCEDURE — 64615 CHEMODENERV MUSC MIGRAINE: CPT | Mod: S$GLB,,, | Performed by: NURSE PRACTITIONER

## 2025-05-12 NOTE — PROCEDURES
Procedures  A time out was conducted just before the start of the procedure to verify the correct patient and procedure, procedure location, and all relevant critical information.      Conventional methods of treatment such as multiple medications, both on and   off label have been tried including: sertraline, nifedipine, amitriptyline, Celexa, zonisamide, Depakote, Requip, Buspar, Topamax, Emgality      The patient has been unresponsive and refractory.The patient meets criteria for chronic headaches according to the ICHD-II, the patient has more than 15 headaches a month which last for more than 4 hours a day.     Botox session number: 2  Last session was 12 weeks ago and resulted in improvement of: n/a     I am aiming for at least 50%  improvement in the patient's symptoms. Frequency of treatment is every 3 months unless no response to the treatments, at which time we will discontinue the injections.      DESCRIPTION OF PROCEDURE: After obtaining informed consent and under   aseptic technique, a total of 155 units of botulinum toxin type A were   injected in the following muscles:      -- Procerus 5 units  --  5 units bilaterally  -- Frontalis 20 units  -- Temporalis 20 units bilaterally  -- Occipitalis 15 units bilaterally  -- Upper cervical paraspinals 10 units bilaterally  -- Trapezius 15 units bilaterally.      The patient tolerated the procedure well. There were no complications. The patient was given a prescription for repeat treatment in 12 weeks      Unavoidable waste 45 units    JOSÉ MANUEL Leon

## 2025-05-28 ENCOUNTER — OFFICE VISIT (OUTPATIENT)
Dept: FAMILY MEDICINE | Facility: CLINIC | Age: 45
End: 2025-05-28
Payer: COMMERCIAL

## 2025-05-28 ENCOUNTER — LAB VISIT (OUTPATIENT)
Dept: LAB | Facility: HOSPITAL | Age: 45
End: 2025-05-28
Attending: NURSE PRACTITIONER
Payer: COMMERCIAL

## 2025-05-28 VITALS
OXYGEN SATURATION: 98 % | SYSTOLIC BLOOD PRESSURE: 128 MMHG | HEIGHT: 62 IN | TEMPERATURE: 98 F | BODY MASS INDEX: 42.47 KG/M2 | WEIGHT: 230.81 LBS | DIASTOLIC BLOOD PRESSURE: 82 MMHG | HEART RATE: 66 BPM

## 2025-05-28 DIAGNOSIS — G47.00 INSOMNIA, UNSPECIFIED TYPE: ICD-10-CM

## 2025-05-28 DIAGNOSIS — M25.50 POLYARTHRALGIA: Primary | ICD-10-CM

## 2025-05-28 DIAGNOSIS — G47.33 OSA (OBSTRUCTIVE SLEEP APNEA): ICD-10-CM

## 2025-05-28 DIAGNOSIS — M25.50 POLYARTHRALGIA: ICD-10-CM

## 2025-05-28 LAB
ALBUMIN SERPL BCP-MCNC: 4.4 G/DL (ref 3.5–5.2)
ALP SERPL-CCNC: 85 UNIT/L (ref 40–150)
ALT SERPL W/O P-5'-P-CCNC: 21 UNIT/L (ref 10–44)
ANION GAP (OHS): 15 MMOL/L (ref 8–16)
AST SERPL-CCNC: 21 UNIT/L (ref 11–45)
BILIRUB SERPL-MCNC: 0.2 MG/DL (ref 0.1–1)
BUN SERPL-MCNC: 15 MG/DL (ref 6–20)
CALCIUM SERPL-MCNC: 9.3 MG/DL (ref 8.7–10.5)
CHLORIDE SERPL-SCNC: 105 MMOL/L (ref 95–110)
CO2 SERPL-SCNC: 22 MMOL/L (ref 23–29)
CREAT SERPL-MCNC: 0.9 MG/DL (ref 0.5–1.4)
CRP SERPL-MCNC: 8.7 MG/L
CYCLIC CITRULLINATED PEPTIDE (CCP) (OHS): 0.7 U/ML
ERYTHROCYTE [SEDIMENTATION RATE] IN BLOOD BY PHOTOMETRIC METHOD: 23 MM/HR
GFR SERPLBLD CREATININE-BSD FMLA CKD-EPI: >60 ML/MIN/1.73/M2
GLUCOSE SERPL-MCNC: 93 MG/DL (ref 70–110)
POTASSIUM SERPL-SCNC: 3.7 MMOL/L (ref 3.5–5.1)
PROT SERPL-MCNC: 8 GM/DL (ref 6–8.4)
RHEUMATOID FACT SERPL-ACNC: <13 IU/ML
SODIUM SERPL-SCNC: 142 MMOL/L (ref 136–145)

## 2025-05-28 PROCEDURE — 86431 RHEUMATOID FACTOR QUANT: CPT

## 2025-05-28 PROCEDURE — 1160F RVW MEDS BY RX/DR IN RCRD: CPT | Mod: CPTII,S$GLB,, | Performed by: NURSE PRACTITIONER

## 2025-05-28 PROCEDURE — 3008F BODY MASS INDEX DOCD: CPT | Mod: CPTII,S$GLB,, | Performed by: NURSE PRACTITIONER

## 2025-05-28 PROCEDURE — 1159F MED LIST DOCD IN RCRD: CPT | Mod: CPTII,S$GLB,, | Performed by: NURSE PRACTITIONER

## 2025-05-28 PROCEDURE — 99214 OFFICE O/P EST MOD 30 MIN: CPT | Mod: S$GLB,,, | Performed by: NURSE PRACTITIONER

## 2025-05-28 PROCEDURE — 86038 ANTINUCLEAR ANTIBODIES: CPT

## 2025-05-28 PROCEDURE — 3074F SYST BP LT 130 MM HG: CPT | Mod: CPTII,S$GLB,, | Performed by: NURSE PRACTITIONER

## 2025-05-28 PROCEDURE — 86140 C-REACTIVE PROTEIN: CPT

## 2025-05-28 PROCEDURE — 99999 PR PBB SHADOW E&M-EST. PATIENT-LVL V: CPT | Mod: PBBFAC,,, | Performed by: NURSE PRACTITIONER

## 2025-05-28 PROCEDURE — 85652 RBC SED RATE AUTOMATED: CPT

## 2025-05-28 PROCEDURE — 80053 COMPREHEN METABOLIC PANEL: CPT

## 2025-05-28 PROCEDURE — 86200 CCP ANTIBODY: CPT

## 2025-05-28 PROCEDURE — 3079F DIAST BP 80-89 MM HG: CPT | Mod: CPTII,S$GLB,, | Performed by: NURSE PRACTITIONER

## 2025-05-28 PROCEDURE — 36415 COLL VENOUS BLD VENIPUNCTURE: CPT | Mod: PO

## 2025-05-28 RX ORDER — TRAZODONE HYDROCHLORIDE 50 MG/1
50 TABLET ORAL NIGHTLY
Qty: 30 TABLET | Refills: 11 | Status: SHIPPED | OUTPATIENT
Start: 2025-05-28 | End: 2026-05-28

## 2025-05-28 NOTE — PROGRESS NOTES
Subjective:       Patient ID: Eugenie Laguerre is a 44 y.o. female.    Chief Complaint: Swelling    HPI  Outside all day  --that evening she became swollen--generalized to hands, dony knees and ankles. She reports chronic intermittent swelling to joints. Both maternal grandmothers had RA.     Morbid obesity BMI 42  Recent home sleep study mild MAURILIO--trial conservative measures--no improvement. Poor sleep, difficulty falling asleep, daytime somnolence    Vitals:    25 1013   BP: 128/82   Pulse:    Temp:      Review of Systems   Constitutional:  Negative for fever.   Musculoskeletal:  Positive for arthralgias and joint swelling.   Skin:  Negative for color change.   Psychiatric/Behavioral:  Positive for sleep disturbance.        Past Medical History:   Diagnosis Date    Anxiety 2023    Clotting disorder     DVT (deep venous thrombosis) 2007    patient does not have factor V leiden mutation - she had a negative test on 2013 and an office visit with Dr Canela (Heme/Onc) stating that her test was negative. Dr Canela ruled out antiphospholipid syndrome as well.  I see no evidence of any other hypercoagulable disorders being diagnosed. Pt had what sounds like a provoked LE DVT after a 16 hour car trip to [place], [place]    High risk pregnancy due to history of  labor 2015    Hyperthyroidism 2013    reports repeat testing was WNL and no treatment needed.    Iron deficiency anemia due to chronic blood loss 2015    Major depressive disorder, recurrent, moderate 2023    Migraines     Restless legs syndrome (RLS)     Short cervix affecting pregnancy 2015     Objective:      Physical Exam  Constitutional:       General: She is not in acute distress.     Appearance: She is well-developed. She is not ill-appearing, toxic-appearing or diaphoretic.   HENT:      Right Ear: Hearing normal.      Left Ear: Hearing normal.   Pulmonary:      Effort: No tachypnea or respiratory distress.    Skin:     Coloration: Skin is not pale.   Neurological:      Mental Status: She is alert and oriented to person, place, and time.   Psychiatric:         Speech: Speech normal.         Behavior: Behavior normal.         Thought Content: Thought content normal.         Judgment: Judgment normal.         Assessment:       1. Polyarthralgia    2. MAURILIO (obstructive sleep apnea)    3. Insomnia, unspecified type        Plan:       Polyarthralgia  -     C-Reactive Protein; Future; Expected date: 05/28/2025  -     Sedimentation rate; Future; Expected date: 05/28/2025  -     Cyclic Citrullinated Peptide Antibody, IgG; Future; Expected date: 05/28/2025  -     Rheumatoid Factor; Future; Expected date: 05/28/2025  -     DAMIAN Screen w/Reflex; Future; Expected date: 05/28/2025  -     Comprehensive Metabolic Panel; Future; Expected date: 05/28/2025    MAURILIO (obstructive sleep apnea)  -     Ambulatory referral/consult to Pulmonology; Future; Expected date: 06/04/2025    Insomnia, unspecified type  -     traZODone (DESYREL) 50 MG tablet; Take 1 tablet (50 mg total) by mouth every evening.  Dispense: 30 tablet; Refill: 11          education provided on supportive care, symptom monitoring and return precautions         Medication List with Changes/Refills   New Medications    TRAZODONE (DESYREL) 50 MG TABLET    Take 1 tablet (50 mg total) by mouth every evening.   Current Medications    ATOGEPANT (QULIPTA) 60 MG TAB    Take 1 tablet (60 mg total) by mouth once daily.    BUSPIRONE (BUSPAR) 5 MG TAB    Take 1 tablet (5 mg total) by mouth 2 (two) times daily.    FLUTICASONE PROPIONATE (FLONASE) 50 MCG/ACTUATION NASAL SPRAY    Spray 2 sprays (100 mcg total) by Each Nostril route daily as needed for Rhinitis.    HYDROXYZINE (ATARAX) 50 MG TABLET    Take 1 tablet (50 mg total) by mouth nightly as needed for Itching or Anxiety.    ONDANSETRON (ZOFRAN) 4 MG TABLET    Take 1 tablet (4 mg total) by mouth every 8 (eight) hours as needed for Nausea.     SERTRALINE (ZOLOFT) 100 MG TABLET    Take 1 tablet (100 mg total) by mouth once daily.    TIZANIDINE (ZANAFLEX) 4 MG TABLET    Take 1/2 or 1 tablet by mouth at night as needed for muscle spasm    UBROGEPANT (UBRELVY) 100 MG TABLET    Take 1 tablet by mouth as needed for migraine. May repeat in 2 hours if needed. Max 2 tablet per day   Discontinued Medications    SEMAGLUTIDE, WEIGHT LOSS, (WEGOVY) 0.25 MG/0.5 ML PNIJ    Inject 0.25 mg into the skin every 7 days.

## 2025-05-29 LAB — ANA (OHS): NORMAL

## 2025-05-30 ENCOUNTER — OFFICE VISIT (OUTPATIENT)
Dept: URGENT CARE | Facility: CLINIC | Age: 45
End: 2025-05-30
Payer: COMMERCIAL

## 2025-05-30 ENCOUNTER — PATIENT MESSAGE (OUTPATIENT)
Dept: FAMILY MEDICINE | Facility: CLINIC | Age: 45
End: 2025-05-30
Payer: COMMERCIAL

## 2025-05-30 VITALS
RESPIRATION RATE: 16 BRPM | TEMPERATURE: 98 F | OXYGEN SATURATION: 100 % | HEART RATE: 79 BPM | DIASTOLIC BLOOD PRESSURE: 84 MMHG | SYSTOLIC BLOOD PRESSURE: 129 MMHG

## 2025-05-30 DIAGNOSIS — S49.91XA INJURY OF RIGHT SHOULDER, INITIAL ENCOUNTER: Primary | ICD-10-CM

## 2025-05-30 PROCEDURE — 73030 X-RAY EXAM OF SHOULDER: CPT | Mod: RT,S$GLB,, | Performed by: RADIOLOGY

## 2025-05-30 RX ORDER — METHOCARBAMOL 500 MG/1
500 TABLET, FILM COATED ORAL 4 TIMES DAILY
Qty: 40 TABLET | Refills: 0 | Status: SHIPPED | OUTPATIENT
Start: 2025-05-30

## 2025-05-30 RX ORDER — LIDOCAINE 50 MG/G
1 PATCH TOPICAL DAILY
Qty: 30 PATCH | Refills: 0 | Status: SHIPPED | OUTPATIENT
Start: 2025-05-30

## 2025-05-30 RX ORDER — PREDNISONE 20 MG/1
TABLET ORAL
Qty: 10 TABLET | Refills: 0 | Status: SHIPPED | OUTPATIENT
Start: 2025-05-30 | End: 2025-06-07

## 2025-05-30 RX ORDER — KETOROLAC TROMETHAMINE 30 MG/ML
15 INJECTION, SOLUTION INTRAMUSCULAR; INTRAVENOUS
Status: COMPLETED | OUTPATIENT
Start: 2025-05-30 | End: 2025-05-30

## 2025-05-30 RX ADMIN — KETOROLAC TROMETHAMINE 15 MG: 30 INJECTION, SOLUTION INTRAMUSCULAR; INTRAVENOUS at 02:05

## 2025-05-30 NOTE — PATIENT INSTRUCTIONS
If you were prescribed a narcotic or controlled medication, do not drive or operate heavy equipment or machinery while taking these medications.  You must understand that you've received an Urgent Care treatment only and that you may be released before all your medical problems are known or treated. You, the patient, will arrange for follow up care as instructed.  Follow up with your PCP or specialty clinic as directed if not improved or as needed. You can call [phone] to schedule an appointment with the appropriate provider.  If your condition worsens we recommend that you receive another evaluation at the Emergency Department for any concerns or worsening of condition.  Patient aware and verbalized understanding.    Recommended no heavy lifting until symptoms resolve.  Lidocaine RX as prescribed for pain relief as needed.  Robaxin RX (muscle relaxer) - will make you drowsy, so please do not drive or operate heavy equipment or machinery while taking this medication.  You may do gently stretching if tolerable (DO NOT TAKE WITH ZANAFLEX OR TRAZODONE AS DISCUSSED).  Steroid RX as prescribed for inflammation/swelling.  Moist warm compresses to area several times daily.   Advised patient to use a Heating pad at home on LOW and Warm baths with OTC Epsom Salt as discussed - MAKE SURE YOU DO NOT FALL ASLEEP WITH a HEATING PAD ON.  Sling for home use as directed.  Wear supportive shoes such as tennis shoes for support of the neck and lower back during the day.  Follow-up with PCP for further evaluation as needed.  Follow-up with Ortho for further evaluation if still experiencing pain as discussed.  Strict ER precautions given to patient.  Patient aware, verbalized understanding and agreed with plan of care.

## 2025-05-30 NOTE — PROGRESS NOTES
Subjective:      Patient ID: Eugenie Laguerre is a 44 y.o. female.    Vitals:  temperature is 98.1 °F (36.7 °C). Her blood pressure is 129/84 and her pulse is 79. Her respiration is 16 and oxygen saturation is 100%.     Chief Complaint: Tailbone Pain    Pt presents to urgent care with right shoulder sharp, throbbing pain that started yesterday. Patient reports that she was getting up whenever she put the majority of her weight on her right shoulder/r arm and felt discomfort after. Patient denies direct injury/trauma or fall. Patient rates her pain to be a 8/10.    Injury  This is a new problem. The current episode started yesterday. The problem occurs constantly. The problem has been unchanged. Associated symptoms include arthralgias, joint swelling and myalgias. Pertinent negatives include no abdominal pain, anorexia, change in bowel habit, chest pain, chills, congestion, coughing, diaphoresis, fatigue, fever, headaches, nausea, neck pain, rash, sore throat, swollen glands, urinary symptoms, visual change, vomiting or weakness. Nothing aggravates the symptoms. She has tried nothing for the symptoms.       Constitution: Negative for chills, sweating, fatigue and fever.   HENT:  Negative for ear pain, drooling, congestion, sore throat, trouble swallowing and voice change.    Neck: Negative for neck pain, neck stiffness, painful lymph nodes and neck swelling.   Cardiovascular:  Negative for chest pain, leg swelling, palpitations, sob on exertion and passing out.   Eyes:  Negative for eye pain, eye redness, photophobia, double vision, blurred vision and eyelid swelling.   Respiratory:  Negative for chest tightness, cough, sputum production, bloody sputum, shortness of breath, stridor and wheezing.    Gastrointestinal:  Negative for abdominal pain, abdominal bloating, nausea, vomiting, constipation, diarrhea and heartburn.   Musculoskeletal:  Positive for joint pain, joint swelling, abnormal ROM of joint and muscle ache.  Negative for back pain and muscle cramps.   Skin:  Negative for rash, erythema and hives.   Allergic/Immunologic: Negative for seasonal allergies, food allergies, hives, itching and sneezing.   Neurological:  Negative for dizziness, light-headedness, passing out, facial drooping, speech difficulty, loss of balance, headaches, altered mental status, loss of consciousness and seizures.   Hematologic/Lymphatic: Negative for swollen lymph nodes.   Psychiatric/Behavioral:  Negative for altered mental status and nervous/anxious. The patient is not nervous/anxious.       Objective:     Physical Exam   Constitutional: She is oriented to person, place, and time. She appears well-developed. She is cooperative.  Non-toxic appearance. She does not appear ill. No distress.   HENT:   Head: Normocephalic and atraumatic.   Ears:   Right Ear: Hearing and external ear normal.   Left Ear: Hearing and external ear normal.   Nose: Nose normal. No mucosal edema or nasal deformity. No epistaxis. Right sinus exhibits no maxillary sinus tenderness and no frontal sinus tenderness. Left sinus exhibits no maxillary sinus tenderness and no frontal sinus tenderness.   Mouth/Throat: Uvula is midline, oropharynx is clear and moist and mucous membranes are normal. No trismus in the jaw. Normal dentition. No uvula swelling. No posterior oropharyngeal edema.   Eyes: Conjunctivae and lids are normal.   Neck: Trachea normal and phonation normal. Neck supple. No edema present. No erythema present.   Cardiovascular: Normal rate, regular rhythm, normal heart sounds and normal pulses.   Pulmonary/Chest: Effort normal and breath sounds normal. No accessory muscle usage. She has no decreased breath sounds.   Abdominal: Normal appearance.   Musculoskeletal:      Right shoulder: She exhibits decreased range of motion, tenderness and bony tenderness. She exhibits no swelling, no crepitus, normal pulse and normal strength.      Comments: Limited R shoulder ROM  secondary to pain. 5/5  strength and full sensation bilateral. No numbness or tingling.    Neurological: She is alert and oriented to person, place, and time. She has normal sensation. She exhibits normal muscle tone. Gait normal. Coordination normal.   Skin: Skin is warm, dry, intact, not diaphoretic and no rash. Capillary refill takes less than 2 seconds. No abrasion, No burn, No bruising, No erythema, No ecchymosis and No petechiae   Psychiatric: Her speech is normal and behavior is normal. Judgment and thought content normal.   Nursing note and vitals reviewed.    X-ray Shoulder 2 or More Views Right  Result Date: 5/30/2025  EXAMINATION: XR SHOULDER COMPLETE 2 OR MORE VIEWS RIGHT CLINICAL HISTORY: Unspecified injury of right shoulder and upper arm, initial encounter TECHNIQUE: 2 or more views of the shoulder are obtained. COMPARISON: Right shoulder x-ray of October 18, 2022 FINDINGS: No dislocation is seen.  A fracture of the scapula, humerus or clavicle is not seen.  The acromioclavicular and glenohumeral joints are within normal limits.     Negative shoulder radiographs. Electronically signed by: Marcos Borden MD Date:    05/30/2025 Time:    14:14      Sling placed by MA today: NV intact. 2+ cap refill. Advised patient to wear until further evaluated with Ortho. Patient aware, verbalized understanding and agreed with plan of care.    Assessment:     1. Injury of right shoulder, initial encounter        Plan:   Discussed the limitations in the urgent care setting with patient.  reviewed. Discussed XRAY with patient. Sling provided for better comfort/support until further evaluated with Ortho. Ortho referral placed for patient as well. Discussed with patient to D/C Zanaflex and Trazodone RXs while on Robaxin RX. Advised close follow-up with PCP and/or Specialist for further evaluation as needed. Strict ER precautions given to patient as well. Patient aware, verbalized understanding and agreed with plan  of care.    Injury of right shoulder, initial encounter  -     X-ray Shoulder 2 or More Views Right; Future; Expected date: 05/30/2025  -     Ambulatory referral/consult to Orthopedics  -     SLING FOR HOME USE    Other orders  -     ketorolac injection 15 mg  -     LIDOcaine (LIDODERM) 5 %; Place 1 patch onto the skin once daily. Remove & Discard patch within 12 hours or as directed by MD.  Dispense: 30 patch; Refill: 0  -     methocarbamoL (ROBAXIN) 500 MG Tab; Take 1 tablet (500 mg total) by mouth 4 (four) times daily. WILL CAUSE DROWSINESS, SO PLEASE DO NOT DRIVE AND/OR OPERATE HEAVY MACHINERY WHILE TAKING THIS MEDICATION.  Dispense: 40 tablet; Refill: 0  -     predniSONE (DELTASONE) 20 MG tablet; Take 2 tablets (40 mg total) by mouth once daily for 2 days, THEN 1.5 tablets (30 mg total) once daily for 2 days, THEN 1 tablet (20 mg total) once daily for 2 days, THEN 0.5 tablets (10 mg total) once daily for 2 days.  Dispense: 10 tablet; Refill: 0    There are no Patient Instructions on file for this visit.

## 2025-06-02 ENCOUNTER — RESULTS FOLLOW-UP (OUTPATIENT)
Dept: FAMILY MEDICINE | Facility: CLINIC | Age: 45
End: 2025-06-02
Payer: COMMERCIAL

## 2025-06-02 ENCOUNTER — PATIENT MESSAGE (OUTPATIENT)
Dept: ORTHOPEDICS | Facility: CLINIC | Age: 45
End: 2025-06-02
Payer: COMMERCIAL

## 2025-06-02 DIAGNOSIS — M25.50 POLYARTHRALGIA: Primary | ICD-10-CM

## 2025-06-04 ENCOUNTER — OFFICE VISIT (OUTPATIENT)
Dept: ORTHOPEDICS | Facility: CLINIC | Age: 45
End: 2025-06-04
Payer: COMMERCIAL

## 2025-06-04 VITALS — WEIGHT: 230.81 LBS | HEIGHT: 62 IN | BODY MASS INDEX: 42.47 KG/M2 | RESPIRATION RATE: 18 BRPM

## 2025-06-04 DIAGNOSIS — S43.431A GLENOID LABRAL TEAR, RIGHT, INITIAL ENCOUNTER: Primary | ICD-10-CM

## 2025-06-04 DIAGNOSIS — M25.511 RIGHT SHOULDER PAIN, UNSPECIFIED CHRONICITY: Primary | ICD-10-CM

## 2025-06-04 DIAGNOSIS — S43.431A GLENOID LABRAL TEAR, RIGHT, INITIAL ENCOUNTER: ICD-10-CM

## 2025-06-04 PROCEDURE — 3008F BODY MASS INDEX DOCD: CPT | Mod: CPTII,S$GLB,, | Performed by: ORTHOPAEDIC SURGERY

## 2025-06-04 PROCEDURE — 1159F MED LIST DOCD IN RCRD: CPT | Mod: CPTII,S$GLB,, | Performed by: ORTHOPAEDIC SURGERY

## 2025-06-04 PROCEDURE — 99204 OFFICE O/P NEW MOD 45 MIN: CPT | Mod: S$GLB,,, | Performed by: ORTHOPAEDIC SURGERY

## 2025-06-04 PROCEDURE — 99999 PR PBB SHADOW E&M-EST. PATIENT-LVL III: CPT | Mod: PBBFAC,,, | Performed by: ORTHOPAEDIC SURGERY

## 2025-06-06 ENCOUNTER — LAB VISIT (OUTPATIENT)
Dept: LAB | Facility: HOSPITAL | Age: 45
End: 2025-06-06
Attending: INTERNAL MEDICINE
Payer: COMMERCIAL

## 2025-06-06 ENCOUNTER — OFFICE VISIT (OUTPATIENT)
Dept: PULMONOLOGY | Facility: CLINIC | Age: 45
End: 2025-06-06
Payer: COMMERCIAL

## 2025-06-06 VITALS
HEIGHT: 62 IN | OXYGEN SATURATION: 99 % | HEART RATE: 78 BPM | WEIGHT: 232.94 LBS | RESPIRATION RATE: 16 BRPM | DIASTOLIC BLOOD PRESSURE: 80 MMHG | SYSTOLIC BLOOD PRESSURE: 132 MMHG | BODY MASS INDEX: 42.87 KG/M2

## 2025-06-06 DIAGNOSIS — I67.1 BRAIN ANEURYSM: ICD-10-CM

## 2025-06-06 DIAGNOSIS — G47.33 OSA (OBSTRUCTIVE SLEEP APNEA): ICD-10-CM

## 2025-06-06 DIAGNOSIS — G25.81 RESTLESS LEG SYNDROME: ICD-10-CM

## 2025-06-06 DIAGNOSIS — G25.81 RESTLESS LEG SYNDROME: Primary | ICD-10-CM

## 2025-06-06 DIAGNOSIS — F51.04 CHRONIC INSOMNIA: ICD-10-CM

## 2025-06-06 LAB
FERRITIN SERPL-MCNC: 76 NG/ML (ref 20–300)
IRON SATN MFR SERPL: 15 % (ref 20–50)
IRON SERPL-MCNC: 54 UG/DL (ref 30–160)
TIBC SERPL-MCNC: 349 UG/DL (ref 250–450)
TRANSFERRIN SERPL-MCNC: 236 MG/DL (ref 200–375)

## 2025-06-06 PROCEDURE — 99999 PR PBB SHADOW E&M-EST. PATIENT-LVL V: CPT | Mod: PBBFAC,,, | Performed by: INTERNAL MEDICINE

## 2025-06-06 PROCEDURE — 82728 ASSAY OF FERRITIN: CPT

## 2025-06-06 PROCEDURE — 36415 COLL VENOUS BLD VENIPUNCTURE: CPT

## 2025-06-06 PROCEDURE — 83540 ASSAY OF IRON: CPT

## 2025-06-07 ENCOUNTER — RESULTS FOLLOW-UP (OUTPATIENT)
Dept: SLEEP MEDICINE | Facility: HOSPITAL | Age: 45
End: 2025-06-07

## 2025-06-07 DIAGNOSIS — G25.81 RESTLESS LEG SYNDROME: Primary | ICD-10-CM

## 2025-06-07 RX ORDER — FERROUS SULFATE 325(65) MG
325 TABLET ORAL
Qty: 45 TABLET | Refills: 0 | Status: SHIPPED | OUTPATIENT
Start: 2025-06-07

## 2025-06-10 ENCOUNTER — PATIENT MESSAGE (OUTPATIENT)
Dept: PSYCHIATRY | Facility: CLINIC | Age: 45
End: 2025-06-10
Payer: COMMERCIAL

## 2025-06-18 ENCOUNTER — OFFICE VISIT (OUTPATIENT)
Dept: FAMILY MEDICINE | Facility: CLINIC | Age: 45
End: 2025-06-18
Payer: COMMERCIAL

## 2025-06-18 DIAGNOSIS — G25.81 RLS (RESTLESS LEGS SYNDROME): ICD-10-CM

## 2025-06-18 DIAGNOSIS — G47.00 INSOMNIA, UNSPECIFIED TYPE: Primary | ICD-10-CM

## 2025-06-18 PROCEDURE — 98005 SYNCH AUDIO-VIDEO EST LOW 20: CPT | Mod: 95,,, | Performed by: NURSE PRACTITIONER

## 2025-06-18 NOTE — PROGRESS NOTES
Subjective:       Patient ID: Eugenie Laguerre is a 44 y.o. female.    The patient location is: [place]  The chief complaint leading to consultation is: insomnia FU    Visit type: audiovisual    Face to Face time with patient: 10min  15 minutes of total time spent on the encounter, which includes face to face time and non-face to face time preparing to see the patient (eg, review of tests), Obtaining and/or reviewing separately obtained history, Documenting clinical information in the electronic or other health record, Independently interpreting results (not separately reported) and communicating results to the patient/family/caregiver, or Care coordination (not separately reported).     Each patient to whom he or she provides medical services by telemedicine is:  (1) informed of the relationship between the physician and patient and the respective role of any other health care provider with respect to management of the patient; and (2) notified that he or she may decline to receive medical services by telemedicine and may withdraw from such care at any time.    HPI  Insomnia: now controlled on trazodone 50mg qhs. Denies adverse effect.     RLS: recently started iron supplement  Planning for in house sleep study per pulm    Review of Systems   Constitutional:  Negative for activity change and unexpected weight change.   HENT:  Negative for hearing loss, rhinorrhea and trouble swallowing.    Eyes:  Negative for discharge and visual disturbance.   Respiratory:  Negative for chest tightness and wheezing.    Cardiovascular:  Negative for chest pain and palpitations.   Gastrointestinal:  Negative for blood in stool, constipation, diarrhea and vomiting.   Endocrine: Negative for polydipsia and polyuria.   Genitourinary:  Negative for difficulty urinating, dysuria, hematuria and menstrual problem.   Musculoskeletal:  Negative for arthralgias, joint swelling and neck pain.   Neurological:  Negative for weakness and headaches.    Psychiatric/Behavioral:  Negative for confusion and dysphoric mood.          Objective:      Physical Exam  Constitutional:       General: She is not in acute distress.     Appearance: She is well-developed. She is not ill-appearing, toxic-appearing or diaphoretic.   HENT:      Right Ear: Hearing normal.      Left Ear: Hearing normal.   Pulmonary:      Effort: No tachypnea or respiratory distress.   Skin:     Coloration: Skin is not pale.   Neurological:      Mental Status: She is alert and oriented to person, place, and time.   Psychiatric:         Speech: Speech normal.         Behavior: Behavior normal.         Thought Content: Thought content normal.         Judgment: Judgment normal.         Assessment:       1. Insomnia, unspecified type    2. RLS (restless legs syndrome)        Plan:       Insomnia, unspecified type   Controlled; continue trazodone     RLS (restless legs syndrome)   Continue plan per pulmonology         education provided on supportive care, symptom monitoring and return precautions       Medication List with Changes/Refills   Current Medications    ASCORBIC ACID, VITAMIN C, 100 MG CHEW    Take 1 tablet (100 mg) by mouth every 48 hours. On empty stomach with Iron    ATOGEPANT (QULIPTA) 60 MG TAB    Take 1 tablet (60 mg total) by mouth once daily.    BUSPIRONE (BUSPAR) 5 MG TAB    Take 1 tablet (5 mg total) by mouth 2 (two) times daily.    FERROUS SULFATE (FEOSOL) 325 MG (65 MG IRON) TAB TABLET    Take 1 tablet (325 mg total) by mouth every 48 hours. On empty stomach with vitamin c    FLUTICASONE PROPIONATE (FLONASE) 50 MCG/ACTUATION NASAL SPRAY    Spray 2 sprays (100 mcg total) by Each Nostril route daily as needed for Rhinitis.    LIDOCAINE (LIDODERM) 5 %    Place 1 patch onto the skin once daily. Remove & Discard patch within 12 hours or as directed by MD.    METHOCARBAMOL (ROBAXIN) 500 MG TAB    Take 1 tablet (500 mg total) by mouth 4 (four) times daily. WILL CAUSE DROWSINESS, SO PLEASE  DO NOT DRIVE AND/OR OPERATE HEAVY MACHINERY WHILE TAKING THIS MEDICATION.    ONDANSETRON (ZOFRAN) 4 MG TABLET    Take 1 tablet (4 mg total) by mouth every 8 (eight) hours as needed for Nausea.    SERTRALINE (ZOLOFT) 100 MG TABLET    Take 1 tablet (100 mg total) by mouth once daily.    TRAZODONE (DESYREL) 50 MG TABLET    Take 1 tablet (50 mg total) by mouth every evening.    UBROGEPANT (UBRELVY) 100 MG TABLET    Take 1 tablet by mouth as needed for migraine. May repeat in 2 hours if needed. Max 2 tablet per day

## 2025-06-24 ENCOUNTER — OFFICE VISIT (OUTPATIENT)
Dept: NEUROLOGY | Facility: CLINIC | Age: 45
End: 2025-06-24
Payer: COMMERCIAL

## 2025-06-24 VITALS
TEMPERATURE: 98 F | BODY MASS INDEX: 42.01 KG/M2 | HEART RATE: 80 BPM | WEIGHT: 228.31 LBS | HEIGHT: 62 IN | DIASTOLIC BLOOD PRESSURE: 81 MMHG | SYSTOLIC BLOOD PRESSURE: 122 MMHG | RESPIRATION RATE: 17 BRPM

## 2025-06-24 DIAGNOSIS — G43.719 INTRACTABLE CHRONIC MIGRAINE WITHOUT AURA AND WITHOUT STATUS MIGRAINOSUS: Primary | ICD-10-CM

## 2025-06-24 PROCEDURE — 99213 OFFICE O/P EST LOW 20 MIN: CPT | Mod: S$GLB,,, | Performed by: NURSE PRACTITIONER

## 2025-06-24 PROCEDURE — 3079F DIAST BP 80-89 MM HG: CPT | Mod: CPTII,S$GLB,, | Performed by: NURSE PRACTITIONER

## 2025-06-24 PROCEDURE — 99999 PR PBB SHADOW E&M-EST. PATIENT-LVL IV: CPT | Mod: PBBFAC,,, | Performed by: NURSE PRACTITIONER

## 2025-06-24 PROCEDURE — 3008F BODY MASS INDEX DOCD: CPT | Mod: CPTII,S$GLB,, | Performed by: NURSE PRACTITIONER

## 2025-06-24 PROCEDURE — 1159F MED LIST DOCD IN RCRD: CPT | Mod: CPTII,S$GLB,, | Performed by: NURSE PRACTITIONER

## 2025-06-24 PROCEDURE — 3074F SYST BP LT 130 MM HG: CPT | Mod: CPTII,S$GLB,, | Performed by: NURSE PRACTITIONER

## 2025-06-24 PROCEDURE — 1160F RVW MEDS BY RX/DR IN RCRD: CPT | Mod: CPTII,S$GLB,, | Performed by: NURSE PRACTITIONER

## 2025-06-24 RX ORDER — BUTALBITAL, ACETAMINOPHEN AND CAFFEINE 50; 325; 40 MG/1; MG/1; MG/1
TABLET ORAL
Qty: 10 TABLET | Refills: 3 | Status: SHIPPED | OUTPATIENT
Start: 2025-06-24

## 2025-06-24 NOTE — PATIENT INSTRUCTIONS
Please call our clinic at [phone] or send a message on the [facility] [employer] portal if there are any changes to the plan described below, for example,if you are not contacted for the requested tests, referral(s) within one week, if you are unable to receive the medications prescribed, or if you feel you need to change the treatment course for any reason.     TESTING:  -- none     REFERRALS:  -- none    PREVENTION (use daily regardless of headache):  -- continue Qulipta once daily   -- continue Botox   -- continue magnesium in ONE of the following preparations -               1. Magnesium oxide 800mg daily (the most common over the counter kind, may causes loose stools)              2. Magnesium citrate 400-500mg daily (harder to find, but more neutral on the bowels)              3. Magnesium glycinate 400mg daily (hardest to find, look online, but most bowel-neutral, best absorbed)     AS-NEEDED TREATMENT (use total no more than 10 days per month unless otherwise stated):  -- continue tizanidine at night. This is a muscle relaxer and it will also make you potentially sleepy. Start with half a tablet to see how you respond but can take up to a whole tablet if needed  -- continue Ubrelvy with next migraine. You can repeat two hours later if needed. With this medication do not drink grapefruit juice or eat grapefruit or some medications like ketoconazole, itraconazole, or antibiotics clarithromycin  -- can trial Fioricet for RESCUE if Ubrelvy not effective

## 2025-06-24 NOTE — ASSESSMENT & PLAN NOTE
We restarted Botox about six weeks ago. Discussed usual course and expectations. Continue current plan.

## 2025-06-24 NOTE — PROGRESS NOTES
Date of service: 6/24/2025  Referring provider: No ref. provider found    Subjective:      Chief complaint: Headache       Patient ID: Eugenie Laguerre is a 44 y.o. female with anxiety, hyperthyroidism, VERENICE, migraine, RLS, (?)clotting disorder who presents for follow up of headache     History of Present Illness    INTERVAL HISTORY 6/24/25    Last office visit was two months ago and most recent Botox was about six weeks ago.    Today she reports she is the same. She is having more facial numbness on left side. She had one episode of aura that lasted 30 minutes, her face was numb before headache started. Current pain 4 with range 2-7. She has a headache 5 days per week. She takes Ubrelvy and tylenol 5 days per week. No side effects to Botox. Otherwise information below is reviewed and verified with no changes made      INTERVAL HISTORY 4/24/25    Last office visit was six months ago and we did one session of Botox in November.    Today she reports she is better. She reports she had relief with one Botox session but lost her insurance after. She now has new insurance and wishes to resume Botox. Current pain 3 with range 2-7. She has 2 headache days per week. She takes Tylenol 3 days per week. Otherwise information below is reviewed and verified with no changes made     INTERVAL HISTORY 10/23/24    Last visit was two months ago and at that time we started Qulipta.    Today she reports she is worse. She has been to the ER twice since last visit due to migraines. Current pain 6 with range 1-8. She has four headache days per week. She takes Ubrelvy 4 days per week. Otherwise information below is reviewed and verified with no changes made     INTERVAL HISTORY 8/20/24    Last visit was one year ago and at that time we started Qulipta. Since last visit she had stent for brain aneurysm. After that she had no insurance for the past year and stopped all medications.     Today she reports she is better. Current pain 3 with range  "0-6. She has a headache 4 days per week. She takes tylenol 4 days per week. Otherwise information below is reviewed and verified with no changes made     INTERVAL HISTORY 8/2/23    Last visit was almost five months ago. At that time we added Emgality and rizatriptan. I ordered an MRA which indicated a small aneurysm for which she saw vascular neuro. She only did the loading dose of Emgality.    Today she reports she is worse. She states she had a procedure done and headaches are worse. She had a coil for aneurysm in July and two days later she lost vision in right eye. She saw ophthalmology who said her eyes were "healthy". Headaches are on the right side. Current pain 3 with range 2-9. She has a daily headache. She is taking rizatriptan and tylenol.  Otherwise information below is reviewed and verified with no changes made     ORIGINAL HEADACHE HISTORY - 3/15/23  Age at onset and course over time: in her 20's had an episode similar to a stroke. She went to the ER and diagnosed with migraines. She was hypotensive due to medication.     She takes Topamax but causes her to be overly sedated. She takes more as needed due to this.     She works as a .      Aura - ring of light in vision, lasts an hour if not treated, always associated with severe headache     Family history of migraine - mom, maternal grandmother  Family history of aneurysm - dad, paternal sister  Last eye exam - august 2022    Location: mainly behind her eyes   Quality:  [] pressure [] tight [x] throbbing [] sharp [] stabbing   Severity: current 6 with range 3-8  Duration: days  Frequency: 20 days per month   Headaches awaken at night?:  yes  Worst time of day: upon waking   Associated with: [x] photophobia [x]  phonophobia [] osmophobia [x] blurred vision  [] double vision [x] loss of appetite [x] nausea [x] vomiting [x] dizziness [] vertigo  [] tinnitus [x] irritability [] sinus pressure [] problems with concentration   [] neck tightness "   Alleviated by:  [x] sleep [x] darkness [] massage [] heat [] ice [] medication  Exacerbated by:  [x] fatigue [x] light [x] noise [] smells [] coughing [] sneezing  [] bending over [] ovulation [] menses [] alcohol [] change in weather [x]  stress  Ipsilateral autonomic: [] nasal congestion [] lacrimation [] ptosis [] injection [] edema [] foreign body sensation [] ear fullness   ICP:  [] transient visual obscurations  [] tinnitus   [x] positional headache  [] non-positional   Sleep habits: trouble falling asleep  Caffeine intake: 44 oz of tea  Gyn status (if female): having periods  HIT 6 - 65    Current acute treatment:  Tylenol   Ubrelvy  Robaxin     Current prevention:  Sertraline  Buspar   Qulipta - started August 2024  Botox - restarted May 2025    Previously tried/failed acute treatment:  Mobic  Robaxin  Fioricet  Naproxen  Sumatriptan  Diclofenac  Zofran  Phenergan  Ibuprofen - daily  Rizatriptan  Tizanidine    Previously tried/failed preventative treatment:  Nifedipine  Amitriptyline - for headaches, somewhat helpful but sedating   Celexa  Zonisamide  Depakote - severe hypotension   Requip  Qulipta - took about 2 months before losing insurance, was effective   Buspar  Topamax - 100 mg QHS  Emgality - first March 2023 - loading dose  Botox - once    Review of patient's allergies indicates:   Allergen Reactions    Amoxicillin     Augmentin [amoxicillin-pot clavulanate] Swelling    Bactrim [sulfamethoxazole-trimethoprim] Itching and Nausea Only    Ropinirole Rash     Current Outpatient Medications   Medication Sig Dispense Refill    ascorbic acid, vitamin C, 100 mg Chew Take 1 tablet (100 mg) by mouth every 48 hours. On empty stomach with Iron 45 tablet 0    atogepant (QULIPTA) 60 mg Tab Take 1 tablet (60 mg total) by mouth once daily. 30 tablet 11    busPIRone (BUSPAR) 5 MG Tab Take 1 tablet (5 mg total) by mouth 2 (two) times daily. 60 tablet 11    ferrous sulfate (FEOSOL) 325 mg (65 mg iron) Tab tablet  Take 1 tablet (325 mg total) by mouth every 48 hours. On empty stomach with vitamin c 45 tablet 0    fluticasone propionate (FLONASE) 50 mcg/actuation nasal spray Spray 2 sprays (100 mcg total) by Each Nostril route daily as needed for Rhinitis. 16 g 0    methocarbamoL (ROBAXIN) 500 MG Tab Take 1 tablet (500 mg total) by mouth 4 (four) times daily. WILL CAUSE DROWSINESS, SO PLEASE DO NOT DRIVE AND/OR OPERATE HEAVY MACHINERY WHILE TAKING THIS MEDICATION. 40 tablet 0    ondansetron (ZOFRAN) 4 MG tablet Take 1 tablet (4 mg total) by mouth every 8 (eight) hours as needed for Nausea. 60 tablet 2    sertraline (ZOLOFT) 100 MG tablet Take 1 tablet (100 mg total) by mouth once daily. 30 tablet 11    traZODone (DESYREL) 50 MG tablet Take 1 tablet (50 mg total) by mouth every evening. 30 tablet 11    ubrogepant (UBRELVY) 100 mg tablet Take 1 tablet by mouth as needed for migraine. May repeat in 2 hours if needed. Max 2 tablet per day 8 tablet 11    butalbital-acetaminophen-caffeine -40 mg (FIORICET, ESGIC) -40 mg per tablet Take 1 tablet by mouth once daily by mouth migraine. No more than 10 tab per month. 10 tablet 3    LIDOcaine (LIDODERM) 5 % Place 1 patch onto the skin once daily. Remove & Discard patch within 12 hours or as directed by MD. (Patient not taking: Reported on 6/24/2025) 30 patch 0     Current Facility-Administered Medications   Medication Dose Route Frequency Provider Last Rate Last Admin    onabotulinumtoxina injection 200 Units  200 Units Intramuscular q12 weeks    200 Units at 05/12/25 1435       Past Medical History  Past Medical History:   Diagnosis Date    Anxiety 01/24/2023    Clotting disorder     DVT (deep venous thrombosis) 03/2007    patient does not have factor V leiden mutation - she had a negative test on 6/5/2013 and an office visit with Dr Canela (Heme/Onc) stating that her test was negative. Dr Canela ruled out antiphospholipid syndrome as well.  I see no evidence of any other  hypercoagulable disorders being diagnosed. Pt had what sounds like a provoked LE DVT after a 16 hour car trip to [place], [place]    High risk pregnancy due to history of  labor 2015    Hyperthyroidism 2013    reports repeat testing was WNL and no treatment needed.    Iron deficiency anemia due to chronic blood loss 2015    Major depressive disorder, recurrent, moderate 2023    Migraines     Restless legs syndrome (RLS)     Short cervix affecting pregnancy 2015       Past Surgical History  Past Surgical History:   Procedure Laterality Date    2 R knee sx; 1 L knee      arthroscopies    BRONCHOSCOPY       SECTION  2015    ESOPHAGOGASTRODUODENOSCOPY N/A 2024    Procedure: EGD (ESOPHAGOGASTRODUODENOSCOPY);  Surgeon: Ritesh Gaytan MD;  Location: [facility];  Service: Gastroenterology;  Laterality: N/A;    LAPAROSCOPIC CHOLECYSTECTOMY N/A 2021    Procedure: CHOLECYSTECTOMY, LAPAROSCOPIC;  Surgeon: Pascual Lu MD;  Location: [facility];  Service: General;  Laterality: N/A;    TUBAL LIGATION  2015       Family History  Family History   Problem Relation Name Age of Onset    Fibromyalgia Mother      Depression Mother      Anuerysm Father      Cancer Father          skin    Deep vein thrombosis Father      Hypertension Father      Bipolar disorder Sister      Ovarian cancer Maternal Aunt      Bladder Cancer Maternal Aunt      Anuerysm Paternal Aunt          AAA    Breast cancer Maternal Grandmother      Diabetes Paternal Grandmother      Anuerysm Paternal Grandfather          AAA    Heart disease Brother      Colon cancer Neg Hx      Stomach cancer Neg Hx      Esophageal cancer Neg Hx         Social History  Social History     Socioeconomic History    Marital status:     Number of children: 2   Tobacco Use    Smoking status: Former     Current packs/day: 0.50     Types: Cigarettes    Smokeless tobacco: Former     Quit date: 2021   Substance and  Sexual Activity    Alcohol use: Not Currently     Comment: 1-2 times a year    Drug use: No    Sexual activity: Yes     Partners: Male     Birth control/protection: None     Social Drivers of Health     Financial Resource Strain: Low Risk  (3/28/2025)    Overall Financial Resource Strain (CARDIA)     Difficulty of Paying Living Expenses: Not hard at all   Food Insecurity: No Food Insecurity (3/28/2025)    Hunger Vital Sign     Worried About Running Out of Food in the Last Year: Never true     Ran Out of Food in the Last Year: Never true   Transportation Needs: No Transportation Needs (3/28/2025)    PRAPARE - Transportation     Lack of Transportation (Medical): No     Lack of Transportation (Non-Medical): No   Physical Activity: Sufficiently Active (3/28/2025)    Exercise Vital Sign     Days of Exercise per Week: 5 days     Minutes of Exercise per Session: 30 min   Stress: No Stress Concern Present (3/28/2025)    [employer] - Occupational Stress Questionnaire     Feeling of Stress : Not at all   Housing Stability: Low Risk  (3/28/2025)    Housing Stability Vital Sign     Unable to Pay for Housing in the Last Year: No     Number of Times Moved in the Last Year: 0     Homeless in the Last Year: No        Objective:        Vitals:    06/24/25 1435   BP: 122/81   Pulse: 80   Resp: 17   Temp: 97.5 °F (36.4 °C)               Body mass index is 41.75 kg/m².    6/24/25  Constitutional:   She appears well-developed and well-nourished. She is well groomed.     Neurological Exam:  General: well-developed, well-nourished, no distress  Mental status: Awake and alert  Speech language: No dysarthria or aphasia on conversation  Cranial nerves: Face symmetric  Motor: Moves all extremities well  Coordination: No ataxia. No tremor.      Data Review:     I have personally reviewed the referring provider's notes, labs, & imaging made available to me today.      RADIOLOGY STUDIES:  I have personally reviewed  the pertinent images performed.       Results for orders placed or performed during the hospital encounter of 06/25/13   MRA Brain without contrast    Narrative    Routine  imaging through this 32-year-old females brain was obtained per the MRA protocol.  A survey flair weighted sequence and T2 weighted sequence were also available.    The anterior cerebral arteries, anterior communicating artery, middle cerebral arteries, and posterior cerebral arteries proximally demonstrate no evidence of aneurysm or stenosis.      The left vertebral artery appears dominant in comparison with the right.      The P1 segment on the left appears hypoplastic with the majority of the blood flow coming from the posterior communicating artery on the left.  The posterior communicating artery on the right appears hypoplastic with a majority of the blood flow coming   from the P1 segment.      The A1 segment on the right appears hypoplastic with the majority of the blood flow to the anterior cerebral arteries likely coming from the left A1 segment.    The ventricles and sulci appear age appropriate.  Mild mucous membrane thickening is noted within the maxillary sinuses bilaterally.    Impression     1.  No obvious evidence of aneurysm or stenosis is noted on this MRI of the brain.      2.  Mild mucous membrane thickening is noted within the maxillary sinuses bilaterally.      Electronically signed by: Philippe Hayden MD  Date:     06/25/13  Time:    16:50        Lab Results   Component Value Date    BNP 21 11/26/2018     05/28/2025     09/27/2024    K 3.7 05/28/2025    K 4.0 09/27/2024    MG 2.3 09/27/2024     05/28/2025     09/27/2024    CO2 22 (L) 05/28/2025    CO2 17 (L) 09/27/2024    BUN 15 05/28/2025    CREATININE 0.9 05/28/2025    GLU 93 05/28/2025    GLU 97 09/27/2024    HGBA1C 5.4 07/09/2024    AST 21 05/28/2025    AST 22 09/27/2024    ALT 21 05/28/2025    ALT 16 09/27/2024    ALBUMIN 4.4 05/28/2025    ALBUMIN  3.5 09/27/2024    PROT 8.0 05/28/2025    PROT 7.1 09/27/2024    BILITOT 0.2 05/28/2025    BILITOT 0.4 09/27/2024    CHOL 172 04/24/2025    CHOL 172 08/07/2020    CHOL 144 11/24/2018    HDL 40 04/24/2025    LDLCALC 109.6 04/24/2025    TRIG 112 04/24/2025    TRIG 36 08/07/2020    TRIG 79 11/24/2018       Lab Results   Component Value Date    WBC 4.84 04/24/2025    HGB 13.7 04/24/2025    HCT 43.2 04/24/2025    MCV 90 04/24/2025     04/24/2025       Lab Results   Component Value Date    TSH 0.980 04/24/2025           Assessment & Plan:       Problem List Items Addressed This Visit          Neuro    Intractable chronic migraine without aura and without status migrainosus - Primary    Overview   Migraine headaches since her 20's. Headaches are typically unilateral, moderate to severe in intensity, worsen with activity, pounding in quality and associated with sensitivity to light and sound. Gradual progression pattern, lack of red flag features on history, and normal neurological exam are reassuring for primary as opposed to secondary etiology of headaches thus imaging will not be pursued for this history and this exam at this time.    She was only able to get the loading dose of Emgality. She has been on Qulipta with minimal improvement.    The patient has chronic migraines ( G43.719) and suffers from headaches more than 3 months, more than 15 days of headache days per month lasting more than 4 hours with at least 8 attacks that meet criteria for migraine. She has tried multiple medications including but not limited to sertraline, nifedipine, amitriptyline, Celexa, zonisamide, Depakote, Requip, Buspar, Topamax, Emgality   The patient has been unresponsive and refractory.The patient meets criteria for chronic headaches according to the ICHD-II, the patient has more than 15 headaches a month which last for more than 4 hours a day. The patient is an ideal candidate for Botox. After treatment, I expect 50%  improvement  in the patient's symptoms. A reduction of at least 7 days per month and the number of cumulative hours suffering with headaches as well as at least 100 total hours affected with migraine per month.  DESCRIPTION OF PROCEDURE: After obtaining informed consent and under aseptic technique, a total of 155 units of botulinum toxin type A to be injected in the following muscles:      -- Procerus 5 units  --  5 units bilaterally  -- Frontalis 20 units  -- Temporalis 20 units bilaterally  -- Occipitalis 15 units bilaterally  -- Upper cervical paraspinals 10 units bilaterally  -- Trapezius 15 units bilaterally.       Unavoidable waste 45 units    Continue Ubrelvy for acute attacks.          Current Assessment & Plan   We restarted Botox about six weeks ago. Discussed usual course and expectations. Continue current plan.          Relevant Medications    butalbital-acetaminophen-caffeine -40 mg (FIORICET, ESGIC) -40 mg per tablet                     Please call our clinic at [phone] or send a message on the [employer] portal if there are any changes to the plan described below, for example,if you are not contacted for the requested tests, referral(s) within one week, if you are unable to receive the medications prescribed, or if you feel you need to change the treatment course for any reason.     TESTING:  -- none     REFERRALS:  -- none    PREVENTION (use daily regardless of headache):  -- continue Qulipta once daily   -- continue Botox   -- continue magnesium in ONE of the following preparations -               1. Magnesium oxide 800mg daily (the most common over the counter kind, may causes loose stools)              2. Magnesium citrate 400-500mg daily (harder to find, but more neutral on the bowels)              3. Magnesium glycinate 400mg daily (hardest to find, look online, but most bowel-neutral, best absorbed)     AS-NEEDED TREATMENT (use total no more than 10 days per month unless otherwise  stated):  -- continue tizanidine at night. This is a muscle relaxer and it will also make you potentially sleepy. Start with half a tablet to see how you respond but can take up to a whole tablet if needed  -- continue Ubrelvy with next migraine. You can repeat two hours later if needed. With this medication do not drink grapefruit juice or eat grapefruit or some medications like ketoconazole, itraconazole, or antibiotics clarithromycin  -- can trial Fioricet for RESCUE if Ubrelvy not effective     Follow up in 7 weeks (on 8/14/2025) for botox.        Tricia Gerardo, NP

## 2025-07-10 ENCOUNTER — HOSPITAL ENCOUNTER (OUTPATIENT)
Dept: RADIOLOGY | Facility: HOSPITAL | Age: 45
Discharge: HOME | End: 2025-07-10
Attending: NURSE PRACTITIONER
Payer: COMMERCIAL

## 2025-07-10 ENCOUNTER — OFFICE VISIT (OUTPATIENT)
Dept: FAMILY MEDICINE | Facility: CLINIC | Age: 45
End: 2025-07-10
Payer: COMMERCIAL

## 2025-07-10 VITALS
BODY MASS INDEX: 41.83 KG/M2 | WEIGHT: 227.31 LBS | SYSTOLIC BLOOD PRESSURE: 120 MMHG | TEMPERATURE: 98 F | HEART RATE: 78 BPM | HEIGHT: 62 IN | DIASTOLIC BLOOD PRESSURE: 76 MMHG | OXYGEN SATURATION: 99 %

## 2025-07-10 DIAGNOSIS — Z12.31 ENCOUNTER FOR SCREENING MAMMOGRAM FOR MALIGNANT NEOPLASM OF BREAST: ICD-10-CM

## 2025-07-10 DIAGNOSIS — R20.0 NUMBNESS: ICD-10-CM

## 2025-07-10 DIAGNOSIS — G47.00 INSOMNIA, UNSPECIFIED TYPE: ICD-10-CM

## 2025-07-10 DIAGNOSIS — G47.33 OSA (OBSTRUCTIVE SLEEP APNEA): ICD-10-CM

## 2025-07-10 DIAGNOSIS — R20.0 NUMBNESS: Primary | ICD-10-CM

## 2025-07-10 PROCEDURE — 3078F DIAST BP <80 MM HG: CPT | Mod: CPTII,S$GLB,, | Performed by: NURSE PRACTITIONER

## 2025-07-10 PROCEDURE — 72074 X-RAY EXAM THORAC SPINE4/>VW: CPT | Mod: TC,FY,PO

## 2025-07-10 PROCEDURE — 77067 SCR MAMMO BI INCL CAD: CPT | Mod: 26,,, | Performed by: RADIOLOGY

## 2025-07-10 PROCEDURE — 3074F SYST BP LT 130 MM HG: CPT | Mod: CPTII,S$GLB,, | Performed by: NURSE PRACTITIONER

## 2025-07-10 PROCEDURE — 3008F BODY MASS INDEX DOCD: CPT | Mod: CPTII,S$GLB,, | Performed by: NURSE PRACTITIONER

## 2025-07-10 PROCEDURE — 99214 OFFICE O/P EST MOD 30 MIN: CPT | Mod: S$GLB,,, | Performed by: NURSE PRACTITIONER

## 2025-07-10 PROCEDURE — 77063 BREAST TOMOSYNTHESIS BI: CPT | Mod: 26,,, | Performed by: RADIOLOGY

## 2025-07-10 PROCEDURE — 77063 BREAST TOMOSYNTHESIS BI: CPT | Mod: TC,PO

## 2025-07-10 PROCEDURE — 1159F MED LIST DOCD IN RCRD: CPT | Mod: CPTII,S$GLB,, | Performed by: NURSE PRACTITIONER

## 2025-07-10 PROCEDURE — 99999 PR PBB SHADOW E&M-EST. PATIENT-LVL IV: CPT | Mod: PBBFAC,,, | Performed by: NURSE PRACTITIONER

## 2025-07-10 PROCEDURE — 72110 X-RAY EXAM L-2 SPINE 4/>VWS: CPT | Mod: 26,,, | Performed by: RADIOLOGY

## 2025-07-10 PROCEDURE — 72110 X-RAY EXAM L-2 SPINE 4/>VWS: CPT | Mod: TC,FY,PO

## 2025-07-10 PROCEDURE — 72074 X-RAY EXAM THORAC SPINE4/>VW: CPT | Mod: 26,,, | Performed by: RADIOLOGY

## 2025-07-10 RX ORDER — TRAZODONE HYDROCHLORIDE 100 MG/1
100 TABLET ORAL NIGHTLY
Qty: 30 TABLET | Refills: 11 | Status: SHIPPED | OUTPATIENT
Start: 2025-07-10 | End: 2026-07-10

## 2025-07-10 NOTE — PROGRESS NOTES
Subjective:       Patient ID: Eugenie Laguerre is a 44 y.o. female.    Chief Complaint: Numbness    HPI  Numbness to right lower back  Ongoing for months  Now occurring 2-3 times per day, lasts a few seconds to minutes   Denies rash, discoloration, swelling, tenderness or pain   Denies back pain, extremity pain or weakness, genital numbness, bowel or bladder dysfunction     Insomnia: 50mg trazodone no longer effective    Vitals:    07/10/25 1112   BP: 120/76   Pulse: 78   Temp: 98 °F (36.7 °C)     Review of Systems   Constitutional:  Negative for fever.   Respiratory:  Negative for cough and shortness of breath.    Cardiovascular:  Negative for chest pain.   Musculoskeletal:  Negative for back pain.   Neurological:  Positive for numbness. Negative for weakness.       Past Medical History:   Diagnosis Date    Anxiety 2023    Clotting disorder     DVT (deep venous thrombosis) 2007    patient does not have factor V leiden mutation - she had a negative test on 2013 and an office visit with Dr Canela (Heme/Onc) stating that her test was negative. Dr Canela ruled out antiphospholipid syndrome as well.  I see no evidence of any other hypercoagulable disorders being diagnosed. Pt had what sounds like a provoked LE DVT after a 16 hour car trip to [place], [place]    High risk pregnancy due to history of  labor 2015    Hyperthyroidism 2013    reports repeat testing was WNL and no treatment needed.    Iron deficiency anemia due to chronic blood loss 2015    Major depressive disorder, recurrent, moderate 2023    Migraines     Restless legs syndrome (RLS)     Short cervix affecting pregnancy 2015     Objective:      Physical Exam  Constitutional:       General: She is not in acute distress.     Appearance: She is well-developed. She is obese. She is not ill-appearing, toxic-appearing or diaphoretic.   HENT:      Right Ear: Hearing normal.      Left Ear: Hearing normal.   Pulmonary:       Effort: No tachypnea or respiratory distress.   Musculoskeletal:      Lumbar back: Normal.   Skin:     Coloration: Skin is not pale.      Findings: No ecchymosis, erythema or rash.   Neurological:      Mental Status: She is alert and oriented to person, place, and time.   Psychiatric:         Speech: Speech normal.         Behavior: Behavior normal.         Thought Content: Thought content normal.         Judgment: Judgment normal.         Assessment:       1. Numbness    2. Insomnia, unspecified type    3. MAURILIO (obstructive sleep apnea)        Plan:       Numbness  -     X-Ray Lumbar Spine 5 View; Future; Expected date: 07/10/2025  -     X-Ray Thoracic Spine 4 or more views; Future; Expected date: 07/10/2025    Insomnia, unspecified type  increase-     traZODone (DESYREL) 100 MG tablet; Take 1 tablet (100 mg total) by mouth every evening.  Dispense: 30 tablet; Refill: 11    MAURILIO (obstructive sleep apnea)   Scheduled for sleep study 1 month; continue management with pulmonology         education provided on supportive care, symptom monitoring and return precautions       Medication List with Changes/Refills   Current Medications    ASCORBIC ACID, VITAMIN C, 100 MG CHEW    Take 1 tablet (100 mg) by mouth every 48 hours. On empty stomach with Iron    ATOGEPANT (QULIPTA) 60 MG TAB    Take 1 tablet (60 mg total) by mouth once daily.    BUSPIRONE (BUSPAR) 5 MG TAB    Take 1 tablet (5 mg total) by mouth 2 (two) times daily.    BUTALBITAL-ACETAMINOPHEN-CAFFEINE -40 MG (FIORICET, ESGIC) -40 MG PER TABLET    Take 1 tablet by mouth once daily by mouth migraine. No more than 10 tab per month.    FERROUS SULFATE (FEOSOL) 325 MG (65 MG IRON) TAB TABLET    Take 1 tablet (325 mg total) by mouth every 48 hours. On empty stomach with vitamin c    FLUTICASONE PROPIONATE (FLONASE) 50 MCG/ACTUATION NASAL SPRAY    Spray 2 sprays (100 mcg total) by Each Nostril route daily as needed for Rhinitis.    LIDOCAINE (LIDODERM) 5 %     Place 1 patch onto the skin once daily. Remove & Discard patch within 12 hours or as directed by MD.    METHOCARBAMOL (ROBAXIN) 500 MG TAB    Take 1 tablet (500 mg total) by mouth 4 (four) times daily. WILL CAUSE DROWSINESS, SO PLEASE DO NOT DRIVE AND/OR OPERATE HEAVY MACHINERY WHILE TAKING THIS MEDICATION.    ONDANSETRON (ZOFRAN) 4 MG TABLET    Take 1 tablet (4 mg total) by mouth every 8 (eight) hours as needed for Nausea.    SERTRALINE (ZOLOFT) 100 MG TABLET    Take 1 tablet (100 mg total) by mouth once daily.    UBROGEPANT (UBRELVY) 100 MG TABLET    Take 1 tablet by mouth as needed for migraine. May repeat in 2 hours if needed. Max 2 tablet per day   Changed and/or Refilled Medications    Modified Medication Previous Medication    TRAZODONE (DESYREL) 100 MG TABLET traZODone (DESYREL) 50 MG tablet       Take 1 tablet (100 mg total) by mouth every evening.    Take 1 tablet (50 mg total) by mouth every evening.

## 2025-07-11 ENCOUNTER — TELEPHONE (OUTPATIENT)
Dept: FAMILY MEDICINE | Facility: CLINIC | Age: 45
End: 2025-07-11
Payer: COMMERCIAL

## 2025-07-11 NOTE — TELEPHONE ENCOUNTER
----- Message from Arlene Gomez NP sent at 7/11/2025 10:07 AM CDT -----  Schedule back and spine (DOMIN[facility])  ----- Message -----  From: Interface, Rad Results In  Sent: 7/10/2025  12:04 PM CDT  To: Arlene Gomez NP

## 2025-07-23 ENCOUNTER — OFFICE VISIT (OUTPATIENT)
Dept: OBSTETRICS AND GYNECOLOGY | Facility: CLINIC | Age: 45
End: 2025-07-23
Payer: COMMERCIAL

## 2025-07-23 VITALS
DIASTOLIC BLOOD PRESSURE: 80 MMHG | HEIGHT: 62 IN | WEIGHT: 226.44 LBS | BODY MASS INDEX: 41.67 KG/M2 | SYSTOLIC BLOOD PRESSURE: 106 MMHG

## 2025-07-23 DIAGNOSIS — Z01.419 ROUTINE GYNECOLOGICAL EXAMINATION: Primary | ICD-10-CM

## 2025-07-23 DIAGNOSIS — Z78.0 MENOPAUSE: ICD-10-CM

## 2025-07-23 DIAGNOSIS — F41.1 GENERALIZED ANXIETY DISORDER: ICD-10-CM

## 2025-07-23 PROCEDURE — 99396 PREV VISIT EST AGE 40-64: CPT | Mod: S$GLB,,, | Performed by: OBSTETRICS & GYNECOLOGY

## 2025-07-23 PROCEDURE — 3079F DIAST BP 80-89 MM HG: CPT | Mod: CPTII,S$GLB,, | Performed by: OBSTETRICS & GYNECOLOGY

## 2025-07-23 PROCEDURE — 3008F BODY MASS INDEX DOCD: CPT | Mod: CPTII,S$GLB,, | Performed by: OBSTETRICS & GYNECOLOGY

## 2025-07-23 PROCEDURE — 1159F MED LIST DOCD IN RCRD: CPT | Mod: CPTII,S$GLB,, | Performed by: OBSTETRICS & GYNECOLOGY

## 2025-07-23 PROCEDURE — 3074F SYST BP LT 130 MM HG: CPT | Mod: CPTII,S$GLB,, | Performed by: OBSTETRICS & GYNECOLOGY

## 2025-07-23 PROCEDURE — 99999 PR PBB SHADOW E&M-EST. PATIENT-LVL III: CPT | Mod: PBBFAC,,, | Performed by: OBSTETRICS & GYNECOLOGY

## 2025-07-23 NOTE — PROGRESS NOTES
Chief Complaint   Patient presents with    Well Woman       History of Present Illness: Eugenie Laguerre is a 44 y.o. female that presents today 2025 with No LMP recorded (lmp unknown). Patient is postmenopausal.  for well gyn visit.    Past Medical History:   Diagnosis Date    Anxiety 2023    Clotting disorder     DVT (deep venous thrombosis) 2007    patient does not have factor V leiden mutation - she had a negative test on 2013 and an office visit with Dr Canela ([facility]/Onc) stating that her test was negative. Dr Canela ruled out antiphospholipid syndrome as well.  I see no evidence of any other hypercoagulable disorders being diagnosed. Pt had what sounds like a provoked LE DVT after a 16 hour car trip to [place], [place]    High risk pregnancy due to history of  labor 2015    Hyperthyroidism 2013    reports repeat testing was WNL and no treatment needed.    Iron deficiency anemia due to chronic blood loss 2015    Major depressive disorder, recurrent, moderate 2023    Migraines     Restless legs syndrome (RLS)     Short cervix affecting pregnancy 2015       Past Surgical History:   Procedure Laterality Date    2 R knee sx; 1 L knee      arthroscopies    BRONCHOSCOPY       SECTION  2015    ESOPHAGOGASTRODUODENOSCOPY N/A 2024    Procedure: EGD (ESOPHAGOGASTRODUODENOSCOPY);  Surgeon: Ritesh Gaytan MD;  Location: [facility];  Service: Gastroenterology;  Laterality: N/A;    LAPAROSCOPIC CHOLECYSTECTOMY N/A 2021    Procedure: CHOLECYSTECTOMY, LAPAROSCOPIC;  Surgeon: Pascual Lu MD;  Location: [facility] OR;  Service: General;  Laterality: N/A;    TUBAL LIGATION  2015       Medications Prior to Visit[1]    Review of patient's allergies indicates:   Allergen Reactions    Amoxicillin     Augmentin [amoxicillin-pot clavulanate] Swelling    Bactrim [sulfamethoxazole-trimethoprim] Itching and Nausea Only    Ropinirole Rash       Family  History   Problem Relation Name Age of Onset    Fibromyalgia Mother      Depression Mother      Anuerysm Father      Cancer Father          skin    Deep vein thrombosis Father      Hypertension Father      Bipolar disorder Sister      Ovarian cancer Maternal Aunt      Bladder Cancer Maternal Aunt      Anuerysm Paternal Aunt          AAA    Breast cancer Maternal Grandmother      Diabetes Paternal Grandmother      Anuerysm Paternal Grandfather          AAA    Heart disease Brother      Colon cancer Neg Hx      Stomach cancer Neg Hx      Esophageal cancer Neg Hx         Social History     Tobacco Use    Smoking status: Former     Current packs/day: 0.50     Types: Cigarettes    Smokeless tobacco: Former     Quit date: 2021   Substance Use Topics    Alcohol use: Not Currently     Comment: 1-2 times a year       Social History     Substance and Sexual Activity   Sexual Activity Yes    Partners: Male    Birth control/protection: None       OB History    Para Term  AB Living   4 4 3 1  3   SAB IAB Ectopic Multiple Live Births       3      # Outcome Date GA Lbr [place]/2nd Weight Sex Type Anes PTL Lv   4 Term 07/30/15 39w0d  3.728 kg (8 lb 3.5 oz) F CS-LTranv EPI N JANNET   3 Term 12/15/12 37w0d  3.204 kg (7 lb 1 oz) F Vag-Spont EPI N JANNET      Birth Comments: System Generated. Please review and update pregnancy details.   2  10/06/08 25w0d  0.822 kg (1 lb 13 oz) F Vag-Spont None Y JANNET      Birth Comments: delivered at 25 IUP, PPROM on a friday with labor on monday.   1 Term                Review of Symptoms:  GENERAL: Denies weight gain or weight loss. Feeling well overall.   SKIN: Denies rash or lesions.   HEAD: Denies head injury or headache.   NODES: Denies enlarged lymph nodes.   CHEST: Denies chest pain or shortness of breath.   CARDIOVASCULAR: Denies palpitations or left sided chest pain.   ABDOMEN: No abdominal pain, constipation, diarrhea, nausea, vomiting or rectal bleeding.   URINARY: No  "frequency, dysuria, hematuria, or burning on urination.  HEMATOLOGIC: No easy bruisability or excessive bleeding.   MUSCULOSKELETAL: Denies joint pain or swelling.     /80   Ht 5' 2" (1.575 m)   Wt 102.7 kg (226 lb 6.6 oz)   LMP  (LMP Unknown)   Body mass index is 41.41 kg/m².      Physical Exam:  APPEARANCE: Well nourished, well developed, in no acute distress.  SKIN: Normal skin turgor, no lesions.  NECK: Neck symmetric without masses   RESPIRATORY: Normal respiratory effort with no retractions or use of accessory muscles  CARDIOVASCULAR: Peripheral vascular system with no swelling no varicosities and palpation of pulses normal  LYMPHATIC: No enlargements of the lymph nodes noted in the neck, axillae, or groin  ABDOMEN: Soft. No tenderness or masses. No hepatosplenomegaly. No hernias.  BREASTS: Symmetrical, no skin changes or visible lesions. No palpable masses, nipple discharge or adenopathy bilaterally.  PELVIC: Normal external female genitalia without lesions. Normal hair distribution. Adequate perineal body, normal urethral meatus. Urethra with no masses.  Bladder nontender. Vagina moist and well rugated without lesions or discharge. Cervix pink and without lesions. No significant cystocele or rectocele. Bimanual exam showed uterus normal size, shape, position, mobile and nontender. Adnexa without masses or tenderness. Urethra and bladder normal.   EXTREMITIES: No clubbing cyanosis or edema.    ASSESSMENT/PLAN:  Routine gynecological examination    Menopause    Generalized anxiety disorder          Patient was counseled today on Pelvic exams and Pap Smear guidelines.   We discussed STD screening if at high risk for a STD.  We discussed recommendation for breast cancer screening with mammogram every other year after the age of 40 and annually after the age of 50.    We discussed colon cancer screening when indicated.   Osteoporosis screening discussed when indicated.   She was advised to see her " primary care physician for all other health maintenance.     FOLLOW-UP with me for next routine visit.          [1]   Outpatient Medications Prior to Visit   Medication Sig Dispense Refill    ascorbic acid, vitamin C, 100 mg Chew Take 1 tablet (100 mg) by mouth every 48 hours. On empty stomach with Iron 45 tablet 0    atogepant (QULIPTA) 60 mg Tab Take 1 tablet (60 mg total) by mouth once daily. 30 tablet 11    busPIRone (BUSPAR) 5 MG Tab Take 1 tablet (5 mg total) by mouth 2 (two) times daily. 60 tablet 11    butalbital-acetaminophen-caffeine -40 mg (FIORICET, ESGIC) -40 mg per tablet Take 1 tablet by mouth once daily by mouth migraine. No more than 10 tab per month. 10 tablet 3    ferrous sulfate (FEOSOL) 325 mg (65 mg iron) Tab tablet Take 1 tablet (325 mg total) by mouth every 48 hours. On empty stomach with vitamin c 45 tablet 0    fluticasone propionate (FLONASE) 50 mcg/actuation nasal spray Spray 2 sprays (100 mcg total) by Each Nostril route daily as needed for Rhinitis. 16 g 0    methocarbamoL (ROBAXIN) 500 MG Tab Take 1 tablet (500 mg total) by mouth 4 (four) times daily. WILL CAUSE DROWSINESS, SO PLEASE DO NOT DRIVE AND/OR OPERATE HEAVY MACHINERY WHILE TAKING THIS MEDICATION. 40 tablet 0    ondansetron (ZOFRAN) 4 MG tablet Take 1 tablet (4 mg total) by mouth every 8 (eight) hours as needed for Nausea. 60 tablet 2    sertraline (ZOLOFT) 100 MG tablet Take 1 tablet (100 mg total) by mouth once daily. 30 tablet 11    traZODone (DESYREL) 100 MG tablet Take 1 tablet (100 mg total) by mouth every evening. 30 tablet 11    ubrogepant (UBRELVY) 100 mg tablet Take 1 tablet by mouth as needed for migraine. May repeat in 2 hours if needed. Max 2 tablet per day 8 tablet 11    LIDOcaine (LIDODERM) 5 % Place 1 patch onto the skin once daily. Remove & Discard patch within 12 hours or as directed by MD. (Patient not taking: Reported on 7/23/2025) 30 patch 0     Facility-Administered Medications Prior to Visit    Medication Dose Route Frequency Provider Last Rate Last Admin    onabotulinumtoxina injection 200 Units  200 Units Intramuscular q12 weeks    200 Units at 05/12/25 2433

## 2025-08-14 ENCOUNTER — PROCEDURE VISIT (OUTPATIENT)
Dept: NEUROLOGY | Facility: CLINIC | Age: 45
End: 2025-08-14
Payer: COMMERCIAL

## 2025-08-14 VITALS
TEMPERATURE: 98 F | SYSTOLIC BLOOD PRESSURE: 121 MMHG | RESPIRATION RATE: 17 BRPM | HEIGHT: 62 IN | HEART RATE: 89 BPM | DIASTOLIC BLOOD PRESSURE: 74 MMHG | BODY MASS INDEX: 41.67 KG/M2 | WEIGHT: 226.44 LBS

## 2025-08-14 DIAGNOSIS — G43.719 INTRACTABLE CHRONIC MIGRAINE WITHOUT AURA AND WITHOUT STATUS MIGRAINOSUS: Primary | ICD-10-CM

## (undated) DEVICE — TROCAR ENDOPATH XCEL 11MM 10CM

## (undated) DEVICE — TROCAR ENDOPATH XCEL 5X75MM

## (undated) DEVICE — SUT MONOCYRL 4-0 PS2 UND

## (undated) DEVICE — TROCAR KII FIOS 5MM X 100MM

## (undated) DEVICE — SEE MEDLINE ITEM 157128

## (undated) DEVICE — TUBING HEATED INSUFFLATOR

## (undated) DEVICE — NEPTUNE 4 PORT MANIFOLD

## (undated) DEVICE — CLOSURE SKIN STERI STRIP 1/2X4

## (undated) DEVICE — BANDAGE ADHESIVE

## (undated) DEVICE — SCISSOR 5MMX35CM DIRECT DRIVE

## (undated) DEVICE — BLADE SURG CARBON STEEL SZ11

## (undated) DEVICE — NDL INSUF ULTRA VERESS 120MM

## (undated) DEVICE — SUT 0 VICRYL / UR6 (J603)

## (undated) DEVICE — ELECTRODE REM PLYHSV RETURN 9

## (undated) DEVICE — APPLICATOR CHLORAPREP ORN 26ML

## (undated) DEVICE — APPLIER CLIP EPIX UNIV 5X34

## (undated) DEVICE — SEE MEDLINE ITEM 152622

## (undated) DEVICE — BAG TISS RETRV MONARCH 10MM

## (undated) DEVICE — SEE L#120831

## (undated) DEVICE — GLOVE SURG BIOGEL LATEX SZ 7.5

## (undated) DEVICE — SOL IRR NACL .9% 3000ML

## (undated) DEVICE — KIT ANTIFOG

## (undated) DEVICE — DRAPE ABDOMINAL TIBURON 14X11

## (undated) DEVICE — IRRIGATOR ENDOSCOPY DISP.

## (undated) DEVICE — SYR 10CC LUER LOCK

## (undated) DEVICE — TROCAR ENDOPATH XCEL 5MM 7.5CM

## (undated) DEVICE — Device